# Patient Record
Sex: MALE | Race: WHITE | NOT HISPANIC OR LATINO | Employment: FULL TIME | URBAN - METROPOLITAN AREA
[De-identification: names, ages, dates, MRNs, and addresses within clinical notes are randomized per-mention and may not be internally consistent; named-entity substitution may affect disease eponyms.]

---

## 2017-01-03 ENCOUNTER — PATIENT MESSAGE (OUTPATIENT)
Dept: OPHTHALMOLOGY | Facility: CLINIC | Age: 57
End: 2017-01-03

## 2017-01-05 ENCOUNTER — TELEPHONE (OUTPATIENT)
Dept: OPTOMETRY | Facility: CLINIC | Age: 57
End: 2017-01-05

## 2017-01-06 ENCOUNTER — TELEPHONE (OUTPATIENT)
Dept: OPHTHALMOLOGY | Facility: CLINIC | Age: 57
End: 2017-01-06

## 2017-01-06 NOTE — TELEPHONE ENCOUNTER
I spoke to patient.  He had several questions regarding his upcoming surgery. Including no food and drink except water/gatorade after 9pm Sunday.  NO COFFEE.  Arrival time 8:30am.

## 2017-01-06 NOTE — TELEPHONE ENCOUNTER
----- Message from Cynthia Cash sent at 1/5/2017  2:55 PM CST -----  Contact: Patient  Patient has some questions about upcoming surgery

## 2017-01-09 ENCOUNTER — ANESTHESIA (OUTPATIENT)
Dept: SURGERY | Facility: OTHER | Age: 57
End: 2017-01-09
Payer: COMMERCIAL

## 2017-01-09 ENCOUNTER — ANESTHESIA EVENT (OUTPATIENT)
Dept: SURGERY | Facility: OTHER | Age: 57
End: 2017-01-09
Payer: COMMERCIAL

## 2017-01-09 PROBLEM — H25.10 NUCLEAR SCLEROSIS: Status: ACTIVE | Noted: 2017-01-09

## 2017-01-09 PROCEDURE — 63600175 PHARM REV CODE 636 W HCPCS: Performed by: NURSE ANESTHETIST, CERTIFIED REGISTERED

## 2017-01-09 RX ORDER — MIDAZOLAM HYDROCHLORIDE 1 MG/ML
INJECTION INTRAMUSCULAR; INTRAVENOUS
Status: DISCONTINUED | OUTPATIENT
Start: 2017-01-09 | End: 2017-01-09

## 2017-01-09 RX ADMIN — MIDAZOLAM HYDROCHLORIDE 2 MG: 1 INJECTION, SOLUTION INTRAMUSCULAR; INTRAVENOUS at 11:01

## 2017-01-09 NOTE — ANESTHESIA PREPROCEDURE EVALUATION
01/09/2017  Adam Dalal is a 56 y.o., male.    [facility] Anesthesia Evaluation    I have reviewed the Patient Summary Reports.    I have reviewed the Nursing Notes.   I have reviewed the Medications.     Review of Systems  Anesthesia Hx:  No problems with previous Anesthesia    Social:  Former Smoker    Cardiovascular:   Exercise tolerance: good    Pulmonary:   Sleep Apnea    Hepatic/GI:  Hepatic/GI Normal    Psych:   anxiety          Physical Exam  General:  Well nourished                 Anesthesia Plan  Type of Anesthesia, risks & benefits discussed:  Anesthesia Type:  MAC  Patient's Preference:   Intra-op Monitoring Plan:   Intra-op Monitoring Plan Comments:   Post Op Pain Control Plan:   Post Op Pain Control Plan Comments:   Induction:   IV  Beta Blocker:         Informed Consent: Patient understands risks and agrees with Anesthesia plan.  Questions answered. Anesthesia consent signed with patient.  ASA Score: 2     Day of Surgery Review of History & Physical:    H&P update referred to the surgeon.         Ready For Surgery From Anesthesia Perspective.

## 2017-01-09 NOTE — ANESTHESIA POSTPROCEDURE EVALUATION
"Anesthesia Post Evaluation    Patient: Adam Dalal    Procedure(s) Performed: Procedure(s) (LRB):  PHACOEMULSIFICATION-ASPIRATION-CATARACT (Right)  INSERTION-INTRAOCULAR LENS (IOL) (Right)    Final Anesthesia Type: MAC  Patient location during evaluation: [facility]  Patient participation: Yes- Able to Participate  Level of consciousness: awake and alert  Post-procedure vital signs: reviewed and stable  Pain management: adequate  Airway patency: patent  PONV status at discharge: No PONV  Anesthetic complications: no      Cardiovascular status: blood pressure returned to baseline  Respiratory status: unassisted  Hydration status: euvolemic  Follow-up not needed.        Visit Vitals    /81 (BP Location: Left arm, Patient Position: Lying, BP Method: Automatic)    Pulse 64    Temp 36.5 °C (97.7 °F) (Oral)    Resp 17    Ht 6' 1" (1.854 m)    Wt 99.8 kg (220 lb)    SpO2 99%    BMI 29.03 kg/m2       Pain/Bárbara Score: Pain Assessment Performed: Yes (1/9/2017  9:16 AM)  Presence of Pain: denies (1/9/2017  9:16 AM)      "

## 2017-01-10 ENCOUNTER — OFFICE VISIT (OUTPATIENT)
Dept: OPHTHALMOLOGY | Facility: CLINIC | Age: 57
End: 2017-01-10
Attending: OPHTHALMOLOGY
Payer: COMMERCIAL

## 2017-01-10 DIAGNOSIS — Z98.890 POST-OPERATIVE STATE: Primary | ICD-10-CM

## 2017-01-10 DIAGNOSIS — H25.11 NUCLEAR SCLEROTIC CATARACT OF RIGHT EYE: ICD-10-CM

## 2017-01-10 PROCEDURE — 99024 POSTOP FOLLOW-UP VISIT: CPT | Mod: S$GLB,,, | Performed by: OPHTHALMOLOGY

## 2017-01-10 PROCEDURE — 99999 PR PBB SHADOW E&M-EST. PATIENT-LVL I: CPT | Mod: PBBFAC,,, | Performed by: OPHTHALMOLOGY

## 2017-01-17 ENCOUNTER — OFFICE VISIT (OUTPATIENT)
Dept: OPHTHALMOLOGY | Facility: CLINIC | Age: 57
End: 2017-01-17
Attending: OPHTHALMOLOGY
Payer: COMMERCIAL

## 2017-01-17 DIAGNOSIS — Z98.890 POST-OPERATIVE STATE: Primary | ICD-10-CM

## 2017-01-17 DIAGNOSIS — H25.13 NUCLEAR SCLEROSIS, BILATERAL: ICD-10-CM

## 2017-01-17 PROCEDURE — 99024 POSTOP FOLLOW-UP VISIT: CPT | Mod: S$GLB,,, | Performed by: OPHTHALMOLOGY

## 2017-01-17 PROCEDURE — 99999 PR PBB SHADOW E&M-EST. PATIENT-LVL I: CPT | Mod: PBBFAC,,, | Performed by: OPHTHALMOLOGY

## 2017-01-17 RX ORDER — TETRACAINE HYDROCHLORIDE 5 MG/ML
1 SOLUTION OPHTHALMIC
Status: CANCELLED | OUTPATIENT
Start: 2017-01-17

## 2017-01-17 RX ORDER — TROPICAMIDE 10 MG/ML
1 SOLUTION/ DROPS OPHTHALMIC
Status: CANCELLED | OUTPATIENT
Start: 2017-01-17

## 2017-01-17 RX ORDER — LIDOCAINE HYDROCHLORIDE 10 MG/ML
1 INJECTION, SOLUTION EPIDURAL; INFILTRATION; INTRACAUDAL; PERINEURAL ONCE
Status: CANCELLED | OUTPATIENT
Start: 2017-01-17 | End: 2017-01-17

## 2017-01-17 RX ORDER — PHENYLEPHRINE HYDROCHLORIDE 25 MG/ML
1 SOLUTION/ DROPS OPHTHALMIC
Status: CANCELLED | OUTPATIENT
Start: 2017-01-17

## 2017-01-17 RX ORDER — DORZOLAMIDE HYDROCHLORIDE AND TIMOLOL MALEATE 20; 5 MG/ML; MG/ML
1 SOLUTION/ DROPS OPHTHALMIC 2 TIMES DAILY
Qty: 10 ML | Refills: 3 | Status: SHIPPED | OUTPATIENT
Start: 2017-01-17 | End: 2018-06-25

## 2017-01-17 RX ORDER — MOXIFLOXACIN 5 MG/ML
1 SOLUTION/ DROPS OPHTHALMIC
Status: CANCELLED | OUTPATIENT
Start: 2017-01-17

## 2017-01-17 NOTE — PROGRESS NOTES
HPI     S/P  CE with IOL OD 01/09/2017 ([facility] -.5-.75)    Pt states he is seeing lots of rings in VA OD in all types of lighting   conditions. His distance VA OD is a little blurred. His OS at distance is   still the clearest and he is a little unsure if that was to be the desired   outcome.     PGN tid OD       Last edited by Blank Hebert on 1/17/2017  2:02 PM.         Assessment /Plan     For exam results, see Encounter Report.    Post-operative state    Nuclear sclerosis, bilateral    Other orders  -     dorzolamide-timolol 2-0.5% (COSOPT) 22.3-6.8 mg/mL ophthalmic solution; Place 1 drop into both eyes 2 (two) times daily.  Dispense: 10 mL; Refill: 3    Slit lamp exam:  L/L: nl  K: clear, wound sealed  AC: trace cell  Iris/Lens: IOL centered and stable    POW1 s/p phaco: Surgery healing well with no signs of infection or abnormal inflammation.    Patient wishes to proceed with surgery in the second eye. Risks, benefits, alternatives reviewed. IOL selection reviewed.     Left eye  IOL: [id number] 16.0, 16.5, 17.0, 17.5 (plano)    The patient expresses a desire to reduce spectacle dependence. I reviewed various IOL and LASER refractive surgical options and we will attempt to minimize spectacle dependence by managing astigmatism and optimizing IOL selection. Femtosecond LASER assisted cataract surgery (FLACS) technology was explained to the patient with educational videos and discussion.  The patient voices understanding and wishes to implement this technology during the cataract procedure.  I explained the increased precision of the LASER versus manual techniques, especially as it relates to astigmatism reduction with arcuate incisions.  I emphasized that although our goal is to reduce the need for refractive correction after surgery, there may still be a need for spectacle correction to achieve optimal visual acuity, and that a reasonable range of functional vision should be the expectation.  No guarantees  are made about post operative refraction or visual acuity, as the eye may heal in unpredictable ways, and the standard risks, benefits, and alternatives to cataract surgery were explained.  The patient understands that the refractive portions of this cataract procedure are not covered by insurance, and that there is an out of pocket expense of $2250 per eye. I also explained that even though our pre-operative plan is to utilize advanced refractive technologies during surgery, that I may decide to eliminate part or all of this plan if surgical challenges or complications arise, or I feel that it is not in the patient's best interest. Consent forms and an ABN form were given to the patient to review.    Catalys Parameters:    Left Eye:   ANTHONY:  12mm   ?Need to sachin patient sitting up?: Yes  Capsulotomy: Scanned Capsule  stGstrstastdstest:st st1st Arcuate: Toric Sachin: at  Axis: 105   Incisions:  OFF

## 2017-01-26 ENCOUNTER — PATIENT MESSAGE (OUTPATIENT)
Dept: OPHTHALMOLOGY | Facility: CLINIC | Age: 57
End: 2017-01-26

## 2017-01-26 ENCOUNTER — TELEPHONE (OUTPATIENT)
Dept: OPTOMETRY | Facility: CLINIC | Age: 57
End: 2017-01-26

## 2017-01-30 ENCOUNTER — ANESTHESIA (OUTPATIENT)
Dept: SURGERY | Facility: OTHER | Age: 57
End: 2017-01-30
Payer: COMMERCIAL

## 2017-01-30 ENCOUNTER — ANESTHESIA EVENT (OUTPATIENT)
Dept: SURGERY | Facility: OTHER | Age: 57
End: 2017-01-30
Payer: COMMERCIAL

## 2017-01-30 PROBLEM — H25.12 NUCLEAR SCLEROTIC CATARACT OF LEFT EYE: Status: ACTIVE | Noted: 2017-01-30

## 2017-01-30 PROBLEM — Z98.890 POST-OPERATIVE STATE: Status: ACTIVE | Noted: 2017-01-30

## 2017-01-30 PROCEDURE — 63600175 PHARM REV CODE 636 W HCPCS: Performed by: NURSE ANESTHETIST, CERTIFIED REGISTERED

## 2017-01-30 RX ORDER — MIDAZOLAM HYDROCHLORIDE 1 MG/ML
INJECTION INTRAMUSCULAR; INTRAVENOUS
Status: DISCONTINUED | OUTPATIENT
Start: 2017-01-30 | End: 2017-01-30

## 2017-01-30 RX ADMIN — MIDAZOLAM HYDROCHLORIDE 2 MG: 1 INJECTION, SOLUTION INTRAMUSCULAR; INTRAVENOUS at 02:01

## 2017-01-30 RX ADMIN — MIDAZOLAM HYDROCHLORIDE 1 MG: 1 INJECTION, SOLUTION INTRAMUSCULAR; INTRAVENOUS at 02:01

## 2017-01-30 NOTE — ANESTHESIA PREPROCEDURE EVALUATION
01/30/2017  Adam Dalal is a 56 y.o., male.    [facility] Anesthesia Evaluation    I have reviewed the Patient Summary Reports.    I have reviewed the Nursing Notes.   I have reviewed the Medications.     Review of Systems  Anesthesia Hx:  No problems with previous Anesthesia    Social:  Former Smoker    Cardiovascular:   Exercise tolerance: good    Pulmonary:   Sleep Apnea    Hepatic/GI:  Hepatic/GI Normal    Psych:   anxiety          Physical Exam  General:  Well nourished                 Anesthesia Plan  Type of Anesthesia, risks & benefits discussed:  Anesthesia Type:  MAC  Patient's Preference:   Intra-op Monitoring Plan:   Intra-op Monitoring Plan Comments:   Post Op Pain Control Plan:   Post Op Pain Control Plan Comments:   Induction:   IV  Beta Blocker:         Informed Consent: Patient understands risks and agrees with Anesthesia plan.  Questions answered. Anesthesia consent signed with patient.  ASA Score: 2     Day of Surgery Review of History & Physical:    H&P update referred to the surgeon.     Anesthesia Plan Notes: Did well 3 weeks ago with cat ext        Ready For Surgery From Anesthesia Perspective.

## 2017-01-30 NOTE — ANESTHESIA POSTPROCEDURE EVALUATION
"Anesthesia Post Evaluation    Patient: Adam Dalal    Procedure(s) Performed: Procedure(s) (LRB):  PHACOEMULSIFICATION-ASPIRATION-CATARACT (Left)  INSERTION-INTRAOCULAR LENS (IOL) (Left)    Final Anesthesia Type: MAC  Patient location during evaluation: [facility]  Patient participation: Yes- Able to Participate  Level of consciousness: awake and alert  Post-procedure vital signs: reviewed and stable  Pain management: adequate  Airway patency: patent  PONV status at discharge: No PONV  Anesthetic complications: no      Cardiovascular status: blood pressure returned to baseline  Respiratory status: unassisted, spontaneous ventilation and room air  Hydration status: euvolemic  Follow-up not needed.        Visit Vitals    /78 (BP Location: Left arm, Patient Position: Sitting, BP Method: Automatic)    Pulse 63    Temp 36.6 °C (97.9 °F) (Oral)    Resp 18    Ht 6' 1" (1.854 m)    Wt 99.8 kg (220 lb)    SpO2 97%    BMI 29.03 kg/m2       Pain/Bárbara Score: Presence of Pain: denies (1/30/2017 12:29 PM)      "

## 2017-01-31 ENCOUNTER — OFFICE VISIT (OUTPATIENT)
Dept: OPHTHALMOLOGY | Facility: CLINIC | Age: 57
End: 2017-01-31
Attending: OPHTHALMOLOGY
Payer: COMMERCIAL

## 2017-01-31 DIAGNOSIS — Z98.890 POST-OPERATIVE STATE: Primary | ICD-10-CM

## 2017-01-31 DIAGNOSIS — H25.12 NUCLEAR SCLEROTIC CATARACT OF LEFT EYE: ICD-10-CM

## 2017-01-31 DIAGNOSIS — H40.053 OCULAR HYPERTENSION, BILATERAL: ICD-10-CM

## 2017-01-31 PROCEDURE — 99999 PR PBB SHADOW E&M-EST. PATIENT-LVL I: CPT | Mod: PBBFAC,,, | Performed by: OPHTHALMOLOGY

## 2017-01-31 PROCEDURE — 99024 POSTOP FOLLOW-UP VISIT: CPT | Mod: S$GLB,,, | Performed by: OPHTHALMOLOGY

## 2017-01-31 NOTE — PROGRESS NOTES
HPI     Post-op Evaluation    Additional comments: 1 day           Comments   S/P Phaco w/IOL OS 1/30/17 Dr. Marin  ([facility])    Pt states doing great .  Could read billboards, even the small print on   them while riding in this AM. Was also able to read the newspaper without   his glasses this AM for the 1st time ever.     Eye meds: P/G/N  TID         Last edited by lBank Hebert on 1/31/2017  9:37 AM. (History)            Assessment /Plan     For exam results, see Encounter Report.    Post-operative state    Nuclear sclerotic cataract of left eye    Ocular hypertension, bilateral      Slit lamp exam:  L/L: nl  K: clear, wound sealed  AC: 1+ cell  Lens: IOL centered and stable    POD1 s/p Phaco/IOL  Appropriate precautions and post op medications reviewed.  Patient instructed to call or come in if symptoms of redness, decreased vision, or pain are experienced.

## 2017-02-14 RX ORDER — ALPRAZOLAM 0.5 MG/1
TABLET ORAL
Qty: 30 TABLET | Refills: 0 | Status: SHIPPED | OUTPATIENT
Start: 2017-02-14 | End: 2017-08-09 | Stop reason: SDUPTHER

## 2017-02-22 ENCOUNTER — PATIENT MESSAGE (OUTPATIENT)
Dept: OPHTHALMOLOGY | Facility: CLINIC | Age: 57
End: 2017-02-22

## 2017-02-22 ENCOUNTER — TELEPHONE (OUTPATIENT)
Dept: OPHTHALMOLOGY | Facility: CLINIC | Age: 57
End: 2017-02-22

## 2017-02-22 NOTE — TELEPHONE ENCOUNTER
----- Message from Clayton Ruffin sent at 2/22/2017  1:09 PM CST -----  Contact: Adam Dalal [[id number]]  Pt states that he lost one of the bottles of his drops he was advised to take post Sx for a month,pt wants to know if more needs to be or is he ok with what he has left for the next couple of days, please call back at [phone],thanks

## 2017-03-03 ENCOUNTER — OFFICE VISIT (OUTPATIENT)
Dept: OPTOMETRY | Facility: CLINIC | Age: 57
End: 2017-03-03
Payer: COMMERCIAL

## 2017-03-03 DIAGNOSIS — H40.053 OCULAR HYPERTENSION, BILATERAL: ICD-10-CM

## 2017-03-03 DIAGNOSIS — Z98.41 S/P BILATERAL CATARACT EXTRACTION: Primary | ICD-10-CM

## 2017-03-03 DIAGNOSIS — Z98.42 S/P BILATERAL CATARACT EXTRACTION: Primary | ICD-10-CM

## 2017-03-03 PROCEDURE — 99999 PR PBB SHADOW E&M-EST. PATIENT-LVL II: CPT | Mod: PBBFAC,,, | Performed by: OPTOMETRIST

## 2017-03-03 PROCEDURE — 92134 CPTRZ OPH DX IMG PST SGM RTA: CPT | Mod: S$GLB,,, | Performed by: OPTOMETRIST

## 2017-03-03 PROCEDURE — 99024 POSTOP FOLLOW-UP VISIT: CPT | Mod: S$GLB,,, | Performed by: OPTOMETRIST

## 2017-03-03 NOTE — PROGRESS NOTES
HPI     01/09/2017 IMPLANT: [id number] 16.0  Dr. Marin OD  01/30/2017 IMPLANT: [id number] 16.0 Dr. Marin OS  With Femtosecond LASER assist OU    Pt states: says his vision is better during the day, still seeing patterns   of the lens implant at night around lights.. Has been using systane gtts   ou prn. Not using any reading glasses. Says the monovision is noticeable,   that his brain has not caught up with his vision. Says floaters have   resolved od  PATIENT'S LAST DFE WAS 10/26/2016        Last edited by Amanda Dawn, PCT on 3/3/2017 11:04 AM.     ROS     Positive for: Eyes    Negative for: Constitutional, Gastrointestinal, Neurological, Skin,   Genitourinary, Musculoskeletal, HENT, Endocrine, Cardiovascular,   Respiratory, Psychiatric, Allergic/Imm, Heme/Lymph    Last edited by Valentina John, OD on 3/3/2017 11:33 AM. (History)        Assessment /Plan     For exam results, see Encounter Report.    S/P bilateral cataract extraction  -     OCT- Retina    Ocular hypertension, bilateral            1.  Pt doing well.  Still adjusting to halos and monovision.  No rx given.  No CME OU.  RTC 1 month for YAG OU-Symfoni IOLs.  2.  Continue scheduled follow-up with Dr. Villegas.

## 2017-05-17 ENCOUNTER — PROCEDURE VISIT (OUTPATIENT)
Dept: OPHTHALMOLOGY | Facility: CLINIC | Age: 57
End: 2017-05-17
Payer: COMMERCIAL

## 2017-05-17 DIAGNOSIS — H26.493 POSTERIOR CAPSULAR OPACIFICATION, BILATERAL: Primary | ICD-10-CM

## 2017-05-17 PROCEDURE — 66821 AFTER CATARACT LASER SURGERY: CPT | Mod: 50,S$GLB,, | Performed by: OPHTHALMOLOGY

## 2017-05-17 PROCEDURE — 92012 INTRM OPH EXAM EST PATIENT: CPT | Mod: 57,S$GLB,, | Performed by: OPHTHALMOLOGY

## 2017-05-17 NOTE — MR AVS SNAPSHOT
[facility] - Ophthalmology  [address]  [facility] [address]  Phone: [phone]  Fax: [phone]                  Adam Dalal   2017 3:00 PM   Procedure visit    Description:  Male : 1960   Provider:  Jason Marin MD   Department:  [facility]           Reason for Visit     Eye Problem           Diagnoses this Visit        Comments    Posterior capsular opacification, bilateral    -  Primary            To Do List           Goals (5 Years of Data)     None      [facility]     [facility] On Call Nurse Care Line -  Assistance  Unless otherwise directed by your provider, please contact [facility], our nurse care line that is available for  assistance.     Registered nurses in the [facility] Center provide: appointment scheduling, clinical advisement, health education, and other advisory services.  Call: [phone] (toll free)               Medications           Message regarding Medications     Verify the changes and/or additions to your medication regime listed below are the same as discussed with your clinician today.  If any of these changes or additions are incorrect, please notify your healthcare provider.             Verify that the below list of medications is an accurate representation of the medications you are currently taking.  If none reported, the list may be blank. If incorrect, please contact your healthcare provider. Carry this list with you in case of emergency.           Current Medications     alprazolam (XANAX) 0.5 MG tablet TAKE 1 TABLET(0.5 MG) BY MOUTH EVERY NIGHT    dorzolamide-timolol 2-0.5% (COSOPT) 22.3-6.8 mg/mL ophthalmic solution Place 1 drop into both eyes 2 (two) times daily.    pravastatin (PRAVACHOL) 80 MG tablet Take 1 tablet (80 mg total) by mouth every morning.           Clinical Reference Information           Allergies as of 2017     No Known Allergies      Immunizations Administered on Date of  Encounter - 5/17/2017     None      Language Assistance Services     ATTENTION: Language assistance services are available, free of charge. Please call [phone].      ATENCIÓN: Si habla joce, tiene a vizcaino disposición servicios gratuitos de asistencia lingüística. Llame al [phone].     CHÚ Ý: N?u b?n nói Ti?ng Vi?t, có các d?ch v? h? tr? ngôn ng? mi?n phí dành cho b?n. G?i s? [phone].         [facility] complies with applicable Federal civil rights laws and does not discriminate on the basis of race, color, national origin, age, disability, or sex.

## 2017-05-17 NOTE — PROGRESS NOTES
HPI     Eye Problem    Additional comments: referred by Dr. John           Comments   DLS 3/3/17    S/P 01/09/2017 IMPLANT: [id number] 16.0  Dr. Marin OD  S/P 01/30/2017 IMPLANT: [id number] 16.0 Dr. Marin OS    Pt states here for YAG laser OU. States his VA for intermediate range is   blurred. Still adjusting to the monovision.         Last edited by Blank Hebert on 5/17/2017  3:03 PM. (History)            Assessment /Plan     For exam results, see Encounter Report.    Posterior capsular opacification, bilateral      Visually significant posterior capsular opacity present.  Discussed risks, benefits, and alternatives to laser surgery.  YAG laser capsulotomy Procedure Note:   Informed consent obtained and correct eye(s) verified with patient.  1 drop of topical Proparacaine and Iopidine instilled, and eye(s) dilated with 1% Tropicamide 2.5% Phenylephrine.  YAG laser applied to posterior capsule in cruciate pattern OU  Patient tolerated procedure well. No complications. Follow up in 1 month/PRN.

## 2017-05-19 ENCOUNTER — PATIENT MESSAGE (OUTPATIENT)
Dept: OPHTHALMOLOGY | Facility: CLINIC | Age: 57
End: 2017-05-19

## 2017-08-07 ENCOUNTER — PATIENT MESSAGE (OUTPATIENT)
Dept: INTERNAL MEDICINE | Facility: CLINIC | Age: 57
End: 2017-08-07

## 2017-08-07 DIAGNOSIS — Z00.00 ROUTINE MEDICAL EXAM: Primary | ICD-10-CM

## 2017-08-09 ENCOUNTER — LAB VISIT (OUTPATIENT)
Dept: LAB | Facility: HOSPITAL | Age: 57
End: 2017-08-09
Attending: INTERNAL MEDICINE
Payer: COMMERCIAL

## 2017-08-09 ENCOUNTER — PATIENT MESSAGE (OUTPATIENT)
Dept: INTERNAL MEDICINE | Facility: CLINIC | Age: 57
End: 2017-08-09

## 2017-08-09 ENCOUNTER — OFFICE VISIT (OUTPATIENT)
Dept: INTERNAL MEDICINE | Facility: CLINIC | Age: 57
End: 2017-08-09
Payer: COMMERCIAL

## 2017-08-09 VITALS
HEART RATE: 72 BPM | HEIGHT: 73 IN | WEIGHT: 207 LBS | BODY MASS INDEX: 27.43 KG/M2 | DIASTOLIC BLOOD PRESSURE: 88 MMHG | SYSTOLIC BLOOD PRESSURE: 126 MMHG

## 2017-08-09 DIAGNOSIS — N42.81: ICD-10-CM

## 2017-08-09 DIAGNOSIS — Z00.00 ROUTINE MEDICAL EXAM: ICD-10-CM

## 2017-08-09 DIAGNOSIS — R35.0 FREQUENCY OF MICTURITION: Primary | ICD-10-CM

## 2017-08-09 LAB
ALBUMIN SERPL BCP-MCNC: 4.2 G/DL
ALP SERPL-CCNC: 59 U/L
ALT SERPL W/O P-5'-P-CCNC: 26 U/L
ANION GAP SERPL CALC-SCNC: 11 MMOL/L
AST SERPL-CCNC: 26 U/L
BASOPHILS # BLD AUTO: 0.05 K/UL
BASOPHILS NFR BLD: 0.8 %
BILIRUB SERPL-MCNC: 1.2 MG/DL
BILIRUB SERPL-MCNC: NORMAL MG/DL
BLOOD URINE, POC: NORMAL
BUN SERPL-MCNC: 14 MG/DL
CALCIUM SERPL-MCNC: 9.4 MG/DL
CHLORIDE SERPL-SCNC: 103 MMOL/L
CHOLEST/HDLC SERPL: 3.4 {RATIO}
CO2 SERPL-SCNC: 27 MMOL/L
COLOR, POC UA: NORMAL
COMPLEXED PSA SERPL-MCNC: 0.43 NG/ML
CREAT SERPL-MCNC: 1.1 MG/DL
DIFFERENTIAL METHOD: ABNORMAL
EOSINOPHIL # BLD AUTO: 0.1 K/UL
EOSINOPHIL NFR BLD: 0.9 %
ERYTHROCYTE [DISTWIDTH] IN BLOOD BY AUTOMATED COUNT: 13.6 %
EST. GFR  (AFRICAN AMERICAN): >60 ML/MIN/1.73 M^2
EST. GFR  (NON AFRICAN AMERICAN): >60 ML/MIN/1.73 M^2
GLUCOSE SERPL-MCNC: 115 MG/DL
GLUCOSE UR QL STRIP: NORMAL
HCT VFR BLD AUTO: 43.4 %
HDL/CHOLESTEROL RATIO: 29.2 %
HDLC SERPL-MCNC: 192 MG/DL
HDLC SERPL-MCNC: 56 MG/DL
HGB BLD-MCNC: 15 G/DL
KETONES UR QL STRIP: NORMAL
LDLC SERPL CALC-MCNC: 119.2 MG/DL
LEUKOCYTE ESTERASE URINE, POC: NORMAL
LYMPHOCYTES # BLD AUTO: 1.4 K/UL
LYMPHOCYTES NFR BLD: 22.2 %
MCH RBC QN AUTO: 31.6 PG
MCHC RBC AUTO-ENTMCNC: 34.6 G/DL
MCV RBC AUTO: 92 FL
MONOCYTES # BLD AUTO: 0.7 K/UL
MONOCYTES NFR BLD: 10.3 %
NEUTROPHILS # BLD AUTO: 4.2 K/UL
NEUTROPHILS NFR BLD: 65.2 %
NITRITE, POC UA: NORMAL
NONHDLC SERPL-MCNC: 136 MG/DL
PH, POC UA: 8
PLATELET # BLD AUTO: 179 K/UL
PMV BLD AUTO: 12 FL
POTASSIUM SERPL-SCNC: 4.9 MMOL/L
PROT SERPL-MCNC: 7.8 G/DL
PROTEIN, POC: NORMAL
RBC # BLD AUTO: 4.74 M/UL
SODIUM SERPL-SCNC: 141 MMOL/L
SPECIFIC GRAVITY, POC UA: 1
TRIGL SERPL-MCNC: 84 MG/DL
UROBILINOGEN, POC UA: NORMAL
WBC # BLD AUTO: 6.43 K/UL

## 2017-08-09 PROCEDURE — 99999 PR PBB SHADOW E&M-EST. PATIENT-LVL III: CPT | Mod: PBBFAC,,, | Performed by: INTERNAL MEDICINE

## 2017-08-09 PROCEDURE — 36415 COLL VENOUS BLD VENIPUNCTURE: CPT

## 2017-08-09 PROCEDURE — 99214 OFFICE O/P EST MOD 30 MIN: CPT | Mod: 25,S$GLB,, | Performed by: INTERNAL MEDICINE

## 2017-08-09 PROCEDURE — 83036 HEMOGLOBIN GLYCOSYLATED A1C: CPT

## 2017-08-09 PROCEDURE — 3008F BODY MASS INDEX DOCD: CPT | Mod: S$GLB,,, | Performed by: INTERNAL MEDICINE

## 2017-08-09 PROCEDURE — 80053 COMPREHEN METABOLIC PANEL: CPT

## 2017-08-09 PROCEDURE — 84153 ASSAY OF PSA TOTAL: CPT

## 2017-08-09 PROCEDURE — 80061 LIPID PANEL: CPT

## 2017-08-09 PROCEDURE — 81001 URINALYSIS AUTO W/SCOPE: CPT | Mod: S$GLB,,, | Performed by: INTERNAL MEDICINE

## 2017-08-09 PROCEDURE — 85025 COMPLETE CBC W/AUTO DIFF WBC: CPT

## 2017-08-09 RX ORDER — ALPRAZOLAM 0.5 MG/1
TABLET ORAL
Qty: 30 TABLET | Refills: 5 | Status: SHIPPED | OUTPATIENT
Start: 2017-08-09 | End: 2018-02-07 | Stop reason: SDUPTHER

## 2017-08-09 RX ORDER — SULFAMETHOXAZOLE AND TRIMETHOPRIM 800; 160 MG/1; MG/1
1 TABLET ORAL 2 TIMES DAILY
Qty: 20 TABLET | Refills: 0 | Status: SHIPPED | OUTPATIENT
Start: 2017-08-09 | End: 2017-08-19

## 2017-08-09 RX ORDER — PRAVASTATIN SODIUM 80 MG/1
80 TABLET ORAL EVERY MORNING
Qty: 90 TABLET | Refills: 6 | Status: SHIPPED | OUTPATIENT
Start: 2017-08-09 | End: 2017-10-09

## 2017-08-09 NOTE — PROGRESS NOTES
Subjective:       Patient ID: Adam Dalal is a 57 y.o. male.    Chief Complaint: Urinary Frequency    History of present illness: 57-year-old some lower abdominal pressure and urination for about 2 weeks.  He says his bowel movements have been normal.  Occasional increased urination at night and occasional warmth with urination.  No discharge.  No significant back pain fever nausea or vomiting.  Office dipstick urine was unremarkable      Urinary Frequency    Associated symptoms include frequency. Pertinent negatives include no chills, nausea, vomiting, constipation or rash.     Review of Systems   Constitutional: Negative for chills, fatigue, fever and unexpected weight change.   HENT: Negative for trouble swallowing.    Eyes: Negative for visual disturbance.   Respiratory: Negative for cough, shortness of breath and wheezing.    Cardiovascular: Negative for chest pain and palpitations.   Gastrointestinal: Positive for abdominal pain. Negative for constipation, diarrhea, nausea and vomiting.   Genitourinary: Positive for dysuria and frequency. Negative for difficulty urinating.   Musculoskeletal: Negative for neck pain.   Skin: Negative for rash.   Neurological: Negative for dizziness and headaches.       Objective:      Physical Exam   Constitutional: He is oriented to person, place, and time. He appears well-developed and well-nourished. No distress.   HENT:   Head: Normocephalic and atraumatic.   Mouth/Throat: No oropharyngeal exudate.   TM's clear, pharynx clear   Eyes: Conjunctivae and EOM are normal. Pupils are equal, round, and reactive to light. No scleral icterus.   Crossed right eye     Neck: Normal range of motion. Neck supple. No thyromegaly present.   No supraclavicular nodes palpated   Cardiovascular: Normal rate, regular rhythm and normal heart sounds.    No murmur heard.  Pulmonary/Chest: Effort normal and breath sounds normal. He has no wheezes.   Abdominal: Soft. Bowel sounds are normal.  He exhibits no mass. There is tenderness (mild suprapubic tenderness). Hernia confirmed negative in the right inguinal area and confirmed negative in the left inguinal area.   Genitourinary: Prostate is enlarged and tender (mild tenderness to palpation).   Musculoskeletal: He exhibits no edema.   Lymphadenopathy:     He has no cervical adenopathy. No inguinal adenopathy noted on the right or left side.   Neurological: He is alert and oriented to person, place, and time.   Skin: No pallor.   Psychiatric: He has a normal mood and affect.       Assessment:       1. Frequency of micturition    2. Tender prostate        Plan:       Adam was seen today for urinary frequency.    Diagnoses and all orders for this visit:    Frequency of micturition  -     POCT urinalysis, dipstick or tablet reag    Tender prostate  -     POCT urinalysis, dipstick or tablet reag    Other orders  -     sulfamethoxazole-trimethoprim 800-160mg (BACTRIM DS) 800-160 mg Tab; Take 1 tablet by mouth 2 (two) times daily.  -     pravastatin (PRAVACHOL) 80 MG tablet; Take 1 tablet (80 mg total) by mouth every morning.  -     alprazolam (XANAX) 0.5 MG tablet; TAKE 1 TABLET(0.5 MG) BY MOUTH EVERY NIGHT        Review labs.  Treat as prostatitis.  Keep appt in next 2 weeks for physical

## 2017-08-10 ENCOUNTER — PATIENT MESSAGE (OUTPATIENT)
Dept: INTERNAL MEDICINE | Facility: CLINIC | Age: 57
End: 2017-08-10

## 2017-08-10 LAB
ESTIMATED AVG GLUCOSE: 108 MG/DL
HBA1C MFR BLD HPLC: 5.4 %

## 2017-08-21 ENCOUNTER — OFFICE VISIT (OUTPATIENT)
Dept: INTERNAL MEDICINE | Facility: CLINIC | Age: 57
End: 2017-08-21
Payer: COMMERCIAL

## 2017-08-21 VITALS
HEIGHT: 73 IN | SYSTOLIC BLOOD PRESSURE: 100 MMHG | HEART RATE: 77 BPM | WEIGHT: 210.13 LBS | DIASTOLIC BLOOD PRESSURE: 72 MMHG | BODY MASS INDEX: 27.85 KG/M2

## 2017-08-21 DIAGNOSIS — F41.9 ANXIETY: ICD-10-CM

## 2017-08-21 DIAGNOSIS — Z00.00 ROUTINE PHYSICAL EXAMINATION: Primary | ICD-10-CM

## 2017-08-21 DIAGNOSIS — R27.8 WORSENED HANDWRITING: ICD-10-CM

## 2017-08-21 DIAGNOSIS — E78.5 HYPERLIPIDEMIA, UNSPECIFIED HYPERLIPIDEMIA TYPE: ICD-10-CM

## 2017-08-21 DIAGNOSIS — R27.8 INCOORDINATION OF EXTREMITY: ICD-10-CM

## 2017-08-21 DIAGNOSIS — R25.1 TREMOR OF RIGHT HAND: ICD-10-CM

## 2017-08-21 DIAGNOSIS — G47.33 OBSTRUCTIVE SLEEP APNEA: ICD-10-CM

## 2017-08-21 PROCEDURE — 99999 PR PBB SHADOW E&M-EST. PATIENT-LVL III: CPT | Mod: PBBFAC,,, | Performed by: INTERNAL MEDICINE

## 2017-08-21 PROCEDURE — 99396 PREV VISIT EST AGE 40-64: CPT | Mod: S$GLB,,, | Performed by: INTERNAL MEDICINE

## 2017-08-21 NOTE — PROGRESS NOTES
Subjective:       Patient ID: Adam Dalal is a 57 y.o. male.    Chief Complaint: Annual Exam    History of present illness: Patient here for follow-up of prostate infection and physical exam.  Labs were reviewed and stable.  He says the antibiotics significantly helped the urine and prostate.  He feels he is essentially back to normal.  He didn't want but a mild scratchy throat for a few days and a longer complaint of hand incoordination on the right, changes in handwriting.  May be some weakness or incoordination of both the right arm and leg.  He has some vision changes with the right eye but that has been chronic and stable.  He sees an ophthalmologist.  No personal or family history of parkinsonism.  He has not tripped or fallen but sometimes feels like the right arm and leg are a little out of sync.      Review of Systems   Constitutional: Negative for chills, fatigue, fever and unexpected weight change.   HENT: Negative for ear pain and sore throat.    Eyes: Negative for pain and visual disturbance.   Respiratory: Negative for cough, shortness of breath and wheezing.    Cardiovascular: Negative for chest pain and leg swelling.   Gastrointestinal: Negative for abdominal pain, constipation, diarrhea, nausea and vomiting.   Genitourinary: Negative for difficulty urinating, frequency and hematuria.   Musculoskeletal: Positive for gait problem. Negative for arthralgias, back pain, myalgias, neck pain and neck stiffness.   Skin: Negative for rash and wound.   Neurological: Negative for dizziness, numbness and headaches.        Mild weakness/incoordination of the right hand and possibly the leg.  Change in handwriting.   Hematological: Negative for adenopathy.   Psychiatric/Behavioral: Negative for dysphoric mood. The patient is nervous/anxious.        Objective:      Physical Exam   Constitutional: He is oriented to person, place, and time. He appears well-developed and well-nourished. No distress.   HENT:    Head: Normocephalic and atraumatic.   Right Ear: External ear normal.   Left Ear: External ear normal.   Mouth/Throat: Oropharynx is clear and moist. No oropharyngeal exudate.   TM's clear, pharynx clear   Eyes: Conjunctivae and EOM are normal. Pupils are equal, round, and reactive to light. No scleral icterus.   Neck: Normal range of motion. Neck supple. No thyromegaly present.   No supraclavicular nodes palpated   Cardiovascular: Normal rate, regular rhythm and normal heart sounds.    No murmur heard.  Pulmonary/Chest: Effort normal and breath sounds normal. He has no wheezes.   Abdominal: Soft. Bowel sounds are normal. He exhibits no mass. There is no tenderness.   Musculoskeletal: He exhibits no edema.   Lymphadenopathy:     He has no cervical adenopathy.   Neurological: He is alert and oriented to person, place, and time.   Patient has reliably to be cogwheeling at the left elbow and wrist.  There is a mild tremor with finger to nose with the right hand.  No lower extremity symptoms.   Skin: No pallor.   Psychiatric: He has a normal mood and affect.       Assessment:       1. Routine physical examination    2. Anxiety    3. Obstructive sleep apnea    4. Hyperlipidemia, unspecified hyperlipidemia type    5. Incoordination of extremity    6. Tremor of right hand    7. Worsened handwriting        Plan:       Adam was seen today for annual exam.    Diagnoses and all orders for this visit:    Routine physical examination    Anxiety    Obstructive sleep apnea    Hyperlipidemia, unspecified hyperlipidemia type    Incoordination of extremity  -     Ambulatory referral to Neurology    Tremor of right hand  -     Ambulatory referral to Neurology    Worsened handwriting  -     Ambulatory referral to Neurology        Follow-up in 6 months, sooner when necessary.  Follow-up after neurology

## 2017-10-09 RX ORDER — PRAVASTATIN SODIUM 80 MG/1
TABLET ORAL
Qty: 90 TABLET | Refills: 0 | Status: SHIPPED | OUTPATIENT
Start: 2017-10-09 | End: 2018-04-16 | Stop reason: SDUPTHER

## 2017-10-23 ENCOUNTER — PATIENT MESSAGE (OUTPATIENT)
Dept: INTERNAL MEDICINE | Facility: CLINIC | Age: 57
End: 2017-10-23

## 2017-11-15 ENCOUNTER — OFFICE VISIT (OUTPATIENT)
Dept: NEUROLOGY | Facility: CLINIC | Age: 57
End: 2017-11-15
Payer: COMMERCIAL

## 2017-11-15 VITALS
WEIGHT: 211.19 LBS | SYSTOLIC BLOOD PRESSURE: 122 MMHG | HEIGHT: 73 IN | BODY MASS INDEX: 27.99 KG/M2 | DIASTOLIC BLOOD PRESSURE: 77 MMHG | HEART RATE: 70 BPM

## 2017-11-15 DIAGNOSIS — G20.A1 PARKINSON'S DISEASE: Primary | ICD-10-CM

## 2017-11-15 PROCEDURE — 99999 PR PBB SHADOW E&M-EST. PATIENT-LVL IV: CPT | Mod: PBBFAC,,, | Performed by: PSYCHIATRY & NEUROLOGY

## 2017-11-15 PROCEDURE — 99205 OFFICE O/P NEW HI 60 MIN: CPT | Mod: S$GLB,,, | Performed by: PSYCHIATRY & NEUROLOGY

## 2017-11-15 RX ORDER — CARBIDOPA AND LEVODOPA 25; 100 MG/1; MG/1
0.5 TABLET ORAL 3 TIMES DAILY
Qty: 45 TABLET | Refills: 11 | Status: SHIPPED | OUTPATIENT
Start: 2017-11-15 | End: 2018-05-10 | Stop reason: SDUPTHER

## 2017-11-15 NOTE — PROGRESS NOTES
[facility] NEUROLOGY  [facility] [place]    Date: 11/15/17  Patient Name: Adam Dalal   MRN: [id number]   PCP: Rashi Gee  Referring Provider: Rashi Gee MD    Assessment:   Adam Dalal is a 57 y.o. male presenting with clinically probable Parkinson's disease.  Will obtain baseline brain imaging to rule out underlying intracranial neuropathology.  Will attempt Sinemet trial and have referred patient for ancillary services as below. Extensive diagnostic counseling completed.   Plan:     Problem List Items Addressed This Visit        Neuro    Parkinson's disease - Primary    Current Assessment & Plan     MRI Brain  Neuropsych referral  PT/OT for LSVT BIG and LOUD  Sinemet Challenge         Relevant Orders    MRI Brain Without Contrast (Completed)    Ambulatory Referral to Physical/Occupational Therapy    Ambulatory Referral to Speech Therapy    Ambulatory referral to Neuropsychology        I spent a total of 65 minutes in face to face time with the patient, over half of which was spent on counseling and education about the patient's diagnosis and medications.     Amol Jerome MD  [facility]   Department of Neurology    Patient note was created using Dragon Dictation.  Any errors in syntax or even information may not have been identified and edited on initial review prior to signing this note.  Subjective:   Patient seen in consultation at the request of Dr. Gee or the evaluation of tremor. A copy of this note will be sent to the referring physician.      HPI:   Mr. Adam Dalal is a 57 y.o. male who presents with a chief complaint of   Treatment.  The patient presents today with his wife who contributes to the history.  They report that over the past year, they have noted the development of a resting tremor.  The patient's right upper extremity.  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  He also admits that his voice is hypophonic.  He has become less expressive over the past year.  He states he often feels rigid in the morning and has trouble initiating movement.  He has frozen several times and also notes postural instability, particularly when stepping backward.  He has had trouble using his computer mouse and coordinating rapid movement.  His wife states that she feels as though he drags his right foot slightly when he walks.  He is more prone to tripping.  They deny cognitive or mood changes, sleep disturbances, behavioral changes, or hallucinations. He denies family history of parkinsonism      PAST MEDICAL HISTORY:  Past Medical History:   Diagnosis Date    Anxiety     Esotropia of right eye 5/2/2013    High cholesterol     Hyperlipidemia        PAST SURGICAL HISTORY:  Past Surgical History:   Procedure Laterality Date    CATARACT EXTRACTION W/  INTRAOCULAR LENS IMPLANT Right 01/09/2017    Dr marin     CATARACT EXTRACTION W/  INTRAOCULAR LENS IMPLANT Left 01/30/2017    Dr. Marin    HERNIA REPAIR      STRABISMUS SURGERY  4yrs    right eye       CURRENT MEDS:  Current Outpatient Prescriptions   Medication Sig Dispense Refill    alprazolam (XANAX) 0.5 MG tablet TAKE 1 TABLET(0.5 MG) BY MOUTH EVERY NIGHT 30 tablet 5    pravastatin (PRAVACHOL) 80 MG tablet TAKE 1 TABLET(80 MG) BY MOUTH EVERY MORNING 90 tablet 0    carbidopa-levodopa  mg (SINEMET)  mg per tablet Take 0.5 tablets by mouth 3 (three) times daily. 45 tablet 11    dorzolamide-timolol 2-0.5% (COSOPT) 22.3-6.8 mg/mL ophthalmic solution Place 1 drop into both eyes 2 (two) times daily. 10 mL 3     No current facility-administered medications for this visit.        ALLERGIES:  Review of patient's allergies indicates:  No Known Allergies    FAMILY HISTORY:  Family History   Problem Relation Age of Onset    Arthritis Mother     Sleep apnea Brother     Arthritis Brother   "   Cataracts Maternal Grandmother     Cataracts Paternal Grandmother     Amblyopia Neg Hx     Blindness Neg Hx     Glaucoma Neg Hx     Macular degeneration Neg Hx     Retinal detachment Neg Hx     Strabismus Neg Hx        SOCIAL HISTORY:  Social History   Substance Use Topics    Smoking status: Former Smoker     Packs/day: 1.00     Years: 1.00     Quit date: 1/1/2006    Smokeless tobacco: Not on file    Alcohol use 12.0 oz/week     20 Glasses of wine per week       Review of Systems:  12 review of systems is negative except for the symptoms mentioned in HPI.      Objective:     Vitals:    11/15/17 0843   BP: 122/77   Pulse: 70   Weight: 95.8 kg (211 lb 3.2 oz)   Height: 6' 1" (1.854 m)     General: NAD, well nourished   Eyes: no tearing, discharge, no erythema   ENT: moist mucous membranes of the oral cavity, nares patent    Neck: Supple, full range of motion  Cardiovascular: Warm and well perfused, pulses equal and symmetrical  Lungs: Normal work of breathing, normal chest wall excursions  Skin: No rash, lesions, or breakdown on exposed skin  Psychiatry: Mood and affect are appropriate   Abdomen: soft, non tender, non distended  Extremeties: No cyanosis, clubbing or edema.    Neurological   MENTAL STATUS: Alert and oriented to person, place, and time. Attention and concentration within normal limits. Speech hypophonic without dysarthria, able to name and repeat without difficulty. Recent and remote memory within normal limits   CRANIAL NERVES: Visual fields intact. PERRL. EOMI. Facial sensation intact. Face symmetrical with hypomimia. Hearing grossly intact. Full shoulder shrug bilaterally. Tongue protrudes midline   SENSORY: Sensation is intact to light touch throughout.    MOTOR: Normal bulk and increased tone in RUE and RLE>L with cogwheeling of RUE. 5/5 deltoid, biceps, triceps, interosseous, hand  bilaterally. 5/5 iliopsoas, knee extension/flexion, foot dorsi/plantarflexion bilaterally. Subtle " resting tremor of RUE  REFLEXES: Symmetric and 1+ throughout  CEREBELLAR/COORDINATION/GAIT: Festinating gait with reduced R arm swing and en bloc turns. Positive pull back test.  Diane slowed R>L.

## 2017-11-15 NOTE — PATIENT INSTRUCTIONS
Common Symptoms of Parkinson Disease  You have Parkinson disease. This disease is caused by a loss of a chemical in your brain needed to help control movement and balance. For reasons that are not clear, cells that make this brain chemical stop working. This causes symptoms. This sheet tells you more about symptoms of Parkinson disease.    How symptoms may affect you  Parkinson disease symptoms vary for each person. You may have many severe symptoms. Or you may have only a few mild ones. Your symptoms may involve only one side of your body. Or they may involve both sides of your body. Also, your symptoms may change over time. And you may have different symptoms at different stages. Your symptoms may also get worse as your disease progresses.  Symptoms that affect movement and balance  These can include:  · Tremor (shaking). This is a very common symptom. Most often, a hand or arm shakes on one or both sides of the body. Tremor may also affect other areas of the body, such as a leg, a foot, or the chin. Shaking may lessen when the affected part is used. It may worsen when at rest.  · Rigidity. This refers to having stiff or tight muscles. This happens because the muscles dont get the signal to relax. Rigidity may cause muscle pain and cramping. It may also cause a stooped posture.  · Problems with balance. This can affect how well you stand and move. This can also increase your risk of falls.  Other symptoms  Other symptoms of Parkinson disease include speaking too softly and in a monotone, writing that gets shaky and smaller across the page, and trouble swallowing. They also include constipation, oily skin, and changes in blood pressure. Memory loss and other problems with thinking can occur later in the disease progression. Bradykinesia--or slow movement--can also occur. This can cause problems with actions such as getting out of chairs and beds. Walking may be limited to short, shuffling steps. You may feel  ""frozen," or unable to move. Blinking, facial expressions, swinging of your arms when walking, and other "unconscious movements" are also slowed down. Some people may have problems with their urination and others may also feel depressed.  Date Last Reviewed: 9/26/2015  © 3625-7631 [employer]. [address] [place] [address]. All rights reserved. This information is not intended as a substitute for professional medical care. Always follow your healthcare professional's instructions.      Exercise for Parkinson's Disease  These exercises can help strengthen your muscles and keep them loose and flexible. Ask your healthcare provider whether theyre right for you. Your healthcare provider or physical therapist may also suggest other exercises.  Do the exercises once a day at first, then build up to several times a day. Exercise slowly, and rest if you feel pain.              Body twist Seated march Back stretch   Body twist  Follow these steps:  · Sit in a chair, facing forward. Place your hands on your shoulders.  · Turn your head and body to the side as far as possible, as if you were trying to look behind you.  · Return to starting position, then turn to the other side.  · Repeat 10 times.  Seated march  Follow these steps:  · Sit in a chair, facing forward.  · Slowly lift one knee as high as you can, then lower your foot to the floor.  · Do the same with your other leg.  · Repeat 10 times with each leg.  Back stretch  Follow these steps:  · Stand or sit with your back straight.  · Hold your arms in front of you. Put your hands and elbows together, hands pointing toward the ceiling.  · Move your arms apart as far as possible, pushing your shoulder blades together.  · Slowly move your hands back together.  · Repeat 10 times.  Date Last Reviewed: 10/11/2015  © 0604-9587 [employer]. [address] [place] [address]. All rights reserved. This information is not intended as " a substitute for professional medical care. Always follow your healthcare professional's instructions.

## 2017-11-15 NOTE — LETTER
November 26, 2017      Rashi Gee MD  [address]  [facility] [address]           [facility] Neurology  [address]  [place] [address]  Phone: [phone]  Fax: [phone]          Patient: Adam Dalal   MR Number: [id number]   YOB: 1960   Date of Visit: 11/15/2017       Dear Dr. Rashi Gee:    Thank you for referring Adam Dalal to me for evaluation. Attached you will find relevant portions of my assessment and plan of care.    If you have questions, please do not hesitate to call me. I look forward to following Adam Dalal along with you.    Sincerely,    Amol Jerome MD    Enclosure  CC:  No Recipients    If you would like to receive this communication electronically, please contact [email or web address] or [phone] to request more information on [employer] access.    For providers and/or their staff who would like to refer a patient to [facility], please contact us through our one-stop-shop provider referral line, [facility], at [phone].    If you feel you have received this communication in error or would no longer like to receive these types of communications, please e-mail [email or web address]

## 2017-11-16 ENCOUNTER — PATIENT MESSAGE (OUTPATIENT)
Dept: NEUROLOGY | Facility: CLINIC | Age: 57
End: 2017-11-16

## 2017-11-17 ENCOUNTER — HOSPITAL ENCOUNTER (OUTPATIENT)
Dept: RADIOLOGY | Facility: OTHER | Age: 57
Discharge: HOME | End: 2017-11-17
Attending: PSYCHIATRY & NEUROLOGY
Payer: COMMERCIAL

## 2017-11-17 DIAGNOSIS — G20.A1 PARKINSON'S DISEASE: ICD-10-CM

## 2017-11-17 PROCEDURE — 70551 MRI BRAIN STEM W/O DYE: CPT | Mod: 26,,, | Performed by: RADIOLOGY

## 2017-11-17 PROCEDURE — 70551 MRI BRAIN STEM W/O DYE: CPT | Mod: TC

## 2017-11-21 ENCOUNTER — CLINICAL SUPPORT (OUTPATIENT)
Dept: REHABILITATION | Facility: HOSPITAL | Age: 57
End: 2017-11-21
Attending: PSYCHIATRY & NEUROLOGY
Payer: COMMERCIAL

## 2017-11-21 DIAGNOSIS — Z74.09 IMPAIRED FUNCTIONAL MOBILITY, BALANCE, GAIT, AND ENDURANCE: ICD-10-CM

## 2017-11-21 DIAGNOSIS — R47.1 DYSARTHRIA: ICD-10-CM

## 2017-11-21 DIAGNOSIS — R49.9 VOICE AND RESONANCE DISORDER: ICD-10-CM

## 2017-11-21 DIAGNOSIS — R49.8 HYPOPHONIA: ICD-10-CM

## 2017-11-21 DIAGNOSIS — Z78.9 IMPAIRED MOTOR CONTROL: ICD-10-CM

## 2017-11-21 PROCEDURE — 97161 PT EVAL LOW COMPLEX 20 MIN: CPT | Mod: PO | Performed by: PHYSICAL THERAPIST

## 2017-11-21 PROCEDURE — 92610 EVALUATE SWALLOWING FUNCTION: CPT | Mod: PO

## 2017-11-21 PROCEDURE — 92522 EVALUATE SPEECH PRODUCTION: CPT | Mod: PO

## 2017-11-21 NOTE — PLAN OF CARE
"Date: 11/21/2017    Start Time:  0900  Stop Time:  0945      OUTPATIENT NEUROLOGICAL REHABILITATION  SPEECH THERAPY EVALUATION    Subjective/History  Onset Date:  Diagnosed November 2017. Symptoms began over the last year.  Primary Diagnosis:  Parkinson's Disease  Treatment Diagnosis:  Hypokinetic Dysarthria  Referring Provider:  Dr. Amol Jerome  Orders:  for evaluation and treat  Current Medical History:  Adam Dalal presents to the [facility] secondary to the diagnosis of Parkinson's Disease. He reported that symptoms began over the past year and that in the last month he has had intermittent stuttering especially when he becomes excited. He also noted that he has always mumbled a little but has notice it more recently especially at the end of his sentences. When he talks with his wife in the evenings, he said that he sometimes will have to repeat himself as she cannot hear him. He had a flat affect and eyes opened widely during the evaluation.  Past Medical History:   Past Medical History:   Diagnosis Date    Anxiety     Esotropia of right eye 5/2/2013    High cholesterol     Hyperlipidemia      Precautions:  General precautions, fall precautions  Prior Therapy:  None  Pain: 0 /10  Nutrition:  Regular diet consistency, thin liquids  Environmental Concerns/Cultural/Spiritual/Developmental/Educational Needs: none  Prior Level of Function: Independent  Social History:  Mr. Dalal lives at home with his wife and two dogs. He currently works for the [employer] in the [employer]. He works in the [email or web address] and does not have contact with the public.   Signs of Abuse: No  Functional Deficits Leading to Referral/Nature of Injury:  Decreased volume, mumbled speech, and intermittent stuttering  Patient Therapy Goals:  "To not head in a direction that it all gets worse."    Objective   Auditory Comprehension: Did not assess but appeared to be " "within functional limits for conversation. No concerns were reported.     Reading Comprehension: Did not assess. No concerns were reported.    Verbal Expression: Did not formally assess. Mr. Dalal spoke in complete, grammatically correct sentences during the evaluation. No concerns were reported.     Written Expression: Did not assess. No concerns were reported.    Cognition: Did not assess but appeared to be within functional limits for conversation. No concerns were reported.     Motor Speech/Fluency/Voice: Oral motor exam revealed: lingual, labial, and velar muscles to be WFL for ROM, strength, and coordination. Diadochokinetic (DDK) rates for rapid repetition of 1 - 3 syllable utterances was WNL. Vocal quality was mildly muffled. Mildly reduced breath-speech coordination was present. Mr. Dalal reported that he often has intermittent stuttering especially when he becomes excited rushing "to get it out" and mumbled speech.      The [place]ay Dysarthria Assessment-2nd Edition was administered to Mr. Dalal to assess his motor speech skills. This test assesses the patient's reflexes, respiration, lips, palate, laryngeal function, tongue, and overall intelligibility. Results are described in the following tables:    Reflexes Respiration Lips Palate Laryngeal Tongue Intelligibility   Normal for Age X X X X X X X   Mild Abnormality          Abnormality obvious but can perform task with reasonable approximation          Some production of task poor in quality, unable to sustain, or extremely labored          Unable to undertake task/ movement/ sound            Description: Reflexes and respiration were WFL. Mr. Dalal was able to sustain an "ah" for an average of 15 seconds which is below the average for his age and gender (norm = 25-35). He was able to change his vocal volume and pitch in a controlled manner, however, mildly decreased prosody and vocal intensity was present during conversation. Mr. Dalal was 100% " "intelligible in this quiet environment. However he reported that his speech intelligibility decreased later in the day.     Swallowing:  Clinical Swallow Study:   A clinical swallow study was performed to assess functionality of the pt's swallow. Mr. Dalal was presented with:  Presentation S/s of aspiration   Tsp thin liquid No   Self regulated sip thin liquid No   Thin liquid via straw  No   Tsp puree No   solid No     Oral Phase: Mr. Dalal maintained an adequate labial seal with no anterior spillage present. His mastication was timely and efficient. Oral residue was present after the cracker, which was cleared with a liquid wash.   Pharyngeal Phase: Laryngeal elevation and excursion was WFL via palpation. Swallow was timely. Vocal quality was clear. No signs and symptoms of aspiration were presented.  Mr. Dalal reports a feeling of "fullness" in his throat especially when eating and drinking. A Modified Barium Swallow Study is recommended to further assess his swallowing function.    Hearing: Appeared to be WFL for conversation in a quiet environment. No concerns were reported.    Assessment/Impressions  Mr. Dalal presented to the [facility] clinic secondary to a diagnosis of Parkinson's disease.  He presents with mild Hypokinetic dysarthria characterized by decreased vocal intensity, muffled vocal quality, mildly decreased prosody, mildly reduced breath-speech coordination, intermittent stuttering especially when he becomes excited rushing "to get it out" and mumbled speech.  No signs or symptoms of aspiration were observed today, however a MBSS is recommended to further assess his swallowing function. Mr. Dalal would benefit from outpatient neurological rehabilitation speech therapy. He would be a good candidate for the LSVT LOUD program (a speech therapy for patients with Parkinson's disease and/or neurological conditions) which focuses on increasing the patient's vocal " intensity and speech clarity over a course of 16 sessions (4 times a week for 4 weeks) including daily homework and carryover exercises. He requested to begin the program in January 2018.    Functional Communication Measure (FCM):   Severity Modifier for Medicare G-Code:  Motor Speech  Current status: FCM:  Level 6   - CI at least 1% but less than 20% impaired, limited or restricted  Projected status:  FCM:   Level 7   - CH 0% impaired, limited or restricted    Swallowing  Current status:  FCM:  Level 7   - CH 0% impaired, limited or restricted  Projected status:  FCM: Level 7   - CH 0% impaired, limited or restricted  Discharge status:  FCM:  Level 7   -  CH 0% impaired, limited or restricted    Short Term Goals (4 weeks):   1. Mr. Dalal will participate in further assessment of his vocal intensity to determine his sound pressure level (SPL, acoustic correlate of vocal loudness) measured with a sound level meter and more specific goals will be determined.  2. Mr. Dalal will use motor speech strategies for speech hierarchy tasks with 90% accuracy IND'ly to increase his speech intelligibility.     Long Term Goals (6 weeks):   1. Mr. Dalal will increase his speech intelligibility to improve functional communication.  2. Mr. Dalal will demonstrate understanding and use of compensatory strategies to improve functional communication.    Rehab Potential: good    Education: Patient was educated on the plan of care. He verbalized and demonstrated understanding and agreed with the plan of care.     Plan  Certification Period: 11/21/17 to 12/31/17  Plan of Care Certification Period: 11/21/17 to 2/16/18 (Pt requested to begin program in January 2018)    Recommended Treatment Plan:  Patient will participate in the [facility] neurological rehabilitation program for LSVT LOUD program 4 times per week to address his speech deficits, educate patient/family, and home exercise program.    Other Recommendations:  1. MBSS to further assess swallowing function.     Radha Levy, BA/BS  SLP  Clinician    I certify that I was present in the room directing the student in service delivery and guiding them using my skilled judgment. As the co-signing therapist I have reviewed the students documentation and am responsible for the treatment, assessment, and plan.    TEO Lewis, CCC-SLP  Speech-Language Pathologist  [facility]  Neurological Rehabilitation        Date: 11/21/2017    I certify the need for these services furnished under this plan of treatment and while under my care.  ____________________________________ Physician/Referring Practitioner   Date of Signature

## 2017-11-21 NOTE — PLAN OF CARE
OUTPATIENT NEUROLOGICAL REHABILITATION  PHYSICAL THERAPY EVALUATION       Parkinson Initial Interview     Identifying Information  Name: Adam Dalal  [facility] Number: [id number]    Medical Diagnosis: G20 (ICD-10-CM) - Parkinson's disease  Encounter Diagnosis:   Encounter Diagnoses   Name Primary?    Impaired functional mobility, balance, gait, and endurance     Impaired motor control      Physician: Amol Jerome MD  Treatment Orders: PT Eval and Treat  Past Medical History:   Diagnosis Date    Anxiety     Esotropia of right eye 5/2/2013    High cholesterol     Hyperlipidemia      Current Outpatient Prescriptions   Medication Sig    alprazolam (XANAX) 0.5 MG tablet TAKE 1 TABLET(0.5 MG) BY MOUTH EVERY NIGHT    carbidopa-levodopa  mg (SINEMET)  mg per tablet Take 0.5 tablets by mouth 3 (three) times daily.    dorzolamide-timolol 2-0.5% (COSOPT) 22.3-6.8 mg/mL ophthalmic solution Place 1 drop into both eyes 2 (two) times daily.    pravastatin (PRAVACHOL) 80 MG tablet TAKE 1 TABLET(80 MG) BY MOUTH EVERY MORNING     No current facility-administered medications for this visit.        Visit #: 1  Evaluation Date: 11/21/17  Plan of care expiration: 1/16/18  Precautions: Standard  FOTO (FS): 84/100 (16% limitation)    Neurological and Other Medical Information/History   Date of Initial Diagnosis: 11/13 or 11/14 of 2017  Diagnosis/Stage: unknown  Past medical history: Pt was diagnosed with Parkinson's Disease Monday (11/13/14) or Tuesday (11/14/17) or last week. Pt states he noticed symptoms included decreased mobility on R, difficulty with ADL tasks including putting keys in car, opening jars, moving mouse on the computer (states he needs to assist with LUE occasionally), and slight tremor on RUE about 1 year ago. Pt reports the following medical history: sleep apnea, cataract surgery last year, hernia operation on R side 20 years prior, and high cholesterol (controlled by medication).    Prior  level of function: independent with all tasks, no problems with mobility    Prior Therapy: No prior for PD, 15 years ago had therapy for neck injury  Date of initial symptoms? ~ a year ago  Do you have pain? 1/10 If yes, please describe: stiffness in R hamstring  How many (if any) falls or near falls have you had in the last year? Fell over uneven surface at work this month, only fall reported for the year  ADL status: difficulty cooking (whisking eggs specifically), tucking in his shirt putting phone in pocket takes noted increased time, buttoning shirt takes increased time, turning the car key     Medication Information   Medications for Parkinson disease:  Carbidopa-levadopa  Time of last Parkinson's med: 6 AM this morning  Time for next Parkinson's med: around noon  Do you experience on/off symptoms? yes If yes, please describe noticeable difference after taking medication  Do you experience any dyskinesias? yes If yes, please describe: R foot dragging, overall mobility on RUE/LE  Do you experience any tremors?yes If yes, please describe: noted in right hand when stretching or yawning, not consistent throughout the day    Surgical Information   Have you had neurosurgery? No. Had an unremarkable brain scan last week  Orthopedic surgery? Cataract, hernia    Social Information   Home environment/ assistance available: single  Story home, 4 CHAMP. Lives with wife and 2 dogs.  DME owned: None  Work:  Yes  Job description:  Works at a [employer]. Required to ambulate a lot but states he is able to do everything he needs to.                      Would you rate yourself as sedentary, moderately active, or very active? Moderately actively  Exercise routine/ activity level prior to onset : Walking at work.   Hobbies: Walking, festivals. Not effected at this point.    Subjective   Pt stated goals: Increase ability in right side      Motor Symptoms   Functional Deficits Leading to Referral/Chief Complaint:  1. Ambulating,  dragging foot on R  2. Turning keys in car    Dominant hand:right    Have you noticed any changes in your ability to:  Button: yes  Dial the phone: No  Open containers: Yes, corkscrew  Manipulate money: Yes  Tie shoes: No  Write: Yes, noticeable smaller handwritting, increased amount of time  Type or use a computer: Yes, Difficulty moving mouse- needs assist from LUE occasionally. Increased errors with typing  Have you noticed if your hand movements are smaller than they used to be? No  Have you noticed if your hands feel any weaker than they used to? Yes, slightly weaker  If yes, please describe: opening jars are more difficult      ROM:   UPPER EXTREMITY--AROM/PROM  (R) UE: WFLs  (L) UE: WFLs           RANGE OF MOTION--LOWER EXTREMITIES  (R) LE Hip: AROM- flexion 90 degrees, AAROM 100 degrees   Knee: normal   Ankle: 6 degrees dorsiflexion    (L) LE: Hip: flexion- 90 degrees AROM, AAROM 113 deggrees   Knee: normal   Ankle: 8 degrees dorsiflexion    Strength: manual muscle test grades below   Upper Extremity Strength   RUE LUE   Shoulder Flexion: 4+/5 5/5   Shoulder Abduction: 4+/5 5/5   Shoulder Extension: 5/5 5/5   Shoulder External  Rotation: 4+/5 5/5   Shoulder Internal  Rotation: 4+/5 5/5   Elbow Flexion: 5/5 5/5   Elbow Extension: 5/5 5/5   Wrist Flexion: 5/5 5/5   Wrist Extension: 4+/5 5/5   : Gross 4/5 Gross 5/5     Lower Extremity Strength   RLE LLE   Hip Flexion: 4+/5 4+/5   Hip Extension:  4/5 4/5   Hip Abduction: 4+/5 4+/5   Hip Adduction: 3+/5 4+/5   Knee Extension: 5/5 5/5   Knee Flexion: 4+/5 4+/5   Ankle Dorsiflexion: 4+/5 4+/5   Ankle Plantarflexion: 5/5 5/5   Ankle Inversion: 4+/5 4+/5   Ankle Eversion: 4+/5 4+/5       Assessment - Outcome Measures     Speech: decreased volume noted, diminished kiko noted and decreased pitch  Mental status: alert, oriented to person, place, and time  Appearance: Well groomed and Work clothing  Behavior:  calm and cooperative  Attention Span and Concentration:   Normal  Posture Alignment :slouched posture, forward head, rounded shoulders, increased tremor on RUE when assessing ROM/muscle strength  Sensation: Light Touch: Intact       Proprioception:   Intact  Tone: cogwheel rigidity noted in RUE per chart review, No noted tone in B LE  Visual/Auditory: denies changes     LE strength and endurance  30 second Chair Rise 12 completed with no arms   5 times sit-stand NT     Gait Assessment:   - AD used: NA  - Assistance: Independent  - Distance: unlimited community distances    -Deviations with ambulation: No RUE arm swing, foot drag on RLE with increased speed or changing speeds  -Impairments contributing to deviations: impaired motor control,     Endurance Deficit: No noticeable difference, Increased difficulty with motor endurance- dragging R foot     Evaluation   Timed Up and Go 7 sec   TUG - Manual Task 7 sec   TUG - CognitiveTask 11 sec   Self Selected Walking Speed 1.2 m/sec (6m/5s)   Fast Walking Speed 1.5 m/sec (6m/4s)       Functional Gait Assessment:   1. Gait on level surface =  3   (3) Normal: less than 5.5 sec, no A.D., no imbalance, normal gait pattern, deviates< 6in   (2) Mild impairment: 7-5.6 sec, uses A.D., mild gait deviations, or deviates 6-10 in   (1) Moderate impairment: > 7 sec, slow speed, imbalance, deviates 10-15 in.   (0) Severe impairment: needs assist, deviates >15 in, reach/touch wall  2. Change in Gait Speed = 2   (3) Normal: smooth change w/o loss of balance or gait deviation, deviates < 6 in, significant difference between speeds   (2) Mild impairment: changes speed, but demonstrates mild gait deviations, deviates 6-10 in, OR no deviations but unable to significantly speed, OR uses A.D.   (1) Moderate impairment: minor changes to speed, OR changes speed w/ significant deviations, deviates 10-15 in, OR  Changes speed , but loses balance & recovers   (0) Severe impairment: cannot change speed, deviates >15 in, or loses balance & needs assist  3.  Gait with horizontal head turns  = 2   (3) Normal: no change in gait, deviates <6 in   (2) Mild impairment: slight change in speed, deviates 6-10 in, OR uses A.D.   (1) Moderate impairment: moderate change in speed, deviates 10-15 in   (0) Severe impairment: severe disruption of gait, deviates >15in  4. Gait with vertical head turns = 3   (3) Normal: no change in gait, deviates <6 in   (2) Mild impairment: slight change in speed, deviates 6-10 in OR uses A.D.   (1) Moderate impairment: moderate change in speed, deviates 10-15 in   (0) Severe impairment: severe disruption of gait, deviates >15 in  5. Gait with pivot turns = 3   (3) Normal: performs safely in 3 sec, no LOB   (2) Mild impairment: performs in >3 sec & no LOB, OR turns safely & requires several steps to regain LOB   (1) Moderate impairment: turns slow, OR requires several small steps for balance following turn & stop   (0) Severe impairment: cannot turn safely, needs assist  6. Step over obstacle = 3   (3) Normal: steps over 2 stacked boxes w/o change in speed or LOB   (2) Mild impairment: able to step over 1 box w/o change in speed or LOB   (1) Moderate impairment: steps over 1 box but must slow down, may require VC   (0) Severe impairment: cannot perform w/o assist  7. Gait with Narrow MIGUELANGEL = 3   (3) Normal: 10 steps no staggering   (2) Mild impairment: 7-9 steps   (1) Moderate impairment: 4-7 steps   (0) Severe impairment: < 4 steps or cannot perform w/o assist  8. Gait with eyes closed = 2   (3) Normal: < 7 sec, no A.D., no LOB, normal gait pattern, deviates <6 in   (2) Mild impairment: 7.1-9 sec, mild gait deviations, deviates 6-10 in   (1) Moderate impairment: > 9 sec, abnormal pattern, LOB, deviates 10-15 in   (0) Severe impairment: cannot perform w/o assist, LOB, deviates >15in  9. Ambulating Backwards = 3   (3) Normal: no A.D., no LOB, normal gait pattern, deviates <6in   (2) Mild impairment: uses A.D., slower speed, mild gait deviations,  deviates 6-10 in   (1) Moderate impairment: slow speed, abnormal gait pattern, LOB, deviates 10-15 in   (0) Severe impairment: severe gait deviations or LOB, deviates >15in  10. Steps = 3   (3) Normal: alternating feet, no rail   (2) Mild Impairment: alternating feet, uses rail   (1) Moderate impairment: step-to, uses rail   (0) Severe impairment: cannot perform safely  Score 27/30     Pt/family was provided educational information, including: role of PT, goals for PT, scheduling - pt verbalized understanding. Discussed insurance limitations with pt.     Educated patient on the LSVT BIG program and protocol. Pt agreed to the program requirements (4x/wk x 4 consecutive weeks).   Discussed the purpose and research behind the program.     Pt has no cultural, educational or language barriers to learning provided.      Assessment   This is a 57 y.o. male referred to outpatient neurological rehabilitation physical therapy and presents with a medical diagnosis of Parkinson's Disease and demonstrates limitations as described in the problem list. The patient's most significant impairments and challenges which affect functional mobility and ADLs are effected as evidenced by decreased ROM on RLE, cogwheel rigidity in RUE, slight decrease in strength on R side, noted difficulty with handwriting and using a mouse on the computer, opening jars, balance deficits as evidenced by >6 inch deviation when ambulating with lateral head turns, and gait deficits. Pt recommended for LSVT BIG program to increase functional mobility, ADLs, and gait deficits 4x per week for 4 weeks. Plan of care will be extended to 1/16/18 to allow for scheduling conflicts and possible need of follow up visits.      Pt prognosis is Good. Pt is a great candidate for the LSVT BIG program designed for Parkinson's Disease pt's which includes: neuromuscular re-education, postural education, functional gait, balance and activity training utilizing amplitude based  training. This includes extensive patient education and HEP to continue the training program at home.  Pt will benefit from skilled outpatient physical therapy to address the deficits listed below in the problem list, provide pt/family education and to maximize pt's level of independence in the home and community environment.     History  Co-morbidities and personal factors that may impact the plan of care Examination  Body Structures and Functions, activity limitations and participation restrictions that may impact the plan of care    Clinical Presentation   Co-morbidities:   Sleep Apnea, High cholesterol, previous eye surgery        Personal Factors:   no deficits Body Regions:   lower extremities  upper extremities   Trunk    Body Systems:    gross symmetry  ROM  strength  gross coordinated movement  balance  gait        Participation Restrictions:       1. Fall Risk  2. Impaired balance   3. Weakness   4. Impaired muscle tone  5. Gait deviations    6. Difficulty to participate in daily activities  7. Requires skilled supervision to complete and progress HEP  Activity limitations:   Learning and applying knowledge  no deficits    General Tasks and Commands  no deficits    Communication  no deficits    Mobility  lifting and carrying objects  walking  reaching for objects on a shelf    Self care  no deficits    Domestic Life  cooking  turning car key    Interactions/Relationships  no deficits    Life Areas  no deficits    Community and Social Life  no deficits         stable and uncomplicated                      low   low  low Decision Making/ Complexity Score:  low     Functional Component Tasks plan/ideas (to be finalized first follow up visit):   1. Sit to stand  2. Handwriting  3. Turning car key  4. Bending over  5. Reaching forward    Hierarchy Task plan/ideas (to be finalized first follow up visit): Ambulating with cognitive tasks, reaching small objects off of shelves at eye level and above,      GOALS:  "  Short term goals: Pt will accomplish the following in 2-4 weeks; agrees to goals set:  1. Pt will perform LSVT BIG daily maximal exercises with supervision to improve carryover of BIG movements into daily life.  2. Pt will complete TUG while performing cognitive task of counting backwards by 3s from 25 in 9 seconds or less to demonstrate improved gait and balance.  3. Assess R HS length and set goal at needed.     Long term goals: 4-8 weeks, pt agrees to goals set  4. Pt will report score of 100 on ABC outcome measure when asked his perceived ability to "reach for a small can off a shelf at eye level" to demonstrate improved gait and balance.  5. Pt will score 29/30 on FGA to demonstrate improved gait and balance.  6. Pt will report score of 100 on ABC outcome measure when asked his perceived ability to "walk up or down stairs" to demonstrate improved strength and balance.      Tiffanie Lambert, SPT     I, Elo Cantrell, DPT, certify that I was present in the room directing the student in service delivery and guiding them using my skilled judgment. As the co-signing therapist I have reviewed the students documentation and am responsible for the treatment, assessment, and plan.     Elo Cantrell DPT  11/21/2017        I certify the need for these services furnished under this plan of treatment and while under my care.  ____________________________________ Physician/Referring Practitioner   Date of Signature    "

## 2017-11-28 ENCOUNTER — PATIENT MESSAGE (OUTPATIENT)
Dept: NEUROLOGY | Facility: CLINIC | Age: 57
End: 2017-11-28

## 2017-12-04 ENCOUNTER — PATIENT MESSAGE (OUTPATIENT)
Dept: NEUROLOGY | Facility: CLINIC | Age: 57
End: 2017-12-04

## 2018-01-04 ENCOUNTER — CLINICAL SUPPORT (OUTPATIENT)
Dept: REHABILITATION | Facility: HOSPITAL | Age: 58
End: 2018-01-04
Attending: PSYCHIATRY & NEUROLOGY
Payer: COMMERCIAL

## 2018-01-04 DIAGNOSIS — R49.8 HYPOPHONIA: ICD-10-CM

## 2018-01-04 DIAGNOSIS — R47.1 DYSARTHRIA: ICD-10-CM

## 2018-01-04 DIAGNOSIS — Z78.9 IMPAIRED MOTOR CONTROL: ICD-10-CM

## 2018-01-04 DIAGNOSIS — R49.9 VOICE AND RESONANCE DISORDER: ICD-10-CM

## 2018-01-04 DIAGNOSIS — Z74.09 IMPAIRED FUNCTIONAL MOBILITY, BALANCE, GAIT, AND ENDURANCE: ICD-10-CM

## 2018-01-04 PROCEDURE — 97112 NEUROMUSCULAR REEDUCATION: CPT | Mod: PO | Performed by: PHYSICAL THERAPIST

## 2018-01-04 PROCEDURE — 92507 TX SP LANG VOICE COMM INDIV: CPT | Mod: PO

## 2018-01-04 NOTE — PLAN OF CARE
Identifying Information  Name: Adam Sawyer [facility] Number: [id number]    Medical Diagnosis: G20 (ICD-10-CM) - Parkinson's disease  Encounter Diagnosis:   Encounter Diagnoses   Name Primary?    Impaired functional mobility, balance, gait, and endurance     Impaired motor control      Physician: Amol Jerome MD  Treatment Orders: PT Eval and Treat, LSVT BIG  Past Medical History:   Diagnosis Date    Anxiety     Esotropia of right eye 5/2/2013    High cholesterol     Hyperlipidemia      Current Outpatient Prescriptions   Medication Sig    alprazolam (XANAX) 0.5 MG tablet TAKE 1 TABLET(0.5 MG) BY MOUTH EVERY NIGHT    carbidopa-levodopa  mg (SINEMET)  mg per tablet Take 0.5 tablets by mouth 3 (three) times daily.    dorzolamide-timolol 2-0.5% (COSOPT) 22.3-6.8 mg/mL ophthalmic solution Place 1 drop into both eyes 2 (two) times daily.    pravastatin (PRAVACHOL) 80 MG tablet TAKE 1 TABLET(80 MG) BY MOUTH EVERY MORNING     No current facility-administered medications for this visit.        Evaluation Date: 11/21/17  Plan of care expiration: 1/16/18  Extended POC: 2/14/2018   Precautions: Standard    LSVT Protocol    Week: 1/4   LSVT visit # 1/16     Visit total= 2/25    SUBJECTIVE:   Pain: pt denies  Pt reports: no new complaints, pt eager to participate in PT    Adam received individual therapy including neuro re-education for 40 minutes including:    LSVT Maximal Daily Exercises to promote amplitude: (8 repetitions each)     Daily Exercises Performance Effort Comments   1 Floor<>ceiling   Fair to good NT 10 finger flicks, 4 reps   2 Side<>side   Fair to good NT 10 finger flicks, 4 reps   3 Step & reach  (forward) Fair to good NT 8 reps   4 Step & reach  (backwards) Fair to good NT 8 reps   5 Backwards Step   good NT 8 reps   6 Rock & reach  (foward) good NT 10 reps   7 Rock & reach   (sideways) fair NT 10 reps       Functional Component Tasks:   1. Sit to stands: 5 reps, 2000g  "ball  2. wiping counter in circles- 5x ea  3. Screws- 3x with moderate VC to increase amplitude of movement  4. knee tap (standing)- 5x ea  5. Buttons (5 on shirt)= 43 sec + 47 sec + 38 sec    Hierarchy Task: NT today, likely will address dressing    Gait Training/ "BIG walking":  NP today due to time    Calibration: none    Carryover assignment tracking:  Using big movements during housework    Assessment: Adam tolerated treatment session well. Today was pt's first treatment session, no goals met since eval. Pt was able to perform all daily maximal exercises with moderate verbal cues for technique. Pt required cueing to improve amplitude of arm and hand movements during session (R>L). Pt was instructed in HEP and provided with pamphlet. Pt can benefit from conitnued skilled PT to address impairments to improve functional mobility, ADLs, and deter onset of symptoms related to diagnosis. POC extended x 4 weeks to allow for completion of program (pt was unable to begin PT immediately after eval).    Anticipated barriers to progress: Sleep Apnea, High cholesterol, previous eye surgery    Impairment list:   Fall Risk  Impaired balance   Weakness   Impaired muscle tone  Gait deviations    Difficulty to participate in daily activities  Requires skilled supervision to complete and progress HEP       GOALS:   Short term goals: Pt will accomplish the following in 2-4 weeks; agrees to goals set:  1. Pt will perform LSVT BIG daily maximal exercises with supervision to improve carryover of BIG movements into daily life.  2. Pt will complete TUG while performing cognitive task of counting backwards by 3s from 25 in 9 seconds or less to demonstrate improved gait and balance.  3. Assess R HS length and set goal at needed.      Long term goals: 4-8 weeks, pt agrees to goals set  4. Pt will report score of 100 on ABC outcome measure when asked his perceived ability to "reach for a small can off a shelf at eye level" to " "demonstrate improved gait and balance.  5. Pt will score 29/30 on FGA to demonstrate improved gait and balance.  6. Pt will report score of 100 on ABC outcome measure when asked his perceived ability to "walk up or down stairs" to demonstrate improved strength and balance.    Plan:  Continue with LSVT Program to increase safety, mobility, independence, and quality of life per protocol- 4 times per week.      Elo Cantrell, PT  01/04/2018      "

## 2018-01-05 NOTE — PATIENT INSTRUCTIONS
COMPLETE 4 REPS OF EACH EXERCISE.   PERFORM EACH EXERCISE Twice Daily, 7 DAYS A WEEK.

## 2018-01-08 ENCOUNTER — CLINICAL SUPPORT (OUTPATIENT)
Dept: REHABILITATION | Facility: HOSPITAL | Age: 58
End: 2018-01-08
Attending: PSYCHIATRY & NEUROLOGY
Payer: COMMERCIAL

## 2018-01-08 ENCOUNTER — DOCUMENTATION ONLY (OUTPATIENT)
Dept: REHABILITATION | Facility: HOSPITAL | Age: 58
End: 2018-01-08

## 2018-01-08 DIAGNOSIS — Z78.9 IMPAIRED MOTOR CONTROL: ICD-10-CM

## 2018-01-08 DIAGNOSIS — R49.8 HYPOPHONIA: ICD-10-CM

## 2018-01-08 DIAGNOSIS — R47.1 DYSARTHRIA: ICD-10-CM

## 2018-01-08 DIAGNOSIS — Z74.09 IMPAIRED FUNCTIONAL MOBILITY, BALANCE, GAIT, AND ENDURANCE: ICD-10-CM

## 2018-01-08 DIAGNOSIS — R49.9 VOICE AND RESONANCE DISORDER: ICD-10-CM

## 2018-01-08 PROCEDURE — 97112 NEUROMUSCULAR REEDUCATION: CPT | Mod: PO | Performed by: PHYSICAL THERAPIST

## 2018-01-08 PROCEDURE — 92507 TX SP LANG VOICE COMM INDIV: CPT | Mod: PO

## 2018-01-08 NOTE — PROGRESS NOTES
"OUTPATIENT NEUROLOGICAL REHABILITATION  SPEECH THERAPY PROGRESS NOTE    Date:  1/8/2018     Start Time:  1345  Stop Time:  1430    Subjective/History  Onset Date:  Diagnosed November 2017. Symptoms began over the last year.  Primary Diagnosis:  Parkinson's Disease  Treatment Diagnosis:  Hypokinetic Dysarthria  Referring Provider:  Dr. Amol Jerome  Reason for Referral: Speech therapy for evaluation and treatment  Current Medical History:  Adam Dalal presents to the [facility] with minimum communication and speech difficulty secondary to the diagnosis of Parkinson's Disease.     Precautions:  general         UNTIMED  Procedure  Min.    Speech-Language therapy    45            Total Minutes: 45  Total Timed Units: 0  Total Untimed Units: 1  Charges Billed/# of units: 1    Visit #: 2  Date of Evaluation:  11/21/17  Plan of Care Expiration:    11/21/17 to 2/16/18 (Pt requested to begin program in January 2018)  Extended POC:      Progress/Current Status    Subjective:     Pain: 0 /10  Pt was motivated to participate in the LSVT LOUD program    Objective:     Short Term Goals: (4 weeks) Current Progress:   1. Mr. Dalal will participate in further assessment of his vocal intensity to determine his sound pressure level (SPL, acoustic correlate of vocal loudness) measured with a sound level meter and more specific goals will be determined. Goal met/revised  1. - Task 1: sustained "ah" for average SPL of 73.4 dB (range 67.0 - 81.5 dB) and average 14.2 seconds (range 12.3 - 17.5 secs)  - Task 2a: sustained "high ah" for average SPL of 83.5 dB (range 81.2 - 86.6 dB) and highest pitch 301.5 Hz (pitch range 283 - 307Hz)  - Task 2b: sustained "low ah" for average of SPL of 75.8 dB (range of 72.8 - 78.1dB) and lowest pitch 181.5 (pitch range 161.0 - 192.0 Hz)  - Task 3: while reading a passage: SPL range of 67.4 dB (range 63.2 - 74.0 dB)  - Task 4: during monologue: SPL " "range of 66.3 dB (range 64.9 - 76.5 dB)  - Task 5: generated words with specific letters:  SPL range of 68.0 dB (range 63.3 - 72.9 dB)  - Task 6: describe a motor task while completing a dual motor activity: SPL range of 62.2 dB (range 60.8 - 72.4 dB); fast rate of speech  - Task 7a: stimulability testing: sustained vowel phonation ("ah") with average SPL 85.7 dB and duration average of 13.4 seconds   - Task 7b: "high ah": average frequency of 360.0 Hz and SPL average 88.6 dB   - Task 7c:  "low ah": average frequency of 203.0 Hz and average SPL 81.7 dB   - Task 7d: functional phrases: SPL range of 79.1 dB   Revised Goal: 1 a. The patient will produce loud, good quality "AH" sounds in the  decibel range for an average of 20 to 25 seconds (as a voice/speech "foundation" skill) for sets of 15 with minimal to no cues at least 95% of the time to further develop breath-speech coordination and for loudness calibration.      - Pt sustained "ah" for average SPL of 83.7 dB (range 76.9 - 86.6 dB) and average 26.3 seconds (range 12.3 - 17.5 secs)   1 b. The patient will produce "AH" from a mid-level to at least 3 notes higher and from a mid-level to at least 3 notes lower in sets of 15 for each pitch direction with minimal to no cues 95% of the time to improve pitch variability. - Pt sustained "high ah" for average SPL of 87.2 dB (range 81.0 - 88.9 dB) and highest pitch 376.3 Hz (pitch range 333 - 395 Hz)    - Pt sustained "low ah" for average of SPL of 81.6 dB (range of 80.2 - 83.4 dB) and lowest pitch 169.7 (pitch range 161.0 - 185.0 Hz)   1 c. The patient will verbalize a set of 10 functional phrases/ sentences at an average loudness level at least in the  decibel range with minimal to no cues at least 95% of the time.  Not formally addressed        1 d. The patient will read aloud at an average loudness level at least in the  decibel range with minimal to no cues at least 95% of the time.  Not formally addressed      1 e. " "The patient will verbalize at least in the decibel range in his conversational speech at least 95% of the time with minimal to no cues.  Not formally addressed      2. 2. Mr. Dalal will use motor speech strategies for speech hierarchy tasks with 90% accuracy IND'ly to increase his speech intelligibility. Goal discontinued.  Will focus on LSVT LOUD goals       Assessment:   Good voice loudness and voice quality today during the exercises. Current goals remain appropriate. Goals to be updated as necessary.      Mr. Dalal presents with minimum impaired communication skills. He presents with dysarthria and hypophonia. Pt would benefit from continued skilled ST. Prognosis for improvement remains Good     Patient Education/Response:     Pt was educated on the LSVT LOUD program and exercises. He verbalized understanding.     Home Program: LSVT LOUD exercises - "AH's"     Plans and Goals:     Recommended Treatment Plan:  Patient will participate in the [facility] program for LSVT LOUD program 4 times per week to address his speech deficits, educate patient/family, and home exercise program.     Other Recommendations: 1. MBSS to further assess swallowing function.     TEO Lewis, CCC-SLP  Speech-Language Pathologist  [facility]  Neurological Rehabilitation    "

## 2018-01-08 NOTE — PROGRESS NOTES
Identifying Information  Name: Adam Sawyer [facility] Number: [id number]     Medical Diagnosis: G20 (ICD-10-CM) - Parkinson's disease  Encounter Diagnosis:        Encounter Diagnoses   Name Primary?    Impaired functional mobility, balance, gait, and endurance      Impaired motor control        Physician: Amol Jerome MD  Treatment Orders: PT Eval and Treat, LSVT BIG       Past Medical History:   Diagnosis Date    Anxiety      Esotropia of right eye 5/2/2013    High cholesterol      Hyperlipidemia             Current Outpatient Prescriptions   Medication Sig    alprazolam (XANAX) 0.5 MG tablet TAKE 1 TABLET(0.5 MG) BY MOUTH EVERY NIGHT    carbidopa-levodopa  mg (SINEMET)  mg per tablet Take 0.5 tablets by mouth 3 (three) times daily.    dorzolamide-timolol 2-0.5% (COSOPT) 22.3-6.8 mg/mL ophthalmic solution Place 1 drop into both eyes 2 (two) times daily.    pravastatin (PRAVACHOL) 80 MG tablet TAKE 1 TABLET(80 MG) BY MOUTH EVERY MORNING      No current facility-administered medications for this visit.          Evaluation Date: 11/21/17  Plan of care expiration: 1/16/18  Extended POC: 2/14/2018   Precautions: Standard     LSVT Protocol     Week: 1/4   LSVT visit # 2/16      Visit total= 3/25     SUBJECTIVE:   Pain: pt denies  Pt reports: no new complaints, reports compliance with HEP ~2x/day     Adam received individual therapy including neuro re-education for 42 minutes including:     LSVT Maximal Daily Exercises to promote amplitude: (8 repetitions each)       Daily Exercises Performance Effort Comments   1 Floor<>ceiling good NT 10 finger flicks   2 Side<>side Fair to good NT 10 finger flicks   3 Step & reach  (forward) Fair to good NT 8 reps   4 Step & reach  (backwards) Fair to good NT 8 reps   5 Backwards Step good NT 8 reps   6 Rock & reach  (foward) good NT 10 reps   7 Rock & reach   (sideways) fair NT 10 reps         Functional Component Tasks:   1. Sit to stands: 5 reps,  "2000g ball  2. wiping counter in circles- 5x ea  3. Screws- 5x with no VC  4. knee tap (standing)- 5x ea  5. Buttons (5 on frame)= 70 sec + 41 sec + 23.1 sec + 27.7 sec + 22 sec     Hierarchy Task: simulated LB dressing     Gait Training/ "BIG walking":  3 flights of stairs without HR, BIG walking with moderate VC for RUE swing. ambulation with cognitive task (animals by alphabet), max VC for RUE swing     Calibration: none     Carryover assignment tracking:  Using big movements while moving furniture     Assessment: Adam tolerated treatment session well. Pt reports good compliance with HEP. Pt demonstrated improved ability to manage buttons due to increased size on practice board. PT can likely increase sit<>stand reps and begin with alternating sides for daily exercises this week. Pt demonstrated impaired gait with cognitive task (decreased RUE swing and hesitation with task). Pt can benefit from conitnued skilled PT to address impairments to improve functional mobility, ADLs, and deter onset of symptoms related to diagnosis.      Anticipated barriers to progress: Sleep Apnea, High cholesterol, previous eye surgery     Impairment list:   Fall Risk  Impaired balance   Weakness   Impaired muscle tone  Gait deviations    Difficulty to participate in daily activities  Requires skilled supervision to complete and progress HEP         GOALS:   Short term goals: Pt will accomplish the following in 2-4 weeks; agrees to goals set:  1. Pt will perform LSVT BIG daily maximal exercises with supervision to improve carryover of BIG movements into daily life.  2. Pt will complete TUG while performing cognitive task of counting backwards by 3s from 25 in 9 seconds or less to demonstrate improved gait and balance.  3. Assess R HS length and set goal at needed.      Long term goals: 4-8 weeks, pt agrees to goals set  4. Pt will report score of 100 on ABC outcome measure when asked his perceived ability to "reach for a small can " "off a shelf at eye level" to demonstrate improved gait and balance.  5. Pt will score 29/30 on FGA to demonstrate improved gait and balance.  6. Pt will report score of 100 on ABC outcome measure when asked his perceived ability to "walk up or down stairs" to demonstrate improved strength and balance.     Plan:  Continue with LSVT Program to increase safety, mobility, independence, and quality of life per protocol- 4 times per week.        Elo Cantrell, PT  1/8/2018         "

## 2018-01-08 NOTE — PROGRESS NOTES
"OUTPATIENT NEUROLOGICAL REHABILITATION  SPEECH THERAPY PROGRESS NOTE    Date:  1/4/2018     Start Time:  1345  Stop Time:  1430    Subjective/History  Onset Date:  Diagnosed November 2017. Symptoms began over the last year.  Primary Diagnosis:  Parkinson's Disease  Treatment Diagnosis:  Hypokinetic Dysarthria  Referring Provider:  Dr. Amol Jerome  Reason for Referral: Speech therapy for evaluation and treatment  Current Medical History:  Adam Dalal presents to the [facility] with minimum communication and speech difficulty secondary to the diagnosis of Parkinson's Disease.     Precautions:  general         UNTIMED  Procedure  Min.    Speech-Language therapy    45            Total Minutes: 45  Total Timed Units: 0  Total Untimed Units: 1  Charges Billed/# of units: 1    Visit #: 2  Date of Evaluation:  11/21/17  Plan of Care Expiration:    11/21/17 to 2/16/18 (Pt requested to begin program in January 2018)  Extended POC:      Progress/Current Status    Subjective:     Pain: 0 /10  Pt was motivated to participate in the LSVT LOUD program    Objective:     Short Term Goals: (4 weeks) Current Progress:   1. Mr. Dalal will participate in further assessment of his vocal intensity to determine his sound pressure level (SPL, acoustic correlate of vocal loudness) measured with a sound level meter and more specific goals will be determined. Goal met/revised  1. - Task 1: sustained "ah" for average SPL of 73.4 dB (range 67.0 - 81.5 dB) and average 14.2 seconds (range 12.3 - 17.5 secs)  - Task 2a: sustained "high ah" for average SPL of 83.5 dB (range 81.2 - 86.6 dB) and highest pitch 301.5 Hz (pitch range 283 - 307Hz)  - Task 2b: sustained "low ah" for average of SPL of 75.8 dB (range of 72.8 - 78.1dB) and lowest pitch 181.5 (pitch range 161.0 - 192.0 Hz)  - Task 3: while reading a passage: SPL range of 67.4 dB (range 63.2 - 74.0 dB)  - Task 4: during monologue: SPL " "range of 66.3 dB (range 64.9 - 76.5 dB)  - Task 5: generated words with specific letters:  SPL range of 68.0 dB (range 63.3 - 72.9 dB)  - Task 6: describe a motor task while completing a dual motor activity: SPL range of 62.2 dB (range 60.8 - 72.4 dB); fast rate of speech  - Task 7a: stimulability testing: sustained vowel phonation ("ah") with average SPL 85.7 dB and duration average of 13.4 seconds   - Task 7b: "high ah": average frequency of 360.0 Hz and SPL average 88.6 dB   - Task 7c:  "low ah": average frequency of 203.0 Hz and average SPL 81.7 dB   - Task 7d: functional phrases: SPL range of 79.1 dB   Revised Goal: 1 a. The patient will produce loud, good quality "AH" sounds in the  decibel range for an average of 20 to 25 seconds (as a voice/speech "foundation" skill) for sets of 15 with minimal to no cues at least 95% of the time to further develop breath-speech coordination and for loudness calibration.         1 b. The patient will produce "AH" from a mid-level to at least 5 notes higher and from a mid-level to at least 3 notes lower in sets of 15 for each pitch direction with minimal to no cues 95% of the time to improve pitch variability.    1 c. The patient will verbalize a set of 10 functional phrases/ sentences at an average loudness level at least in the  decibel range with minimal to no cues at least 95% of the time.      1 d. The patient will read aloud at an average loudness level at least in the  decibel range with minimal to no cues at least 95% of the time.    1 e. The patient will verbalize at least in the decibel range in his conversational speech at least 95% of the time with minimal to no cues.    2. 2. Mr. Dalal will use motor speech strategies for speech hierarchy tasks with 90% accuracy IND'ly to increase his speech intelligibility. Goal discontinued.  Will focus on LSVT LOUD goals       Assessment:     Mr. Dalal presents with minimum impaired communication skills. He presents with " "dysarthria and hypophonia. Pt would benefit from continued skilled ST. Prognosis for improvement remains Good     Patient Education/Response:     Pt was educated on the LSVT LOUD program. He verbalized understanding.     Home Program: LSVT LOUD exercises - "AH's"     Plans and Goals:     Recommended Treatment Plan:  Patient will participate in the [facility] program for LSVT LOUD program 4 times per week to address his speech deficits, educate patient/family, and home exercise program.     Other Recommendations: 1. MBSS to further assess swallowing function.     TEO Lewis, CCC-SLP  Speech-Language Pathologist  [facility]  Neurological Rehabilitation      "

## 2018-01-10 ENCOUNTER — CLINICAL SUPPORT (OUTPATIENT)
Dept: REHABILITATION | Facility: HOSPITAL | Age: 58
End: 2018-01-10
Attending: PSYCHIATRY & NEUROLOGY
Payer: COMMERCIAL

## 2018-01-10 DIAGNOSIS — Z74.09 IMPAIRED FUNCTIONAL MOBILITY, BALANCE, GAIT, AND ENDURANCE: ICD-10-CM

## 2018-01-10 DIAGNOSIS — R49.8 HYPOPHONIA: ICD-10-CM

## 2018-01-10 DIAGNOSIS — Z78.9 IMPAIRED MOTOR CONTROL: ICD-10-CM

## 2018-01-10 DIAGNOSIS — R49.9 VOICE AND RESONANCE DISORDER: ICD-10-CM

## 2018-01-10 DIAGNOSIS — R47.1 DYSARTHRIA: ICD-10-CM

## 2018-01-10 PROCEDURE — 97112 NEUROMUSCULAR REEDUCATION: CPT | Mod: PO

## 2018-01-10 PROCEDURE — 97116 GAIT TRAINING THERAPY: CPT | Mod: PO

## 2018-01-10 PROCEDURE — 92507 TX SP LANG VOICE COMM INDIV: CPT | Mod: PO

## 2018-01-10 NOTE — PROGRESS NOTES
"OUTPATIENT NEUROLOGICAL REHABILITATION  SPEECH THERAPY PROGRESS NOTE    Date:  1/10/2018     Start Time:  0900  Stop Time:  0945    Subjective/History  Onset Date:  Diagnosed November 2017. Symptoms began over the last year.  Primary Diagnosis:  Parkinson's Disease  Treatment Diagnosis:  Hypokinetic Dysarthria  Referring Provider:  Dr. Amol Jerome  Reason for Referral: Speech therapy for evaluation and treatment  Current Medical History:  Adam Dalal presents to the [facility] with minimum communication and speech difficulty secondary to the diagnosis of Parkinson's Disease.     Precautions:  general         UNTIMED  Procedure  Min.    Speech-Language therapy    45            Total Minutes: 45  Total Timed Units: 0  Total Untimed Units: 1  Charges Billed/# of units: 1    Visit #: 4  Date of Evaluation:  11/21/17  Plan of Care Expiration:    11/21/17 to 2/16/18 (Pt requested to begin program in January 2018)  Extended POC:      Progress/Current Status    Subjective:     Pain: 0 /10  "My dogs started barking while I did my exercises."     Objective:     Short Term Goals: (4 weeks) Current Progress:   1. Mr. Dalal will participate in further assessment of his vocal intensity to determine his sound pressure level (SPL, acoustic correlate of vocal loudness) measured with a sound level meter and more specific goals will be determined. Goal met/revised  1. - Task 1: sustained "ah" for average SPL of 73.4 dB (range 67.0 - 81.5 dB) and average 14.2 seconds (range 12.3 - 17.5 secs)  - Task 2a: sustained "high ah" for average SPL of 83.5 dB (range 81.2 - 86.6 dB) and highest pitch 301.5 Hz (pitch range 283 - 307Hz)  - Task 2b: sustained "low ah" for average of SPL of 75.8 dB (range of 72.8 - 78.1dB) and lowest pitch 181.5 (pitch range 161.0 - 192.0 Hz)  - Task 3: while reading a passage: SPL range of 67.4 dB (range 63.2 - 74.0 dB)  - Task 4: during monologue: SPL range " "of 66.3 dB (range 64.9 - 76.5 dB)  - Task 5: generated words with specific letters:  SPL range of 68.0 dB (range 63.3 - 72.9 dB)  - Task 6: describe a motor task while completing a dual motor activity: SPL range of 62.2 dB (range 60.8 - 72.4 dB); fast rate of speech  - Task 7a: stimulability testing: sustained vowel phonation ("ah") with average SPL 85.7 dB and duration average of 13.4 seconds   - Task 7b: "high ah": average frequency of 360.0 Hz and SPL average 88.6 dB   - Task 7c:  "low ah": average frequency of 203.0 Hz and average SPL 81.7 dB   - Task 7d: functional phrases: SPL range of 79.1 dB   Revised Goal: 1 a. The patient will produce loud, good quality "AH" sounds in the  decibel range for an average of 20 to 25 seconds (as a voice/speech "foundation" skill) for sets of 15 with minimal to no cues at least 95% of the time to further develop breath-speech coordination and for loudness calibration.      - Pt sustained "ah" for average SPL of 83.5 dB (range 79.8 - 87.1 dB) and average 25.3 seconds (range 22 - 28 secs)   1 b. The patient will produce "AH" from a mid-level to at least 3 notes higher and from a mid-level to at least 3 notes lower in sets of 15 for each pitch direction with minimal to no cues 95% of the time to improve pitch variability. - Pt sustained "high ah" for average SPL of 86.7dB (range 85.4 - 89.3 dB) and highest pitch 325.5 Hz (pitch range 309 - 387 Hz)    - Pt sustained "low ah" for average of SPL of 81.4 dB (range of 79.6 - 83.7 dB) and lowest pitch 180.3 (pitch range 161.0 - 185.0 Hz)   1 c. The patient will verbalize a set of 10 functional phrases/ sentences at an average loudness level at least in the  decibel range with minimal to no cues at least 95% of the time.  Not formally addressed        1 d. The patient will read aloud at an average loudness level at least in the  decibel range with minimal to no cues at least 95% of the time.  Not formally addressed      1 e. The patient " "will verbalize at least in the decibel range in his conversational speech at least 95% of the time with minimal to no cues.  - Average SPL of 68.6 dB (range 61.7 - 75.8 dB)     2. 2. Mr. Dalal will use motor speech strategies for speech hierarchy tasks with 90% accuracy IND'ly to increase his speech intelligibility. Goal discontinued.  Will focus on LSVT LOUD goals       Assessment:   Increased vocal loudness during the exercises but he needed cues to maintain clear, loud speech in conversation. Current goals remain appropriate. Goals to be updated as necessary.      Mr. Dalal presents with minimum impaired communication skills. He presents with dysarthria and hypophonia. Pt would benefit from continued skilled ST. Prognosis for improvement remains Good     Patient Education/Response:     Pt was educated on the LSVT LOUD program and exercises. He verbalized understanding.     Home Program: LSVT LOUD exercises - "AH's"     Plans and Goals:     Continue LSVT LOUD program     Other Recommendations: 1. MBSS to further assess swallowing function.     TEO Lewis, CCC-SLP  Speech-Language Pathologist  [facility]  Neurological Rehabilitation  "

## 2018-01-10 NOTE — PROGRESS NOTES
Identifying Information  Name: Adam Sawyer [facility] Number: [id number]     Medical Diagnosis: G20 (ICD-10-CM) - Parkinson's disease  Encounter Diagnosis:        Encounter Diagnoses   Name Primary?    Impaired functional mobility, balance, gait, and endurance      Impaired motor control        Physician: Amol Jerome MD  Treatment Orders: PT Eval and Treat, LSVT BIG       Past Medical History:   Diagnosis Date    Anxiety      Esotropia of right eye 5/2/2013    High cholesterol      Hyperlipidemia             Current Outpatient Prescriptions   Medication Sig    alprazolam (XANAX) 0.5 MG tablet TAKE 1 TABLET(0.5 MG) BY MOUTH EVERY NIGHT    carbidopa-levodopa  mg (SINEMET)  mg per tablet Take 0.5 tablets by mouth 3 (three) times daily.    dorzolamide-timolol 2-0.5% (COSOPT) 22.3-6.8 mg/mL ophthalmic solution Place 1 drop into both eyes 2 (two) times daily.    pravastatin (PRAVACHOL) 80 MG tablet TAKE 1 TABLET(80 MG) BY MOUTH EVERY MORNING      No current facility-administered medications for this visit.          Evaluation Date: 11/21/17  Plan of care expiration: 1/16/18  Extended POC: 2/14/2018   Precautions: Standard     LSVT Protocol     Week: 1/4   LSVT visit # 3/16      Visit total= 4/25     SUBJECTIVE:   Pain: pt denies  Pt reports: no new complaints, reports compliance with HEP ~2x/day     Adam received individual therapy including neuro re-education for 37 minutes including:     LSVT Maximal Daily Exercises to promote amplitude: (10 repetitions each)       Daily Exercises Performance Effort Comments   1 Floor<>ceiling good NT 10 finger flicks   2 Side<>side  good NT 10 finger flicks   3 Step & reach  (forward)  good NT 10 reps   4 Step & reach  (backwards)  good NT 10 reps   5 Backwards Step good NT 10 reps   6 Rock & reach  (foward) good NT 10 reps   7 Rock & reach   (sideways) fair NT 10 reps         Functional Component Tasks:   1. Sit to stands: 10 reps, 2000g  "ball  2. wiping counter in circles- 5x ea  3. Screws- 5x with no VC  4. knee tap (standing)- 5x ea  5. Buttons (5 on frame)= 32 sec + 16 sec + 24 sec + 22 sec      Hierarchy Task: simulated LB dressing     Gait Training/ "BIG walking" x 8 mins:  3 flights of stairs without HR, BIG walking with moderate VC for RUE swing. ambulation with cognitive task (states by alphabet), max VC for RUE swing     Calibration: none     Carryover assignment tracking:  Using big movements while moving furniture     Assessment: Adam tolerated treatment session well and did not have any problems.  Pt was able to increase reps from 8 to 10 reps and increased reps for sit to stand trials with weighted ball.  Pt able to decrease the amount of time to button and unbutton 5 buttons  Pt can benefit from conitnued skilled PT to address impairments to improve functional mobility, ADLs, and deter onset of symptoms related to diagnosis.      Anticipated barriers to progress: Sleep Apnea, High cholesterol, previous eye surgery     Impairment list:   Fall Risk  Impaired balance   Weakness   Impaired muscle tone  Gait deviations    Difficulty to participate in daily activities  Requires skilled supervision to complete and progress HEP         GOALS:   Short term goals: Pt will accomplish the following in 2-4 weeks; agrees to goals set:  1. Pt will perform LSVT BIG daily maximal exercises with supervision to improve carryover of BIG movements into daily life.  2. Pt will complete TUG while performing cognitive task of counting backwards by 3s from 25 in 9 seconds or less to demonstrate improved gait and balance.  3. Assess R HS length and set goal at needed.      Long term goals: 4-8 weeks, pt agrees to goals set  4. Pt will report score of 100 on ABC outcome measure when asked his perceived ability to "reach for a small can off a shelf at eye level" to demonstrate improved gait and balance.  5. Pt will score 29/30 on FGA to demonstrate improved " "gait and balance.  6. Pt will report score of 100 on ABC outcome measure when asked his perceived ability to "walk up or down stairs" to demonstrate improved strength and balance.     Plan:  Continue with LSVT Program to increase safety, mobility, independence, and quality of life per protocol- 4 times per week.     Chasidy Field, PTA    1/10/2018         "

## 2018-01-11 ENCOUNTER — CLINICAL SUPPORT (OUTPATIENT)
Dept: REHABILITATION | Facility: HOSPITAL | Age: 58
End: 2018-01-11
Attending: PSYCHIATRY & NEUROLOGY
Payer: COMMERCIAL

## 2018-01-11 DIAGNOSIS — Z74.09 IMPAIRED FUNCTIONAL MOBILITY, BALANCE, GAIT, AND ENDURANCE: ICD-10-CM

## 2018-01-11 DIAGNOSIS — Z78.9 IMPAIRED MOTOR CONTROL: ICD-10-CM

## 2018-01-11 DIAGNOSIS — R49.8 HYPOPHONIA: ICD-10-CM

## 2018-01-11 DIAGNOSIS — R49.9 VOICE AND RESONANCE DISORDER: ICD-10-CM

## 2018-01-11 DIAGNOSIS — R47.1 DYSARTHRIA: ICD-10-CM

## 2018-01-11 PROCEDURE — 97112 NEUROMUSCULAR REEDUCATION: CPT | Mod: PO

## 2018-01-11 PROCEDURE — 97116 GAIT TRAINING THERAPY: CPT | Mod: PO

## 2018-01-11 PROCEDURE — 92507 TX SP LANG VOICE COMM INDIV: CPT | Mod: PO

## 2018-01-11 NOTE — PROGRESS NOTES
"Identifying Information  Name: Adam Sawyer [facility] [address] Number: [id number]     Medical Diagnosis: G20 (ICD-10-CM) - Parkinson's disease  Encounter Diagnosis:        Encounter Diagnoses   Name Primary?    Impaired functional mobility, balance, gait, and endurance      Impaired motor control        Physician: Amol Jerome MD  Treatment Orders: PT Eval and Treat, LSVT BIG       Past Medical History:   Diagnosis Date    Anxiety      Esotropia of right eye 5/2/2013    High cholesterol      Hyperlipidemia             Current Outpatient Prescriptions   Medication Sig    alprazolam (XANAX) 0.5 MG tablet TAKE 1 TABLET(0.5 MG) BY MOUTH EVERY NIGHT    carbidopa-levodopa  mg (SINEMET)  mg per tablet Take 0.5 tablets by mouth 3 (three) times daily.    dorzolamide-timolol 2-0.5% (COSOPT) 22.3-6.8 mg/mL ophthalmic solution Place 1 drop into both eyes 2 (two) times daily.    pravastatin (PRAVACHOL) 80 MG tablet TAKE 1 TABLET(80 MG) BY MOUTH EVERY MORNING      No current facility-administered medications for this visit.          Evaluation Date: 11/21/17  Plan of care expiration: 1/16/18  Extended POC: 2/14/2018   Precautions: Standard     LSVT Protocol     Week: 1/4   LSVT visit # 4/16      Visit total= 4/25     SUBJECTIVE:   Pain: pt denies  Pt reports: " I'm a little worn out today."     Adam received individual therapy including neuro re-education for 37 minutes including:     LSVT Maximal Daily Exercises to promote amplitude: (10 repetitions each)       Daily Exercises Performance Effort Comments   1 Floor<>ceiling good NT 10 finger flicks   2 Side<>side  good NT 10 finger flicks   3 Step & reach  (forward)  good NT 10 reps   4 Step & reach  (backwards)  good NT 10 reps   5 Backwards Step good NT 10 reps   6 Rock & reach  (foward) good NT 10 reps   7 Rock & reach   (sideways) fair NT 10 reps         Functional Component Tasks:   1. Sit to stands: 10 reps, 2000g ball  2. wiping counter in circles- " "5x ea  3. Screws- 5x with no VC  4. knee tap (standing)- 10x ea  5. Buttons (5 on frame)= 20 sec + 32 sec + 19 sec + 22 sec      Hierarchy Task: simulated LB dressing     Gait Training/ "BIG walking" x 8 mins:  3 flights of stairs without HR, BIG walking with moderate VC for RUE swing. ambulation with cognitive task ([place] cities by alphabet), max VC for RUE swing     Calibration: none     Carryover assignment tracking:  Using big movements while moving furniture     Assessment: Adam tolerated treatment session well and did not have any problems. Pt performed activities today with better balance on the knee taps and was able to increase reps on knee taps and sit to stands.   Pt can benefit from conitnued skilled PT to address impairments to improve functional mobility, ADLs, and deter onset of symptoms related to diagnosis.      Anticipated barriers to progress: Sleep Apnea, High cholesterol, previous eye surgery     Impairment list:   Fall Risk  Impaired balance   Weakness   Impaired muscle tone  Gait deviations    Difficulty to participate in daily activities  Requires skilled supervision to complete and progress HEP         GOALS:   Short term goals: Pt will accomplish the following in 2-4 weeks; agrees to goals set:  1. Pt will perform LSVT BIG daily maximal exercises with supervision to improve carryover of BIG movements into daily life.  2. Pt will complete TUG while performing cognitive task of counting backwards by 3s from 25 in 9 seconds or less to demonstrate improved gait and balance.  3. Assess R HS length and set goal at needed.      Long term goals: 4-8 weeks, pt agrees to goals set  4. Pt will report score of 100 on ABC outcome measure when asked his perceived ability to "reach for a small can off a shelf at eye level" to demonstrate improved gait and balance.  5. Pt will score 29/30 on FGA to demonstrate improved gait and balance.  6. Pt will report score of 100 on ABC outcome measure when asked his " "perceived ability to "walk up or down stairs" to demonstrate improved strength and balance.     Plan:  Continue with LSVT Program to increase safety, mobility, independence, and quality of life per protocol- 4 times per week.     Chasidy Field, PTA    1/11/2018         "

## 2018-01-12 ENCOUNTER — CLINICAL SUPPORT (OUTPATIENT)
Dept: REHABILITATION | Facility: HOSPITAL | Age: 58
End: 2018-01-12
Attending: PSYCHIATRY & NEUROLOGY
Payer: COMMERCIAL

## 2018-01-12 DIAGNOSIS — Z78.9 IMPAIRED MOTOR CONTROL: ICD-10-CM

## 2018-01-12 DIAGNOSIS — R49.9 VOICE AND RESONANCE DISORDER: ICD-10-CM

## 2018-01-12 DIAGNOSIS — R49.8 HYPOPHONIA: ICD-10-CM

## 2018-01-12 DIAGNOSIS — Z74.09 IMPAIRED FUNCTIONAL MOBILITY, BALANCE, GAIT, AND ENDURANCE: ICD-10-CM

## 2018-01-12 DIAGNOSIS — R47.1 DYSARTHRIA: ICD-10-CM

## 2018-01-12 PROCEDURE — 97116 GAIT TRAINING THERAPY: CPT | Mod: PO | Performed by: PHYSICAL THERAPIST

## 2018-01-12 PROCEDURE — 97112 NEUROMUSCULAR REEDUCATION: CPT | Mod: PO | Performed by: PHYSICAL THERAPIST

## 2018-01-12 PROCEDURE — 92507 TX SP LANG VOICE COMM INDIV: CPT | Mod: PO

## 2018-01-12 NOTE — PROGRESS NOTES
"OUTPATIENT NEUROLOGICAL REHABILITATION  SPEECH THERAPY PROGRESS NOTE    Date:  1/11/2018     Start Time:  0900  Stop Time:  0945    Subjective/History  Onset Date:  Diagnosed November 2017. Symptoms began over the last year.  Primary Diagnosis:  Parkinson's Disease  Treatment Diagnosis:  Hypokinetic Dysarthria  Referring Provider:  Dr. Amol Jerome  Reason for Referral: Speech therapy for evaluation and treatment  Current Medical History:  Adam Dalal presents to the [facility] with minimum communication and speech difficulty secondary to the diagnosis of Parkinson's Disease.     Precautions:  general         UNTIMED  Procedure  Min.    Speech-Language therapy    45            Total Minutes: 45  Total Timed Units: 0  Total Untimed Units: 1  Charges Billed/# of units: 1    Visit #: 5  Date of Evaluation:  11/21/17  Plan of Care Expiration:    11/21/17 to 2/16/18 (Pt requested to begin program in January 2018)  Extended POC:      Progress/Current Status    Subjective:     Pain: 0 /10      Objective:     Short Term Goals: (4 weeks) Current Progress:   1. Mr. Dalal will participate in further assessment of his vocal intensity to determine his sound pressure level (SPL, acoustic correlate of vocal loudness) measured with a sound level meter and more specific goals will be determined. Goal met/revised  1. - Task 1: sustained "ah" for average SPL of 73.4 dB (range 67.0 - 81.5 dB) and average 14.2 seconds (range 12.3 - 17.5 secs)  - Task 2a: sustained "high ah" for average SPL of 83.5 dB (range 81.2 - 86.6 dB) and highest pitch 301.5 Hz (pitch range 283 - 307Hz)  - Task 2b: sustained "low ah" for average of SPL of 75.8 dB (range of 72.8 - 78.1dB) and lowest pitch 181.5 (pitch range 161.0 - 192.0 Hz)  - Task 3: while reading a passage: SPL range of 67.4 dB (range 63.2 - 74.0 dB)  - Task 4: during monologue: SPL range of 66.3 dB (range 64.9 - 76.5 dB)  - Task 5: " "generated words with specific letters:  SPL range of 68.0 dB (range 63.3 - 72.9 dB)  - Task 6: describe a motor task while completing a dual motor activity: SPL range of 62.2 dB (range 60.8 - 72.4 dB); fast rate of speech  - Task 7a: stimulability testing: sustained vowel phonation ("ah") with average SPL 85.7 dB and duration average of 13.4 seconds   - Task 7b: "high ah": average frequency of 360.0 Hz and SPL average 88.6 dB   - Task 7c:  "low ah": average frequency of 203.0 Hz and average SPL 81.7 dB   - Task 7d: functional phrases: SPL range of 79.1 dB   Revised Goal: 1 a. The patient will produce loud, good quality "AH" sounds in the  decibel range for an average of 20 to 25 seconds (as a voice/speech "foundation" skill) for sets of 15 with minimal to no cues at least 95% of the time to further develop breath-speech coordination and for loudness calibration.      - Pt sustained "ah" for average SPL of 85.2 dB (range 83.6 - 87.3 dB) and average 25.1 seconds (range 20 - 30 secs) - 100% acc   1 b. The patient will produce "AH" from a mid-level to at least 3 notes higher and from a mid-level to at least 3 notes lower in sets of 15 for each pitch direction with minimal to no cues 95% of the time to improve pitch variability. - Pt sustained "high ah" for average SPL of 88.9 dB (range 84.4 - 90.4 dB) and highest pitch 358.5 Hz (pitch range 270 - 397 Hz), given min cues (53% acc)    - Pt sustained "low ah" for average of SPL of 81.7 dB (range of 80.4 - 84.0 dB) and lowest pitch 181.7 (pitch range 173.0 - 192.0 Hz), given min cues   1 c. The patient will verbalize a set of 10 functional phrases/ sentences at an average loudness level at least in the  decibel range with minimal to no cues at least 95% of the time.  - Words:  Average SPL of 76.7 dB (range 64.5 - 82.8 dB), given min cues       1 d. The patient will read aloud at an average loudness level at least in the  decibel range with minimal to no cues at least 95% of " "the time.  Not formally addressed      1 e. The patient will verbalize at least in the decibel range in his conversational speech at least 95% of the time with minimal to no cues.  - Average SPL of 70.8 dB (range 67.3 - 77.9 dB), given min cues     2. 2. Mr. Dalal will use motor speech strategies for speech hierarchy tasks with 90% accuracy IND'ly to increase his speech intelligibility. Goal discontinued.  Will focus on LSVT LOUD goals       Assessment:   Increased vocal loudness during the exercises but he needed cues to change pitch levels. Current goals remain appropriate. Goals to be updated as necessary.      Mr. Dalal presents with minimum impaired communication skills. He presents with dysarthria and hypophonia. Pt would benefit from continued skilled ST. Prognosis for improvement remains Good     Patient Education/Response:     Pt was educated on the LSVT LOUD program and exercises. He verbalized understanding.     Home Program: LSVT LOUD exercises - "AH's"     Plans and Goals:     Continue LSVT LOUD program     Other Recommendations: 1. MBSS to further assess swallowing function.     TEO Lewis, CCC-SLP  Speech-Language Pathologist  [facility]  Neurological Rehabilitation  "

## 2018-01-12 NOTE — PROGRESS NOTES
Identifying Information  Name: Adam Dalal  [facility] Number: [id number]     Medical Diagnosis: G20 (ICD-10-CM) - Parkinson's disease  Encounter Diagnosis:        Encounter Diagnoses   Name Primary?    Impaired functional mobility, balance, gait, and endurance      Impaired motor control        Physician: Amol Jerome MD  Treatment Orders: PT Eval and Treat, LSVT BIG       Past Medical History:   Diagnosis Date    Anxiety      Esotropia of right eye 5/2/2013    High cholesterol      Hyperlipidemia             Current Outpatient Prescriptions   Medication Sig    alprazolam (XANAX) 0.5 MG tablet TAKE 1 TABLET(0.5 MG) BY MOUTH EVERY NIGHT    carbidopa-levodopa  mg (SINEMET)  mg per tablet Take 0.5 tablets by mouth 3 (three) times daily.    dorzolamide-timolol 2-0.5% (COSOPT) 22.3-6.8 mg/mL ophthalmic solution Place 1 drop into both eyes 2 (two) times daily.    pravastatin (PRAVACHOL) 80 MG tablet TAKE 1 TABLET(80 MG) BY MOUTH EVERY MORNING      No current facility-administered medications for this visit.          Evaluation Date: 11/21/17  Plan of care expiration: 1/16/18  Extended POC: 2/14/2018   Precautions: Standard     LSVT Protocol     Week: 1/4   LSVT visit # 5/16      Visit total= 6/25     SUBJECTIVE:   Pain: pt denies  Pt reports: no new complaints     Adam received individual therapy including neuro re-education for 36 minutes including:     LSVT Maximal Daily Exercises to promote amplitude: (8 repetitions each)       Daily Exercises Performance Effort Comments   1 Floor<>ceiling good NT 10 finger flicks   2 Side<>side  good NT 10 finger flicks   3 Step & reach  (forward)  good NT    4 Step & reach  (backwards)  good NT    5 Backwards Step good NT    6 Rock & reach  (foward) good NT    7 Rock & reach   (sideways) fair NT          Functional Component Tasks:   1. Sit to stands: 10 reps, 2000g ball  2. wiping counter in circles- 5x ea  3. Screws- 5x ea  4. knee tap (standing)-  "10x ea  5. Buttons (5 on frame)= 27 sec + 17.1 sec + 21 sec + 25.3 sec      Hierarchy Task: simulated LB dressing  * sitting HSS, runner's DF stretch 3 x 30 sec    Gait Training/ "BIG walking" x 14 mins:  3 flights of stairs without HR, BIG walking with moderate VC for RUE swing and hand. Ambulation with cognitive task (cities by alphabet), max VC for RUE swing. ambulation with manual task- ball toss to self, ball volley to PT- fair to good dynamic balance     Calibration: none     Carryover assignment tracking:  Using big movements while performing housekeeping/ cleaning tasks     Assessment: Adam tolerated treatment session well and did not have any problems. Pt is demonstrating improved right hand motions for turning screws. Pt reports that turning keys may be becoming easier. Pt demonstrate minimal RUE motion with partially closed hand during ambulation which increases with addition of cognitive task. PT provided pt with illustrated HEP for hamstring and calf stretching due to reports of leg cramping (increased after increased activity). Pt can benefit from  skilled PT to address impairments to improve functional mobility, ADLs, and deter onset of symptoms related to diagnosis.     Anticipated barriers to progress: Sleep Apnea, High cholesterol, previous eye surgery     Impairment list:   Fall Risk  Impaired balance   Weakness   Impaired muscle tone  Gait deviations    Difficulty to participate in daily activities  Requires skilled supervision to complete and progress HEP         GOALS:   Short term goals: Pt will accomplish the following in 2-4 weeks; agrees to goals set:  1. Pt will perform LSVT BIG daily maximal exercises with supervision to improve carryover of BIG movements into daily life.  2. Pt will complete TUG while performing cognitive task of counting backwards by 3s from 25 in 9 seconds or less to demonstrate improved gait and balance.  3. Assess R HS length and set goal at needed.      Long term " "goals: 4-8 weeks, pt agrees to goals set  4. Pt will report score of 100 on ABC outcome measure when asked his perceived ability to "reach for a small can off a shelf at eye level" to demonstrate improved gait and balance.  5. Pt will score 29/30 on FGA to demonstrate improved gait and balance.  6. Pt will report score of 100 on ABC outcome measure when asked his perceived ability to "walk up or down stairs" to demonstrate improved strength and balance.     Plan:  Continue with LSVT Program to increase safety, mobility, independence, and quality of life per protocol- 4 times per week.     Elo Cantrell, PT    1/12/2018         "

## 2018-01-12 NOTE — PROGRESS NOTES
"OUTPATIENT NEUROLOGICAL REHABILITATION  SPEECH THERAPY PROGRESS NOTE    Date:  1/12/2018     Start Time:  0900  Stop Time:  0945    Subjective/History  Onset Date:  Diagnosed November 2017. Symptoms began over the last year.  Primary Diagnosis:  Parkinson's Disease  Treatment Diagnosis:  Hypokinetic Dysarthria  Referring Provider:  Dr. Amol Jerome  Reason for Referral: Speech therapy for evaluation and treatment  Current Medical History:  Adam Dalal presents to the [facility] with minimum communication and speech difficulty secondary to the diagnosis of Parkinson's Disease.     Precautions:  general         UNTIMED  Procedure Min.    Speech-Language therapy   45           Total Minutes: 45  Total Timed Units: 0  Total Untimed Units: 1  Charges Billed/# of units: 1    Visit #: 5  Date of Evaluation:  11/21/17  Plan of Care Expiration:    11/21/17 to 2/16/18 (Pt requested to begin program in January 2018)  Extended POC:      Progress/Current Status    Subjective:     Pain: 0 /10      Objective:     Short Term Goals: (4 weeks) Current Progress:   1. Mr. Dalal will participate in further assessment of his vocal intensity to determine his sound pressure level (SPL, acoustic correlate of vocal loudness) measured with a sound level meter and more specific goals will be determined. Goal met/revised  1. - Task 1: sustained "ah" for average SPL of 73.4 dB (range 67.0 - 81.5 dB) and average 14.2 seconds (range 12.3 - 17.5 secs)  - Task 2a: sustained "high ah" for average SPL of 83.5 dB (range 81.2 - 86.6 dB) and highest pitch 301.5 Hz (pitch range 283 - 307Hz)  - Task 2b: sustained "low ah" for average of SPL of 75.8 dB (range of 72.8 - 78.1dB) and lowest pitch 181.5 (pitch range 161.0 - 192.0 Hz)  - Task 3: while reading a passage: SPL range of 67.4 dB (range 63.2 - 74.0 dB)  - Task 4: during monologue: SPL range of 66.3 dB (range 64.9 - 76.5 dB)  - Task 5: generated " "words with specific letters:  SPL range of 68.0 dB (range 63.3 - 72.9 dB)  - Task 6: describe a motor task while completing a dual motor activity: SPL range of 62.2 dB (range 60.8 - 72.4 dB); fast rate of speech  - Task 7a: stimulability testing: sustained vowel phonation ("ah") with average SPL 85.7 dB and duration average of 13.4 seconds   - Task 7b: "high ah": average frequency of 360.0 Hz and SPL average 88.6 dB   - Task 7c:  "low ah": average frequency of 203.0 Hz and average SPL 81.7 dB   - Task 7d: functional phrases: SPL range of 79.1 dB   Revised Goal: 1 a. The patient will produce loud, good quality "AH" sounds in the  decibel range for an average of 20 to 25 seconds (as a voice/speech "foundation" skill) for sets of 15 with minimal to no cues at least 95% of the time to further develop breath-speech coordination and for loudness calibration.      - Pt sustained "ah" for average SPL 83.4dB (range 80.9 - 86.6 dB) and average 20.5 seconds (range 5 - 26 secs)   1 b. The patient will produce "AH" from a mid-level to at least 3 notes higher and from a mid-level to at least 3 notes lower in sets of 15 for each pitch direction with minimal to no cues 95% of the time to improve pitch variability. - Pt sustained "high ah" for average SPL of 89.6 dB (range 81.7 - 93.3 dB) and highest pitch 313.8 Hz (pitch range 320 - 343 Hz)    - Pt sustained "low ah" for average of SPL of 82.3 dB (range of 79.3 - 88.0 dB) and lowest pitch 168.6 (pitch range 158.0 - 187.0 Hz)   1 c. The patient will verbalize a set of 10 functional phrases/ sentences at an average loudness level at least in the  decibel range with minimal to no cues at least 95% of the time.  Words:  Average SPL 78.2 dB (range 64.7 - 86.7 dB)       1 d. The patient will read aloud at an average loudness level at least in the  decibel range with minimal to no cues at least 95% of the time.  Not formally addressed      1 e. The patient will verbalize at least in the " "decibel range in his conversational speech at least 95% of the time with minimal to no cues.  - Average SPL 70.4 dB (range 63.6 - 77.8 dB)     2. 2. Mr. Dalal will use motor speech strategies for speech hierarchy tasks with 90% accuracy IND'ly to increase his speech intelligibility. Goal discontinued.  Will focus on LSVT LOUD goals       Assessment:   Increased vocal loudness during the exercises but he needed cues to slow down speech rate in conversation. Current goals remain appropriate. Goals to be updated as necessary.      Mr. Dalal presents with minimum impaired communication skills. He presents with dysarthria and hypophonia. Pt would benefit from continued skilled ST. Prognosis for improvement remains Good     Patient Education/Response:     Pt was educated on the LSVT LOUD program and exercises. He verbalized understanding.     Home Program: LSVT LOUD exercises - "AH's" and word list     Plans and Goals:     Continue LSVT LOUD program     Other Recommendations: 1. MBSS to further assess swallowing function.     TEO Lewis, CCC-SLP  Speech-Language Pathologist  [facility]  Neurological Rehabilitation  "

## 2018-01-16 ENCOUNTER — CLINICAL SUPPORT (OUTPATIENT)
Dept: REHABILITATION | Facility: HOSPITAL | Age: 58
End: 2018-01-16
Attending: PSYCHIATRY & NEUROLOGY
Payer: COMMERCIAL

## 2018-01-16 DIAGNOSIS — R47.1 DYSARTHRIA: ICD-10-CM

## 2018-01-16 DIAGNOSIS — R49.8 HYPOPHONIA: ICD-10-CM

## 2018-01-16 DIAGNOSIS — Z74.09 IMPAIRED FUNCTIONAL MOBILITY, BALANCE, GAIT, AND ENDURANCE: ICD-10-CM

## 2018-01-16 DIAGNOSIS — Z78.9 IMPAIRED MOTOR CONTROL: ICD-10-CM

## 2018-01-16 DIAGNOSIS — R49.9 VOICE AND RESONANCE DISORDER: ICD-10-CM

## 2018-01-16 PROCEDURE — 97112 NEUROMUSCULAR REEDUCATION: CPT | Mod: PO

## 2018-01-16 PROCEDURE — 97116 GAIT TRAINING THERAPY: CPT | Mod: PO

## 2018-01-16 PROCEDURE — 92507 TX SP LANG VOICE COMM INDIV: CPT | Mod: PO

## 2018-01-16 NOTE — PROGRESS NOTES
"Identifying Information  Name: Adam Sawyer [facility] Number: [id number]     Medical Diagnosis: G20 (ICD-10-CM) - Parkinson's disease  Encounter Diagnosis:        Encounter Diagnoses   Name Primary?    Impaired functional mobility, balance, gait, and endurance      Impaired motor control        Physician: Amol Jerome MD  Treatment Orders: PT Eval and Treat, LSVT BIG       Past Medical History:   Diagnosis Date    Anxiety      Esotropia of right eye 5/2/2013    High cholesterol      Hyperlipidemia             Current Outpatient Prescriptions   Medication Sig    alprazolam (XANAX) 0.5 MG tablet TAKE 1 TABLET(0.5 MG) BY MOUTH EVERY NIGHT    carbidopa-levodopa  mg (SINEMET)  mg per tablet Take 0.5 tablets by mouth 3 (three) times daily.    dorzolamide-timolol 2-0.5% (COSOPT) 22.3-6.8 mg/mL ophthalmic solution Place 1 drop into both eyes 2 (two) times daily.    pravastatin (PRAVACHOL) 80 MG tablet TAKE 1 TABLET(80 MG) BY MOUTH EVERY MORNING      No current facility-administered medications for this visit.          Evaluation Date: 11/21/17  Plan of care expiration: 1/16/18  Extended POC: 2/14/2018   Precautions: Standard     LSVT Protocol     Week: 2/4   LSVT visit # 6/16      Visit total= 7/25     SUBJECTIVE:   Pain: pt denies  Pt reports: " I'm doing pretty good."     Adam received individual therapy including neuro re-education for 36 minutes including:     LSVT Maximal Daily Exercises to promote amplitude: (10 repetitions each)       Daily Exercises Performance Effort Comments   1 Floor<>ceiling good NT 10 finger flicks   2 Side<>side  good NT 10 finger flicks   3 Step & reach  (forward)  good NT    4 Step & reach  (backwards)  good NT    5 Backwards Step good NT    6 Rock & reach  (foward) good NT    7 Rock & reach   (sideways) fair NT          Functional Component Tasks:   1. Sit to stands: 10 reps, 3000g ball  2. wiping counter in circles with #1lb cuff weight- 5x ea  3. Screws- 5x " "ea  4. knee tap (standing)- 10x ea  5. Buttons (5 on frame)= 25 sec + 26 sec + 27  sec + 30 sec      Hierarchy Task: simulated LB dressing x 5 trials each  2 x 30 sec sitting B HSS, runner's DF stretch 2 x 30 sec    Gait Training/ "BIG walking" x 14 mins:  3 flights of stairs without HR, BIG walking with moderate VC for RUE swing and hand. Ambulation with cognitive task ( [place] by alphabet), max VC for RUE swing. ambulation with manual task- ball toss to self, ball volley to PT- fair to good dynamic balance     Calibration: none     Carryover assignment tracking:  Using big movements while performing housekeeping/ cleaning tasks     Assessment: Adam tolerated treatment session well and did not have any problems. Pt was able to progress to 10 reps from 8 reps on all exercises, and began alternating sides on both sustained and multidirectional exercises.  Pt progressed to 1 lb cuff weights on wiping counter tops.    Pt can benefit from  skilled PT to address impairments to improve functional mobility, ADLs, and deter onset of symptoms related to diagnosis.     Anticipated barriers to progress: Sleep Apnea, High cholesterol, previous eye surgery     Impairment list:   Fall Risk  Impaired balance   Weakness   Impaired muscle tone  Gait deviations    Difficulty to participate in daily activities  Requires skilled supervision to complete and progress HEP         GOALS:   Short term goals: Pt will accomplish the following in 2-4 weeks; agrees to goals set:  1. Pt will perform LSVT BIG daily maximal exercises with supervision to improve carryover of BIG movements into daily life.  2. Pt will complete TUG while performing cognitive task of counting backwards by 3s from 25 in 9 seconds or less to demonstrate improved gait and balance.  3. Assess R HS length and set goal at needed.      Long term goals: 4-8 weeks, pt agrees to goals set  4. Pt will report score of 100 on ABC outcome measure when asked his " "perceived ability to "reach for a small can off a shelf at eye level" to demonstrate improved gait and balance.  5. Pt will score 29/30 on FGA to demonstrate improved gait and balance.  6. Pt will report score of 100 on ABC outcome measure when asked his perceived ability to "walk up or down stairs" to demonstrate improved strength and balance.     Plan:  Continue with LSVT Program to increase safety, mobility, independence, and quality of life per protocol- 4 times per week.     Chasidy Field, PTA    1/16/2018         "

## 2018-01-16 NOTE — PROGRESS NOTES
"OUTPATIENT NEUROLOGICAL REHABILITATION  SPEECH THERAPY PROGRESS NOTE    Date:  1/16/2018     Start Time:  0900  Stop Time:  0945    Subjective/History  Onset Date:  Diagnosed November 2017. Symptoms began over the last year.  Primary Diagnosis:  Parkinson's Disease  Treatment Diagnosis:  Hypokinetic Dysarthria  Referring Provider:  Dr. Amol Jerome  Reason for Referral: Speech therapy for evaluation and treatment  Current Medical History:  Adam Dalal presents to the [facility] with minimum communication and speech difficulty secondary to the diagnosis of Parkinson's Disease.     Precautions:  general         UNTIMED  Procedure Min.    Speech-Language therapy   45           Total Minutes: 45  Total Timed Units: 0  Total Untimed Units: 1  Charges Billed/# of units: 1    Visit #: 5  Date of Evaluation:  11/21/17  Plan of Care Expiration:    11/21/17 to 2/16/18 (Pt requested to begin program in January 2018)  Extended POC:      Progress/Current Status    Subjective:     Pain: 0 /10    Objective:     Short Term Goals: (4 weeks) Current Progress:   1. Mr. Dalal will participate in further assessment of his vocal intensity to determine his sound pressure level (SPL, acoustic correlate of vocal loudness) measured with a sound level meter and more specific goals will be determined. Goal met/revised  1. - Task 1: sustained "ah" for average SPL of 73.4 dB (range 67.0 - 81.5 dB) and average 14.2 seconds (range 12.3 - 17.5 secs)  - Task 2a: sustained "high ah" for average SPL of 83.5 dB (range 81.2 - 86.6 dB) and highest pitch 301.5 Hz (pitch range 283 - 307Hz)  - Task 2b: sustained "low ah" for average of SPL of 75.8 dB (range of 72.8 - 78.1dB) and lowest pitch 181.5 (pitch range 161.0 - 192.0 Hz)  - Task 3: while reading a passage: SPL range of 67.4 dB (range 63.2 - 74.0 dB)  - Task 4: during monologue: SPL range of 66.3 dB (range 64.9 - 76.5 dB)  - Task 5: generated " "words with specific letters:  SPL range of 68.0 dB (range 63.3 - 72.9 dB)  - Task 6: describe a motor task while completing a dual motor activity: SPL range of 62.2 dB (range 60.8 - 72.4 dB); fast rate of speech  - Task 7a: stimulability testing: sustained vowel phonation ("ah") with average SPL 85.7 dB and duration average of 13.4 seconds   - Task 7b: "high ah": average frequency of 360.0 Hz and SPL average 88.6 dB   - Task 7c:  "low ah": average frequency of 203.0 Hz and average SPL 81.7 dB   - Task 7d: functional phrases: SPL range of 79.1 dB   Revised Goal: 1 a. The patient will produce loud, good quality "AH" sounds in the  decibel range for an average of 20 to 25 seconds (as a voice/speech "foundation" skill) for sets of 15 with minimal to no cues at least 95% of the time to further develop breath-speech coordination and for loudness calibration.      - Pt sustained "ah" for average SPL 85.8 dB (range 83.9 - 91.0 dB) and average 21.8 seconds (range 6 - 25 secs)   1 b. The patient will produce "AH" from a mid-level to at least 3 notes higher and from a mid-level to at least 3 notes lower in sets of 15 for each pitch direction with minimal to no cues 95% of the time to improve pitch variability. - Pt sustained "high ah" for average SPL of 90.3 dB (range 88.9 - 91.4 dB) and highest pitch 325.1 Hz (pitch range 319 - 336 Hz)    - Pt sustained "low ah" for average of SPL of 84.7 dB (range of 81.6 - 86.5 dB) and lowest pitch 178.9 (pitch range 120.0 - 193.0 Hz)   1 c. The patient will verbalize a set of 10 functional phrases/ sentences at an average loudness level at least in the  decibel range with minimal to no cues at least 95% of the time.  Words:  Average SPL 78.2 dB (range 62.7 - 86.1 dB)  Phrases:  Average SPL 77.1 dB (range 62.4 - 82.9 dB)         1 d. The patient will read aloud at an average loudness level at least in the  decibel range with minimal to no cues at least 95% of the time.  Not formally " "addressed      1 e. The patient will verbalize at least in the decibel range in his conversational speech at least 95% of the time with minimal to no cues.  - Average SPL 67.4 dB (range 60.4 - 78.3 dB)     2. 2. Mr. Dalal will use motor speech strategies for speech hierarchy tasks with 90% accuracy IND'ly to increase his speech intelligibility. Goal discontinued.  Will focus on LSVT LOUD goals       Assessment:   Increased vocal loudness and MPT. Current goals remain appropriate. Goals to be updated as necessary.      Mr. Dalal presents with minimum impaired communication skills. He presents with dysarthria and hypophonia. Pt would benefit from continued skilled ST. Prognosis for improvement remains Good     Patient Education/Response:     Pt was educated on the LSVT LOUD program and exercises. He verbalized understanding.     Home Program: LSVT LOUD exercises - "AH's" and word list     Plans and Goals:     Continue LSVT LOUD program     Other Recommendations: 1. MBSS to further assess swallowing function.     TEO Lewis, CCC-SLP  Speech-Language Pathologist  [facility]  Neurological Rehabilitation  "

## 2018-01-19 ENCOUNTER — CLINICAL SUPPORT (OUTPATIENT)
Dept: REHABILITATION | Facility: HOSPITAL | Age: 58
End: 2018-01-19
Attending: PSYCHIATRY & NEUROLOGY
Payer: COMMERCIAL

## 2018-01-19 DIAGNOSIS — Z74.09 IMPAIRED FUNCTIONAL MOBILITY, BALANCE, GAIT, AND ENDURANCE: ICD-10-CM

## 2018-01-19 DIAGNOSIS — R47.1 DYSARTHRIA: ICD-10-CM

## 2018-01-19 DIAGNOSIS — Z78.9 IMPAIRED MOTOR CONTROL: ICD-10-CM

## 2018-01-19 DIAGNOSIS — R49.8 HYPOPHONIA: ICD-10-CM

## 2018-01-19 DIAGNOSIS — R49.9 VOICE AND RESONANCE DISORDER: ICD-10-CM

## 2018-01-19 PROCEDURE — 92507 TX SP LANG VOICE COMM INDIV: CPT | Mod: PO

## 2018-01-19 PROCEDURE — 97112 NEUROMUSCULAR REEDUCATION: CPT | Mod: PO | Performed by: PHYSICAL THERAPIST

## 2018-01-19 PROCEDURE — 97116 GAIT TRAINING THERAPY: CPT | Mod: PO | Performed by: PHYSICAL THERAPIST

## 2018-01-19 NOTE — PROGRESS NOTES
"Identifying Information  Name: Adam Sawyer [facility] Number: [id number]     Medical Diagnosis: G20 (ICD-10-CM) - Parkinson's disease  Encounter Diagnosis:        Encounter Diagnoses   Name Primary?    Impaired functional mobility, balance, gait, and endurance      Impaired motor control        Physician: Amol Jerome MD  Treatment Orders: PT Eval and Treat, LSVT BIG       Past Medical History:   Diagnosis Date    Anxiety      Esotropia of right eye 5/2/2013    High cholesterol      Hyperlipidemia             Current Outpatient Prescriptions   Medication Sig    alprazolam (XANAX) 0.5 MG tablet TAKE 1 TABLET(0.5 MG) BY MOUTH EVERY NIGHT    carbidopa-levodopa  mg (SINEMET)  mg per tablet Take 0.5 tablets by mouth 3 (three) times daily.    dorzolamide-timolol 2-0.5% (COSOPT) 22.3-6.8 mg/mL ophthalmic solution Place 1 drop into both eyes 2 (two) times daily.    pravastatin (PRAVACHOL) 80 MG tablet TAKE 1 TABLET(80 MG) BY MOUTH EVERY MORNING      No current facility-administered medications for this visit.          Evaluation Date: 11/21/17  Plan of care expiration: 1/16/18  Extended POC: 2/14/2018   Precautions: Standard     LSVT Protocol     Week: 2/4   LSVT visit # 7/16      Visit total= 8/25     SUBJECTIVE:   Pain: pt denies  Pt reports: "I'm doing better"  FOTO: Degenerative Disorders survey= 89% (11% impairment)   ABC survey= 92% (8% impairment)     Adam received individual therapy including neuro re-education for 28 minutes including:     LSVT Maximal Daily Exercises to promote amplitude: (10 repetitions each)       Daily Exercises Performance Effort Comments   1 Floor<>ceiling good NT 10 finger flicks   2 Side<>side good NT 10 finger flicks   3 Step & reach  (forward) good NT    4 Step & reach  (backwards) good NT    5 Backwards Step good NT    6 Rock & reach  (foward) good NT 20x   7 Rock & reach   (sideways) good NT 10x         Functional Component Tasks:   1. Sit to stands: 10 " "reps, 3000g ball  2. wiping wall in circles with #1lb cuff weight- 5x ea  3. Screws- 5x ea   4. knee tap (standing)- 10x ea  5. Buttons (5 on shirt)= 32 sec + 25 sec + 24 sec + 28 sec      Hierarchy Task: simulated LB dressing x 5 trials each- NP today      Gait Training/ "BIG walking" x 13 mins:  3 flights of stairs without HR, BIG walking with minimal VC for RUE swing and hand. Ambulation with manual task- ball toss to PT (forward and backward)- fair to good dynamic balance  TUG with cognitive task= 11.5 sec + 6.7 sec    Calibration: improved RUE swing and decreased fisting with walking      Carryover assignment tracking:  Using big movements while performing housekeeping/ cleaning tasks     Assessment: Adam tolerated treatment session well. Pt demonstrates improved calibration with daily maximal exercises and ambulation. Pt continues to require minimal verbal cues to increased RUE swing with gait. Pt also demonstrates improved stair negotiation  Pt can benefit from  skilled PT to address impairments to improve functional mobility, ADLs, and deter onset of symptoms related to diagnosis.     Anticipated barriers to progress: Sleep Apnea, High cholesterol, previous eye surgery     Impairment list:   Fall Risk  Impaired balance   Weakness   Impaired muscle tone  Gait deviations    Difficulty to participate in daily activities  Requires skilled supervision to complete and progress HEP         GOALS:   Status as of 1/19/18  Short term goals: Pt will accomplish the following in 2-4 weeks; agrees to goals set:  1. Pt will perform LSVT BIG daily maximal exercises with supervision to improve carryover of BIG movements into daily life. met  2. Pt will complete TUG while performing cognitive task of counting backwards by 3s from 25 in 9 seconds or less to demonstrate improved gait and balance. Improved/ nearly met- 9.1 sec  3. Assess R HS length and set goal at needed. NT- pt is performing HS stretches     Long term " "goals: 4-8 weeks, pt agrees to goals set  4. Pt will report score of 100 on ABC outcome measure when asked his perceived ability to "reach for a small can off a shelf at eye level" to demonstrate improved gait and balance. met  5. Pt will score 29/30 on FGA to demonstrate improved gait and balance.  6. Pt will report score of 100 on ABC outcome measure when asked his perceived ability to "walk up or down stairs" to demonstrate improved strength and balance. Same- 90% confidence     Plan:  Continue with LSVT Program to increase safety, mobility, independence, and quality of life per protocol- 4 times per week.     Elo Cantrell, PT    1/19/2018         "

## 2018-01-19 NOTE — PLAN OF CARE
"Identifying Information  Name: Adam Sawyer [facility] Number: [id number]     Medical Diagnosis: G20 (ICD-10-CM) - Parkinson's disease  Encounter Diagnosis:        Encounter Diagnoses   Name Primary?    Impaired functional mobility, balance, gait, and endurance      Impaired motor control        Physician: Amol Jerome MD  Treatment Orders: PT Eval and Treat, LSVT BIG       Past Medical History:   Diagnosis Date    Anxiety      Esotropia of right eye 5/2/2013    High cholesterol      Hyperlipidemia             Current Outpatient Prescriptions   Medication Sig    alprazolam (XANAX) 0.5 MG tablet TAKE 1 TABLET(0.5 MG) BY MOUTH EVERY NIGHT    carbidopa-levodopa  mg (SINEMET)  mg per tablet Take 0.5 tablets by mouth 3 (three) times daily.    dorzolamide-timolol 2-0.5% (COSOPT) 22.3-6.8 mg/mL ophthalmic solution Place 1 drop into both eyes 2 (two) times daily.    pravastatin (PRAVACHOL) 80 MG tablet TAKE 1 TABLET(80 MG) BY MOUTH EVERY MORNING      No current facility-administered medications for this visit.          Evaluation Date: 11/21/17  Plan of care expiration: 1/16/18  Extended POC: 2/14/2018   Precautions: Standard     LSVT Protocol     Week: 2/4   LSVT visit # 7/16      Visit total= 8/25     SUBJECTIVE:   Pain: pt denies  Pt reports: "I'm doing better"  FOTO: Degenerative Disorders survey= 89% (11% impairment)   ABC survey= 92% (8% impairment)     Adam received individual therapy including neuro re-education for 28 minutes including:     LSVT Maximal Daily Exercises to promote amplitude: (10 repetitions each)       Daily Exercises Performance Effort Comments   1 Floor<>ceiling good NT 10 finger flicks   2 Side<>side good NT 10 finger flicks   3 Step & reach  (forward) good NT    4 Step & reach  (backwards) good NT    5 Backwards Step good NT    6 Rock & reach  (foward) good NT 20x   7 Rock & reach   (sideways) good NT 10x         Functional Component Tasks:   1. Sit to stands: 10 " "reps, 3000g ball  2. wiping wall in circles with #1lb cuff weight- 5x ea  3. Screws- 5x ea   4. knee tap (standing)- 10x ea  5. Buttons (5 on shirt)= 32 sec + 25 sec + 24 sec + 28 sec      Hierarchy Task: simulated LB dressing x 5 trials each- NP today      Gait Training/ "BIG walking" x 13 mins:  3 flights of stairs without HR, BIG walking with minimal VC for RUE swing and hand. Ambulation with manual task- ball toss to PT (forward and backward)- fair to good dynamic balance  TUG with cognitive task= 11.5 sec + 6.7 sec    Calibration: improved RUE swing and decreased fisting with walking      Carryover assignment tracking:  Using big movements while performing housekeeping/ cleaning tasks     Assessment: Adam tolerated treatment session well. Pt demonstrates improved calibration with daily maximal exercises and ambulation. Pt continues to require minimal verbal cues to increased RUE swing with gait. Pt also demonstrates improved stair negotiation  Pt can benefit from  skilled PT to address impairments to improve functional mobility, ADLs, and deter onset of symptoms related to diagnosis.     Anticipated barriers to progress: Sleep Apnea, High cholesterol, previous eye surgery     Impairment list:   Fall Risk  Impaired balance   Weakness   Impaired muscle tone  Gait deviations    Difficulty to participate in daily activities  Requires skilled supervision to complete and progress HEP         GOALS:   Status as of 1/19/18  Short term goals: Pt will accomplish the following in 2-4 weeks; agrees to goals set:  1. Pt will perform LSVT BIG daily maximal exercises with supervision to improve carryover of BIG movements into daily life. met  2. Pt will complete TUG while performing cognitive task of counting backwards by 3s from 25 in 9 seconds or less to demonstrate improved gait and balance. Improved/ nearly met- 9.1 sec  3. Assess R HS length and set goal at needed. NT- pt is performing HS stretches     Long term " "goals: 4-8 weeks, pt agrees to goals set  4. Pt will report score of 100 on ABC outcome measure when asked his perceived ability to "reach for a small can off a shelf at eye level" to demonstrate improved gait and balance. met  5. Pt will score 29/30 on FGA to demonstrate improved gait and balance.  6. Pt will report score of 100 on ABC outcome measure when asked his perceived ability to "walk up or down stairs" to demonstrate improved strength and balance. Same- 90% confidence     Plan:  Continue with LSVT Program to increase safety, mobility, independence, and quality of life per protocol- 4 times per week.     Elo Cantrell, PT    1/19/2018           "

## 2018-01-19 NOTE — PROGRESS NOTES
"OUTPATIENT NEUROLOGICAL REHABILITATION  SPEECH THERAPY PROGRESS NOTE    Date:  1/19/2018     Start Time:  0900  Stop Time:  0945    Subjective/History  Onset Date:  Diagnosed November 2017. Symptoms began over the last year.  Primary Diagnosis:  Parkinson's Disease  Treatment Diagnosis:  Hypokinetic Dysarthria  Referring Provider:  Dr. Amol Jerome  Reason for Referral: Speech therapy for evaluation and treatment  Current Medical History:  Adam Dalal presents to the [facility] with minimum communication and speech difficulty secondary to the diagnosis of Parkinson's Disease.     Precautions:  general         UNTIMED  Procedure Min.    Speech-Language therapy   45           Total Minutes: 45  Total Timed Units: 0  Total Untimed Units: 1  Charges Billed/# of units: 1    Visit #: 6  Date of Evaluation:  11/21/17  Plan of Care Expiration:    11/21/17 to 2/16/18 (Pt requested to begin program in January 2018)  Extended POC:      Progress/Current Status    Subjective:     Pain: 0 /10    Objective:     Short Term Goals: (4 weeks) Current Progress:   1. Mr. Dalal will participate in further assessment of his vocal intensity to determine his sound pressure level (SPL, acoustic correlate of vocal loudness) measured with a sound level meter and more specific goals will be determined. Goal met/revised  1. - Task 1: sustained "ah" for average SPL of 73.4 dB (range 67.0 - 81.5 dB) and average 14.2 seconds (range 12.3 - 17.5 secs)  - Task 2a: sustained "high ah" for average SPL of 83.5 dB (range 81.2 - 86.6 dB) and highest pitch 301.5 Hz (pitch range 283 - 307Hz)  - Task 2b: sustained "low ah" for average of SPL of 75.8 dB (range of 72.8 - 78.1dB) and lowest pitch 181.5 (pitch range 161.0 - 192.0 Hz)  - Task 3: while reading a passage: SPL range of 67.4 dB (range 63.2 - 74.0 dB)  - Task 4: during monologue: SPL range of 66.3 dB (range 64.9 - 76.5 dB)  - Task 5: generated " "words with specific letters:  SPL range of 68.0 dB (range 63.3 - 72.9 dB)  - Task 6: describe a motor task while completing a dual motor activity: SPL range of 62.2 dB (range 60.8 - 72.4 dB); fast rate of speech  - Task 7a: stimulability testing: sustained vowel phonation ("ah") with average SPL 85.7 dB and duration average of 13.4 seconds   - Task 7b: "high ah": average frequency of 360.0 Hz and SPL average 88.6 dB   - Task 7c:  "low ah": average frequency of 203.0 Hz and average SPL 81.7 dB   - Task 7d: functional phrases: SPL range of 79.1 dB   Revised Goal: 1 a. The patient will produce loud, good quality "AH" sounds in the  decibel range for an average of 20 to 25 seconds (as a voice/speech "foundation" skill) for sets of 15 with minimal to no cues at least 95% of the time to further develop breath-speech coordination and for loudness calibration.      - Pt sustained "ah" for average SPL 85.6 dB (range 82.9 - 89.1 dB) and average 22.7 seconds (range 16 - 26 secs) - 80% acc   1 b. The patient will produce "AH" from a mid-level to at least 3 notes higher and from a mid-level to at least 3 notes lower in sets of 15 for each pitch direction with minimal to no cues 95% of the time to improve pitch variability. - Pt sustained "high ah" for average SPL of 88.4 dB (range 84.5 - 91.2 dB) and highest pitch 434.3 Hz (pitch range 357 - 1208 Hz)- 80% acc    - Pt sustained "low ah" for average of SPL of 85.2 dB (range of 80.7 - 88.5 dB) and lowest pitch 179.9 (pitch range 120.0 - 193.0 Hz) - 26% acc min cues needed   1 c. The patient will verbalize a set of 10 functional phrases/ sentences at an average loudness level at least in the  decibel range with minimal to no cues at least 95% of the time.  Phrases:  Average SPL 76.3 dB (range 65.3 - 84.6 dB)  Sentences:  Average SPL 77.1 dB (range 60.0 - 83.1dB)         1 d. The patient will read aloud at an average loudness level at least in the  decibel range with minimal to no " "cues at least 95% of the time.  Not formally addressed      1 e. The patient will verbalize at least in the decibel range in his conversational speech at least 95% of the time with minimal to no cues.  - Average SPL 68.6 dB (range 64.9 - 76.1 dB)     2. 2. Mr. Dalal will use motor speech strategies for speech hierarchy tasks with 90% accuracy IND'ly to increase his speech intelligibility. Goal discontinued.  Will focus on LSVT LOUD goals       Assessment:   Increased vocal loudness and MPT. Good carryover of strategies.  Will monitor swallowing to determine if MBSS is warranted. Current goals remain appropriate. Goals to be updated as necessary.      Mr. Dalal presents with minimum impaired communication skills. He presents with dysarthria and hypophonia. Pt would benefit from continued skilled ST. Prognosis for improvement remains Good     Patient Education/Response:     Pt was educated on the LSVT LOUD program and exercises. He verbalized understanding.     Home Program: LSVT LOUD exercises - "AH's", list of words, phrases, and sentences    Plans and Goals:     Continue LSVT LOUD program     Other Recommendations: 1. MBSS to further assess swallowing function is still warranted.     TEO Lewis, CCC-SLP  Speech-Language Pathologist  [facility]  Neurological Rehabilitation  "

## 2018-01-22 ENCOUNTER — CLINICAL SUPPORT (OUTPATIENT)
Dept: REHABILITATION | Facility: HOSPITAL | Age: 58
End: 2018-01-22
Attending: PSYCHIATRY & NEUROLOGY
Payer: COMMERCIAL

## 2018-01-22 ENCOUNTER — DOCUMENTATION ONLY (OUTPATIENT)
Dept: REHABILITATION | Facility: HOSPITAL | Age: 58
End: 2018-01-22

## 2018-01-22 DIAGNOSIS — R49.8 HYPOPHONIA: ICD-10-CM

## 2018-01-22 DIAGNOSIS — R47.1 DYSARTHRIA: ICD-10-CM

## 2018-01-22 DIAGNOSIS — Z78.9 IMPAIRED MOTOR CONTROL: ICD-10-CM

## 2018-01-22 DIAGNOSIS — Z74.09 IMPAIRED FUNCTIONAL MOBILITY, BALANCE, GAIT, AND ENDURANCE: ICD-10-CM

## 2018-01-22 DIAGNOSIS — R49.9 VOICE AND RESONANCE DISORDER: ICD-10-CM

## 2018-01-22 PROCEDURE — 97116 GAIT TRAINING THERAPY: CPT | Mod: PO

## 2018-01-22 PROCEDURE — 92507 TX SP LANG VOICE COMM INDIV: CPT | Mod: PO

## 2018-01-22 PROCEDURE — 97112 NEUROMUSCULAR REEDUCATION: CPT | Mod: PO

## 2018-01-22 NOTE — PROGRESS NOTES
"Identifying Information  Name: Adam Sawyer [facility] Number: [id number]     Medical Diagnosis: G20 (ICD-10-CM) - Parkinson's disease  Encounter Diagnosis:        Encounter Diagnoses   Name Primary?    Impaired functional mobility, balance, gait, and endurance      Impaired motor control        Physician: Amol Jerome MD  Treatment Orders: PT Eval and Treat, LSVT BIG       Past Medical History:   Diagnosis Date    Anxiety      Esotropia of right eye 5/2/2013    High cholesterol      Hyperlipidemia             Current Outpatient Prescriptions   Medication Sig    alprazolam (XANAX) 0.5 MG tablet TAKE 1 TABLET(0.5 MG) BY MOUTH EVERY NIGHT    carbidopa-levodopa  mg (SINEMET)  mg per tablet Take 0.5 tablets by mouth 3 (three) times daily.    dorzolamide-timolol 2-0.5% (COSOPT) 22.3-6.8 mg/mL ophthalmic solution Place 1 drop into both eyes 2 (two) times daily.    pravastatin (PRAVACHOL) 80 MG tablet TAKE 1 TABLET(80 MG) BY MOUTH EVERY MORNING      No current facility-administered medications for this visit.          Evaluation Date: 11/21/17  Plan of care expiration: 1/16/18  Extended POC: 2/14/2018   Precautions: Standard     LSVT Protocol     Week: 3/4   LSVT visit # 8/16      Visit total= 8/25     SUBJECTIVE:   Pain: pt denies  Pt reports: "I'm able to clap with my hands now, I used to pretend to clap because I couldn't do it.  Now I can actually do it."      Adam received individual therapy including neuro re-education for 28 minutes including:     LSVT Maximal Daily Exercises to promote amplitude: (10 repetitions each)       Daily Exercises Performance Effort Comments   1 Floor<>ceiling good NT 10 finger flicks   2 Side<>side good NT 10 finger flicks, alternating sides   3 Step & reach  (forward) good NT Alternating sides on the red mat   4 Step & reach  (backwards) good NT Alternating sides on the red mat   5 Backwards Step good NT Alternating sides on the red mat   6 Rock & " "reach  (foward) good NT 20x  On red mat   7 Rock & reach   (sideways) good NT 10x  On red mat         Functional Component Tasks:   1. Sit to stands: 10 reps, 3000g ball  2. wiping wall in circles with #1lb cuff weight- 5x ea  3. Screws- 5x ea   4. knee tap (standing)- 10x ea  5. Buttons (5 on square)= 33 sec + 29 sec + 23 sec      Hierarchy Task: simulated LB dressing x 5 trials each       Gait Training/ "BIG walking" x 13 mins:  3 flights of stairs without HR, BIG walking with minimal VC for RUE swing and hand.  Ambulation with cognitive task ( naming [employer]) VC's to increase R arm swing      Calibration: improved RUE swing and decreased fisting with walking      Carryover assignment tracking:  Using big movements while performing housekeeping/ cleaning tasks     Assessment: Adam tolerated treatment session well and did not have any problems. Pt progressed to standing exercises on uneven surfaces with alternating sides.  Pt required minimal VCs for right arm swing during gait.  Pt can benefit from skilled PT to address impairments to improve functional mobility, ADLs, and deter onset of symptoms related to diagnosis.     Anticipated barriers to progress: Sleep Apnea, High cholesterol, previous eye surgery     Impairment list:   Fall Risk  Impaired balance   Weakness   Impaired muscle tone  Gait deviations    Difficulty to participate in daily activities  Requires skilled supervision to complete and progress HEP         GOALS:   Status as of 1/19/18  Short term goals: Pt will accomplish the following in 2-4 weeks; agrees to goals set:  1. Pt will perform LSVT BIG daily maximal exercises with supervision to improve carryover of BIG movements into daily life. met  2. Pt will complete TUG while performing cognitive task of counting backwards by 3s from 25 in 9 seconds or less to demonstrate improved gait and balance. Improved/ nearly met- 9.1 sec  3. Assess R HS length and set goal at needed. NT- pt is " "performing HS stretches     Long term goals: 4-8 weeks, pt agrees to goals set  4. Pt will report score of 100 on ABC outcome measure when asked his perceived ability to "reach for a small can off a shelf at eye level" to demonstrate improved gait and balance. met  5. Pt will score 29/30 on FGA to demonstrate improved gait and balance.  6. Pt will report score of 100 on ABC outcome measure when asked his perceived ability to "walk up or down stairs" to demonstrate improved strength and balance. Same- 90% confidence     Plan:  Continue with LSVT Program to increase safety, mobility, independence, and quality of life per protocol- 4 times per week.     Chasidy Field, PTA    1/22/2018         "

## 2018-01-22 NOTE — PROGRESS NOTES
"OUTPATIENT NEUROLOGICAL REHABILITATION  SPEECH THERAPY PROGRESS NOTE    Date:  1/22/2018     Start Time:  0900  Stop Time:  0945    Subjective/History  Onset Date:  Diagnosed November 2017. Symptoms began over the last year.  Primary Diagnosis:  Parkinson's Disease  Treatment Diagnosis:  Hypokinetic Dysarthria  Referring Provider:  Dr. Amol Jerome  Reason for Referral: Speech therapy for evaluation and treatment  Current Medical History:  Adam Dalal presents to the [facility] with minimum communication and speech difficulty secondary to the diagnosis of Parkinson's Disease.     Precautions:  general         UNTIMED  Procedure Min.    Speech-Language therapy   45           Total Minutes: 45  Total Timed Units: 0  Total Untimed Units: 1  Charges Billed/# of units: 1    Visit #: 7  Date of Evaluation:  11/21/17  Plan of Care Expiration:    11/21/17 to 2/16/18 (Pt requested to begin program in January 2018)  Extended POC:      Progress/Current Status    Subjective:     Pain: 0 /10    Objective:     Short Term Goals: (4 weeks) Current Progress:   1. Mr. Dalal will participate in further assessment of his vocal intensity to determine his sound pressure level (SPL, acoustic correlate of vocal loudness) measured with a sound level meter and more specific goals will be determined. Goal met/revised  1. - Task 1: sustained "ah" for average SPL of 73.4 dB (range 67.0 - 81.5 dB) and average 14.2 seconds (range 12.3 - 17.5 secs)  - Task 2a: sustained "high ah" for average SPL of 83.5 dB (range 81.2 - 86.6 dB) and highest pitch 301.5 Hz (pitch range 283 - 307Hz)  - Task 2b: sustained "low ah" for average of SPL of 75.8 dB (range of 72.8 - 78.1dB) and lowest pitch 181.5 (pitch range 161.0 - 192.0 Hz)  - Task 3: while reading a passage: SPL range of 67.4 dB (range 63.2 - 74.0 dB)  - Task 4: during monologue: SPL range of 66.3 dB (range 64.9 - 76.5 dB)  - Task 5: generated " "words with specific letters:  SPL range of 68.0 dB (range 63.3 - 72.9 dB)  - Task 6: describe a motor task while completing a dual motor activity: SPL range of 62.2 dB (range 60.8 - 72.4 dB); fast rate of speech  - Task 7a: stimulability testing: sustained vowel phonation ("ah") with average SPL 85.7 dB and duration average of 13.4 seconds   - Task 7b: "high ah": average frequency of 360.0 Hz and SPL average 88.6 dB   - Task 7c:  "low ah": average frequency of 203.0 Hz and average SPL 81.7 dB   - Task 7d: functional phrases: SPL range of 79.1 dB   Revised Goal: 1 a. The patient will produce loud, good quality "AH" sounds in the  decibel range for an average of 20 to 25 seconds (as a voice/speech "foundation" skill) for sets of 15 with minimal to no cues at least 95% of the time to further develop breath-speech coordination and for loudness calibration.      - Pt sustained "ah" for average SPL 84.9 dB (range 82.8 - 87.51 dB) and average 21.8 seconds (range 18 - 23 secs) - 87% acc IND'ly   1 b. The patient will produce "AH" from a mid-level to at least 3 notes higher and from a mid-level to at least 3 notes lower in sets of 15 for each pitch direction with minimal to no cues 95% of the time to improve pitch variability. - Pt sustained "high ah" for average SPL of 87.2 dB (range 84.8 - 89.5 dB) and highest pitch 347.3 Hz (pitch range 333 - 356 Hz)- 100% acc IND'ly    - Pt sustained "low ah" for average of SPL of 84.8 dB (range of 82.2 - 86.9 dB) and lowest pitch 174.6 (pitch range 163.0 - 181.0 Hz) - 27% acc min cues needed   1 c. The patient will verbalize a set of 10 functional phrases/ sentences at an average loudness level at least in the  decibel range with minimal to no cues at least 95% of the time.  Phrases:  Average SPL 78.3 dB (range 63.9 - 81.7 dB)  Sentences:  Average SPL 78.9 dB (range 64.4 - 86.5 dB)       1 d. The patient will read aloud at an average loudness level at least in the  decibel range with " "minimal to no cues at least 95% of the time.  -  Paragraph read 2x: Average SPL 75.3 dB (range 65.7 - 80.0 dB), min cues to slow down and read louder.     1 e. The patient will verbalize at least in the decibel range in his conversational speech at least 95% of the time with minimal to no cues.  - Average SPL 71.1 dB (range 68.4 - 80.2 dB), min cues to speak louder     2. 2. Mr. Dalal will use motor speech strategies for speech hierarchy tasks with 90% accuracy IND'ly to increase his speech intelligibility. Goal discontinued.  Will focus on LSVT LOUD goals       Assessment:   Good carryover of strategies.  Current goals remain appropriate. Goals to be updated as necessary.      Mr. Dalal presents with minimum impaired communication skills. He presents with dysarthria and hypophonia. Pt would benefit from continued skilled ST. Prognosis for improvement remains Good     Patient Education/Response:     Pt was educated on the LSVT LOUD program and exercises. He verbalized understanding.     Home Program: LSVT LOUD exercises - "AH's", list of words, phrases, and sentences    Plans and Goals:     Continue LSVT LOUD program     Other Recommendations: 1. MBSS to further assess swallowing function if still warranted.     TEO Lewis, CCC-SLP  Speech-Language Pathologist  [facility]  Neurological Rehabilitation  "

## 2018-01-22 NOTE — PROGRESS NOTES
I, Elo Cantrell, DONNYT, met with Chasidy Field PTA to discuss this pt's POC. Pt is doing well with LSVT BIG program. May begin to add weights this week to progress. Continue to address stairs, cognitive task + gait, and manual task + gait.    Elo Cantrell DPT  1/22/2018      Face to face consultation with supervision PT held on 01/22/2018    Chasidy Field PTA

## 2018-01-23 ENCOUNTER — CLINICAL SUPPORT (OUTPATIENT)
Dept: REHABILITATION | Facility: HOSPITAL | Age: 58
End: 2018-01-23
Attending: PSYCHIATRY & NEUROLOGY
Payer: COMMERCIAL

## 2018-01-23 DIAGNOSIS — Z78.9 IMPAIRED MOTOR CONTROL: ICD-10-CM

## 2018-01-23 DIAGNOSIS — R49.9 VOICE AND RESONANCE DISORDER: ICD-10-CM

## 2018-01-23 DIAGNOSIS — R47.1 DYSARTHRIA: ICD-10-CM

## 2018-01-23 DIAGNOSIS — Z74.09 IMPAIRED FUNCTIONAL MOBILITY, BALANCE, GAIT, AND ENDURANCE: ICD-10-CM

## 2018-01-23 DIAGNOSIS — R49.8 HYPOPHONIA: ICD-10-CM

## 2018-01-23 PROCEDURE — 97116 GAIT TRAINING THERAPY: CPT | Mod: PO

## 2018-01-23 PROCEDURE — 97112 NEUROMUSCULAR REEDUCATION: CPT | Mod: PO

## 2018-01-23 PROCEDURE — 92507 TX SP LANG VOICE COMM INDIV: CPT | Mod: PO

## 2018-01-23 NOTE — PROGRESS NOTES
"Identifying Information  Name: Adam Sawyer [facility] Number: [id number]     Medical Diagnosis: G20 (ICD-10-CM) - Parkinson's disease  Encounter Diagnosis:        Encounter Diagnoses   Name Primary?    Impaired functional mobility, balance, gait, and endurance      Impaired motor control        Physician: Amol Jerome MD  Treatment Orders: PT Eval and Treat, LSVT BIG       Past Medical History:   Diagnosis Date    Anxiety      Esotropia of right eye 5/2/2013    High cholesterol      Hyperlipidemia             Current Outpatient Prescriptions   Medication Sig    alprazolam (XANAX) 0.5 MG tablet TAKE 1 TABLET(0.5 MG) BY MOUTH EVERY NIGHT    carbidopa-levodopa  mg (SINEMET)  mg per tablet Take 0.5 tablets by mouth 3 (three) times daily.    dorzolamide-timolol 2-0.5% (COSOPT) 22.3-6.8 mg/mL ophthalmic solution Place 1 drop into both eyes 2 (two) times daily.    pravastatin (PRAVACHOL) 80 MG tablet TAKE 1 TABLET(80 MG) BY MOUTH EVERY MORNING      No current facility-administered medications for this visit.          Evaluation Date: 11/21/17  Plan of care expiration: 1/16/18  Extended POC: 2/14/2018   Precautions: Standard     LSVT Protocol     Week: 3/4   LSVT visit # 9/16      Visit total= 8/25     SUBJECTIVE:   Pain: 2/10 right abdominal  Pt reports: "I pulled something on the Right side of my abdominals I think doing something.  Is it ok if I do a little less twisting."      Adam received individual therapy including neuro re-education for 30 minutes including:     LSVT Maximal Daily Exercises to promote amplitude: (10 repetitions each)       Daily Exercises Performance Effort Comments   1 Floor<>ceiling good NT 10 finger flicks   2 Side<>side good NT 10 finger flicks, alternating sides, only reaching to the sides, not stretching legs 2* abdominal pain   3 Step & reach  (forward) good NT Alternating sides on the red mat   4 Step & reach  (backwards) good NT Alternating sides on the red " "mat   5 Backwards Step good NT Alternating sides on the red mat   6 Rock & reach  (foward) good NT 20x  On red mat   7 Rock & reach   (sideways) good NT 10x  On red mat, only 1/2 turns 2* pain in abdominal         Functional Component Tasks:   1. Sit to stands: 10 reps, 3000g ball  2. wiping wall in circles with #1lb cuff weight- 5x ea  3. Screws- 5x ea   4. knee tap (standing)- 10x ea  5. Buttons (5 on square)= 25 sec + 22 sec + 30 sec + 23 sec     Hierarchy Task: simulated LB dressing x 5 trials each       Gait Training/ "BIG walking" x 15mins:  3 flights of stairs without HR, BIG walking with minimal VC for RUE swing and hand.  Ambulation with cognitive task ( naming rock bands on turf downstairs) VC's to increase R arm swing      Calibration: improved RUE swing and decreased fisting with walking      Carryover assignment tracking:  Using big movements while performing housekeeping/ cleaning tasks     Assessment: Adam tolerated tx session fairly well despite new abdominal pain.  Pt was able to perform all exercises in sitting and standing.  Pt did not twist as much as the pain in his abdominals was felt with twisting.  Pt ambulated on the turf today for change of surface and did not have any problems.  Cont to require VC's to increase arm swing on the R hand.  Pt can benefit from skilled PT to address impairments to improve functional mobility, ADLs, and deter onset of symptoms related to diagnosis.     Anticipated barriers to progress: Sleep Apnea, High cholesterol, previous eye surgery     Impairment list:   Fall Risk  Impaired balance   Weakness   Impaired muscle tone  Gait deviations    Difficulty to participate in daily activities  Requires skilled supervision to complete and progress HEP         GOALS:   Status as of 1/19/18  Short term goals: Pt will accomplish the following in 2-4 weeks; agrees to goals set:  1. Pt will perform LSVT BIG daily maximal exercises with supervision to improve carryover of " "BIG movements into daily life. met  2. Pt will complete TUG while performing cognitive task of counting backwards by 3s from 25 in 9 seconds or less to demonstrate improved gait and balance. Improved/ nearly met- 9.1 sec  3. Assess R HS length and set goal at needed. NT- pt is performing HS stretches     Long term goals: 4-8 weeks, pt agrees to goals set  4. Pt will report score of 100 on ABC outcome measure when asked his perceived ability to "reach for a small can off a shelf at eye level" to demonstrate improved gait and balance. met  5. Pt will score 29/30 on FGA to demonstrate improved gait and balance.  6. Pt will report score of 100 on ABC outcome measure when asked his perceived ability to "walk up or down stairs" to demonstrate improved strength and balance. Same- 90% confidence     Plan:  Continue with LSVT Program to increase safety, mobility, independence, and quality of life per protocol- 4 times per week.     Chasidy Field, PTA    1/23/2018         "

## 2018-01-23 NOTE — PROGRESS NOTES
"OUTPATIENT NEUROLOGICAL REHABILITATION  SPEECH THERAPY PROGRESS NOTE    Date:  1/23/2018     Start Time:  0900  Stop Time:  0945    Subjective/History  Onset Date:  Diagnosed November 2017. Symptoms began over the last year.  Primary Diagnosis:  Parkinson's Disease  Treatment Diagnosis:  Hypokinetic Dysarthria  Referring Provider:  Dr. Amol Jerome  Reason for Referral: Speech therapy for evaluation and treatment  Current Medical History:  Adam Dalal presents to the [facility] with minimum communication and speech difficulty secondary to the diagnosis of Parkinson's Disease.     Precautions:  general         UNTIMED  Procedure Min.    Speech-Language therapy   45           Total Minutes: 45  Total Timed Units: 0  Total Untimed Units: 1  Charges Billed/# of units: 1    Visit #: 7  Date of Evaluation:  11/21/17  Plan of Care Expiration:    11/21/17 to 2/16/18 (Pt requested to begin program in January 2018)  Extended POC:      Progress/Current Status    Subjective:     Pain: 0 /10    Objective:     Short Term Goals: (4 weeks) Current Progress:   1. Mr. Dalal will participate in further assessment of his vocal intensity to determine his sound pressure level (SPL, acoustic correlate of vocal loudness) measured with a sound level meter and more specific goals will be determined. Goal met/revised  1. - Task 1: sustained "ah" for average SPL of 73.4 dB (range 67.0 - 81.5 dB) and average 14.2 seconds (range 12.3 - 17.5 secs)  - Task 2a: sustained "high ah" for average SPL of 83.5 dB (range 81.2 - 86.6 dB) and highest pitch 301.5 Hz (pitch range 283 - 307Hz)  - Task 2b: sustained "low ah" for average of SPL of 75.8 dB (range of 72.8 - 78.1dB) and lowest pitch 181.5 (pitch range 161.0 - 192.0 Hz)  - Task 3: while reading a passage: SPL range of 67.4 dB (range 63.2 - 74.0 dB)  - Task 4: during monologue: SPL range of 66.3 dB (range 64.9 - 76.5 dB)  - Task 5: generated " "words with specific letters:  SPL range of 68.0 dB (range 63.3 - 72.9 dB)  - Task 6: describe a motor task while completing a dual motor activity: SPL range of 62.2 dB (range 60.8 - 72.4 dB); fast rate of speech  - Task 7a: stimulability testing: sustained vowel phonation ("ah") with average SPL 85.7 dB and duration average of 13.4 seconds   - Task 7b: "high ah": average frequency of 360.0 Hz and SPL average 88.6 dB   - Task 7c:  "low ah": average frequency of 203.0 Hz and average SPL 81.7 dB   - Task 7d: functional phrases: SPL range of 79.1 dB   Revised Goal: 1 a. The patient will produce loud, good quality "AH" sounds in the  decibel range for an average of 20 to 25 seconds (as a voice/speech "foundation" skill) for sets of 15 with minimal to no cues at least 95% of the time to further develop breath-speech coordination and for loudness calibration.      - Pt sustained "ah" for average SPL 84.0 dB (range 83.2 - 85.7 dB) and average 21.1 seconds (range 18 - 23 secs) - 93% acc IND'ly   1 b. The patient will produce "AH" from a mid-level to at least 3 notes higher and from a mid-level to at least 3 notes lower in sets of 15 for each pitch direction with minimal to no cues 95% of the time to improve pitch variability. - Pt sustained "high ah" for average SPL of 86.8 dB (range 84.4 - 89.9 dB) and highest pitch 297.5 Hz (pitch range 274 - 318 Hz)    - Pt sustained "low ah" for average of SPL of 84.9 dB (range of 82.1 - 88.6 dB) and lowest pitch 177.1 (pitch range 119.0 - 187.0 Hz) - 27% acc min cues needed   1 c. The patient will verbalize a set of 10 functional phrases/ sentences at an average loudness level at least in the  decibel range with minimal to no cues at least 95% of the time.  Phrases:  Average SPL 77.4 dB (range 65.4 - 83.4 dB)  Sentences read 2x:  Average SPL 78.0 dB (range 67.2 - 84.3 dB)       1 d. The patient will read aloud at an average loudness level at least in the  decibel range with minimal to " "no cues at least 95% of the time.  -  Paragraph: Average SPL 76.4 dB (range 64.2 - 81.6 dB), min cues to slow down and read louder.     1 e. The patient will verbalize at least in the decibel range in his conversational speech at least 95% of the time with minimal to no cues.  - Average SPL 68.5 dB (range 63.0 - 80.2 dB), min cues to speak louder     2. 2. Mr. Dalal will use motor speech strategies for speech hierarchy tasks with 90% accuracy IND'ly to increase his speech intelligibility. Goal discontinued.  Will focus on LSVT LOUD goals       Assessment:   Good carryover of loud voice in spontaneous speech inconsistently.   Current goals remain appropriate. Goals to be updated as necessary.      Mr. Dalal presents with minimum impaired communication skills. He presents with dysarthria and hypophonia. Pt would benefit from continued skilled ST. Prognosis for improvement remains Good     Patient Education/Response:     Pt was educated on the LSVT LOUD program and exercises. Discussed masked facial expression and how exercises may help improve it.  He verbalized understanding.     Home Program: LSVT LOUD exercises - "AH's", list of phrases, sentences, and reading aloud.     Plans and Goals:     Continue LSVT LOUD program     Other Recommendations: 1. MBSS to further assess swallowing function if still warranted.     TEO Lewis, CCC-SLP  Speech-Language Pathologist  [facility]  Neurological Rehabilitation  "

## 2018-01-25 ENCOUNTER — CLINICAL SUPPORT (OUTPATIENT)
Dept: REHABILITATION | Facility: HOSPITAL | Age: 58
End: 2018-01-25
Attending: PSYCHIATRY & NEUROLOGY
Payer: COMMERCIAL

## 2018-01-25 DIAGNOSIS — Z74.09 IMPAIRED FUNCTIONAL MOBILITY, BALANCE, GAIT, AND ENDURANCE: ICD-10-CM

## 2018-01-25 DIAGNOSIS — R47.1 DYSARTHRIA: ICD-10-CM

## 2018-01-25 DIAGNOSIS — R49.9 VOICE AND RESONANCE DISORDER: ICD-10-CM

## 2018-01-25 DIAGNOSIS — R49.8 HYPOPHONIA: ICD-10-CM

## 2018-01-25 DIAGNOSIS — Z78.9 IMPAIRED MOTOR CONTROL: ICD-10-CM

## 2018-01-25 PROCEDURE — 92507 TX SP LANG VOICE COMM INDIV: CPT | Mod: PO

## 2018-01-25 PROCEDURE — 97116 GAIT TRAINING THERAPY: CPT | Mod: PO

## 2018-01-25 PROCEDURE — 97110 THERAPEUTIC EXERCISES: CPT | Mod: PO

## 2018-01-25 NOTE — PROGRESS NOTES
"OUTPATIENT NEUROLOGICAL REHABILITATION  SPEECH THERAPY PROGRESS NOTE    Date:  1/25/2018     Start Time:  0900  Stop Time:  0945    Subjective/History  Onset Date:  Diagnosed November 2017. Symptoms began over the last year.  Primary Diagnosis:  Parkinson's Disease  Treatment Diagnosis:  Hypokinetic Dysarthria  Referring Provider:  Dr. Amol Jerome  Reason for Referral: Speech therapy for evaluation and treatment  Current Medical History:  Adam Dalal presents to the [facility] with minimum communication and speech difficulty secondary to the diagnosis of Parkinson's Disease.     Precautions:  general         UNTIMED  Procedure Min.    Speech-Language therapy   45           Total Minutes: 45  Total Timed Units: 0  Total Untimed Units: 1  Charges Billed/# of units: 1    Visit #: 7  Date of Evaluation:  11/21/17  Plan of Care Expiration:    11/21/17 to 2/16/18 (Pt requested to begin program in January 2018)  Extended POC:      Progress/Current Status    Subjective:     Pain: 0 /10    Objective:     Short Term Goals: (4 weeks) Current Progress:   1. Mr. Dalal will participate in further assessment of his vocal intensity to determine his sound pressure level (SPL, acoustic correlate of vocal loudness) measured with a sound level meter and more specific goals will be determined. Goal met/revised  1. - Task 1: sustained "ah" for average SPL of 73.4 dB (range 67.0 - 81.5 dB) and average 14.2 seconds (range 12.3 - 17.5 secs)  - Task 2a: sustained "high ah" for average SPL of 83.5 dB (range 81.2 - 86.6 dB) and highest pitch 301.5 Hz (pitch range 283 - 307Hz)  - Task 2b: sustained "low ah" for average of SPL of 75.8 dB (range of 72.8 - 78.1dB) and lowest pitch 181.5 (pitch range 161.0 - 192.0 Hz)  - Task 3: while reading a passage: SPL range of 67.4 dB (range 63.2 - 74.0 dB)  - Task 4: during monologue: SPL range of 66.3 dB (range 64.9 - 76.5 dB)  - Task 5: generated " "words with specific letters:  SPL range of 68.0 dB (range 63.3 - 72.9 dB)  - Task 6: describe a motor task while completing a dual motor activity: SPL range of 62.2 dB (range 60.8 - 72.4 dB); fast rate of speech  - Task 7a: stimulability testing: sustained vowel phonation ("ah") with average SPL 85.7 dB and duration average of 13.4 seconds   - Task 7b: "high ah": average frequency of 360.0 Hz and SPL average 88.6 dB   - Task 7c:  "low ah": average frequency of 203.0 Hz and average SPL 81.7 dB   - Task 7d: functional phrases: SPL range of 79.1 dB   Revised Goal: 1 a. The patient will produce loud, good quality "AH" sounds in the  decibel range for an average of 20 to 25 seconds (as a voice/speech "foundation" skill) for sets of 15 with minimal to no cues at least 95% of the time to further develop breath-speech coordination and for loudness calibration.      - Pt sustained "ah" for average SPL 84.0 dB (range 83.2 - 85.7 dB) and average 21.1 seconds (range 18 - 23 secs) - 93% acc IND'ly   1 b. The patient will produce "AH" from a mid-level to at least 3 notes higher and from a mid-level to at least 3 notes lower in sets of 15 for each pitch direction with minimal to no cues 95% of the time to improve pitch variability. - Pt sustained "high ah" for average SPL of 86.8 dB (range 84.4 - 89.9 dB) and highest pitch 297.5 Hz (pitch range 274 - 318 Hz)    - Pt sustained "low ah" for average of SPL of 84.9 dB (range of 82.1 - 88.6 dB) and lowest pitch 177.1 (pitch range 119.0 - 187.0 Hz) - 27% acc min cues needed   1 c. The patient will verbalize a set of 10 functional phrases/ sentences at an average loudness level at least in the  decibel range with minimal to no cues at least 95% of the time.  Phrases:  Average SPL 77.4 dB (range 65.4 - 83.4 dB)  Sentences read 2x:  Average SPL 78.0 dB (range 67.2 - 84.3 dB)       1 d. The patient will read aloud at an average loudness level at least in the  decibel range with minimal to " "no cues at least 95% of the time.  -  Paragraph: Average SPL 76.4 dB (range 64.2 - 81.6 dB), min cues to slow down and read louder.     1 e. The patient will verbalize at least in the decibel range in his conversational speech at least 95% of the time with minimal to no cues.  - Average SPL 68.5 dB (range 63.0 - 80.2 dB), min cues to speak louder     2. 2. Mr. Dalal will use motor speech strategies for speech hierarchy tasks with 90% accuracy IND'ly to increase his speech intelligibility. Goal discontinued.  Will focus on LSVT LOUD goals       Assessment:   Good carryover of loud voice in spontaneous speech inconsistently.   Current goals remain appropriate. Goals to be updated as necessary.      Mr. Dalal presents with minimum impaired communication skills. He presents with dysarthria and hypophonia. Pt would benefit from continued skilled ST. Prognosis for improvement remains Good     Patient Education/Response:     Pt was educated on the LSVT LOUD program and exercises. Discussed masked facial expression and how exercises may help improve it.  He verbalized understanding.     Home Program: LSVT LOUD exercises - "AH's", list of phrases, sentences, and reading aloud.     Plans and Goals:     Continue LSVT LOUD program     Other Recommendations: 1. MBSS to further assess swallowing function if still warranted.     TEO Lewis, CCC-SLP  Speech-Language Pathologist  [facility]  Neurological Rehabilitation  "

## 2018-01-25 NOTE — PROGRESS NOTES
"Identifying Information  Name: Adam Sawyer [facility] Number: [id number]     Medical Diagnosis: G20 (ICD-10-CM) - Parkinson's disease  Encounter Diagnosis:        Encounter Diagnoses   Name Primary?    Impaired functional mobility, balance, gait, and endurance      Impaired motor control        Physician: Amol Jerome MD  Treatment Orders: PT Eval and Treat, LSVT BIG       Past Medical History:   Diagnosis Date    Anxiety      Esotropia of right eye 5/2/2013    High cholesterol      Hyperlipidemia             Current Outpatient Prescriptions   Medication Sig    alprazolam (XANAX) 0.5 MG tablet TAKE 1 TABLET(0.5 MG) BY MOUTH EVERY NIGHT    carbidopa-levodopa  mg (SINEMET)  mg per tablet Take 0.5 tablets by mouth 3 (three) times daily.    dorzolamide-timolol 2-0.5% (COSOPT) 22.3-6.8 mg/mL ophthalmic solution Place 1 drop into both eyes 2 (two) times daily.    pravastatin (PRAVACHOL) 80 MG tablet TAKE 1 TABLET(80 MG) BY MOUTH EVERY MORNING      No current facility-administered medications for this visit.          Evaluation Date: 11/21/17  Plan of care expiration: 1/16/18  Extended POC: 2/14/2018   Precautions: Standard     LSVT Protocol     Week: 3/4   LSVT visit # 10/16      Visit total= 8/25     SUBJECTIVE:   Pain: 2/10 right abdominal  Pt reports: "My abdominals are feeling a little bit better."       Adam received individual therapy including neuro re-education for 30 minutes including:     LSVT Maximal Daily Exercises to promote amplitude: (10 repetitions each)       Daily Exercises Performance Effort Comments   1 Floor<>ceiling good NT 10 finger flicks   2 Side<>side good NT 10 finger flicks, alternating sides,   3 Step & reach  (forward) good NT Alternating sides on the red mat   4 Step & reach  (backwards) good NT Alternating sides on the red mat   5 Backwards Step good NT Alternating sides on the red mat   6 Rock & reach  (foward) good NT 20x  On red mat   7 Rock & reach " "  (sideways) good NT 10x  On red mat, only 1/2 turns 2* pain in abdominal         Functional Component Tasks:   1. Sit to stands: 5 reps, no ball 2* pain in abdominals  2. wiping wall in circles with #1.5lb cuff weight- 5x ea  3. Screws- 5x ea   4. knee tap (standing)- 10x ea  5. Buttons (5 on square)= 26 sec + 24 sec + 21 sec + 24 sec     Hierarchy Task: simulated LB dressing x 5 trials each       Gait Training/ "BIG walking" x 15mins:  3 flights of stairs without HR, BIG walking with minimal VC for RUE swing and hand.  Ambulation with cognitive task ( naming animals on turf downstairs) VC's to increase R arm swing      Calibration: improved RUE swing and decreased fisting with walking      Carryover assignment tracking:  Using big movements while performing housekeeping/ cleaning tasks     Assessment: Adam tolerated tx session well and only have minimal complaints of ab pain.  Pt cont to perform exercises but did not use the weighted ball for sit to stand 2* ab pain.  Progressed to 1.5 cuff weights for wiping the counter tops.   Cont to require VC's to increase arm swing on the R hand.  Pt can benefit from skilled PT to address impairments to improve functional mobility, ADLs, and deter onset of symptoms related to diagnosis.     Anticipated barriers to progress: Sleep Apnea, High cholesterol, previous eye surgery     Impairment list:   Fall Risk  Impaired balance   Weakness   Impaired muscle tone  Gait deviations    Difficulty to participate in daily activities  Requires skilled supervision to complete and progress HEP         GOALS:   Status as of 1/19/18  Short term goals: Pt will accomplish the following in 2-4 weeks; agrees to goals set:  1. Pt will perform LSVT BIG daily maximal exercises with supervision to improve carryover of BIG movements into daily life. met  2. Pt will complete TUG while performing cognitive task of counting backwards by 3s from 25 in 9 seconds or less to demonstrate improved gait " "and balance. Improved/ nearly met- 9.1 sec  3. Assess R HS length and set goal at needed. NT- pt is performing HS stretches     Long term goals: 4-8 weeks, pt agrees to goals set  4. Pt will report score of 100 on ABC outcome measure when asked his perceived ability to "reach for a small can off a shelf at eye level" to demonstrate improved gait and balance. met  5. Pt will score 29/30 on FGA to demonstrate improved gait and balance.  6. Pt will report score of 100 on ABC outcome measure when asked his perceived ability to "walk up or down stairs" to demonstrate improved strength and balance. Same- 90% confidence     Plan:  Continue with LSVT Program to increase safety, mobility, independence, and quality of life per protocol- 4 times per week.     Chasidy Field, PTA    1/25/2018         "

## 2018-01-25 NOTE — PROGRESS NOTES
"OUTPATIENT NEUROLOGICAL REHABILITATION  SPEECH THERAPY PROGRESS NOTE    Date:  1/25/2018     Start Time:  0945  Stop Time:  1030    Subjective/History  Onset Date:  Diagnosed November 2017. Symptoms began over the last year.  Primary Diagnosis:  Parkinson's Disease  Treatment Diagnosis:  Hypokinetic Dysarthria  Referring Provider:  Dr. Amol Jerome  Reason for Referral: Speech therapy for evaluation and treatment  Current Medical History:  Adam Dalal presents to the [facility] with minimum communication and speech difficulty secondary to the diagnosis of Parkinson's Disease.     Precautions:  general         UNTIMED  Procedure Min.    Speech-Language therapy   45           Total Minutes: 45  Total Timed Units: 0  Total Untimed Units: 1  Charges Billed/# of units: 1    Visit #: 8  Date of Evaluation:  11/21/17  Plan of Care Expiration:    11/21/17 to 2/16/18 (Pt requested to begin program in January 2018)  Extended POC:      Progress/Current Status    Subjective:     Pain: 0 /10    Objective:     Short Term Goals: (4 weeks) Current Progress:   1. Mr. Dalal will participate in further assessment of his vocal intensity to determine his sound pressure level (SPL, acoustic correlate of vocal loudness) measured with a sound level meter and more specific goals will be determined. Goal met/revised  1. - Task 1: sustained "ah" for average SPL of 73.4 dB (range 67.0 - 81.5 dB) and average 14.2 seconds (range 12.3 - 17.5 secs)  - Task 2a: sustained "high ah" for average SPL of 83.5 dB (range 81.2 - 86.6 dB) and highest pitch 301.5 Hz (pitch range 283 - 307Hz)  - Task 2b: sustained "low ah" for average of SPL of 75.8 dB (range of 72.8 - 78.1dB) and lowest pitch 181.5 (pitch range 161.0 - 192.0 Hz)  - Task 3: while reading a passage: SPL range of 67.4 dB (range 63.2 - 74.0 dB)  - Task 4: during monologue: SPL range of 66.3 dB (range 64.9 - 76.5 dB)  - Task 5: generated " "words with specific letters:  SPL range of 68.0 dB (range 63.3 - 72.9 dB)  - Task 6: describe a motor task while completing a dual motor activity: SPL range of 62.2 dB (range 60.8 - 72.4 dB); fast rate of speech  - Task 7a: stimulability testing: sustained vowel phonation ("ah") with average SPL 85.7 dB and duration average of 13.4 seconds   - Task 7b: "high ah": average frequency of 360.0 Hz and SPL average 88.6 dB   - Task 7c:  "low ah": average frequency of 203.0 Hz and average SPL 81.7 dB   - Task 7d: functional phrases: SPL range of 79.1 dB   Revised Goal: 1 a. The patient will produce loud, good quality "AH" sounds in the  decibel range for an average of 20 to 25 seconds (as a voice/speech "foundation" skill) for sets of 15 with minimal to no cues at least 95% of the time to further develop breath-speech coordination and for loudness calibration.      - Pt sustained "ah" for average SPL 86.5 dB (range 83.6 - 88.4 dB) and average 21.1 seconds (range 14 - 23 secs)   1 b. The patient will produce "AH" from a mid-level to at least 3 notes higher and from a mid-level to at least 3 notes lower in sets of 15 for each pitch direction with minimal to no cues 95% of the time to improve pitch variability. - Pt sustained "high ah" for average SPL of 86.1 dB (range 83.7 - 89.9 dB) and highest pitch 336.9 Hz (pitch range 308 - 360 Hz)    - Pt sustained "low ah" for average of SPL of 84.4 dB (range of 79.3 - 86.5 dB) and lowest pitch 176.5 (pitch range 169.0 - 187.0 Hz)    1 c. The patient will verbalize a set of 10 functional phrases/ sentences at an average loudness level at least in the  decibel range with minimal to no cues at least 95% of the time.  Phrases:  Average SPL 77.7 dB (range 67.0 - 83.1 dB)  Sentences read 1x:  Average SPL 80 dB (range 75.6 dB - 84.4 dB)       1 d. The patient will read aloud at an average loudness level at least in the  decibel range with minimal to no cues at least 95% of the time.  -  " "Paragraph: Average SPL 76.4 dB (range 62.3 - 86.4 dB), min cues to slow down and read louder.     1 e. The patient will verbalize at least in the decibel range in his conversational speech at least 95% of the time with minimal to no cues.  - Average SPL 70.8 dB (range 66.8 - 84.2 dB), min cues to speak louder     2. 2. Mr. Dalal will use motor speech strategies for speech hierarchy tasks with 90% accuracy IND'ly to increase his speech intelligibility. Goal discontinued.  Will focus on LSVT LOUD goals       Assessment:   Carryover of loud voice to conversational discourse becoming more consistent, and pt continues to make progress. Current goals remain appropriate. Goals to be updated as necessary.      Mr. Dalal presents with minimum impaired communication skills. He presents with dysarthria and hypophonia. Pt would benefit from continued skilled ST. Prognosis for improvement remains Good     Patient Education/Response:     The LSVT LOUD program and exercises as well as the pt's progress on this plan of care were discussed. He verbalized understanding.     Home Program: LSVT LOUD exercises - "AH's", list of phrases, sentences, and reading aloud.     Plans and Goals:     Continue LSVT LOUD program     Other Recommendations: 1. MBSS to further assess swallowing function if still warranted.     Didi Goldberg, SHALOM   Clinician     "

## 2018-01-26 ENCOUNTER — CLINICAL SUPPORT (OUTPATIENT)
Dept: REHABILITATION | Facility: HOSPITAL | Age: 58
End: 2018-01-26
Attending: PSYCHIATRY & NEUROLOGY
Payer: COMMERCIAL

## 2018-01-26 DIAGNOSIS — Z78.9 IMPAIRED MOTOR CONTROL: ICD-10-CM

## 2018-01-26 DIAGNOSIS — R47.1 DYSARTHRIA: ICD-10-CM

## 2018-01-26 DIAGNOSIS — R49.8 HYPOPHONIA: ICD-10-CM

## 2018-01-26 DIAGNOSIS — Z74.09 IMPAIRED FUNCTIONAL MOBILITY, BALANCE, GAIT, AND ENDURANCE: ICD-10-CM

## 2018-01-26 DIAGNOSIS — R49.9 VOICE AND RESONANCE DISORDER: ICD-10-CM

## 2018-01-26 PROCEDURE — 97112 NEUROMUSCULAR REEDUCATION: CPT | Mod: PO | Performed by: PHYSICAL THERAPIST

## 2018-01-26 PROCEDURE — 92507 TX SP LANG VOICE COMM INDIV: CPT | Mod: PO

## 2018-01-26 NOTE — PROGRESS NOTES
"Identifying Information  Name: Adam Sawyer [facility] Number: [id number]     Medical Diagnosis: G20 (ICD-10-CM) - Parkinson's disease  Encounter Diagnosis:        Encounter Diagnoses   Name Primary?    Impaired functional mobility, balance, gait, and endurance      Impaired motor control        Physician: Amol Jerome MD  Treatment Orders: PT Eval and Treat, LSVT BIG       Past Medical History:   Diagnosis Date    Anxiety      Esotropia of right eye 5/2/2013    High cholesterol      Hyperlipidemia             Current Outpatient Prescriptions   Medication Sig    alprazolam (XANAX) 0.5 MG tablet TAKE 1 TABLET(0.5 MG) BY MOUTH EVERY NIGHT    carbidopa-levodopa  mg (SINEMET)  mg per tablet Take 0.5 tablets by mouth 3 (three) times daily.    dorzolamide-timolol 2-0.5% (COSOPT) 22.3-6.8 mg/mL ophthalmic solution Place 1 drop into both eyes 2 (two) times daily.    pravastatin (PRAVACHOL) 80 MG tablet TAKE 1 TABLET(80 MG) BY MOUTH EVERY MORNING      No current facility-administered medications for this visit.          Evaluation Date: 11/21/17  Plan of care expiration: 1/16/18  Extended POC: 2/14/2018   Precautions: Standard     LSVT Protocol     Week: 3/4   LSVT visit # 10/16      Visit total= 8/25     SUBJECTIVE:   Pain: tightness in cervical and upper thoracic region. Reports pain in abdominals (old hernia) with use of weighted ball for sit<>stand  Pt reports: requesting exercises to address tightness in cervical/ upper back region. "What do I do when I am finished here?"  Adam received individual therapy including neuro re-education for 37 minutes including:     LSVT Maximal Daily Exercises to promote amplitude: (10 repetitions each)       Daily Exercises Performance Effort Comments   1 Floor<>ceiling good NT 10 finger flicks   2 Side<>side good NT 10 finger flicks, alternating sides,   3 Step & reach  (forward) good NT Alternating sides on the    4 Step & reach  (backwards) good NT " "Alternating sides on the    5 Backwards Step good NT Alternating sides on the    6 Rock & reach  (foward) good NT 20x   7 Rock & reach   (sideways) good NT 10x         Functional Component Tasks:   1. Sit to stands: 10 reps, 1# BUE  2. wiping wall in circles with 1# cuff weight- 5x ea  3. Screws- 5x ea   4. knee tap>clap(standing)- 10x ea  5. Buttons (5 on self)= 48 sec + 39.4 sec + 42.9 sec     Hierarchy Task: simulated LB dressing x 5 trials each- NP today  Pt instructed in HEP to address cervical/ thoracic tightness and rounded shoulders posture: chin tucks, cervical extension, cervical AROM, pec doorway stretch      Gait Training/ "BIG walking" x 4 mins: ambulation with 1# weights 600 ft in hallway, mod VC for RUE swing  (backwards)    Calibration: improved RUE use during exercises     Carryover assignment tracking:  concentrate on RUE swing with gait     Assessment: Adam tolerated tx session well.  Pt was instructed in cervical ext PROM, chin tucks, pectoralis doorway stretch, AROM cervical rotation to address reports of poor posture and cervical/ thoracic pain/ tightness. PT added 1# weights to UE for functional tasks to improve UE movement and strength. Pt did not perform exercises on foam mat to determine if this may have influenced onset of tightness in cervical/thoracic region. 1# weight used for sit<>stand vs. Ball to decrease strain on abdominal area.   Pt can benefit from skilled PT to address impairments to improve functional mobility, ADLs, and deter onset of symptoms related to diagnosis.     Anticipated barriers to progress: Sleep Apnea, High cholesterol, previous eye surgery     Impairment list:   Fall Risk  Impaired balance   Weakness   Impaired muscle tone  Gait deviations    Difficulty to participate in daily activities  Requires skilled supervision to complete and progress HEP         GOALS:   Status as of 1/19/18  Short term goals: Pt will accomplish the following in 2-4 weeks; agrees to " "goals set:  1. Pt will perform LSVT BIG daily maximal exercises with supervision to improve carryover of BIG movements into daily life. met  2. Pt will complete TUG while performing cognitive task of counting backwards by 3s from 25 in 9 seconds or less to demonstrate improved gait and balance. Improved/ nearly met- 9.1 sec  3. Assess R HS length and set goal at needed. NT- pt is performing HS stretches     Long term goals: 4-8 weeks, pt agrees to goals set  4. Pt will report score of 100 on ABC outcome measure when asked his perceived ability to "reach for a small can off a shelf at eye level" to demonstrate improved gait and balance. met  5. Pt will score 29/30 on FGA to demonstrate improved gait and balance.  6. Pt will report score of 100 on ABC outcome measure when asked his perceived ability to "walk up or down stairs" to demonstrate improved strength and balance. Same- 90% confidence     Plan:  Continue with LSVT Program to increase safety, mobility, independence, and quality of life per protocol- 4 times per week.     Elo Cantrell, PT    1/26/2018         "

## 2018-01-26 NOTE — PROGRESS NOTES
"OUTPATIENT NEUROLOGICAL REHABILITATION  SPEECH THERAPY PROGRESS NOTE    Date:  1/26/2018     Start Time:  0900  Stop Time:  0945    Subjective/History  Onset Date:  Diagnosed November 2017. Symptoms began over the last year.  Primary Diagnosis:  Parkinson's Disease  Treatment Diagnosis:  Hypokinetic Dysarthria  Referring Provider:  Dr. Amol Jerome  Reason for Referral: Speech therapy for evaluation and treatment  Current Medical History:  Adam Dalal presents to the [facility] with minimum communication and speech difficulty secondary to the diagnosis of Parkinson's Disease.     Precautions:  general         UNTIMED  Procedure Min.    Speech-Language therapy   45           Total Minutes: 45  Total Timed Units: 0  Total Untimed Units: 1  Charges Billed/# of units: 1    Visit #: 9  Date of Evaluation:  11/21/17  Plan of Care Expiration:    11/21/17 to 2/16/18 (Pt requested to begin program in January 2018)  Extended POC:      Progress/Current Status    Subjective:     Pain: 0 /10  Pt motivated and performing LSVT LOUD exercises well.     Objective:     Short Term Goals: (4 weeks) Current Progress:   1. Mr. Dalal will participate in further assessment of his vocal intensity to determine his sound pressure level (SPL, acoustic correlate of vocal loudness) measured with a sound level meter and more specific goals will be determined. Goal met/revised  1. - Task 1: sustained "ah" for average SPL of 73.4 dB (range 67.0 - 81.5 dB) and average 14.2 seconds (range 12.3 - 17.5 secs)  - Task 2a: sustained "high ah" for average SPL of 83.5 dB (range 81.2 - 86.6 dB) and highest pitch 301.5 Hz (pitch range 283 - 307Hz)  - Task 2b: sustained "low ah" for average of SPL of 75.8 dB (range of 72.8 - 78.1dB) and lowest pitch 181.5 (pitch range 161.0 - 192.0 Hz)  - Task 3: while reading a passage: SPL range of 67.4 dB (range 63.2 - 74.0 dB)  - Task 4: during monologue: SPL range of " "66.3 dB (range 64.9 - 76.5 dB)  - Task 5: generated words with specific letters:  SPL range of 68.0 dB (range 63.3 - 72.9 dB)  - Task 6: describe a motor task while completing a dual motor activity: SPL range of 62.2 dB (range 60.8 - 72.4 dB); fast rate of speech  - Task 7a: stimulability testing: sustained vowel phonation ("ah") with average SPL 85.7 dB and duration average of 13.4 seconds   - Task 7b: "high ah": average frequency of 360.0 Hz and SPL average 88.6 dB   - Task 7c:  "low ah": average frequency of 203.0 Hz and average SPL 81.7 dB   - Task 7d: functional phrases: SPL range of 79.1 dB   Revised Goal: 1 a. The patient will produce loud, good quality "AH" sounds in the  decibel range for an average of 20 to 25 seconds (as a voice/speech "foundation" skill) for sets of 15 with minimal to no cues at least 95% of the time to further develop breath-speech coordination and for loudness calibration.      - Pt sustained "ah" for average SPL 87.0 dB (range 85.1 - 88.6 dB) and average 22.5 seconds (range 20 - 25 secs)   1 b. The patient will produce "AH" from a mid-level to at least 3 notes higher and from a mid-level to at least 3 notes lower in sets of 15 for each pitch direction with minimal to no cues 95% of the time to improve pitch variability. - Pt sustained "high ah" for average SPL of 89.2 dB (range 87.4 - 91.5 dB) and highest pitch 304.2 Hz (pitch range 297 - 321 Hz)    - Pt sustained "low ah" for average of SPL of 85.9 dB (range of 82.2 - 90.6 dB) and lowest pitch 176.8 (pitch range 171.0 - 180.0 Hz)    1 c. The patient will verbalize a set of 10 functional phrases/ sentences at an average loudness level at least in the  decibel range with minimal to no cues at least 95% of the time.    Sentences read 3x:  Average SPL 80.0 dB (range 61.6 dB - 83.6 dB)       1 d. The patient will read aloud at an average loudness level at least in the  decibel range with minimal to no cues at least 95% of the time.  -  " "Paragraph: Average SPL 76.5 dB (range 60.5 - 81.2 dB), min cues to slow down and read louder.     1 e. The patient will verbalize at least in the decibel range in his conversational speech at least 95% of the time with minimal to no cues.  - Average SPL 71.4 dB (range 61.4 - 76.8 dB), min cues to speak louder     2. 2. Mr. Dalal will use motor speech strategies for speech hierarchy tasks with 90% accuracy IND'ly to increase his speech intelligibility. Goal discontinued.  Will focus on LSVT LOUD goals       Assessment:   Good carryover of loud voice in conversational speech. Increased loudness and maximum phonation time for "long AH." Current goals remain appropriate. Goals to be updated as necessary.      Mr. Dalal presents with minimum impaired communication skills. He presents with dysarthria and hypophonia. Pt would benefit from continued skilled ST. Prognosis for improvement remains Good     Patient Education/Response:     The LSVT LOUD program and exercises as well as the pt's progress on this plan of care were discussed. He verbalized understanding.     Home Program: LSVT LOUD exercises - "AH's", list of phrases, sentences, conversation, and reading aloud.     Plans and Goals:     Continue LSVT LOUD program     Other Recommendations: 1. MBSS to further assess swallowing function if still warranted.     BETH Lewis., CCC-SLP  Speech-Language Pathologist         "

## 2018-01-26 NOTE — PATIENT INSTRUCTIONS
Axial Extension (Chin Tuck)        Gently pull chin in while lengthening back of neck. Hold 5 seconds. Repeat 10 times. Do 2 sets, 1-2 sessions per day.       AROM, Rotation        Sit or stand, head comfortable, centered position. Turn head slowly to look over one shoulder. Hold 5-10 seconds. Repeat to other side.  Repeat 10 times per session. Do 1-2 sessions per day.         Scapula Adduction With Pectoralis Stretch: Mid-Range - Standing        Shoulders at 90°, keeping weight on feet, lean forward and squeeze shoulder blades together. Hold 30 seconds. Repeat with arms lifted in higher position.   Do 3 times, 1 times per day.     [email or web address]

## 2018-01-29 ENCOUNTER — CLINICAL SUPPORT (OUTPATIENT)
Dept: REHABILITATION | Facility: HOSPITAL | Age: 58
End: 2018-01-29
Attending: PSYCHIATRY & NEUROLOGY
Payer: COMMERCIAL

## 2018-01-29 DIAGNOSIS — Z78.9 IMPAIRED MOTOR CONTROL: ICD-10-CM

## 2018-01-29 DIAGNOSIS — Z74.09 IMPAIRED FUNCTIONAL MOBILITY, BALANCE, GAIT, AND ENDURANCE: ICD-10-CM

## 2018-01-29 PROCEDURE — 92507 TX SP LANG VOICE COMM INDIV: CPT | Mod: PO

## 2018-01-29 PROCEDURE — 97116 GAIT TRAINING THERAPY: CPT | Mod: PO

## 2018-01-29 PROCEDURE — 97112 NEUROMUSCULAR REEDUCATION: CPT | Mod: PO

## 2018-01-29 NOTE — PROGRESS NOTES
"Identifying Information  Name: Adam Sawyer [facility] Number: [id number]     Medical Diagnosis: G20 (ICD-10-CM) - Parkinson's disease  Encounter Diagnosis:        Encounter Diagnoses   Name Primary?    Impaired functional mobility, balance, gait, and endurance      Impaired motor control        Physician: Amol Jerome MD  Treatment Orders: PT Eval and Treat, LSVT BIG       Past Medical History:   Diagnosis Date    Anxiety      Esotropia of right eye 5/2/2013    High cholesterol      Hyperlipidemia             Current Outpatient Prescriptions   Medication Sig    alprazolam (XANAX) 0.5 MG tablet TAKE 1 TABLET(0.5 MG) BY MOUTH EVERY NIGHT    carbidopa-levodopa  mg (SINEMET)  mg per tablet Take 0.5 tablets by mouth 3 (three) times daily.    dorzolamide-timolol 2-0.5% (COSOPT) 22.3-6.8 mg/mL ophthalmic solution Place 1 drop into both eyes 2 (two) times daily.    pravastatin (PRAVACHOL) 80 MG tablet TAKE 1 TABLET(80 MG) BY MOUTH EVERY MORNING      No current facility-administered medications for this visit.          Evaluation Date: 11/21/17  Plan of care expiration: 1/16/18  Extended POC: 2/14/2018   Precautions: Standard     LSVT Protocol     Week: 4/4   LSVT visit # 11/16      Visit total= 8/25     SUBJECTIVE:   Pain:  Reports pain in abdominals (old hernia) with use of weighted ball for sit<>stand  Pt reports: " I'm doing pretty good."   Adam received individual therapy including neuro re-education for 37 minutes including:     LSVT Maximal Daily Exercises to promote amplitude: (10 repetitions each)       Daily Exercises Performance Effort Comments   1 Floor<>ceiling good NT 10 finger flicks  #1lb cuff weights   2 Side<>side good NT 10 finger flicks, alternating sides,  #1lb cuff weights   3 Step & reach  (forward) good NT Alternating sides on the   #1lb cuff weights   4 Step & reach  (backwards) good NT Alternating sides on the   #1lb cuff weights   5 Backwards Step good NT Alternating " "sides on the   #1lb cuff weights   6 Rock & reach  (foward) good NT 20x  #1lb cuff weights   7 Rock & reach   (sideways) good NT 10x  #1lb cuff weights         Functional Component Tasks:   1. Sit to stands: 10 reps, 1# BUE  2. wiping wall in circles with 1# cuff weight- 5x ea  3. Screws- 5x ea   4. knee tap>clap(standing)- 10x ea  5. Buttons (5 on self)= 24 sec + 28 sec + 22 sec + 22 sec     Hierarchy Task: simulated LB dressing x 5 trials each- NP today  Pt instructed in HEP to address cervical/ thoracic tightness and rounded shoulders posture: chin tucks, cervical extension, cervical AROM, pec doorway stretch      Gait Training/ "BIG walking" x 4 mins: ambulation naming things you see during [employer] parades    Calibration: improved RUE use during exercises     Carryover assignment tracking:  concentrate on RUE swing with gait     Assessment: Adam tolerated tx session well and did not have any problems.  Pt was able to progress to #1lb cuff weights on B UE for all sitting and standing therex with no complaints.   Pt can benefit from skilled PT to address impairments to improve functional mobility, ADLs, and deter onset of symptoms related to diagnosis.     Anticipated barriers to progress: Sleep Apnea, High cholesterol, previous eye surgery     Impairment list:   Fall Risk  Impaired balance   Weakness   Impaired muscle tone  Gait deviations    Difficulty to participate in daily activities  Requires skilled supervision to complete and progress HEP         GOALS:   Status as of 1/19/18  Short term goals: Pt will accomplish the following in 2-4 weeks; agrees to goals set:  1. Pt will perform LSVT BIG daily maximal exercises with supervision to improve carryover of BIG movements into daily life. met  2. Pt will complete TUG while performing cognitive task of counting backwards by 3s from 25 in 9 seconds or less to demonstrate improved gait and balance. Improved/ nearly met- 9.1 sec  3. Assess R HS length and set " "goal at needed. NT- pt is performing HS stretches     Long term goals: 4-8 weeks, pt agrees to goals set  4. Pt will report score of 100 on ABC outcome measure when asked his perceived ability to "reach for a small can off a shelf at eye level" to demonstrate improved gait and balance. met  5. Pt will score 29/30 on FGA to demonstrate improved gait and balance.  6. Pt will report score of 100 on ABC outcome measure when asked his perceived ability to "walk up or down stairs" to demonstrate improved strength and balance. Same- 90% confidence     Plan:  Continue with LSVT Program to increase safety, mobility, independence, and quality of life per protocol- 4 times per week.     Chasidy Field, PTA    1/29/2018         "

## 2018-01-29 NOTE — PROGRESS NOTES
"OUTPATIENT NEUROLOGICAL REHABILITATION  SPEECH THERAPY PROGRESS NOTE    Date:  1/29/2018     Start Time:  815  Stop Time:  900    Subjective/History  Onset Date:  Diagnosed November 2017. Symptoms began over the last year.  Primary Diagnosis:  Parkinson's Disease  Treatment Diagnosis:  Hypokinetic Dysarthria  Referring Provider:  Dr. Amol Jerome  Reason for Referral: Speech therapy for evaluation and treatment  Current Medical History:  Adam Dalal presents to the [facility] with minimum communication and speech difficulty secondary to the diagnosis of Parkinson's Disease.     Precautions:  general         UNTIMED  Procedure Min.    Speech-Language therapy   45           Total Minutes: 45  Total Timed Units: 0  Total Untimed Units: 1  Charges Billed/# of units: 1    Visit #: 10  Date of Evaluation:  11/21/17  Plan of Care Expiration:    11/21/17 to 2/16/18 (Pt requested to begin program in January 2018)  Extended POC:      Progress/Current Status    Subjective:     Pain: 0 /10  Pt motivated and performing LSVT LOUD exercises well.     Objective:     Short Term Goals: (4 weeks) Current Progress:   1. Mr. Dalal will participate in further assessment of his vocal intensity to determine his sound pressure level (SPL, acoustic correlate of vocal loudness) measured with a sound level meter and more specific goals will be determined. Goal met/revised  1. - Task 1: sustained "ah" for average SPL of 73.4 dB (range 67.0 - 81.5 dB) and average 14.2 seconds (range 12.3 - 17.5 secs)  - Task 2a: sustained "high ah" for average SPL of 83.5 dB (range 81.2 - 86.6 dB) and highest pitch 301.5 Hz (pitch range 283 - 307Hz)  - Task 2b: sustained "low ah" for average of SPL of 75.8 dB (range of 72.8 - 78.1dB) and lowest pitch 181.5 (pitch range 161.0 - 192.0 Hz)  - Task 3: while reading a passage: SPL range of 67.4 dB (range 63.2 - 74.0 dB)  - Task 4: during monologue: SPL range of " "66.3 dB (range 64.9 - 76.5 dB)  - Task 5: generated words with specific letters:  SPL range of 68.0 dB (range 63.3 - 72.9 dB)  - Task 6: describe a motor task while completing a dual motor activity: SPL range of 62.2 dB (range 60.8 - 72.4 dB); fast rate of speech  - Task 7a: stimulability testing: sustained vowel phonation ("ah") with average SPL 85.7 dB and duration average of 13.4 seconds   - Task 7b: "high ah": average frequency of 360.0 Hz and SPL average 88.6 dB   - Task 7c:  "low ah": average frequency of 203.0 Hz and average SPL 81.7 dB   - Task 7d: functional phrases: SPL range of 79.1 dB   Revised Goal: 1 a. The patient will produce loud, good quality "AH" sounds in the  decibel range for an average of 20 to 25 seconds (as a voice/speech "foundation" skill) for sets of 15 with minimal to no cues at least 95% of the time to further develop breath-speech coordination and for loudness calibration.      - Pt sustained "ah" for average SPL 86.0 dB (range 82.8 -90.3 dB) and average 22.9 seconds (range 20-25 secs)   1 b. The patient will produce "AH" from a mid-level to at least 3 notes higher and from a mid-level to at least 3 notes lower in sets of 15 for each pitch direction with minimal to no cues 95% of the time to improve pitch variability. - Pt sustained "high ah" for average SPL of 85.7 dB (range 82.3-88.5dB) and highest pitch 304.7 Hz (pitch range 303-311 Hz)    - Pt sustained "low ah" for average of SPL of 83.5 dB (range of 81.4-85.8 dB) and lowest pitch 174.0 (pitch range 166-180 Hz)    1 c. The patient will verbalize a set of 10 functional phrases/ sentences at an average loudness level at least in the  decibel range with minimal to no cues at least 95% of the time.  Phrases:  Average SPL 78.9 dB (range 63.9 dB - 86.7 dB)    Sentences read 2x:  Average SPL 80.5 dB (range 74.8 dB - 86.2 dB)       1 d. The patient will read aloud at an average loudness level at least in the  decibel range with minimal to " "no cues at least 95% of the time.  -  Paragraph: Average SPL 78.1 dB (range 61.0 - 83.6 dB), min cues to read louder.     1 e. The patient will verbalize at least in the decibel range in his conversational speech at least 95% of the time with minimal to no cues.  - Average SPL 70.0 dB (range 62.8-79.5 dB), min cues to speak louder  -Average SPL with motor task 72.2 dB (range 656.9-81.1 dB)      2. 2. Mr. Dalal will use motor speech strategies for speech hierarchy tasks with 90% accuracy IND'ly to increase his speech intelligibility. Goal discontinued.  Will focus on LSVT LOUD goals       Assessment:   Good carryover of loud voice in conversational speech. Increased loudness and maximum phonation time for "long AH." Current goals remain appropriate. Goals to be updated as necessary.  Carryover of loud voice noted during conversational speech. A motor task was     Mr. Dalal presents with minimum impaired communication skills. He presents with dysarthria and hypophonia. Pt would benefit from continued skilled ST. Prognosis for improvement remains Good     Patient Education/Response:     The LSVT LOUD program and exercises as well as the pt's progress on this plan of care were discussed. He verbalized understanding.     Home Program: LSVT LOUD exercises - "AH's", list of phrases, sentences, conversation, and reading aloud.     Plans and Goals:     Continue LSVT LOUD program     Other Recommendations: 1. MBSS to further assess swallowing function if still warranted.          "

## 2018-01-30 ENCOUNTER — CLINICAL SUPPORT (OUTPATIENT)
Dept: REHABILITATION | Facility: HOSPITAL | Age: 58
End: 2018-01-30
Attending: PSYCHIATRY & NEUROLOGY
Payer: COMMERCIAL

## 2018-01-30 DIAGNOSIS — Z74.09 IMPAIRED FUNCTIONAL MOBILITY, BALANCE, GAIT, AND ENDURANCE: ICD-10-CM

## 2018-01-30 DIAGNOSIS — R47.1 DYSARTHRIA: ICD-10-CM

## 2018-01-30 DIAGNOSIS — R49.8 HYPOPHONIA: ICD-10-CM

## 2018-01-30 DIAGNOSIS — Z78.9 IMPAIRED MOTOR CONTROL: ICD-10-CM

## 2018-01-30 DIAGNOSIS — R49.9 VOICE AND RESONANCE DISORDER: ICD-10-CM

## 2018-01-30 PROCEDURE — 92507 TX SP LANG VOICE COMM INDIV: CPT | Mod: PO

## 2018-01-30 PROCEDURE — 97116 GAIT TRAINING THERAPY: CPT | Mod: PO | Performed by: PHYSICAL THERAPIST

## 2018-01-30 PROCEDURE — 97112 NEUROMUSCULAR REEDUCATION: CPT | Mod: PO | Performed by: PHYSICAL THERAPIST

## 2018-01-30 NOTE — PROGRESS NOTES
"OUTPATIENT NEUROLOGICAL REHABILITATION  SPEECH THERAPY PROGRESS NOTE    Date:  1/30/2018     Start Time:  815  Stop Time:  900    Subjective/History  Onset Date:  Diagnosed November 2017. Symptoms began over the last year.  Primary Diagnosis:  Parkinson's Disease  Treatment Diagnosis:  Hypokinetic Dysarthria  Referring Provider:  Dr. Amol Jerome  Reason for Referral: Speech therapy for evaluation and treatment  Current Medical History:  Adam Dalal presents to the [facility] with minimum communication and speech difficulty secondary to the diagnosis of Parkinson's Disease.     Precautions:  general         UNTIMED  Procedure Min.    Speech-Language therapy   45           Total Minutes: 45  Total Timed Units: 0  Total Untimed Units: 1  Charges Billed/# of units: 1    Visit #: 12  Date of Evaluation:  11/21/17  Plan of Care Expiration:    11/21/17 to 2/16/18 (Pt requested to begin program in January 2018)  Extended POC:      Progress/Current Status    Subjective:     Pain: 0 /10  Pt motivated and performing LSVT LOUD exercises well.     Objective:     Short Term Goals: (4 weeks) Current Progress:   1. Mr. Dalal will participate in further assessment of his vocal intensity to determine his sound pressure level (SPL, acoustic correlate of vocal loudness) measured with a sound level meter and more specific goals will be determined. Goal met/revised  1. - Task 1: sustained "ah" for average SPL of 73.4 dB (range 67.0 - 81.5 dB) and average 14.2 seconds (range 12.3 - 17.5 secs)  - Task 2a: sustained "high ah" for average SPL of 83.5 dB (range 81.2 - 86.6 dB) and highest pitch 301.5 Hz (pitch range 283 - 307Hz)  - Task 2b: sustained "low ah" for average of SPL of 75.8 dB (range of 72.8 - 78.1dB) and lowest pitch 181.5 (pitch range 161.0 - 192.0 Hz)  - Task 3: while reading a passage: SPL range of 67.4 dB (range 63.2 - 74.0 dB)  - Task 4: during monologue: SPL range of " "66.3 dB (range 64.9 - 76.5 dB)  - Task 5: generated words with specific letters:  SPL range of 68.0 dB (range 63.3 - 72.9 dB)  - Task 6: describe a motor task while completing a dual motor activity: SPL range of 62.2 dB (range 60.8 - 72.4 dB); fast rate of speech  - Task 7a: stimulability testing: sustained vowel phonation ("ah") with average SPL 85.7 dB and duration average of 13.4 seconds   - Task 7b: "high ah": average frequency of 360.0 Hz and SPL average 88.6 dB   - Task 7c:  "low ah": average frequency of 203.0 Hz and average SPL 81.7 dB   - Task 7d: functional phrases: SPL range of 79.1 dB   Revised Goal: 1 a. The patient will produce loud, good quality "AH" sounds in the  decibel range for an average of 20 to 25 seconds (as a voice/speech "foundation" skill) for sets of 15 with minimal to no cues at least 95% of the time to further develop breath-speech coordination and for loudness calibration.      - Pt sustained "ah" for average SPL 85.9 dB (range 84.7-87.4 dB) and average 22.5 seconds (range 20-23.5 secs)   1 b. The patient will produce "AH" from a mid-level to at least 3 notes higher and from a mid-level to at least 3 notes lower in sets of 15 for each pitch direction with minimal to no cues 95% of the time to improve pitch variability. - Pt sustained "high ah" for average SPL of 84.0 dB (range 81.7-85.0dB) and highest pitch 292.3 Hz (pitch range 280-301 Hz)    - Pt sustained "low ah" for average of SPL of 81.5 dB (range of 79.5-85.7 dB) and lowest pitch 175.0 (pitch range 160-236 Hz)    1 c. The patient will verbalize a set of 10 functional phrases/ sentences at an average loudness level at least in the  decibel range with minimal to no cues at least 95% of the time.    Phrases read 2x:  Average SPL 80 dB (range 65.2-83.5 dB)    Sentences read 2x:  Average SPL 79.2 dB (range 65.8 dB - 83.5 dB)       1 d. The patient will read aloud at an average loudness level at least in the  decibel range with " "minimal to no cues at least 95% of the time.  -  Paragraph: Average SPL 77.6 dB (range 61.8 - 82.3 dB).      1 e. The patient will verbalize at least in the decibel range in his conversational speech at least 95% of the time with minimal to no cues.  - Average SPL 70.6 dB (range 62.2-78.7 dB), min cues to speak louder  -Average SPL with cognitive task 72.1 dB (range 62.1- 81.5 dB)      2. 2. Mr. Dalal will use motor speech strategies for speech hierarchy tasks with 90% accuracy IND'ly to increase his speech intelligibility. Goal discontinued.  Will focus on LSVT LOUD goals       Assessment:   A cognitive task was added into conversation during today's session. The pt did well with this task, and continues to improve consistent use of loud voice during conversational speech. Current goals remain appropriate. Goals to be updated as necessary.      Mr. Dalal presents with minimum impaired communication skills. He presents with dysarthria and hypophonia. Pt would benefit from continued skilled ST. Prognosis for improvement remains Good     Patient Education/Response:     The LSVT LOUD program and exercises as well as the pt's progress on this plan of care were discussed. He verbalized understanding.     Home Program: LSVT LOUD exercises - "AH's", list of phrases, sentences, conversation, and reading aloud.     Plans and Goals:     Continue LSVT LOUD program     Other Recommendations: 1. MBSS to further assess swallowing function if still warranted.        Didi Goldberg, SHALOM   Clinician     "

## 2018-01-30 NOTE — PROGRESS NOTES
"Identifying Information  Name: Adam Sawyer [facility] Number: [id number]     Medical Diagnosis: G20 (ICD-10-CM) - Parkinson's disease  Encounter Diagnosis:        Encounter Diagnoses   Name Primary?    Impaired functional mobility, balance, gait, and endurance      Impaired motor control        Physician: Amol Jerome MD  Treatment Orders: PT Eval and Treat, LSVT BIG       Past Medical History:   Diagnosis Date    Anxiety      Esotropia of right eye 5/2/2013    High cholesterol      Hyperlipidemia             Current Outpatient Prescriptions   Medication Sig    alprazolam (XANAX) 0.5 MG tablet TAKE 1 TABLET(0.5 MG) BY MOUTH EVERY NIGHT    carbidopa-levodopa  mg (SINEMET)  mg per tablet Take 0.5 tablets by mouth 3 (three) times daily.    dorzolamide-timolol 2-0.5% (COSOPT) 22.3-6.8 mg/mL ophthalmic solution Place 1 drop into both eyes 2 (two) times daily.    pravastatin (PRAVACHOL) 80 MG tablet TAKE 1 TABLET(80 MG) BY MOUTH EVERY MORNING      No current facility-administered medications for this visit.          Evaluation Date: 11/21/17  Plan of care expiration: 1/16/18  Extended POC: 2/14/2018   Precautions: Standard     LSVT Protocol     Week: 4/4   LSVT visit # 12/16         SUBJECTIVE:   Pain:  Reports pain right great toe  Pt reports: " I'm doing pretty good."     Adam received individual therapy including neuro re-education for 29 minutes including:     LSVT Maximal Daily Exercises to promote amplitude: (10 repetitions each)       Daily Exercises Performance Effort Comments   1 Floor<>ceiling good NT 10 finger flicks  #1lb cuff weights   2 Side<>side good NT 10 finger flicks, alternating sides,  #1lb cuff weights   3 Step & reach  (forward) good NT Alternating sides on the   #1lb cuff weights   4 Step & reach  (backwards) good NT Alternating sides on the   #1lb cuff weights   5 Backwards Step good NT Alternating sides on the   #1lb cuff weights   6 Rock & reach  (foward) good NT " "20x  #1lb cuff weights   7 Rock & reach   (sideways) good NT 10x  #1lb cuff weights         Functional Component Tasks:   1. Sit to stands: 10 reps, 1# BUE  2. wiping wall in circles with 1# cuff weight- 5x ea  3. Screws- 5x ea RUE  4. knee tap>clap(standing)- 10x ea  5. Buttons (6 on self)= 29.3 sec + 37 sec + 31 sec + 31 sec     Hierarchy Task: simulated LB dressing x 5 trials each      Gait Training/ "BIG walking" x 15 mins: ambulation  In small spaces, quick side stepping, braided gait, stairs. Minimal VC to increase RUE swing    Calibration: improved RUE use during exercises, noted calibration of hand     Carryover assignment tracking:  Paint with BIG motions     Assessment: Adam tolerated tx session well. Pt demonstrated improved calibration for moving RUE and keeping right hand open during session. Pt reports improvements in lower body dressing and turning keys. PT may trial weight on both UE and LE to improve challenge of daily exercises. Pt can benefit from skilled PT to address impairments to improve functional mobility, ADLs, and deter onset of symptoms related to diagnosis.     Anticipated barriers to progress: Sleep Apnea, High cholesterol, previous eye surgery     Impairment list:   Fall Risk  Impaired balance   Weakness   Impaired muscle tone  Gait deviations    Difficulty to participate in daily activities  Requires skilled supervision to complete and progress HEP         GOALS:   Status as of 1/19/18  Short term goals: Pt will accomplish the following in 2-4 weeks; agrees to goals set:  1. Pt will perform LSVT BIG daily maximal exercises with supervision to improve carryover of BIG movements into daily life. met  2. Pt will complete TUG while performing cognitive task of counting backwards by 3s from 25 in 9 seconds or less to demonstrate improved gait and balance. Improved/ nearly met- 9.1 sec  3. Assess R HS length and set goal at needed. NT- pt is performing HS stretches     Long term goals: " "4-8 weeks, pt agrees to goals set  4. Pt will report score of 100 on ABC outcome measure when asked his perceived ability to "reach for a small can off a shelf at eye level" to demonstrate improved gait and balance. met  5. Pt will score 29/30 on FGA to demonstrate improved gait and balance.  6. Pt will report score of 100 on ABC outcome measure when asked his perceived ability to "walk up or down stairs" to demonstrate improved strength and balance. Same- 90% confidence     Plan:  Continue with LSVT Program to increase safety, mobility, independence, and quality of life per protocol- 4 times per week.     Elo Cantrell, PT    1/30/2018         "

## 2018-01-31 ENCOUNTER — CLINICAL SUPPORT (OUTPATIENT)
Dept: REHABILITATION | Facility: HOSPITAL | Age: 58
End: 2018-01-31
Attending: PSYCHIATRY & NEUROLOGY
Payer: COMMERCIAL

## 2018-01-31 DIAGNOSIS — Z74.09 IMPAIRED FUNCTIONAL MOBILITY, BALANCE, GAIT, AND ENDURANCE: ICD-10-CM

## 2018-01-31 DIAGNOSIS — R47.1 DYSARTHRIA: ICD-10-CM

## 2018-01-31 DIAGNOSIS — Z78.9 IMPAIRED MOTOR CONTROL: ICD-10-CM

## 2018-01-31 DIAGNOSIS — R49.8 HYPOPHONIA: ICD-10-CM

## 2018-01-31 DIAGNOSIS — R49.9 VOICE AND RESONANCE DISORDER: ICD-10-CM

## 2018-01-31 PROCEDURE — 92507 TX SP LANG VOICE COMM INDIV: CPT | Mod: PO

## 2018-01-31 PROCEDURE — 97116 GAIT TRAINING THERAPY: CPT | Mod: PO

## 2018-01-31 PROCEDURE — 97112 NEUROMUSCULAR REEDUCATION: CPT | Mod: PO

## 2018-01-31 NOTE — PROGRESS NOTES
"OUTPATIENT NEUROLOGICAL REHABILITATION  SPEECH THERAPY PROGRESS NOTE    Date:  1/31/2018     Start Time:  815  Stop Time:  900    Subjective/History  Onset Date:  Diagnosed November 2017. Symptoms began over the last year.  Primary Diagnosis:  Parkinson's Disease  Treatment Diagnosis:  Hypokinetic Dysarthria  Referring Provider:  Dr. Amol Jerome  Reason for Referral: Speech therapy for evaluation and treatment  Current Medical History:  Adam Dalal presents to the [facility] with minimum communication and speech difficulty secondary to the diagnosis of Parkinson's Disease.     Precautions:  general         UNTIMED  Procedure Min.    Speech-Language therapy   45           Total Minutes: 45  Total Timed Units: 0  Total Untimed Units: 1  Charges Billed/# of units: 1    Visit #: 13  Date of Evaluation:  11/21/17  Plan of Care Expiration:    11/21/17 to 2/16/18 (Pt requested to begin program in January 2018)  Extended POC:      Progress/Current Status    Subjective:     Pain: 0 /10  Pt motivated and performing LSVT LOUD exercises well.     Objective:     Short Term Goals: (4 weeks) Current Progress:   1. Mr. Dalal will participate in further assessment of his vocal intensity to determine his sound pressure level (SPL, acoustic correlate of vocal loudness) measured with a sound level meter and more specific goals will be determined. Goal met/revised  1. - Task 1: sustained "ah" for average SPL of 73.4 dB (range 67.0 - 81.5 dB) and average 14.2 seconds (range 12.3 - 17.5 secs)  - Task 2a: sustained "high ah" for average SPL of 83.5 dB (range 81.2 - 86.6 dB) and highest pitch 301.5 Hz (pitch range 283 - 307Hz)  - Task 2b: sustained "low ah" for average of SPL of 75.8 dB (range of 72.8 - 78.1dB) and lowest pitch 181.5 (pitch range 161.0 - 192.0 Hz)  - Task 3: while reading a passage: SPL range of 67.4 dB (range 63.2 - 74.0 dB)  - Task 4: during monologue: SPL range of " "66.3 dB (range 64.9 - 76.5 dB)  - Task 5: generated words with specific letters:  SPL range of 68.0 dB (range 63.3 - 72.9 dB)  - Task 6: describe a motor task while completing a dual motor activity: SPL range of 62.2 dB (range 60.8 - 72.4 dB); fast rate of speech  - Task 7a: stimulability testing: sustained vowel phonation ("ah") with average SPL 85.7 dB and duration average of 13.4 seconds   - Task 7b: "high ah": average frequency of 360.0 Hz and SPL average 88.6 dB   - Task 7c:  "low ah": average frequency of 203.0 Hz and average SPL 81.7 dB   - Task 7d: functional phrases: SPL range of 79.1 dB   Revised Goal: 1 a. The patient will produce loud, good quality "AH" sounds in the  decibel range for an average of 20 to 25 seconds (as a voice/speech "foundation" skill) for sets of 15 with minimal to no cues at least 95% of the time to further develop breath-speech coordination and for loudness calibration.      - Pt sustained "ah" for average SPL 82.7 dB (range 81.2-85.5 dB) and average 23.2 seconds (range 19.75-26.25 secs)   1 b. The patient will produce "AH" from a mid-level to at least 3 notes higher and from a mid-level to at least 3 notes lower in sets of 15 for each pitch direction with minimal to no cues 95% of the time to improve pitch variability. - Pt sustained "high ah" for average SPL of 82.4 dB (range 78.7-84.5dB) and highest pitch 279.4 Hz (pitch range 260-287 Hz)    - Pt sustained "low ah" for average of SPL of 80.8 dB (range of 79.0-82.5 dB) and lowest pitch 178.4 (pitch range 169-187 Hz)    1 c. The patient will verbalize a set of 10 functional phrases/ sentences at an average loudness level at least in the  decibel range with minimal to no cues at least 95% of the time.    Phrases read 2x:  Average SPL 79.1 dB (range 61.4-85.4 dB)    Sentences read 2x:  Average SPL 77.6 dB (range 62.4 dB - 83.3 dB)       1 d. The patient will read aloud at an average loudness level at least in the  decibel range with " "minimal to no cues at least 95% of the time.  -  Paragraph: Average SPL 74.5 dB (range 60.2 - 82.2 dB).      1 e. The patient will verbalize at least in the decibel range in his conversational speech at least 95% of the time with minimal to no cues.  - Average SPL 69.9 dB (range 61.2-83.1 dB),  min cues to speak louder. Pt completed task while standing.        2. 2. Mr. Dalal will use motor speech strategies for speech hierarchy tasks with 90% accuracy IND'ly to increase his speech intelligibility. Goal discontinued.  Will focus on LSVT LOUD goals       Assessment:   Prior to beginning the session, pt reported that some food had irritated his throat while eating breakfast. Pt performed the conversation portion of LSVT LOUD program standing up today, and he maintained good volume, pace, and prosody throughout the task. Current goals remain appropriate. Goals to be updated as necessary.      Mr. Dalal presents with minimum impaired communication skills. He presents with dysarthria and hypophonia. Pt would benefit from continued skilled ST. Prognosis for improvement remains Good     Patient Education/Response:     The LSVT LOUD program and exercises as well as the pt's progress on this plan of care were discussed. Pt was also encouraged to report any further discomfort or s/s of aspiration and penetration while eating and drinking. He verbalized understanding.     Home Program: LSVT LOUD exercises - "AH's", list of phrases, sentences, conversation, and reading aloud.     Plans and Goals:     Continue LSVT LOUD program     Other Recommendations: 1. MBSS to further assess swallowing function if still warranted.        SHALOM Villalba   Clinician   1/31/2018    "

## 2018-01-31 NOTE — PROGRESS NOTES
"Identifying Information  Name: Adam Sawyer [facility] Number: [id number]     Medical Diagnosis: G20 (ICD-10-CM) - Parkinson's disease  Encounter Diagnosis:        Encounter Diagnoses   Name Primary?    Impaired functional mobility, balance, gait, and endurance      Impaired motor control        Physician: Amol Jerome MD  Treatment Orders: PT Eval and Treat, LSVT BIG       Past Medical History:   Diagnosis Date    Anxiety      Esotropia of right eye 5/2/2013    High cholesterol      Hyperlipidemia             Current Outpatient Prescriptions   Medication Sig    alprazolam (XANAX) 0.5 MG tablet TAKE 1 TABLET(0.5 MG) BY MOUTH EVERY NIGHT    carbidopa-levodopa  mg (SINEMET)  mg per tablet Take 0.5 tablets by mouth 3 (three) times daily.    dorzolamide-timolol 2-0.5% (COSOPT) 22.3-6.8 mg/mL ophthalmic solution Place 1 drop into both eyes 2 (two) times daily.    pravastatin (PRAVACHOL) 80 MG tablet TAKE 1 TABLET(80 MG) BY MOUTH EVERY MORNING      No current facility-administered medications for this visit.          Evaluation Date: 11/21/17  Plan of care expiration: 1/16/18  Extended POC: 2/14/2018   Precautions: Standard     LSVT Protocol     Week: 4/4   LSVT visit # 13/16         SUBJECTIVE:   Pain:  Reports pain right great toe  Pt reports: " I'm able to scrub my pots better since I have been practicing wiping here."    Adam received individual therapy including neuro re-education for 29 minutes including:     LSVT Maximal Daily Exercises to promote amplitude: (10 repetitions each)       Daily Exercises Performance Effort Comments   1 Floor<>ceiling good NT 10 finger flicks  #1lb cuff weights   2 Side<>side good NT 10 finger flicks, alternating sides,  #1lb cuff weights   3 Step & reach  (forward) good NT Alternating sides on the   #1lb cuff weights   4 Step & reach  (backwards) good NT Alternating sides on the   #1lb cuff weights   5 Backwards Step good NT Alternating sides on the " "  #1lb cuff weights   6 Rock & reach  (foward) good NT 20x  #1lb cuff weights   7 Rock & reach   (sideways) good NT 10x  #1lb cuff weights         Functional Component Tasks:   1. Sit to stands: 10 reps, 1# BUE  2. wiping wall in circles with 1# cuff weight- 5x ea  3. Screws- 5x ea RUE  4. knee tap>clap(standing)- 10x ea  5. Buttons (6 on self)= 24 sec + 23 sec + 22 sec + 24 sec     Hierarchy Task: simulated LB dressing x 5 trials each      Gait Training/ "BIG walking" x 15 mins: ambulation  In small spaces, quick side stepping, braided gait, stairs, gait on turf, gait weaving around mats downstairs. Minimal VC to increase RUE swing    Calibration: improved RUE use during exercises, noted calibration of hand     Carryover assignment tracking:  Paint with BIG motions     Assessment: Adam tolerated tx session well and did not have any problems noted.  Pt was able to improve time on the small buttons today and cont to report improvement in his daily tasks at home.   Pt can benefit from skilled PT to address impairments to improve functional mobility, ADLs, and deter onset of symptoms related to diagnosis.     Anticipated barriers to progress: Sleep Apnea, High cholesterol, previous eye surgery     Impairment list:   Fall Risk  Impaired balance   Weakness   Impaired muscle tone  Gait deviations    Difficulty to participate in daily activities  Requires skilled supervision to complete and progress HEP         GOALS:   Status as of 1/19/18  Short term goals: Pt will accomplish the following in 2-4 weeks; agrees to goals set:  1. Pt will perform LSVT BIG daily maximal exercises with supervision to improve carryover of BIG movements into daily life. met  2. Pt will complete TUG while performing cognitive task of counting backwards by 3s from 25 in 9 seconds or less to demonstrate improved gait and balance. Improved/ nearly met- 9.1 sec  3. Assess R HS length and set goal at needed. NT- pt is performing HS " "stretches     Long term goals: 4-8 weeks, pt agrees to goals set  4. Pt will report score of 100 on ABC outcome measure when asked his perceived ability to "reach for a small can off a shelf at eye level" to demonstrate improved gait and balance. met  5. Pt will score 29/30 on FGA to demonstrate improved gait and balance.  6. Pt will report score of 100 on ABC outcome measure when asked his perceived ability to "walk up or down stairs" to demonstrate improved strength and balance. Same- 90% confidence     Plan:  Continue with LSVT Program to increase safety, mobility, independence, and quality of life per protocol- 4 times per week.     Chasidy Field, PTA    1/31/2018         "

## 2018-02-01 ENCOUNTER — CLINICAL SUPPORT (OUTPATIENT)
Dept: REHABILITATION | Facility: HOSPITAL | Age: 58
End: 2018-02-01
Attending: PSYCHIATRY & NEUROLOGY
Payer: COMMERCIAL

## 2018-02-01 DIAGNOSIS — Z74.09 IMPAIRED FUNCTIONAL MOBILITY, BALANCE, GAIT, AND ENDURANCE: ICD-10-CM

## 2018-02-01 DIAGNOSIS — R47.1 DYSARTHRIA: ICD-10-CM

## 2018-02-01 DIAGNOSIS — R49.8 HYPOPHONIA: ICD-10-CM

## 2018-02-01 DIAGNOSIS — R49.9 VOICE AND RESONANCE DISORDER: ICD-10-CM

## 2018-02-01 DIAGNOSIS — Z78.9 IMPAIRED MOTOR CONTROL: ICD-10-CM

## 2018-02-01 PROCEDURE — 92507 TX SP LANG VOICE COMM INDIV: CPT | Mod: PO

## 2018-02-01 PROCEDURE — 97530 THERAPEUTIC ACTIVITIES: CPT | Mod: PO

## 2018-02-01 PROCEDURE — 97112 NEUROMUSCULAR REEDUCATION: CPT | Mod: PO

## 2018-02-01 NOTE — PROGRESS NOTES
"OUTPATIENT NEUROLOGICAL REHABILITATION  SPEECH THERAPY PROGRESS NOTE    Date:  2/1/2018     Start Time:  820  Stop Time:  900    Subjective/History  Onset Date:  Diagnosed November 2017. Symptoms began over the last year.  Primary Diagnosis:  Parkinson's Disease  Treatment Diagnosis:  Hypokinetic Dysarthria  Referring Provider:  Dr. Amol Jerome  Reason for Referral: Speech therapy for evaluation and treatment  Current Medical History:  Adam Dalal presents to the [facility] with minimum communication and speech difficulty secondary to the diagnosis of Parkinson's Disease.     Precautions:  general         UNTIMED  Procedure Min.    Speech-Language therapy   45           Total Minutes: 45  Total Timed Units: 0  Total Untimed Units: 1  Charges Billed/# of units: 1    Visit #: 14  Date of Evaluation:  11/21/17  Plan of Care Expiration:    11/21/17 to 2/16/18 (Pt requested to begin program in January 2018)  Extended POC:      Progress/Current Status    Subjective:     Pain: 0 /10  Pt motivated and performing LSVT LOUD exercises well.     Objective:     Short Term Goals: (4 weeks) Current Progress:   1. Mr. Dalal will participate in further assessment of his vocal intensity to determine his sound pressure level (SPL, acoustic correlate of vocal loudness) measured with a sound level meter and more specific goals will be determined. Goal met/revised  1. - Task 1: sustained "ah" for average SPL of 73.4 dB (range 67.0 - 81.5 dB) and average 14.2 seconds (range 12.3 - 17.5 secs)  - Task 2a: sustained "high ah" for average SPL of 83.5 dB (range 81.2 - 86.6 dB) and highest pitch 301.5 Hz (pitch range 283 - 307Hz)  - Task 2b: sustained "low ah" for average of SPL of 75.8 dB (range of 72.8 - 78.1dB) and lowest pitch 181.5 (pitch range 161.0 - 192.0 Hz)  - Task 3: while reading a passage: SPL range of 67.4 dB (range 63.2 - 74.0 dB)  - Task 4: during monologue: SPL range of " "66.3 dB (range 64.9 - 76.5 dB)  - Task 5: generated words with specific letters:  SPL range of 68.0 dB (range 63.3 - 72.9 dB)  - Task 6: describe a motor task while completing a dual motor activity: SPL range of 62.2 dB (range 60.8 - 72.4 dB); fast rate of speech  - Task 7a: stimulability testing: sustained vowel phonation ("ah") with average SPL 85.7 dB and duration average of 13.4 seconds   - Task 7b: "high ah": average frequency of 360.0 Hz and SPL average 88.6 dB   - Task 7c:  "low ah": average frequency of 203.0 Hz and average SPL 81.7 dB   - Task 7d: functional phrases: SPL range of 79.1 dB   Revised Goal: 1 a. The patient will produce loud, good quality "AH" sounds in the  decibel range for an average of 20 to 25 seconds (as a voice/speech "foundation" skill) for sets of 15 with minimal to no cues at least 95% of the time to further develop breath-speech coordination and for loudness calibration.      - Pt sustained "ah" for average SPL 84.1 dB (range 81.8-86.1 dB) and average 26.6 seconds (range 16-30.25 secs)   1 b. The patient will produce "AH" from a mid-level to at least 3 notes higher and from a mid-level to at least 3 notes lower in sets of 15 for each pitch direction with minimal to no cues 95% of the time to improve pitch variability. - Pt sustained "high ah" for average SPL of 84.8 dB (range 82.4-85.7dB) and highest pitch 250.9 Hz (pitch range 245-259 Hz)    - Pt sustained "low ah" for average of SPL of 86.0 dB (range of 82.7.0-88.7 dB) and lowest pitch 167.6 (pitch range 160-175 Hz)    1 c. The patient will verbalize a set of 10 functional phrases/ sentences at an average loudness level at least in the  decibel range with minimal to no cues at least 95% of the time.    Sentences read 3x:  Average SPL 79.1 dB (range 70.4 dB - 84.7 dB)       1 d. The patient will read aloud at an average loudness level at least in the  decibel range with minimal to no cues at least 95% of the time.  -  Paragraph: Not " "formally addressed. (Previously: Average SPL 74.5 dB (range 60.2 - 82.2 dB). )     1 e. The patient will verbalize at least in the decibel range in his conversational speech at least 95% of the time with minimal to no cues.  - Average SPL 70.2 dB (range 64.3-80.7 dB),  min cues to speak louder.      2. 2. Mr. Dalal will use motor speech strategies for speech hierarchy tasks with 90% accuracy IND'ly to increase his speech intelligibility. Goal discontinued.  Will focus on LSVT LOUD goals       Assessment:   Pt continues to maintain good volume, pace, and prosody throughout each task, and he was able to increase his maximum phonation time as well. Current goals remain appropriate. Goals to be updated as necessary.      Mr. Dalal presents with minimum impaired communication skills. He presents with dysarthria and hypophonia. Pt would benefit from continued skilled ST. Prognosis for improvement remains Good     Patient Education/Response:     The LSVT LOUD program and exercises as well as the pt's progress on this plan of care were discussed. The anatomy used to produce his LOUD voice and maintain breath support were also discussed. He verbalized understanding.     Home Program: LSVT LOUD exercises - "AH's", list of phrases, sentences, conversation, and reading aloud.     Plans and Goals:     Continue LSVT LOUD program     Other Recommendations: 1. MBSS to further assess swallowing function if still warranted.        Didi Goldberg, SHALOM   Clinician   2/1/2018  "

## 2018-02-01 NOTE — PROGRESS NOTES
"Identifying Information  Name: Adam [facility]yer [facility] Number: [id number]     Medical Diagnosis: G20 (ICD-10-CM) - Parkinson's disease  Encounter Diagnosis:        Encounter Diagnoses   Name Primary?    Impaired functional mobility, balance, gait, and endurance      Impaired motor control        Physician: Amol Jerome MD  Treatment Orders: PT Eval and Treat, LSVT BIG       Past Medical History:   Diagnosis Date    Anxiety      Esotropia of right eye 5/2/2013    High cholesterol      Hyperlipidemia             Current Outpatient Prescriptions   Medication Sig    alprazolam (XANAX) 0.5 MG tablet TAKE 1 TABLET(0.5 MG) BY MOUTH EVERY NIGHT    carbidopa-levodopa  mg (SINEMET)  mg per tablet Take 0.5 tablets by mouth 3 (three) times daily.    dorzolamide-timolol 2-0.5% (COSOPT) 22.3-6.8 mg/mL ophthalmic solution Place 1 drop into both eyes 2 (two) times daily.    pravastatin (PRAVACHOL) 80 MG tablet TAKE 1 TABLET(80 MG) BY MOUTH EVERY MORNING      No current facility-administered medications for this visit.          Evaluation Date: 11/21/17  Plan of care expiration: 1/16/18  Extended POC: 2/14/2018   Precautions: Standard     LSVT Protocol     Week: 4/4   LSVT visit # 14/16         SUBJECTIVE:   Pain:  denies  Pt reports: " I'm doing pretty good."     Adam received individual therapy including neuro re-education for 29 minutes including:     LSVT Maximal Daily Exercises to promote amplitude: (10 repetitions each)       Daily Exercises Performance Effort Comments   1 Floor<>ceiling good NT 10 finger flicks  #1lb cuff weights   2 Side<>side good NT 10 finger flicks, alternating sides,  #1lb cuff weights   3 Step & reach  (forward) good NT Alternating sides on the   #1lb cuff weights   4 Step & reach  (backwards) good NT Alternating sides on the   #1lb cuff weights   5 Backwards Step good NT Alternating sides on the   #1lb cuff weights   6 Rock & reach  (foward) good NT 20x  #1lb cuff weights " "  7 Rock & reach   (sideways) good NT 10x  #1lb cuff weights         Functional Component Tasks:   1. Sit to stands: 10 reps, 1# BUE  2. wiping wall in circles with 1# cuff weight- 5x ea  3. Screws- 5x ea RUE  4. knee tap>clap(standing)- 10x ea  5. Buttons (6 on self)= 28 sec + 23 sec + 27 sec + 26 sec     Hierarchy Task: simulated LB dressing x 5 trials each      Gait Training/ "BIG walking" x 15 mins: ambulation with #1lb cuff weights and naming types of cars    Calibration: improved RUE use during exercises, noted calibration of hand     Carryover assignment tracking:  Paint with BIG motions     Assessment: dAam tolerated tx session well and did not have any problems noted.  Pt able to progress with knee taps and able to balance better on single leg.  Pt can benefit from skilled PT to address impairments to improve functional mobility, ADLs, and deter onset of symptoms related to diagnosis.     Anticipated barriers to progress: Sleep Apnea, High cholesterol, previous eye surgery     Impairment list:   Fall Risk  Impaired balance   Weakness   Impaired muscle tone  Gait deviations    Difficulty to participate in daily activities  Requires skilled supervision to complete and progress HEP         GOALS:   Status as of 1/19/18  Short term goals: Pt will accomplish the following in 2-4 weeks; agrees to goals set:  1. Pt will perform LSVT BIG daily maximal exercises with supervision to improve carryover of BIG movements into daily life. met  2. Pt will complete TUG while performing cognitive task of counting backwards by 3s from 25 in 9 seconds or less to demonstrate improved gait and balance. Improved/ nearly met- 9.1 sec  3. Assess R HS length and set goal at needed. NT- pt is performing HS stretches     Long term goals: 4-8 weeks, pt agrees to goals set  4. Pt will report score of 100 on ABC outcome measure when asked his perceived ability to "reach for a small can off a shelf at eye level" to demonstrate improved " "gait and balance. met  5. Pt will score 29/30 on FGA to demonstrate improved gait and balance.  6. Pt will report score of 100 on ABC outcome measure when asked his perceived ability to "walk up or down stairs" to demonstrate improved strength and balance. Same- 90% confidence     Plan:  Continue with LSVT Program to increase safety, mobility, independence, and quality of life per protocol- 4 times per week.     Chasidy Field, PTA    2/1/2018         "

## 2018-02-02 ENCOUNTER — CLINICAL SUPPORT (OUTPATIENT)
Dept: REHABILITATION | Facility: HOSPITAL | Age: 58
End: 2018-02-02
Attending: PSYCHIATRY & NEUROLOGY
Payer: COMMERCIAL

## 2018-02-02 DIAGNOSIS — R49.8 HYPOPHONIA: ICD-10-CM

## 2018-02-02 DIAGNOSIS — Z78.9 IMPAIRED MOTOR CONTROL: ICD-10-CM

## 2018-02-02 DIAGNOSIS — R49.9 VOICE AND RESONANCE DISORDER: ICD-10-CM

## 2018-02-02 DIAGNOSIS — R47.1 DYSARTHRIA: ICD-10-CM

## 2018-02-02 DIAGNOSIS — Z74.09 IMPAIRED FUNCTIONAL MOBILITY, BALANCE, GAIT, AND ENDURANCE: ICD-10-CM

## 2018-02-02 PROCEDURE — 97112 NEUROMUSCULAR REEDUCATION: CPT | Mod: PO | Performed by: PHYSICAL THERAPIST

## 2018-02-02 PROCEDURE — 97530 THERAPEUTIC ACTIVITIES: CPT | Mod: PO | Performed by: PHYSICAL THERAPIST

## 2018-02-02 PROCEDURE — 92507 TX SP LANG VOICE COMM INDIV: CPT | Mod: PO

## 2018-02-02 NOTE — PROGRESS NOTES
"OUTPATIENT NEUROLOGICAL REHABILITATION  SPEECH THERAPY DISCHARGE SUMMARY    Date:  2/2/2018     Start Time:  0915  Stop Time:  1000    Subjective/History  Onset Date:  Diagnosed November 2017. Symptoms began over the last year.  Primary Diagnosis:  Parkinson's Disease  Treatment Diagnosis:  Hypokinetic Dysarthria  Referring Provider:  Dr. Amol Jerome  Reason for Referral: Speech therapy for evaluation and treatment  Current Medical History:  Adam Dalal presents to the [facility] with minimum communication and speech difficulty secondary to the diagnosis of Parkinson's Disease.     Precautions:  general         UNTIMED  Procedure Min.    Speech-Language therapy   45           Total Minutes: 45  Total Timed Units: 0  Total Untimed Units: 1  Charges Billed/# of units: 1    Visit #: 15  Date of Evaluation:  11/21/17  Plan of Care Expiration:    11/21/17 to 2/16/18 (Pt requested to begin program in January 2018)  Extended POC:  N/A    Progress/Current Status    Subjective:     Pain: 0 /10  Pt motivated and performed LSVT LOUD exercises well.     Objective:     Short Term Goals: (4 weeks) Current Progress:   1. Mr. Dalal will participate in further assessment of his vocal intensity to determine his sound pressure level (SPL, acoustic correlate of vocal loudness) measured with a sound level meter and more specific goals will be determined. Goal met/revised  1. - Task 1: sustained "ah" for average SPL of 73.4 dB (range 67.0 - 81.5 dB) and average 14.2 seconds (range 12.3 - 17.5 secs)  - Task 2a: sustained "high ah" for average SPL of 83.5 dB (range 81.2 - 86.6 dB) and highest pitch 301.5 Hz (pitch range 283 - 307Hz)  - Task 2b: sustained "low ah" for average of SPL of 75.8 dB (range of 72.8 - 78.1dB) and lowest pitch 181.5 (pitch range 161.0 - 192.0 Hz)  - Task 3: while reading a passage: SPL range of 67.4 dB (range 63.2 - 74.0 dB)  - Task 4: during monologue: SPL " "range of 66.3 dB (range 64.9 - 76.5 dB)  - Task 5: generated words with specific letters:  SPL range of 68.0 dB (range 63.3 - 72.9 dB)  - Task 6: describe a motor task while completing a dual motor activity: SPL range of 62.2 dB (range 60.8 - 72.4 dB); fast rate of speech  - Task 7a: stimulability testing: sustained vowel phonation ("ah") with average SPL 85.7 dB and duration average of 13.4 seconds   - Task 7b: "high ah": average frequency of 360.0 Hz and SPL average 88.6 dB   - Task 7c:  "low ah": average frequency of 203.0 Hz and average SPL 81.7 dB   - Task 7d: functional phrases: SPL range of 79.1 dB   Revised Goal: 1 a. The patient will produce loud, good quality "AH" sounds in the  decibel range for an average of 20 to 25 seconds (as a voice/speech "foundation" skill) for sets of 15 with minimal to no cues at least 95% of the time to further develop breath-speech coordination and for loudness calibration.      - Overall Average: Pt sustained "ah" for average SPL 84.4 dB (range 81.8-86.1 dB) and average 25.6 seconds (range 16-30.5 secs)    Goal met   1 b. The patient will produce "AH" from a mid-level to at least 3 notes higher and from a mid-level to at least 3 notes lower in sets of 15 for each pitch direction with minimal to no cues 95% of the time to improve pitch variability. - "high ah"  Overall Average SPL of 85.6 dB and highest pitch 282.0 Hz   - "low ah" Overall Average SPL of 84.3 dB and lowest pitch 173.4 Hz   Goal met   1 c. The patient will verbalize a set of 10 functional phrases/ sentences at an average loudness level at least in the  decibel range with minimal to no cues at least 95% of the time.    Sentences:  Overall Average SPL 78.9 dB (range 70.4 dB - 84.7 dB)  Goal met     1 d. The patient will read aloud at an average loudness level at least in the  decibel range with minimal to no cues at least 95% of the time.  -  Overall  Average SPL 77.0 dB (range 60.2 - 82.2 dB).  Goal met     1 e. " "The patient will verbalize at least in the decibel range in his conversational speech at least 95% of the time with minimal to no cues.  - Overall Average SPL 71.0 dB (range 64.3-80.7 dB)  min cues to speak louder.   Goal met   2. 2. Mr. Dalal will use motor speech strategies for speech hierarchy tasks with 90% accuracy IND'ly to increase his speech intelligibility. Goal discontinued.  Will focus on LSVT LOUD goals       Assessment:   Mr. Dalal presents with minimum impaired communication skills. He presented with mild dysarthria and hypophonia. Improvements noted and pt was able to maintain good volume, pace, and prosody throughout each task and conversation.  He also was able to increase his maximum phonation time as well. He is being discharged from outpatient speech and he completed the LSVT LOUD program. Pt was instructed to follow up in about 6 months if needed for retraining of LOUD voice.     Patient Education/Response:     The LSVT LOUD home program and exercises were discussed. He verbalized understanding.     Home Program: LSVT LOUD exercises - "AH's", list of phrases, sentences, conversation, and reading aloud using good LOUD voice.    Plans and Goals:     Discharge Plan:  Mr. Dalal is being discharged from outpatient neuro rehab speech therapy for the following reason(s):  Patient has met all of his goals and he completed the LSVT LOUD program.        Other Recommendations: 1. MBSS to further assess swallowing function if still warranted.     BETH Lewis., CCC-SLP  Speech-Language Pathologist    2/2/2018  "

## 2018-02-08 RX ORDER — ALPRAZOLAM 0.5 MG/1
TABLET ORAL
Qty: 30 TABLET | Refills: 0 | Status: SHIPPED | OUTPATIENT
Start: 2018-02-08 | End: 2018-06-25 | Stop reason: SDUPTHER

## 2018-02-08 NOTE — PLAN OF CARE
PHYSICAL THERAPY DISCHARGE SUMMARY     Name: Adam Dalal  Clinic Number: [id number]    Diagnosis: G20 (ICD-10-CM) - Parkinson's disease  Encounter Diagnosis:   1. Impaired functional mobility, balance, gait, and endurance     2. Impaired motor control           Physician: Amol Jerome MD  Treatment Orders: PT Eval and Treat  Past Medical History:   Diagnosis Date    Anxiety     Esotropia of right eye 5/2/2013    High cholesterol     Hyperlipidemia        Initial visit: 11/21/2017  Date of Last visit: 2/2/2018  Total Visits Received: 17    DME recommended: none  HEP provided: LSVT BIG daily maximal exercises  Pain: denies    FOTO: Degenerative Disorders Survey= 93% (7% limitation)   ABC scale= 99% (1% impairment)  OBJECTIVE     ROM:   UPPER EXTREMITY--AROM/PROM  (R) UE: WFLs  (L) UE: WFLs           RANGE OF MOTION--LOWER EXTREMITIES  (R) LE Hip: AROM flex= 96 deg   Knee: equal bilaterally   Ankle: AAROM DF= 10 deg    (L) LE: Hip: flex AROM= 96 deg, DF= 8 deg   Knee: equal bilaterally   Ankle: AAROM DF= 8 deg    Strength: manual muscle test grades below   Upper Extremity Strength   RUE LUE   Shoulder Flexion: 5/5 5/5   Shoulder Abduction: 5/5 5/5   Shoulder Extension: 5/5 5/5   Shoulder External  Rotation:   5/5 5/5   Shoulder Internal  Rotation: 5/5 5/5   Elbow Flexion: 5/5 5/5   Elbow Extension: 5/5 5/5   Wrist Flexion: NT NT   Wrist Extension: NT NT   : NT NT     Lower Extremity Strength   RLE LLE   Hip Flexion: 5/5 5/5   Hip Extension:  4+/5 5/5   Hip Abduction: 5/5 4+/5   Hip Adduction: 4/5 4+/5   Knee Extension: 4+/5* 5/5   Knee Flexion: 4/5 4/5   Ankle Dorsiflexion: 4+/5 5/5   Ankle Plantarflexion: 4+/5 5/5   Ankle Inversion: 5/5 5/5   Ankle Eversion: 5/5 5/5   * cramp in HS       Evaluation   Single Limb Stance R LE 22 sec  (<10 sec = HIGH FALL RISK)   Single Limb Stance L LE > 60 sec  (<10 sec = HIGH FALL RISK)   30 second Chair Rise 15 completed with no arms   5 times sit-stand NT      Gait Assessment:   - AD used: none  - Assistance: I  - Distance: community  - Curb: I  - Ramp:  I     Evaluation   Timed Up and Go 6.3 sec   cognitive 8.5 sec   manual 5.5 sec   Self Selected Walking Speed 1.3 m/sec (6m/4.6s)   Fast Walking Speed 1.76 m/sec (6m/3.4s)     Functional Gait Assessment:   1. Gait on level surface =  3   (3) Normal: less than 5.5 sec, no A.D., no imbalance, normal gait pattern, deviates< 6in   (2) Mild impairment: 7-5.6 sec, uses A.D., mild gait deviations, or deviates 6-10 in   (1) Moderate impairment: > 7 sec, slow speed, imbalance, deviates 10-15 in.   (0) Severe impairment: needs assist, deviates >15 in, reach/touch wall  2. Change in Gait Speed = 3   (3) Normal: smooth change w/o loss of balance or gait deviation, deviates < 6 in, significant difference between speeds   (2) Mild impairment: changes speed, but demonstrates mild gait deviations, deviates 6-10 in, OR no deviations but unable to significantly speed, OR uses A.D.   (1) Moderate impairment: minor changes to speed, OR changes speed w/ significant deviations, deviates 10-15 in, OR  Changes speed , but loses balance & recovers   (0) Severe impairment: cannot change speed, deviates >15 in, or loses balance & needs assist  3. Gait with horizontal head turns  = 3   (3) Normal: no change in gait, deviates <6 in   (2) Mild impairment: slight change in speed, deviates 6-10 in, OR uses A.D.   (1) Moderate impairment: moderate change in speed, deviates 10-15 in   (0) Severe impairment: severe disruption of gait, deviates >15in  4. Gait with vertical head turns = 3   (3) Normal: no change in gait, deviates <6 in   (2) Mild impairment: slight change in speed, deviates 6-10 in OR uses A.D.   (1) Moderate impairment: moderate change in speed, deviates 10-15 in   (0) Severe impairment: severe disruption of gait, deviates >15 in  5. Gait with pivot turns = 3   (3) Normal: performs safely in 3 sec, no LOB   (2) Mild impairment: performs  in >3 sec & no LOB, OR turns safely & requires several steps to regain LOB   (1) Moderate impairment: turns slow, OR requires several small steps for balance following turn & stop   (0) Severe impairment: cannot turn safely, needs assist  6. Step over obstacle = 3   (3) Normal: steps over 2 stacked boxes w/o change in speed or LOB   (2) Mild impairment: able to step over 1 box w/o change in speed or LOB   (1) Moderate impairment: steps over 1 box but must slow down, may require VC   (0) Severe impairment: cannot perform w/o assist  7. Gait with Narrow MIGUELANGEL = 3   (3) Normal: 10 steps no staggering   (2) Mild impairment: 7-9 steps   (1) Moderate impairment: 4-7 steps   (0) Severe impairment: < 4 steps or cannot perform w/o assist  8. Gait with eyes closed = 3   (3) Normal: < 7 sec, no A.D., no LOB, normal gait pattern, deviates <6 in   (2) Mild impairment: 7.1-9 sec, mild gait deviations, deviates 6-10 in   (1) Moderate impairment: > 9 sec, abnormal pattern, LOB, deviates 10-15 in   (0) Severe impairment: cannot perform w/o assist, LOB, deviates >15in  9. Ambulating Backwards = 3   (3) Normal: no A.D., no LOB, normal gait pattern, deviates <6in   (2) Mild impairment: uses A.D., slower speed, mild gait deviations, deviates 6-10 in   (1) Moderate impairment: slow speed, abnormal gait pattern, LOB, deviates 10-15 in   (0) Severe impairment: severe gait deviations or LOB, deviates >15in  10. Steps = 3   (3) Normal: alternating feet, no rail   (2) Mild Impairment: alternating feet, uses rail   (1) Moderate impairment: step-to, uses rail   (0) Severe impairment: cannot perform safely  Score 30/30         Functional Mobility (Bed mobility, transfers)  Bed mobility: I  Supine to sit: I  Sit to supine: I  Rolling: I  Transfers to bed: I  Transfers to toilet: I  Sit to stand:  I  Stand pivot:  I  Car transfers: I  Wheelchair mobility: I  Floor transfers: I    Pt participated in the following treatment today:   Neuromuscular re  education x 17 minutes:     LSVT Maximal Daily Exercises to promote amplitude: (10 repetitions each)       Daily Exercises Performance Effort Comments   1 Floor<>ceiling good NT 10 finger flicks  #1lb cuff weights   2 Side<>side good NT 10 finger flicks, alternating sides,  #1lb cuff weights   3 Step & reach  (forward) good NT Alternating sides on the   #1lb cuff weights   4 Step & reach  (backwards) good NT Alternating sides on the   #1lb cuff weights   5 Backwards Step good NT Alternating sides on the   #1lb cuff weights   6 Rock & reach  (foward) good NT 20x  #1lb cuff weights   7 Rock & reach   (sideways) good NT 10x  #1lb cuff weights         therapeutic activity x 26 minutes:   D/c assessments: strength, ROM, FGA, TUG, balance    ASSESSMENT   57 year old male with diagnosis of Parkinson's Disease referred to [facility] received skilled outpatient PT 11/21/2017 to 2/2/2018. Pt completed LSVT BIG program and met all goals. Pt reports improvements in gait (now feels normal) and ability to turn keys. Pt demonstrated improvements in RUE and BLE strength/ endurance, AROM of hips and ankles, gait quality and speed, and balance.  Pt demonstrates improve arm swing and right foot clearance during gait. Occasional foot slap is noted, but is not impairing pt's balance or mobility.  Pt is scored within normal ranges for strength, LE endurance (sit<>stand test), balance (FGA), gait velocity, and ability to multitask with gait. Pt is d/c to continue with LSVT BIG HEP and may follow up in 6 months-1 yr to determine if mobility/ balance has changed to determine the need to resume PT.     Status Towards Goals Met:     Short term goals:  1. Pt will perform LSVT BIG daily maximal exercises with supervision to improve carryover of BIG movements into daily life. met  2. Pt will complete TUG while performing cognitive task of counting backwards by 3s from 25 in 9 seconds or less to demonstrate improved gait and  "balance. met  3. Assess R HS length and set goal at needed. met     Long term goals:   4. Pt will report score of 100 on ABC outcome measure when asked his perceived ability to "reach for a small can off a shelf at eye level" to demonstrate improved gait and balance. met  5. Pt will score 29/30 on FGA to demonstrate improved gait and balance. met  6. Pt will report score of 100 on ABC outcome measure when asked his perceived ability to "walk up or down stairs" to demonstrate improved strength and balance. met     Goals Not achieved and why: all goals achieved    Discharge reason : Met goals and completed LSVT BIG protocol    PLAN   This patient is discharged from Physical Therapy Services.         Elo Cantrell, PT  02/02/2018        "

## 2018-02-09 ENCOUNTER — INITIAL CONSULT (OUTPATIENT)
Dept: NEUROLOGY | Facility: CLINIC | Age: 58
End: 2018-02-09
Payer: COMMERCIAL

## 2018-02-09 DIAGNOSIS — F06.70 MILD NEUROCOGNITIVE DISORDER DUE TO PARKINSON'S DISEASE: ICD-10-CM

## 2018-02-09 DIAGNOSIS — G20.A1 MILD NEUROCOGNITIVE DISORDER DUE TO PARKINSON'S DISEASE: ICD-10-CM

## 2018-02-09 PROCEDURE — 96118 PR NEUROPSYCH TESTING BY PSYCH/PHYS: CPT | Mod: S$GLB,,, | Performed by: PSYCHIATRY & NEUROLOGY

## 2018-02-09 PROCEDURE — 90791 PSYCH DIAGNOSTIC EVALUATION: CPT | Mod: S$GLB,,, | Performed by: PSYCHIATRY & NEUROLOGY

## 2018-02-09 PROCEDURE — 99499 UNLISTED E&M SERVICE: CPT | Mod: S$GLB,,, | Performed by: PSYCHIATRY & NEUROLOGY

## 2018-02-09 PROCEDURE — 96119 PR NEUROPSYCH TESTING BY TECHNICIAN: CPT | Mod: 59,S$GLB,, | Performed by: PSYCHIATRY & NEUROLOGY

## 2018-02-09 NOTE — PROGRESS NOTES
NEUROPSYCHOLOGICAL EVALUATION - CONFIDENTIAL    REFERRAL SOURCE: Amol Jerome MD  MEDICAL NECESSITY:  Evaluate cognitive functioning in the setting of clinically probable Parkinsons disease.     DATE CONDUCTED: 2/9/2018    SOURCES OF INFORMATION:  The following was gathered from a clinical interview with Mr. Adam Dalal, a separate interview with his wife, and review of the available medical records. Mr. Dalal expressed an understanding of the purpose of the evaluation and consented to all procedures. Total licensed billing psychologists professional time including clinical interview, test administration and interpretation of tests administered by the billing psychologist, integration of test results and other clinical data, preparing the final report, and personally reporting results to the patient   58302 - 1 hour  [id number] - 2 hours  [id number] - 2 hours    HISTORY OF PRESENT ILLNESS: Mr. Adam Dalal is a 57-year-old, right-handed,  male with 18 years of education who was referred for a neuropsychological evaluation in the setting of clinically probable Parkinsons disease (diagnosed in 11/2017). He developed a right hand tremor within the past several years with changes in handwriting and fine motor difficulties. He also noticed leg dragging and stiffness/rigidity. He denied sensory changes.      Both Mr. Dalal and his wife reported mild inattentiveness/distractibility at baseline. Neither has noticed significant changes to his cognitive or daily functioning since the diagnosis of PD or over the past several years. He has no history of impulse control issues, both currently and pre-morbidly. He has occasional instances of forgetting to take his medication, but this has reduced since he started using a pillbox. No changes in judgment or personality were noted. He has no difficulties managing a schedule, cooking, or driving. He continues to meet his job demands with no problems.     ADDITIONAL MEDICAL  HISTORY:  Bilateral cataract removal with lense implantation in early 2017, tinnitus, CHU, HLD. Occasional problems with sleep maintenance due to using the bathroom several times per night. He feels urinary urgency has improved since starting Sinemet. He sleeps about 8-9 hours per night and feels rested during the day. He has 2 cups of coffee in the morning. He occasionally naps on the weekends. He does not use a CPAP machine at the present time due to claustrophobia.     Impressions offered from a brain MRI in 11/2017 includes: No MRI evidence of acute intracranial abnormality. Mild generalized cerebral volume loss.    SUBSTANCE USE: Mr. Dalal described a longstanding history of moderate to heavy alcohol use. He estimated drinking fairly heavily in his 30s, averaging about 4 alcoholic beverages per day. He has consumed alcohol on a daily basis for the majority of his adult life. He currently drinks a few glasses of red wine per night. He does not believe his use has ever been a problem or reached the point of abuse. He denied a history of illicit drug use/abuse. He smoked cigarettes for about 2 years, none in many years.     CURRENT MEDICATIONS:     ALPRAZolam (XANAX) 0.5 MG tablet     carbidopa-levodopa  mg (SINEMET)  mg per tablet    dorzolamide-timolol 2-0.5% (COSOPT) 22.3-6.8 mg/mL ophthalmic solution    pravastatin (PRAVACHOL) 80 MG tablet      PSYCHIATRIC HISTORY: Longstanding history of mild anxiety with claustrophobia. He has been prescribed Xanax for the past 5 years, taking about 2x per week before sleep after a particularly stressful day at work. He also takes when he flies. He reported difficulties using a CPAP machine due to claustrophobia. Otherwise, he does not believe that anxiety impacts his daily functioning.    Mr. Dalal denied a history of depression, with his wife commenting on his very positive mood and temperament. He denied any changes in mood at the present time. He has  been very proactive in his treatment/therapies for PD. He feels that PD has had little impact on his life, with the exception of noting occasional gait changes while at work. He denied active suicidal ideation, plan, or intent. He denied experiencing hallucinations. He saw a therapist at one point due to trepidation about moving to [place], but he has otherwise not been engaged in consistent psychotherapy. Xanax currently prescribed by PCP.     FAMILY HISTORY: No known history of neurodegenerative conditions. Father with Bipolar Disorder.     PSYCHOSOCIAL HISTORY: Mild inattentiveness throughout his education, with difficulty taking notes and listening to lectures simultaneously. Academic performance improved as he went to college at [employer] and a Masters program at [employer]. He graduated from [employer] with a Masters degree in fine arts. Mr. Dalal has worked at the [employer] for many years and has been the  for almost the past 20 years. Once .  since 2006. No children.    BEHAVIORAL OBSERVATIONS: Mr. Dalal arrived on time for the evaluation and was unaccompanied. He was appropriately dressed and groomed. He underwent bilateral cataract removal with lense implantation in early 2017. He denied any difficulty perceiving the visual stimuli. Mild resting tremor was noted during the interview, more pronounced when using a pencil. Speech was of normal rate, volume, and prosody with very mild dysarthria. Expressive and receptive language were grossly intact. Mood was euthymic. Mr. Dalal demonstrated intact episodic memory for recent events. He recalled his 3 medications from memory. He had no difficulty understanding or retaining test instructions. He appeared engaged and motivated for the entirety of the evaluation. He worked at a fast pace.     TEST RESULTS: The test scores are also included in an appendix to this report.      Mental Status Exam: Mr. Dalal was fully oriented to time  and place. He obtained a total score of 26/30 on the MoCA. He did not draw the outside contour of a clock, instead drawing dash marks from the center of the clock. The clock hands were placed at the designated time, but they were the incorrect length. Mr. Dalal encoded 5/5 words after 2 trials, freely recalling 5/5 words following a brief delay. He provided 3/5 correct responses on a serial 7 subtraction task.     Baseline/Pre-Morbid Abilities: Baseline abilities were estimated to be in the average to high average range.     Visuospatial Abilities: Mr. Dalal employed a highly inefficient approach to his copy of a complex figure. He worked right to left in a piecemeal fashion, which resulted in the slight misplacement of multiple details. He self-corrected several errors. Overall, the drawing was more suggestive of poor organization/planning than visuospatial dysfunction. Furthermore, he performed in the high average range on a block design test.     Language: Very superior letter verbal fluency. Average semantic verbal fluency and confrontation naming. Grossly intact expressive and receptive language.     Learning/Memory: Mr. Kearney demonstrated average encoding of 2 short stories. He lost several details from each story following a long delay and his overall recall was low average/average. Responses to yes/no questions pertaining to the stories was within normal limits. Mr. Berg encoding of a supraspan word list was low average as he recalled 6, 7, and 9 of 12 words across the learning trials. He demonstrated perfect retention following a long delay and his overall recall was average. Recognition was average.     Executive Functioning/Attention: Encoding/single trial learning ranged from average to high average. Working memory and processing speed were average. Conceptual reasoning/problem solving were within normal limits. Performance on a test of divided attention/set shifting was average.     Mood: Responses on  a self-report inventory were not suggestive of clinically significant depression.     SUMMARY AND IMPRESSION: Mr. Adam Dalal is a 57-year-old male who was referred for a neuropsychological evaluation in the setting of clinically probable Parkinsons disease (diagnosed in 11/2017). He has mild inattentiveness and anxiety at baseline. He works full-time and continues to function independently with few reported problems.     Mr. Berg neuropsychological profile was within normal limits and not suggestive of a cognitive diagnosis at the present time. Mild inefficiencies were noted in encoding/retrieval and planning/organization. These may be pre-morbid weaknesses based on his history of inattention. Otherwise, he demonstrated intact functioning in all other domains of cognitive functioning. The results can act as baseline for future comparison. Mr. Berg risks for current and future cognitive dysfunction include: Parkinsons disease, chronic moderate to heavy alcohol use, and untreated obstructive sleep apnea (CHU). In fact, alcohol use and untreated CHU may be contributing to mild cognitive inefficiencies at the present time. Several recommendations are offered to assist in his care.     DIAGNOSIS:  1. Subclinical alcohol use disorder  2. Subclinical anxiety disorder    RECOMMENDATIONS:    1. Follow-up with Sleep Medicine - For untreated obstructive sleep apnea (CUH) as this can contribute to cognitive difficulties. Notify providers of claustrophobia as there may be equipment for people with this condition.     2. Counseling/Psychotherapy - For treatment of mild claustrophobia/anxiety. It is also possible that he is self-medicating clinically significant anxiety with alcohol.     3. Optimize Brain Health/Reduce Alcohol Consumption - Prioritize physical and social activity. Maintain a heart healthy diet and 100% treatment compliance. Mr. Dalal alcohol consumption would currently be classified as moderate to heavy  use, which can also impact cognitive functioning.     4. Compensatory Mechanisms -Prioritize external structure and organization. Use a pillbox for medication management and set recurring alarms/timers on phone at medication times. Keep a calendar/datebook and write down important information.     5. Neuropsychology Follow-up - 1-2 years to assess for interval change.      I will provide Mr. Dalal the results of the evaluation.      Thank you for allowing me to participate in Mr. Dalal care.  If you have any questions, please contact me at [phone].    Vikas Meyer Psy.D., ABPP  Board Certified in Clinical Neuropsychology  [facility] - Department of Neurology    APPENDIX  TESTS ADMINISTERED:  Clinical Interview and Review of Records, Chapin Cognitive Assessment (MoCA), Test of Premorbid Functioning (TOPF), selected subtests from the Wechsler Adult Intelligence Scale - 4th Edition (WAIS-IV), Logical Memory subtest form the Wechsler Memory Scale - 4th Edition (WMS-IV), Alas Verbal Learning Test - Revised (HVLT-R), Luis Complex Figure (copy trial only), Trailmaking Test Part A and B, Controlled Oral Word Association Test (COWAT), Semantic Verbal Fluency (Animals), Symbol Digit Modalities Test (SDMT), Wisconsin Card Sorting Test - 64 card version, Naming subtest from the NAB, and the Geriatric Depression Scale.      TOPF    Standard Score  + simple demographics 108 (predicted = 114)     MoCA    26/30    WAIS-IV   Index Percentile  Working Memory Index 111 (77%)    Individual subtests  Scaled Score   Block Design   14  Digit Span   13   LDF   9 (raw)   LDB   5 (raw)   LDS   6 (raw)  RDS   11  Arithmetic   11    WMS-IV   Scaled Score  Logical Memory I  10  Logical Memory II  8  Logical Memory II Recog. 24/30 (raw)    CVLT-II   T scores  T1    6 (raw)  T2    7 (raw)  T3    9 (raw)  Total    37  DR    45 (9/12)  Retention   55 (10%)  Discrim.   51 (12/12 hits, 1 fp)    Luis Complex  Figure  Percentile  Copy    2-5% (30/36)  Time    >16% (218 seconds)                             Romel Norms   T-Score  Trails A   44  Trails B   50 (0 errors)  FAS    74  Animal fluency   49    NAB    T-Score  Naming   52 (31/31)    SDMT    Z-Score  Written    0.17  Oral    0.40    WCST-64   Standard Score  Total Correct   87  Perseverative Errors  85  Categories   >16% (raw)  Trials to 1st   >16% (11)  FMS    0  Learning to Learn  >16% (-6.23)    GDS    2/30

## 2018-02-21 ENCOUNTER — OFFICE VISIT (OUTPATIENT)
Dept: NEUROLOGY | Facility: CLINIC | Age: 58
End: 2018-02-21
Payer: COMMERCIAL

## 2018-02-21 VITALS
HEIGHT: 73 IN | HEART RATE: 65 BPM | BODY MASS INDEX: 27.99 KG/M2 | DIASTOLIC BLOOD PRESSURE: 72 MMHG | SYSTOLIC BLOOD PRESSURE: 116 MMHG | WEIGHT: 211.19 LBS

## 2018-02-21 DIAGNOSIS — F10.10 EXCESSIVE DRINKING ALCOHOL: ICD-10-CM

## 2018-02-21 DIAGNOSIS — G20.A1 PARKINSON'S DISEASE: ICD-10-CM

## 2018-02-21 PROCEDURE — 99214 OFFICE O/P EST MOD 30 MIN: CPT | Mod: S$GLB,,, | Performed by: PSYCHIATRY & NEUROLOGY

## 2018-02-21 PROCEDURE — 99999 PR PBB SHADOW E&M-EST. PATIENT-LVL III: CPT | Mod: PBBFAC,,, | Performed by: PSYCHIATRY & NEUROLOGY

## 2018-02-21 PROCEDURE — 3008F BODY MASS INDEX DOCD: CPT | Mod: S$GLB,,, | Performed by: PSYCHIATRY & NEUROLOGY

## 2018-02-21 NOTE — ASSESSMENT & PLAN NOTE
-- continue sinemet 1/2 tab TID with extra half tab as needed for increased tone  -- MRI brain reviewed and WNL  -- PT/OT completed LSVT BIG/LOUD  -- reviewed neuropsych testing report, discussed alcohol overuse and anxiety

## 2018-02-21 NOTE — PROGRESS NOTES
[facility] NEUROLOGY  [facility] [place] [facility]    Date: 2/21/18  Patient Name: Adam Dalal   MRN: [id number]   PCP: Rashi Gee  Referring Provider: Self, Aaareferral    Assessment:   Adam Dalal is a 57 y.o. male presenting Parkinson's disease diagnosed after dopamine challenge.  The patient is doing well with current good symptomatic control.  He has completed PT and OT as well as neuropsychiatry evaluation.  I have counseled patient on his alcohol use today.  Patient will use an extra half tablet of Sinemet as needed for increased tone.   Plan:     Problem List Items Addressed This Visit        Neuro    Parkinson's disease    Current Assessment & Plan     -- continue sinemet 1/2 tab TID with extra half tab as needed for increased tone  -- MRI brain reviewed and WNL  -- PT/OT completed LSVT BIG/LOUD  -- reviewed neuropsych testing report, discussed alcohol overuse and anxiety            Psychiatric    Excessive drinking alcohol    Overview     Drinks ~4 glasses of wine every night         Current Assessment & Plan     -- patient counseled on appropriate alcohol use, expressed understanding             Amol Jerome MD  [facility]   Department of Neurology    Patient note was created using Dragon Dictation.  Any errors in syntax or even information may not have been identified and edited on initial review prior to signing this note.  Subjective:   P     HPI:   Mr. Adam Dalal is a 57 y.o. male who presents in follow-up for Parkinson's disease.  Since the patient's last visit, he was initiated on Sinemet with total resolution of his resting tremor.  He expresses improve mobility, though does endorse occasional increase in tone in his right lower extremity greater than his left.  He states very rarely, particularly when his medication is wearing off, he was emergency tremor.  He denies any falls, hallucinations, cognitive changes, or medication side effects.  "He has completed LSVT BIG and LOUD therapy as well as neuropsychiatry testing.  During this evaluation, concern was raised over his anxiety and heavy alcohol use.  We discussed his excessive alcohol use today as well as appropriate consumption.  The patient states that he would "take those recommendations under consideration".  He has no new complaints today.      PAST MEDICAL HISTORY:  Past Medical History:   Diagnosis Date    Anxiety     Esotropia of right eye 5/2/2013    High cholesterol     Hyperlipidemia        PAST SURGICAL HISTORY:  Past Surgical History:   Procedure Laterality Date    CATARACT EXTRACTION W/  INTRAOCULAR LENS IMPLANT Right 01/09/2017    Dr marin     CATARACT EXTRACTION W/  INTRAOCULAR LENS IMPLANT Left 01/30/2017    Dr. Marin    HERNIA REPAIR      STRABISMUS SURGERY  4yrs    right eye       CURRENT MEDS:  Current Outpatient Prescriptions   Medication Sig Dispense Refill    ALPRAZolam (XANAX) 0.5 MG tablet TAKE 1 TABLET(0.5 MG) BY MOUTH EVERY NIGHT 30 tablet 0    carbidopa-levodopa  mg (SINEMET)  mg per tablet Take 0.5 tablets by mouth 3 (three) times daily. 45 tablet 11    pravastatin (PRAVACHOL) 80 MG tablet TAKE 1 TABLET(80 MG) BY MOUTH EVERY MORNING 90 tablet 0    dorzolamide-timolol 2-0.5% (COSOPT) 22.3-6.8 mg/mL ophthalmic solution Place 1 drop into both eyes 2 (two) times daily. 10 mL 3     No current facility-administered medications for this visit.        ALLERGIES:  Review of patient's allergies indicates:  No Known Allergies    FAMILY HISTORY:  Family History   Problem Relation Age of Onset    Arthritis Mother     Sleep apnea Brother     Arthritis Brother     Cataracts Maternal Grandmother     Cataracts Paternal Grandmother     Amblyopia Neg Hx     Blindness Neg Hx     Glaucoma Neg Hx     Macular degeneration Neg Hx     Retinal detachment Neg Hx     Strabismus Neg Hx        SOCIAL HISTORY:  Social History   Substance Use Topics    Smoking status: " "Former Smoker     Packs/day: 1.00     Years: 1.00     Quit date: 1/1/2006    Smokeless tobacco: Not on file    Alcohol use 12.0 oz/week     20 Glasses of wine per week       Review of Systems:  12 review of systems is negative except for the symptoms mentioned in HPI.      Objective:     Vitals:    02/21/18 0907   BP: 116/72   Pulse: 65   Weight: 95.8 kg (211 lb 3.2 oz)   Height: 6' 1" (1.854 m)     General: NAD, well nourished   Eyes: no tearing, discharge, no erythema   ENT: moist mucous membranes of the oral cavity, nares patent    Neck: Supple, full range of motion  Cardiovascular: Warm and well perfused, pulses equal and symmetrical  Lungs: Normal work of breathing, normal chest wall excursions  Skin: No rash, lesions, or breakdown on exposed skin  Psychiatry: Mood and affect are appropriate   Abdomen: soft, non tender, non distended  Extremeties: No cyanosis, clubbing or edema.    Neurological   MENTAL STATUS: Alert and oriented to person, place, and time. Attention and concentration within normal limits. Speech without dysarthria, able to name and repeat without difficulty. Recent and remote memory within normal limits   CRANIAL NERVES: Visual fields intact. PERRL.  Facial sensation intact. Face symmetrical with hypomimia. Hearing grossly intact. Full shoulder shrug bilaterally. Tongue protrudes midline   SENSORY: Sensation is intact to light touch throughout.    MOTOR: Normal bulk and increased tone in RLE>L with cogwheeling of RUE. 5/5 deltoid, biceps, triceps, interosseous, hand  bilaterally. 5/5 iliopsoas, knee extension/flexion, foot dorsi/plantarflexion bilaterally. No resting tremor seen today.   REFLEXES: Symmetric and 1+ throughout  CEREBELLAR/COORDINATION/GAIT: Narrow based gait. Positive pull back test.  Diane slowed R>L.      "

## 2018-02-21 NOTE — PATIENT INSTRUCTIONS
Parkinsons Disease: Coping Tips for Caregivers  In the early stages of Parkinsons disease, your loved one can likely handle most tasks without help. But over time, you may find yourself taking on more and more responsibilities. Keep in mind that you cant take good care of someone else if you dont take care of yourself, too. So be sure to take breaks when you need them. Its not being selfish. Its vital.    Give yourself a break  Its OK to spend time outside your role as caregiver. It may not feel right at first. But even simple things can help ease stress. Try these tips:  · Go to a movie or concert.  · Have a meal with friends.  · Read a book or write in a journal.  · Take a walk.  · Pursue a hobby.  · Take a long bath.  If you feel depressed  Taking care of your loved one may leave you feeling frustrated, sad, or worn out. This isnt a sign youre doing something wrong. Its completely normal. However, be on the lookout for signs of depression. These include feeling sad, guilty, tired, or helpless most of the time. Other signs include trouble sleeping, and loss of interest in activities or food. If you notice these signs in yourself, or in your loved one, talk to a doctor. Depression can and should be treated.  Accept help  Caring for someone with Parkinsons disease takes more than 1 person. So learn to accept help when its offered. And be willing to ask for help when you need it. People who care about you and your loved one really do want to help. You can also talk to the doctor or a  about options for respite care (temporary help). This can range from adult day care to a home health aide who cares for your loved one a few times each month.  Date Last Reviewed: 11/9/2015  © 3885-1321 [employer]. [address] [place] [address], [place] [address]. All rights reserved. This information is not intended as a substitute for professional medical care. Always follow your healthcare  professional's instructions.

## 2018-02-23 ENCOUNTER — OFFICE VISIT (OUTPATIENT)
Dept: NEUROLOGY | Facility: CLINIC | Age: 58
End: 2018-02-23
Payer: COMMERCIAL

## 2018-02-23 DIAGNOSIS — F06.70 MILD NEUROCOGNITIVE DISORDER DUE TO PARKINSON'S DISEASE: Primary | ICD-10-CM

## 2018-02-23 DIAGNOSIS — G20.A1 MILD NEUROCOGNITIVE DISORDER DUE TO PARKINSON'S DISEASE: Primary | ICD-10-CM

## 2018-02-23 PROCEDURE — 99499 UNLISTED E&M SERVICE: CPT | Mod: S$GLB,,, | Performed by: PSYCHIATRY & NEUROLOGY

## 2018-02-25 NOTE — PROGRESS NOTES
NEUROPSYCHOLOGICAL EVALUATION - CONFIDENTIAL  FEEDBACK NOTE    On 2/23/18, I provided Mr. Adam Dalal and his wife the results of his neuropsychological evaluation. Please see his full report for a comprehensive overview of the findings. He was provided a copy of the report and invited to call with additional questions.     Vikas Meyer Psy.D., DANIELAP  Board Certified in Clinical Neuropsychology  [facility] - Department of Neurology

## 2018-04-16 RX ORDER — PRAVASTATIN SODIUM 80 MG/1
TABLET ORAL
Qty: 90 TABLET | Refills: 0 | Status: SHIPPED | OUTPATIENT
Start: 2018-04-16 | End: 2018-06-25 | Stop reason: SDUPTHER

## 2018-05-10 ENCOUNTER — PATIENT MESSAGE (OUTPATIENT)
Dept: NEUROLOGY | Facility: CLINIC | Age: 58
End: 2018-05-10

## 2018-05-10 RX ORDER — CARBIDOPA AND LEVODOPA 25; 100 MG/1; MG/1
TABLET ORAL
Qty: 180 TABLET | Refills: 3 | Status: SHIPPED | OUTPATIENT
Start: 2018-05-10 | End: 2019-05-31 | Stop reason: SDUPTHER

## 2018-06-05 ENCOUNTER — PATIENT MESSAGE (OUTPATIENT)
Dept: INTERNAL MEDICINE | Facility: CLINIC | Age: 58
End: 2018-06-05

## 2018-06-22 ENCOUNTER — PATIENT MESSAGE (OUTPATIENT)
Dept: INTERNAL MEDICINE | Facility: CLINIC | Age: 58
End: 2018-06-22

## 2018-06-25 ENCOUNTER — OFFICE VISIT (OUTPATIENT)
Dept: INTERNAL MEDICINE | Facility: CLINIC | Age: 58
End: 2018-06-25
Payer: COMMERCIAL

## 2018-06-25 ENCOUNTER — LAB VISIT (OUTPATIENT)
Dept: LAB | Facility: HOSPITAL | Age: 58
End: 2018-06-25
Attending: INTERNAL MEDICINE
Payer: COMMERCIAL

## 2018-06-25 VITALS
HEART RATE: 75 BPM | OXYGEN SATURATION: 96 % | HEIGHT: 73 IN | SYSTOLIC BLOOD PRESSURE: 122 MMHG | DIASTOLIC BLOOD PRESSURE: 80 MMHG | WEIGHT: 212.5 LBS | BODY MASS INDEX: 28.16 KG/M2

## 2018-06-25 DIAGNOSIS — Z00.00 ROUTINE PHYSICAL EXAMINATION: Primary | ICD-10-CM

## 2018-06-25 DIAGNOSIS — Z12.5 SCREENING FOR PROSTATE CANCER: ICD-10-CM

## 2018-06-25 DIAGNOSIS — G47.33 OBSTRUCTIVE SLEEP APNEA: ICD-10-CM

## 2018-06-25 DIAGNOSIS — L98.9 SKIN LESION OF FACE: ICD-10-CM

## 2018-06-25 DIAGNOSIS — G20.A1 PARKINSON'S DISEASE: ICD-10-CM

## 2018-06-25 DIAGNOSIS — F41.9 ANXIETY: ICD-10-CM

## 2018-06-25 DIAGNOSIS — E78.5 HYPERLIPIDEMIA, UNSPECIFIED HYPERLIPIDEMIA TYPE: ICD-10-CM

## 2018-06-25 DIAGNOSIS — Z74.09 IMPAIRED FUNCTIONAL MOBILITY, BALANCE, GAIT, AND ENDURANCE: ICD-10-CM

## 2018-06-25 DIAGNOSIS — Z00.00 ROUTINE PHYSICAL EXAMINATION: ICD-10-CM

## 2018-06-25 LAB
ALBUMIN SERPL BCP-MCNC: 4.2 G/DL
ALP SERPL-CCNC: 54 U/L
ALT SERPL W/O P-5'-P-CCNC: 12 U/L
ANION GAP SERPL CALC-SCNC: 8 MMOL/L
AST SERPL-CCNC: 24 U/L
BASOPHILS # BLD AUTO: 0.05 K/UL
BASOPHILS NFR BLD: 0.7 %
BILIRUB SERPL-MCNC: 0.9 MG/DL
BUN SERPL-MCNC: 13 MG/DL
CALCIUM SERPL-MCNC: 8.9 MG/DL
CHLORIDE SERPL-SCNC: 103 MMOL/L
CHOLEST SERPL-MCNC: 173 MG/DL
CHOLEST/HDLC SERPL: 2.9 {RATIO}
CO2 SERPL-SCNC: 27 MMOL/L
COMPLEXED PSA SERPL-MCNC: 0.32 NG/ML
CREAT SERPL-MCNC: 0.9 MG/DL
DIFFERENTIAL METHOD: ABNORMAL
EOSINOPHIL # BLD AUTO: 0.1 K/UL
EOSINOPHIL NFR BLD: 1 %
ERYTHROCYTE [DISTWIDTH] IN BLOOD BY AUTOMATED COUNT: 13.4 %
EST. GFR  (AFRICAN AMERICAN): >60 ML/MIN/1.73 M^2
EST. GFR  (NON AFRICAN AMERICAN): >60 ML/MIN/1.73 M^2
ESTIMATED AVG GLUCOSE: 108 MG/DL
GLUCOSE SERPL-MCNC: 114 MG/DL
HBA1C MFR BLD HPLC: 5.4 %
HCT VFR BLD AUTO: 42.6 %
HDLC SERPL-MCNC: 59 MG/DL
HDLC SERPL: 34.1 %
HGB BLD-MCNC: 14.8 G/DL
LDLC SERPL CALC-MCNC: 96.8 MG/DL
LYMPHOCYTES # BLD AUTO: 1.3 K/UL
LYMPHOCYTES NFR BLD: 18 %
MCH RBC QN AUTO: 31.7 PG
MCHC RBC AUTO-ENTMCNC: 34.7 G/DL
MCV RBC AUTO: 91 FL
MONOCYTES # BLD AUTO: 0.7 K/UL
MONOCYTES NFR BLD: 10 %
NEUTROPHILS # BLD AUTO: 4.9 K/UL
NEUTROPHILS NFR BLD: 70 %
NONHDLC SERPL-MCNC: 114 MG/DL
PLATELET # BLD AUTO: 165 K/UL
PMV BLD AUTO: 11.7 FL
POTASSIUM SERPL-SCNC: 4.3 MMOL/L
PROT SERPL-MCNC: 7.3 G/DL
RBC # BLD AUTO: 4.67 M/UL
SODIUM SERPL-SCNC: 138 MMOL/L
TRIGL SERPL-MCNC: 86 MG/DL
WBC # BLD AUTO: 6.93 K/UL

## 2018-06-25 PROCEDURE — 85025 COMPLETE CBC W/AUTO DIFF WBC: CPT

## 2018-06-25 PROCEDURE — 80061 LIPID PANEL: CPT

## 2018-06-25 PROCEDURE — 84153 ASSAY OF PSA TOTAL: CPT

## 2018-06-25 PROCEDURE — 99999 PR PBB SHADOW E&M-EST. PATIENT-LVL IV: CPT | Mod: PBBFAC,,, | Performed by: INTERNAL MEDICINE

## 2018-06-25 PROCEDURE — 36415 COLL VENOUS BLD VENIPUNCTURE: CPT

## 2018-06-25 PROCEDURE — 99396 PREV VISIT EST AGE 40-64: CPT | Mod: S$GLB,,, | Performed by: INTERNAL MEDICINE

## 2018-06-25 PROCEDURE — 80053 COMPREHEN METABOLIC PANEL: CPT

## 2018-06-25 PROCEDURE — 83036 HEMOGLOBIN GLYCOSYLATED A1C: CPT

## 2018-06-25 RX ORDER — PRAVASTATIN SODIUM 80 MG/1
TABLET ORAL
Qty: 90 TABLET | Refills: 6 | Status: SHIPPED | OUTPATIENT
Start: 2018-06-25 | End: 2019-08-05 | Stop reason: SDUPTHER

## 2018-06-25 RX ORDER — ALPRAZOLAM 0.5 MG/1
TABLET ORAL
Qty: 30 TABLET | Refills: 5 | Status: SHIPPED | OUTPATIENT
Start: 2018-06-25 | End: 2018-12-24 | Stop reason: SDUPTHER

## 2018-06-25 NOTE — PROGRESS NOTES
Subjective:       Patient ID: Adam Dalal is a 57 y.o. male.    Chief Complaint: Follow-up    Here to follow up anxiety and refill medication. He was diagnosed with Parkinsonism and is taking meds. He has gone through the [employer] program and tries to do that daily. He feels he is functionally stable. He denies and chest pain or SOB. He feels his anxiety is well controled.     He is due for labs and I will refill meds.       Review of Systems   Constitutional: Positive for activity change. Negative for chills, fatigue, fever and unexpected weight change.   HENT: Negative for nosebleeds, rhinorrhea and trouble swallowing.    Eyes: Negative for pain and discharge.   Respiratory: Negative for cough, chest tightness, shortness of breath and wheezing.    Cardiovascular: Negative for chest pain and palpitations.   Gastrointestinal: Negative for abdominal pain, blood in stool, constipation, diarrhea, nausea and vomiting.   Endocrine: Negative for polydipsia and polyuria.   Genitourinary: Negative for difficulty urinating, hematuria and urgency.   Musculoskeletal: Positive for gait problem. Negative for arthralgias, joint swelling and neck pain.   Skin: Negative for rash.   Neurological: Positive for tremors. Negative for dizziness, weakness and headaches.   Hematological: Does not bruise/bleed easily.   Psychiatric/Behavioral: Negative for confusion, dysphoric mood, sleep disturbance and suicidal ideas.       Objective:      Physical Exam   Constitutional: He is oriented to person, place, and time. He appears well-developed and well-nourished. No distress.   HENT:   Head: Normocephalic and atraumatic.   Right Ear: External ear normal.   Left Ear: External ear normal.   Mouth/Throat: Oropharynx is clear and moist. No oropharyngeal exudate.   TM's clear, pharynx clear   Eyes: Conjunctivae and EOM are normal. Pupils are equal, round, and reactive to light. No scleral icterus.   Neck: Normal range of motion. Neck  supple. No thyromegaly present.   No supraclavicular nodes palpated   Cardiovascular: Normal rate, regular rhythm and normal heart sounds.    No murmur heard.  Pulmonary/Chest: Effort normal and breath sounds normal. He has no wheezes.   Abdominal: Soft. Bowel sounds are normal. He exhibits no mass. There is no tenderness.   Musculoskeletal: He exhibits no edema.   Lymphadenopathy:     He has no cervical adenopathy.   Neurological: He is alert and oriented to person, place, and time.   Cogwheeling at elbows   Skin: No rash noted. No erythema. No pallor.   Psychiatric: He has a normal mood and affect. His behavior is normal.       Assessment:       1. Routine physical examination    2. Hyperlipidemia, unspecified hyperlipidemia type    3. Obstructive sleep apnea    4. Parkinson's disease    5. Screening for prostate cancer    6. Anxiety    7. Impaired functional mobility, balance, gait, and endurance        Plan:       Adam was seen today for follow-up.    Diagnoses and all orders for this visit:    Routine physical examination  -     Lipid panel; Future  -     PSA, Screening; Future  -     Comprehensive metabolic panel; Future  -     CBC auto differential; Future  -     Hemoglobin A1c; Future    Hyperlipidemia, unspecified hyperlipidemia type  -     Lipid panel; Future  -     PSA, Screening; Future  -     Comprehensive metabolic panel; Future  -     CBC auto differential; Future  -     Hemoglobin A1c; Future    Obstructive sleep apnea  -     Lipid panel; Future  -     PSA, Screening; Future  -     Comprehensive metabolic panel; Future  -     CBC auto differential; Future  -     Hemoglobin A1c; Future    Parkinson's disease  -     Lipid panel; Future  -     PSA, Screening; Future  -     Comprehensive metabolic panel; Future  -     CBC auto differential; Future  -     Hemoglobin A1c; Future    Screening for prostate cancer  -     Lipid panel; Future  -     PSA, Screening; Future  -     Comprehensive metabolic  panel; Future  -     CBC auto differential; Future  -     Hemoglobin A1c; Future    Anxiety    Impaired functional mobility, balance, gait, and endurance    Other orders  -     pravastatin (PRAVACHOL) 80 MG tablet; TAKE 1 TABLET(80 MG) BY MOUTH EVERY MORNING  -     ALPRAZolam (XANAX) 0.5 MG tablet; TAKE 1 TABLET(0.5 MG) BY MOUTH EVERY NIGHT        Follow up 6 months.

## 2018-10-25 ENCOUNTER — OFFICE VISIT (OUTPATIENT)
Dept: OTOLARYNGOLOGY | Facility: CLINIC | Age: 58
End: 2018-10-25
Payer: COMMERCIAL

## 2018-10-25 VITALS
DIASTOLIC BLOOD PRESSURE: 80 MMHG | WEIGHT: 214 LBS | SYSTOLIC BLOOD PRESSURE: 118 MMHG | HEART RATE: 78 BPM | TEMPERATURE: 97 F | BODY MASS INDEX: 28.23 KG/M2

## 2018-10-25 DIAGNOSIS — H91.93 HEARING DIFFICULTY OF BOTH EARS: ICD-10-CM

## 2018-10-25 DIAGNOSIS — H93.12 TINNITUS OF LEFT EAR: ICD-10-CM

## 2018-10-25 DIAGNOSIS — H93.13 TINNITUS AURIUM, BILATERAL: ICD-10-CM

## 2018-10-25 DIAGNOSIS — H61.23 BILATERAL IMPACTED CERUMEN: ICD-10-CM

## 2018-10-25 PROCEDURE — 3008F BODY MASS INDEX DOCD: CPT | Mod: CPTII,S$GLB,, | Performed by: OTOLARYNGOLOGY

## 2018-10-25 PROCEDURE — 69210 REMOVE IMPACTED EAR WAX UNI: CPT | Mod: S$GLB,,, | Performed by: OTOLARYNGOLOGY

## 2018-10-25 PROCEDURE — 99204 OFFICE O/P NEW MOD 45 MIN: CPT | Mod: 25,S$GLB,, | Performed by: OTOLARYNGOLOGY

## 2018-10-25 RX ORDER — OFLOXACIN 3 MG/ML
5 SOLUTION AURICULAR (OTIC) 2 TIMES DAILY
Qty: 10 ML | Refills: 0 | Status: SHIPPED | OUTPATIENT
Start: 2018-10-25 | End: 2019-03-11

## 2018-10-25 NOTE — PROGRESS NOTES
Subjective:       Patient ID: Adam Dalal is a 58 y.o. male.    Chief Complaint: Tinnitus (both ears but more in the left)    He is a new patient for me here today complaining of ringing in his ears for about the past 5 years.  Over the past week or so however it has become louder.  He believes the ringing is in both ears but seems louder on the left.  He denies antecedent acoustical trauma or URI or AR symptoms or air travel.  There is history of acoustic trauma in the form of loud music and on questioning recalls attending a concert in the past few weeks but without immediate symptoms.  He has also flown recently without exacerbation.  He denies prior otologic history otherwise including in childhood.  Family history includes hearing loss in his mother when she was elderly.  He denies any nasal throat or other otolaryngologic complaints.  There is history of Parkinson's disease and no recent medication changes.  Caffeine intake includes 1.5 cups of coffee daily.  Denies NSAID use.  Denies blood pressure problems.          Review of Systems   Ears: Positive for hearing loss, ear pressure, ringing in ear and family history of hearing loss.  Negative for ear pain, ear discharge, ear infections, head trauma and taken gentramycin/streptomycin.    Nose:  Negative for nosebleeds, nasal obstruction, nasal or sinus surgery, loss of smell, postnasal drip and snoring.    Mouth/Throat: Negative for pain swallowing, impaired swallowing, hoarse voice, throat mass, neck mass, oral ulcers and neck lumps.   Constitutional: Negative for recent unexplained weight loss, fever, chills and night sweats.    Eyes:  Positive for history of glaucoma.   Cardiovascular:  Negative for chest pain, palpitations and history of high blood pressure.   Respiratory:  Negative for asthma, emphysema, history of tuberculosis, recent cough and shortness of breath.    Gastrointestinal:  Negative for history of stomach ulcers or pain, acid  reflux, indigestion and blood in stool.   Other:  Positive for disturbances in coordination, depression and anxiety. Negative for kidney problem, bladder problem, prostate disease, arthritis, slurred, swollen glands, anemia and persistent infections.           Objective:        Vitals:    10/25/18 1455   BP: 118/80   Pulse: 78   Temp: 97.3 °F (36.3 °C)     Body mass index is 28.23 kg/m².  Physical Exam   Constitutional: He is oriented to person, place, and time. He appears well-developed and well-nourished. No distress.   HENT:   Head: Normocephalic and atraumatic.   Right Ear: External ear normal.   Left Ear: External ear normal.   Nose: Septal deviation present. No mucosal edema or rhinorrhea. No epistaxis.   Mouth/Throat: Oropharynx is clear and moist and mucous membranes are normal. No oral lesions. No trismus in the jaw. No uvula swelling. No oropharyngeal exudate, posterior oropharyngeal edema or posterior oropharyngeal erythema.   Bilateral cerumen impactions noted. See procedure note.   Neck: Phonation normal. Neck supple. No tracheal deviation present. No thyromegaly present.   Pulmonary/Chest: Effort normal. No respiratory distress.   Lymphadenopathy:     He has no cervical adenopathy.   Neurological: He is alert and oriented to person, place, and time.   Skin: Skin is warm and dry.   Psychiatric: He has a normal mood and affect. His behavior is normal. His speech is not slurred.       Tests / Results:        Assessment:       1. Tinnitus aurium, bilateral    2. Tinnitus of left ear    3. Bilateral impacted cerumen    4. Hearing difficulty of both ears        Plan:        Ears cleaned as per procedure note.  See procedure note.    Reviewed all above and considerations and recommendations and answered questions.  Generic Floxin for 1 week then follow-up in 2 weeks.  Ear care reviewed and answered questions including discontinue Q-tips and possibly use over-the-counter Debrox earwax drops twice a week  beginning in the next few weeks.

## 2018-10-25 NOTE — PROCEDURES
Ear Cerumen Removal  Date/Time: 10/25/2018 6:42 PM  Performed by: Saima Diego MD  Authorized by: Saima Diego MD     Consent Done?:  Yes (Verbal)  Location details:  Both ears  Procedure type: curette    Cerumen  Removal Results:  Cerumen completely removed  Patient tolerance:  Patient tolerated the procedure well with no immediate complications     Bilateral cerumen impactions were cleared with a combination of curette, Domeboro wash, and suction. This was tolerated well with mild irritation of the right ear canal and otherwise normal canals and tympanic membranes bilaterally and reports great improvement in his presenting ear complaints.

## 2018-11-08 ENCOUNTER — OFFICE VISIT (OUTPATIENT)
Dept: OTOLARYNGOLOGY | Facility: CLINIC | Age: 58
End: 2018-11-08
Payer: COMMERCIAL

## 2018-11-08 ENCOUNTER — CLINICAL SUPPORT (OUTPATIENT)
Dept: OTOLARYNGOLOGY | Facility: CLINIC | Age: 58
End: 2018-11-08
Payer: COMMERCIAL

## 2018-11-08 VITALS
DIASTOLIC BLOOD PRESSURE: 75 MMHG | HEART RATE: 67 BPM | TEMPERATURE: 98 F | SYSTOLIC BLOOD PRESSURE: 119 MMHG | BODY MASS INDEX: 28.47 KG/M2 | WEIGHT: 214.81 LBS | HEIGHT: 73 IN

## 2018-11-08 DIAGNOSIS — H90.3 ASYMMETRICAL SENSORINEURAL HEARING LOSS: Primary | ICD-10-CM

## 2018-11-08 DIAGNOSIS — H90.3 BILATERAL SENSORINEURAL HEARING LOSS: ICD-10-CM

## 2018-11-08 DIAGNOSIS — R29.2 ABNORMAL ACOUSTIC REFLEX: ICD-10-CM

## 2018-11-08 DIAGNOSIS — H93.19 TINNITUS, UNSPECIFIED LATERALITY: ICD-10-CM

## 2018-11-08 DIAGNOSIS — H74.8X2 TYPE A-S TYMPANOGRAM, LEFT: ICD-10-CM

## 2018-11-08 DIAGNOSIS — Z77.122 HISTORY OF EXPOSURE TO NOISE: ICD-10-CM

## 2018-11-08 PROCEDURE — 92557 COMPREHENSIVE HEARING TEST: CPT | Mod: S$GLB,,, | Performed by: AUDIOLOGIST-HEARING AID FITTER

## 2018-11-08 PROCEDURE — 92550 TYMPANOMETRY & REFLEX THRESH: CPT | Mod: S$GLB,,, | Performed by: AUDIOLOGIST-HEARING AID FITTER

## 2018-11-08 PROCEDURE — 99214 OFFICE O/P EST MOD 30 MIN: CPT | Mod: S$GLB,,, | Performed by: OTOLARYNGOLOGY

## 2018-11-08 PROCEDURE — 3008F BODY MASS INDEX DOCD: CPT | Mod: CPTII,S$GLB,, | Performed by: OTOLARYNGOLOGY

## 2018-11-08 NOTE — PATIENT INSTRUCTIONS
Hearing protection.  Hearing aid consultation.  Follow up if change or problems and one year.    Okay to use OTC Debrox ear wax drops every few days.

## 2018-11-08 NOTE — PROGRESS NOTES
Subjective:       Patient ID: Adam Dalal is a 58 y.o. male.    Chief Complaint: Other (go over Audio/Tinnitus alot better)    He is here for audiogram and follow-up.  He states the tinnitus essentially cleared immediately after having his ears cleaned at his last visit.  He states he used the drops as prescribed although is still taking them, however there has been no pain or tenderness.  There are no nasal or throat or other otolaryngologic.  He believes his last audiogram was in 2003 and was told some hearing loss at that time. He again denies any family history of hearing loss except for his mother when she was elderly.  He is not aware of any history of problems the with his pregnancy or birth or early childhood infections or hospitalizations or head trauma.  As previously noted there is history of acoustic trauma in the form of loud music attending live music concerts periodically.            Review of Systems   Ears: Positive for hearing loss and family history of hearing loss.  Negative for ear pain, ear pressure, ringing in ear, ear discharge, ear infections, head trauma and taken gentramycin/streptomycin.    Nose:  Negative for nosebleeds, nasal obstruction, nasal or sinus surgery, loss of smell, postnasal drip and snoring.    Mouth/Throat: Negative for pain swallowing, impaired swallowing, hoarse voice, throat mass, neck mass, oral ulcers and neck lumps.   Constitutional: Negative for recent unexplained weight loss, fever, chills and night sweats.    Eyes:  Positive for history of glaucoma.   Cardiovascular:  Negative for chest pain, palpitations and history of high blood pressure.   Respiratory:  Negative for asthma, emphysema, history of tuberculosis, recent cough and shortness of breath.    Gastrointestinal:  Negative for history of stomach ulcers or pain, acid reflux, indigestion and blood in stool.   Other:  Positive for disturbances in coordination, depression and anxiety. Negative for  kidney problem, bladder problem, prostate disease, arthritis, slurred, swollen glands, anemia and persistent infections.           Objective:        Vitals:    11/08/18 1114   BP: 119/75   Pulse: 67   Temp: 97.8 °F (36.6 °C)     Body mass index is 28.34 kg/m².  Physical Exam   Constitutional: He is oriented to person, place, and time. He appears well-developed and well-nourished. No distress.   HENT:   Head: Normocephalic and atraumatic.   Right Ear: Tympanic membrane, external ear and ear canal normal.   Left Ear: Tympanic membrane, external ear and ear canal normal.   Nose: Septal deviation present. No mucosal edema or rhinorrhea. No epistaxis.   Mouth/Throat: Oropharynx is clear and moist and mucous membranes are normal. No oral lesions. No trismus in the jaw. No uvula swelling. No oropharyngeal exudate, posterior oropharyngeal edema or posterior oropharyngeal erythema.   Neck: Phonation normal. Neck supple. No tracheal deviation present. No thyromegaly present.   Pulmonary/Chest: Effort normal. No respiratory distress.   Lymphadenopathy:     He has no cervical adenopathy.   Neurological: He is alert and oriented to person, place, and time.   Skin: Skin is warm and dry.   Psychiatric: He has a normal mood and affect. His behavior is normal. His speech is not slurred.       Tests / Results:                 Assessment:       1. Bilateral sensorineural hearing loss    2. History of exposure to noise        Plan:       Reviewed above audiogram with patient and recommendations and answered questions.  Hearing aid consultation.  Hearing protection.  Follow up if change or problems and one year.    Ear care reviewed and okay to use OTC Debrox ear wax drops every few days.

## 2018-12-24 RX ORDER — ALPRAZOLAM 0.5 MG/1
TABLET ORAL
Qty: 30 TABLET | Refills: 0 | Status: SHIPPED | OUTPATIENT
Start: 2018-12-24 | End: 2019-03-27

## 2018-12-24 NOTE — TELEPHONE ENCOUNTER
Please remind pt that he needs visits every 6 months for controlled meds like Xanax. I will refill one time but he needs to make an appointment.

## 2018-12-26 RX ORDER — ALPRAZOLAM 0.5 MG/1
TABLET ORAL
Qty: 30 TABLET | Refills: 0 | Status: SHIPPED | OUTPATIENT
Start: 2018-12-26 | End: 2019-03-27

## 2018-12-31 NOTE — PROGRESS NOTES
Marcos Mckeon, CCC-A  Audiologist - [facility] [address] [place] [address]  [email or web address]  [phone]    Patient: Adam Dalal   MRN: [id number]  : 1960  GUZMAN: 2018      AUDIOLOGICAL EVALUATION      RECOMMENDATIONS:   It is recommended that he:  Follow up medically with a physician to assess asymmetrical hearing loss.  Receive binaural hearing aids to improve speech understanding.  Continue to receive audiological monitoring annually.  Use precaution and/or hearing protection in noisy environments.    If you should have any questions or concerns regarding the above information, please do not hesitate to contact me at [phone].      _______________________________  Marcos Mckeon, GLORIA-A  Audiologist

## 2019-01-30 ENCOUNTER — OFFICE VISIT (OUTPATIENT)
Dept: DERMATOLOGY | Facility: CLINIC | Age: 59
End: 2019-01-30
Payer: COMMERCIAL

## 2019-01-30 DIAGNOSIS — L82.1 SK (SEBORRHEIC KERATOSIS): ICD-10-CM

## 2019-01-30 DIAGNOSIS — L21.9 SEBORRHEIC DERMATITIS, UNSPECIFIED: ICD-10-CM

## 2019-01-30 DIAGNOSIS — D22.9 NEVUS: ICD-10-CM

## 2019-01-30 DIAGNOSIS — D18.00 ANGIOMA: ICD-10-CM

## 2019-01-30 DIAGNOSIS — Z86.69 HISTORY OF PARKINSON'S DISEASE: ICD-10-CM

## 2019-01-30 DIAGNOSIS — L57.0 AK (ACTINIC KERATOSIS): Primary | ICD-10-CM

## 2019-01-30 DIAGNOSIS — L81.4 LENTIGO: ICD-10-CM

## 2019-01-30 PROCEDURE — 99999 PR PBB SHADOW E&M-EST. PATIENT-LVL II: CPT | Mod: PBBFAC,,, | Performed by: DERMATOLOGY

## 2019-01-30 PROCEDURE — 17000 DESTRUCT PREMALG LESION: CPT | Mod: S$GLB,,, | Performed by: DERMATOLOGY

## 2019-01-30 PROCEDURE — 99202 OFFICE O/P NEW SF 15 MIN: CPT | Mod: 25,S$GLB,, | Performed by: DERMATOLOGY

## 2019-01-30 PROCEDURE — 99202 PR OFFICE/OUTPT VISIT, NEW, LEVL II, 15-29 MIN: ICD-10-PCS | Mod: 25,S$GLB,, | Performed by: DERMATOLOGY

## 2019-01-30 PROCEDURE — 17000 PR DESTRUCTION(LASER SURGERY,CRYOSURGERY,CHEMOSURGERY),PREMALIGNANT LESIONS,FIRST LESION: ICD-10-PCS | Mod: S$GLB,,, | Performed by: DERMATOLOGY

## 2019-01-30 PROCEDURE — 17003 DESTRUCTION, PREMALIGNANT LESIONS; SECOND THROUGH 14 LESIONS: ICD-10-PCS | Mod: S$GLB,,, | Performed by: DERMATOLOGY

## 2019-01-30 PROCEDURE — 99999 PR PBB SHADOW E&M-EST. PATIENT-LVL II: ICD-10-PCS | Mod: PBBFAC,,, | Performed by: DERMATOLOGY

## 2019-01-30 PROCEDURE — 17003 DESTRUCT PREMALG LES 2-14: CPT | Mod: S$GLB,,, | Performed by: DERMATOLOGY

## 2019-01-30 NOTE — PROGRESS NOTES
"  Subjective:       Patient ID:  Adam Dalal is a 58 y.o. male who presents for   Chief Complaint   Patient presents with    Skin Check    Lesion     Pt here today for a UBSE. Pt c/o dry and scaly spots on forehead x 1 year. No bleeding, pain or prev tx. Pt has hx of ak in the past but no skin cancer.     Patient is here today for a "mole" check.   Pt has a history of normal sun exposure in the past.   Pt recalls several blistering sunburns in the past- yes  Pt has history of tanning bed use- no  Pt has had moles removed in the past- no  Pt has history of melanoma in first degree relatives- no          Review of Systems   Skin: Positive for activity-related sunscreen use. Negative for daily sunscreen use, tendency to form keloidal scars and recent sunburn.   Hematologic/Lymphatic: Does not bruise/bleed easily.        Objective:    Physical Exam   Constitutional: He appears well-developed and well-nourished. No distress.   Neurological: He is alert and oriented to person, place, and time. He is not disoriented.   Psychiatric: He has a normal mood and affect.   Skin:   Areas Examined (abnormalities noted in diagram):   Scalp / Hair Palpated and Inspected  Head / Face Inspection Performed  Neck Inspection Performed  Chest / Axilla Inspection Performed  Abdomen Inspection Performed  Back Inspection Performed  RUE Inspected  LUE Inspection Performed                   Diagram Legend     Erythematous scaling macule/papule c/w actinic keratosis       Vascular papule c/w angioma      Pigmented verrucoid papule/plaque c/w seborrheic keratosis      Yellow umbilicated papule c/w sebaceous hyperplasia      Irregularly shaped tan macule c/w lentigo     1-2 mm smooth white papules consistent with Milia      Movable subcutaneous cyst with punctum c/w epidermal inclusion cyst      Subcutaneous movable cyst c/w pilar cyst      Firm pink to brown papule c/w dermatofibroma      Pedunculated fleshy papule(s) c/w skin tag(s) "      Evenly pigmented macule c/w junctional nevus     Mildly variegated pigmented, slightly irregular-bordered macule c/w mildly atypical nevus      Flesh colored to evenly pigmented papule c/w intradermal nevus       Pink pearly papule/plaque c/w basal cell carcinoma      Erythematous hyperkeratotic cursted plaque c/w SCC      Surgical scar with no sign of skin cancer recurrence      Open and closed comedones      Inflammatory papules and pustules      Verrucoid papule consistent consistent with wart     Erythematous eczematous patches and plaques     Dystrophic onycholytic nail with subungual debris c/w onychomycosis     Umbilicated papule    Erythematous-base heme-crusted tan verrucoid plaque consistent with inflamed seborrheic keratosis     Erythematous Silvery Scaling Plaque c/w Psoriasis     See annotation      Assessment / Plan:        AK (actinic keratosis)  Cryosurgery Procedure Note    Verbal consent from the patient is obtained including, but not limited to, risk of hypopigmentation/hyperpigmentation, scar, recurrence of lesion. The patient is aware of the precancerous quality and need for treatment of these lesions. Liquid nitrogen cryosurgery is applied to the 3 actinic keratoses, as detailed in the physical exam, to produce a freeze injury. The patient is aware that blisters may form and is instructed on wound care with gentle cleansing and use of vaseline ointment to keep moist until healed. The patient is supplied a handout on cryosurgery and is instructed to call if lesions do not completely resolve.    Seborrheic dermatitis, unspecified  OTC hydrocortisone cream to right lower cheek  CErave am and pm   OTC medicated shampoos    Angioma  These are benign vascular lesions that are inherited.  Treatment is not necessary.    SK (seborrheic keratosis)  These are benign inherited growths without a malignant potential. Reassurance given to patient. No treatment is necessary.     Lentigo  This is a benign  hyperpigmented sun induced lesion. Daily sun protection will reduce the number of new lesions. Treatment of these benign lesions are considered cosmetic.  The nature of sun-induced photo-aging and skin cancers is discussed.  Sun avoidance, protective clothing, and the use of 30-SPF sunscreens is advised. Observe closely for skin damage/changes, and call if such occurs.    Nevus  Discussed ABCDE's of nevi.  Monitor for new mole or moles that are becoming bigger, darker, irritated, or developing irregular borders. Brochure provided.    History of Parkinson's disease  Discussed increased risk of melanoma in this population  Discussed sun protection, need  For hat, monitoring of new or changing moles    Upper body skin examination performed today including at least 6 points as noted in physical examination. No lesions suspicious for malignancy noted.             Follow-up in about 6 months (around 7/30/2019). ensure resolution of ak

## 2019-03-11 ENCOUNTER — OFFICE VISIT (OUTPATIENT)
Dept: OPTOMETRY | Facility: CLINIC | Age: 59
End: 2019-03-11
Payer: COMMERCIAL

## 2019-03-11 DIAGNOSIS — H50.00 ESOTROPIA: Primary | ICD-10-CM

## 2019-03-11 PROCEDURE — 99999 PR PBB SHADOW E&M-EST. PATIENT-LVL II: ICD-10-PCS | Mod: PBBFAC,,, | Performed by: OPTOMETRIST

## 2019-03-11 PROCEDURE — 99999 PR PBB SHADOW E&M-EST. PATIENT-LVL II: CPT | Mod: PBBFAC,,, | Performed by: OPTOMETRIST

## 2019-03-11 PROCEDURE — 92015 PR REFRACTION: ICD-10-PCS | Mod: S$GLB,,, | Performed by: OPTOMETRIST

## 2019-03-11 PROCEDURE — 92015 DETERMINE REFRACTIVE STATE: CPT | Mod: S$GLB,,, | Performed by: OPTOMETRIST

## 2019-03-11 PROCEDURE — 92014 COMPRE OPH EXAM EST PT 1/>: CPT | Mod: S$GLB,,, | Performed by: OPTOMETRIST

## 2019-03-11 PROCEDURE — 92014 PR EYE EXAM, EST PATIENT,COMPREHESV: ICD-10-PCS | Mod: S$GLB,,, | Performed by: OPTOMETRIST

## 2019-03-11 NOTE — PROGRESS NOTES
HPI     S/P PC IOL 01/2017 Dr. Marin.  DLS  Dr. Villegas  10/13/2016  Patient states reading books and using comupter has been great since   surgery.  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  strab hx    Last edited by Vikas Villegas, OD on 3/11/2019  2:55 PM. (History)        ROS     Positive for: Eyes (strabismus/cat surgeery)    Negative for: Constitutional, Gastrointestinal, Neurological, Skin,   Genitourinary, Musculoskeletal, HENT, Endocrine, Cardiovascular,   Respiratory, Psychiatric, Allergic/Imm, Heme/Lymph    Last edited by Vikas Villegas, OD on 3/11/2019  2:55 PM. (History)        Assessment /Plan     For exam results, see Encounter Report.    Esotropia        1. AET (OD>>OS) sp EOM surgery. Discussed surgery (pt reports some depth perception problems) but he wishes to wait  2. OHT Hx.   iop flux 20-25 without meds prior to cat surgery.  CD wnl--see OCT.  Last VF  wnl.  -fam hx. pach: 579/601. Doubt glauc now--will monitor   3. Sp MFIOL/YAG OU.  Pt reports some distance problems, and glare at night.  Wrote spex Rx and advised CRIZAL for glare    PLAN:    rtc 1 yr

## 2019-03-27 ENCOUNTER — OFFICE VISIT (OUTPATIENT)
Dept: INTERNAL MEDICINE | Facility: CLINIC | Age: 59
End: 2019-03-27
Payer: COMMERCIAL

## 2019-03-27 VITALS
WEIGHT: 217.31 LBS | SYSTOLIC BLOOD PRESSURE: 110 MMHG | BODY MASS INDEX: 28.67 KG/M2 | HEART RATE: 73 BPM | OXYGEN SATURATION: 98 % | DIASTOLIC BLOOD PRESSURE: 72 MMHG

## 2019-03-27 DIAGNOSIS — Z12.5 SCREENING FOR PROSTATE CANCER: ICD-10-CM

## 2019-03-27 DIAGNOSIS — F41.9 ANXIETY: ICD-10-CM

## 2019-03-27 DIAGNOSIS — Z00.00 ROUTINE PHYSICAL EXAMINATION: ICD-10-CM

## 2019-03-27 DIAGNOSIS — E78.5 HYPERLIPIDEMIA, UNSPECIFIED HYPERLIPIDEMIA TYPE: ICD-10-CM

## 2019-03-27 DIAGNOSIS — G20.A1 PARKINSON'S DISEASE: Primary | ICD-10-CM

## 2019-03-27 PROCEDURE — 99396 PREV VISIT EST AGE 40-64: CPT | Mod: S$GLB,,, | Performed by: INTERNAL MEDICINE

## 2019-03-27 PROCEDURE — 3008F BODY MASS INDEX DOCD: CPT | Mod: CPTII,S$GLB,, | Performed by: INTERNAL MEDICINE

## 2019-03-27 PROCEDURE — 99396 PR PREVENTIVE VISIT,EST,40-64: ICD-10-PCS | Mod: S$GLB,,, | Performed by: INTERNAL MEDICINE

## 2019-03-27 PROCEDURE — 99999 PR PBB SHADOW E&M-EST. PATIENT-LVL III: ICD-10-PCS | Mod: PBBFAC,,, | Performed by: INTERNAL MEDICINE

## 2019-03-27 PROCEDURE — 3008F PR BODY MASS INDEX (BMI) DOCUMENTED: ICD-10-PCS | Mod: CPTII,S$GLB,, | Performed by: INTERNAL MEDICINE

## 2019-03-27 PROCEDURE — 99999 PR PBB SHADOW E&M-EST. PATIENT-LVL III: CPT | Mod: PBBFAC,,, | Performed by: INTERNAL MEDICINE

## 2019-03-27 RX ORDER — ALPRAZOLAM 0.5 MG/1
TABLET ORAL
Qty: 30 TABLET | Refills: 3 | Status: SHIPPED | OUTPATIENT
Start: 2019-03-27 | End: 2019-10-09 | Stop reason: SDUPTHER

## 2019-03-27 NOTE — PROGRESS NOTES
Subjective:       Patient ID: Adam Dalal is a 58 y.o. male.    Chief Complaint: Follow-up    HPI patient comes in follow-up parkinsonism dyslipidemia and anxiety.  Says he is doing well, taking his medicine.  He is hoping to change to a new neurologist closer to home and work.  He will let me know the name soon.  He left the information at home but knows it is at or near our [facility].  He is taking the alprazolam infrequently but I will refill that prescription.  He denies any chest pain shortness of breath fevers or chills.  Constipation is mild but he had a colonoscopy.  Tremors maybe slightly worse.    Review of Systems   Constitutional: Negative for activity change, chills, fatigue, fever and unexpected weight change.   HENT: Negative for nosebleeds and trouble swallowing.    Eyes: Negative for pain, discharge and visual disturbance.   Respiratory: Negative for cough, shortness of breath and wheezing.    Cardiovascular: Negative for chest pain and palpitations.   Gastrointestinal: Positive for constipation. Negative for abdominal pain, diarrhea, nausea and vomiting.   Genitourinary: Negative for difficulty urinating and hematuria.   Musculoskeletal: Negative for neck pain.   Skin: Negative for rash.   Neurological: Positive for tremors. Negative for dizziness and headaches.   Hematological: Does not bruise/bleed easily.   Psychiatric/Behavioral: Negative for dysphoric mood, sleep disturbance and suicidal ideas.       Objective:      Physical Exam   Constitutional: He is oriented to person, place, and time. He appears well-developed and well-nourished. No distress.   HENT:   Head: Normocephalic and atraumatic.   Mouth/Throat: Oropharynx is clear and moist. No oropharyngeal exudate.   TM's clear, pharynx clear   Eyes: Pupils are equal, round, and reactive to light. Conjunctivae and EOM are normal. No scleral icterus.   Neck: Normal range of motion. Neck supple. No thyromegaly present.   No  supraclavicular nodes palpated   Cardiovascular: Normal rate, regular rhythm and normal heart sounds.   No murmur heard.  Pulmonary/Chest: Effort normal and breath sounds normal. He has no wheezes.   Abdominal: Soft. Bowel sounds are normal. He exhibits no mass. There is no tenderness.   Musculoskeletal: He exhibits no edema.   Lymphadenopathy:     He has no cervical adenopathy.   Neurological: He is alert and oriented to person, place, and time.   Slight hand tremors, Parkinson's face expression   Skin: No rash noted. No erythema. No pallor.   Psychiatric: He has a normal mood and affect. His behavior is normal.       Assessment:       1. Parkinson's disease    2. Anxiety    3. Hyperlipidemia, unspecified hyperlipidemia type    4. Screening for prostate cancer    5. Routine physical examination        Plan:       Adam was seen today for follow-up.    Diagnoses and all orders for this visit:    Parkinson's disease  Comments:  Continue to monitor. Continue medication.     Anxiety    Hyperlipidemia, unspecified hyperlipidemia type    Screening for prostate cancer  -     PSA, Screening; Future    Routine physical examination  -     PSA, Screening; Future  -     CBC auto differential; Future  -     Cholesterol, total; Future  -     Hemoglobin A1c; Future  -     Lipid panel; Future    Other orders  -     ALPRAZolam (XANAX) 0.5 MG tablet; TAKE 1 TABLET(0.5 MG) BY MOUTH EVERY NIGHT        labs next visit

## 2019-03-28 ENCOUNTER — PATIENT MESSAGE (OUTPATIENT)
Dept: INTERNAL MEDICINE | Facility: CLINIC | Age: 59
End: 2019-03-28

## 2019-03-29 ENCOUNTER — TELEPHONE (OUTPATIENT)
Dept: NEUROLOGY | Facility: CLINIC | Age: 59
End: 2019-03-29

## 2019-03-29 NOTE — TELEPHONE ENCOUNTER
----- Message from Chanel Molina sent at 3/29/2019  3:10 PM CDT -----  Contact: self @[phone]  Calling to schedule a NP appt with Dr Babcock for Parkinson's but there is nothing available.  Pls call pt with an appt.

## 2019-04-11 ENCOUNTER — TELEPHONE (OUTPATIENT)
Dept: NEUROLOGY | Facility: CLINIC | Age: 59
End: 2019-04-11

## 2019-04-12 ENCOUNTER — TELEPHONE (OUTPATIENT)
Dept: NEUROLOGY | Facility: CLINIC | Age: 59
End: 2019-04-12

## 2019-04-12 NOTE — TELEPHONE ENCOUNTER
----- Message from Mikayla Mayorga sent at 4/12/2019  1:20 PM CDT -----  Contact: Self/ [phone]  Patient would like a call back to make a new patient appt to see the doctor for parkinson.

## 2019-04-12 NOTE — TELEPHONE ENCOUNTER
----- Message from Amol Ugalde sent at 4/11/2019 11:37 AM CDT -----  Contact: Patient @ [phone]  Patient calling to schedule a NP appt to consult for Parkinson's, pls call

## 2019-05-29 ENCOUNTER — OFFICE VISIT (OUTPATIENT)
Dept: NEUROLOGY | Facility: CLINIC | Age: 59
End: 2019-05-29
Payer: COMMERCIAL

## 2019-05-29 VITALS
HEIGHT: 73 IN | WEIGHT: 216.94 LBS | SYSTOLIC BLOOD PRESSURE: 135 MMHG | BODY MASS INDEX: 28.75 KG/M2 | DIASTOLIC BLOOD PRESSURE: 81 MMHG | HEART RATE: 75 BPM

## 2019-05-29 DIAGNOSIS — G25.9 MOVEMENT DISORDER: Primary | ICD-10-CM

## 2019-05-29 PROCEDURE — 99214 OFFICE O/P EST MOD 30 MIN: CPT | Mod: S$GLB,,, | Performed by: PSYCHIATRY & NEUROLOGY

## 2019-05-29 PROCEDURE — 99214 PR OFFICE/OUTPT VISIT, EST, LEVL IV, 30-39 MIN: ICD-10-PCS | Mod: S$GLB,,, | Performed by: PSYCHIATRY & NEUROLOGY

## 2019-05-29 PROCEDURE — 99999 PR PBB SHADOW E&M-EST. PATIENT-LVL III: ICD-10-PCS | Mod: PBBFAC,,, | Performed by: PSYCHIATRY & NEUROLOGY

## 2019-05-29 PROCEDURE — 3008F BODY MASS INDEX DOCD: CPT | Mod: CPTII,S$GLB,, | Performed by: PSYCHIATRY & NEUROLOGY

## 2019-05-29 PROCEDURE — 3008F PR BODY MASS INDEX (BMI) DOCUMENTED: ICD-10-PCS | Mod: CPTII,S$GLB,, | Performed by: PSYCHIATRY & NEUROLOGY

## 2019-05-29 PROCEDURE — 99999 PR PBB SHADOW E&M-EST. PATIENT-LVL III: CPT | Mod: PBBFAC,,, | Performed by: PSYCHIATRY & NEUROLOGY

## 2019-05-29 RX ORDER — RASAGILINE 1 MG/1
1 TABLET ORAL DAILY
Qty: 30 TABLET | Refills: 11 | Status: SHIPPED | OUTPATIENT
Start: 2019-05-29 | End: 2020-05-30

## 2019-05-29 NOTE — PROGRESS NOTES
"Name: Adam Dalal  MRN: [id number]   CSN: [id number]      Date: 05/29/2019    Referring physician:  Aaareferral Self  No address on file    Chief Complaint / Interval History: No chief complaint on file.      History of Present Illness (HPI):  59 yo here for evaluation for his presumed PD.  Progressed with slowness and stiffness about 4 years ago.  Tried to blame on aging at the time.  Generally was slower getting out of couches.  Was shuffling and unable to keep up.  Would drag the R foot.  BEhind and above the R knee tends to get stiffer.  Now more progressive slowness and trouble with dressing.  Still driving a car, more issues with backing up and trouble seeing around while driving.      Wife Berenice is here.  She notes that he "feels like he is failing    Works at the [employer] on [employer].    Nonmotor/Premotor ROS:  Hyposmia (HENT)?Yes - a little diminished  RBD/sleep issues (Constitutional)?No, used to snore more   Depression/anxiety (Psychiatric)?Yes - especially at night  Fatigue (Constitutional)?Yes  Constipation (GI)?No  Urinary issues ()?Yes - urgency  Sexual dysfunction ()?Yes - mild ED  Orthostasis (Cardiovascular)?No  Leg swelling (Cardiovascular)? 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    Social History: The patient  reports that he quit smoking about 13 years ago. He has a 1.00 pack-year smoking history. He has never used smokeless tobacco. He reports that he drinks about 12.0 oz of alcohol per week. He reports that he does not use drugs.    Family History: Their family " "history includes Arthritis in his brother and mother; Cataracts in his maternal grandmother and paternal grandmother; Hearing loss in his mother; Sleep apnea in his brother.    Allergies: Patient has no known allergies.     Meds:   Current Outpatient Medications on File Prior to Visit   Medication Sig Dispense Refill    ALPRAZolam (XANAX) 0.5 MG tablet TAKE 1 TABLET(0.5 MG) BY MOUTH EVERY NIGHT 30 tablet 3    carbidopa-levodopa  mg (SINEMET)  mg per tablet 0.5 tab morning, 1 tab mid day, and 0.5 tab evening (Patient taking differently: 1/4 tablet in morning, 1/4 tablet mid day, 1/4 tablet afternoon, and 1/4 tablet before bed time) 180 tablet 3    pravastatin (PRAVACHOL) 80 MG tablet TAKE 1 TABLET(80 MG) BY MOUTH EVERY MORNING 90 tablet 6     No current facility-administered medications on file prior to visit.        Exam:  /81   Pulse 75   Ht 6' 1" (1.854 m)   Wt 98.4 kg (216 lb 14.9 oz)   BMI 28.62 kg/m²     Constitutional  Well-developed, well-nourished, appears stated age   Ophthalmoscopic  No papilledema with no hemorrhages or exudates bilaterally   Cardiovascular  Radial pulses 2+ and symmetric, no LE edema bilaterally   Neurological    * Mental status  MOCA =      - Orientation  Oriented to person, place, time, and situation     - Memory   Intact recent and remote     - Attention/concentration  Attentive, vigilant during exam     - Language  Naming & repetition intact, +2-step commands     - Fund of knowledge  Aware of current events     - Executive  Well-organized thoughts     - Other     * Cranial nerves       - CN II  PERRL, visual fields full to confrontation     - CN III, IV, VI  Extraocular movements full, normal pursuits and saccades     - CN V  Sensation V1 - V3 intact     - CN VII  Face strong and symmetric bilaterally     - CN VIII  Hearing intact bilaterally     - CN IX, X  Palate raises midline and symmetric     - CN XI  SCM and trapezius 5/5 bilaterally     - CN XII  " Tongue midline   * Motor  Muscle bulk normal, strength 5/5 throughout   * Sensory   Intact to temperature and vibration throughout   * Coordination  No dysmetria with finger-to-nose or heel-to-shin   * Gait  See below.   * Deep tendon reflexes  2+ and symmetric throughout   Babinski downgoing bilaterally   * Specialized movement exam    R>L mild-mod symptoms, akinetic rigid      Medical Record Review:  - Lab Results:  No visits with results within 3 Month(s) from this visit.   Latest known visit with results is:   Lab Visit on 06/25/2018   Component Date Value Ref Range Status    Cholesterol 06/25/2018 173  120 - 199 mg/dL Final    Triglycerides 06/25/2018 86  30 - 150 mg/dL Final    HDL 06/25/2018 59  40 - 75 mg/dL Final    LDL Cholesterol 06/25/2018 96.8  63.0 - 159.0 mg/dL Final    Hdl/Cholesterol Ratio 06/25/2018 34.1  20.0 - 50.0 % Final    Total Cholesterol/HDL Ratio 06/25/2018 2.9  2.0 - 5.0 Final    Non-HDL Cholesterol 06/25/2018 114  mg/dL Final    PSA, SCREEN 06/25/2018 0.32  0.00 - 4.00 ng/mL Final    Sodium 06/25/2018 138  136 - 145 mmol/L Final    Potassium 06/25/2018 4.3  3.5 - 5.1 mmol/L Final    Chloride 06/25/2018 103  95 - 110 mmol/L Final    CO2 06/25/2018 27  23 - 29 mmol/L Final    Glucose 06/25/2018 114* 70 - 110 mg/dL Final    BUN, Bld 06/25/2018 13  6 - 20 mg/dL Final    Creatinine 06/25/2018 0.9  0.5 - 1.4 mg/dL Final    Calcium 06/25/2018 8.9  8.7 - 10.5 mg/dL Final    Total Protein 06/25/2018 7.3  6.0 - 8.4 g/dL Final    Albumin 06/25/2018 4.2  3.5 - 5.2 g/dL Final    Total Bilirubin 06/25/2018 0.9  0.1 - 1.0 mg/dL Final    Alkaline Phosphatase 06/25/2018 54* 55 - 135 U/L Final    AST 06/25/2018 24  10 - 40 U/L Final    ALT 06/25/2018 12  10 - 44 U/L Final    Anion Gap 06/25/2018 8  8 - 16 mmol/L Final    eGFR if African American 06/25/2018 >60  >60 mL/min/1.73 m^2 Final    eGFR if non African American 06/25/2018 >60  >60 mL/min/1.73 m^2 Final    WBC 06/25/2018  6.93  3.90 - 12.70 K/uL Final    RBC 06/25/2018 4.67  4.60 - 6.20 M/uL Final    Hemoglobin 06/25/2018 14.8  14.0 - 18.0 g/dL Final    Hematocrit 06/25/2018 42.6  40.0 - 54.0 % Final    Mean Corpuscular Volume 06/25/2018 91  82 - 98 fL Final    Mean Corpuscular Hemoglobin 06/25/2018 31.7* 27.0 - 31.0 pg Final    Mean Corpuscular Hemoglobin Conc 06/25/2018 34.7  32.0 - 36.0 g/dL Final    RDW 06/25/2018 13.4  11.5 - 14.5 % Final    Platelets 06/25/2018 165  150 - 350 K/uL Final    MPV 06/25/2018 11.7  9.2 - 12.9 fL Final    Gran # (ANC) 06/25/2018 4.9  1.8 - 7.7 K/uL Final    Lymph # 06/25/2018 1.3  1.0 - 4.8 K/uL Final    Mono # 06/25/2018 0.7  0.3 - 1.0 K/uL Final    Eos # 06/25/2018 0.1  0.0 - 0.5 K/uL Final    Baso # 06/25/2018 0.05  0.00 - 0.20 K/uL Final    Gran% 06/25/2018 70.0  38.0 - 73.0 % Final    Lymph% 06/25/2018 18.0  18.0 - 48.0 % Final    Mono% 06/25/2018 10.0  4.0 - 15.0 % Final    Eosinophil% 06/25/2018 1.0  0.0 - 8.0 % Final    Basophil% 06/25/2018 0.7  0.0 - 1.9 % Final    Differential Method 06/25/2018 Automated   Final    Hemoglobin A1C 06/25/2018 5.4  4.0 - 5.6 % Final    Estimated Avg Glucose 06/25/2018 108  68 - 131 mg/dL Final       Diagnoses:           pd    Medical Decision Making:  - cd/ld  exercie  Medi diet  - no falls!    Tyree Babcock MD, MPH  Division of Movement and Memory Disorders  [facility]  [phone]

## 2019-05-31 ENCOUNTER — PATIENT MESSAGE (OUTPATIENT)
Dept: NEUROLOGY | Facility: CLINIC | Age: 59
End: 2019-05-31

## 2019-05-31 RX ORDER — CARBIDOPA AND LEVODOPA 25; 100 MG/1; MG/1
TABLET ORAL
Qty: 180 TABLET | Refills: 0 | Status: SHIPPED | OUTPATIENT
Start: 2019-05-31 | End: 2019-12-23

## 2019-05-31 RX ORDER — CARBIDOPA AND LEVODOPA 25; 100 MG/1; MG/1
TABLET ORAL
Qty: 180 TABLET | Refills: 3 | OUTPATIENT
Start: 2019-05-31

## 2019-08-05 RX ORDER — PRAVASTATIN SODIUM 80 MG/1
TABLET ORAL
Qty: 90 TABLET | Refills: 0 | Status: SHIPPED | OUTPATIENT
Start: 2019-08-05 | End: 2019-10-28 | Stop reason: SDUPTHER

## 2019-09-03 ENCOUNTER — OFFICE VISIT (OUTPATIENT)
Dept: NEUROLOGY | Facility: CLINIC | Age: 59
End: 2019-09-03
Payer: COMMERCIAL

## 2019-09-03 VITALS
HEART RATE: 84 BPM | BODY MASS INDEX: 29.65 KG/M2 | SYSTOLIC BLOOD PRESSURE: 120 MMHG | HEIGHT: 73 IN | DIASTOLIC BLOOD PRESSURE: 78 MMHG | WEIGHT: 223.75 LBS

## 2019-09-03 DIAGNOSIS — G20.A1 PARKINSON DISEASE: Primary | ICD-10-CM

## 2019-09-03 PROCEDURE — 99999 PR PBB SHADOW E&M-EST. PATIENT-LVL III: CPT | Mod: PBBFAC,,, | Performed by: PSYCHIATRY & NEUROLOGY

## 2019-09-03 PROCEDURE — 3008F BODY MASS INDEX DOCD: CPT | Mod: CPTII,S$GLB,, | Performed by: PSYCHIATRY & NEUROLOGY

## 2019-09-03 PROCEDURE — 99215 PR OFFICE/OUTPT VISIT, EST, LEVL V, 40-54 MIN: ICD-10-PCS | Mod: S$GLB,,, | Performed by: PSYCHIATRY & NEUROLOGY

## 2019-09-03 PROCEDURE — 3008F PR BODY MASS INDEX (BMI) DOCUMENTED: ICD-10-PCS | Mod: CPTII,S$GLB,, | Performed by: PSYCHIATRY & NEUROLOGY

## 2019-09-03 PROCEDURE — 99215 OFFICE O/P EST HI 40 MIN: CPT | Mod: S$GLB,,, | Performed by: PSYCHIATRY & NEUROLOGY

## 2019-09-03 PROCEDURE — 99999 PR PBB SHADOW E&M-EST. PATIENT-LVL III: ICD-10-PCS | Mod: PBBFAC,,, | Performed by: PSYCHIATRY & NEUROLOGY

## 2019-09-03 NOTE — PROGRESS NOTES
"Name: Adam Dalal  MRN: [id number]   CSN: [id number]      Date: 09/03/2019    Chief Complaint / Interval History:   - last seen 4 months ago  - good vacation in [place]  - no falls!  - still taking Azilect and cd/ld - sometimes an extra dose at night - almost never more than total of three tabs per day  - has not tried taking more  - feeling more normal  - would consider higher dose medication  - would consdier MM  - interested in research trials      History of Present Illness (HPI):  57 yo here for evaluation for his presumed PD.  Progressed with slowness and stiffness about 4 years ago.  Tried to blame on aging at the time.  Generally was slower getting out of couches.  Was shuffling and unable to keep up.  Would drag the R foot.  BEhind and above the R knee tends to get stiffer.  Now more progressive slowness and trouble with dressing.  Still driving a car, more issues with backing up and trouble seeing around while driving.      Wife Berenice is here.  She notes that he "feels like he is failing    Works at the [employer] on [address].    Nonmotor/Premotor ROS:  Hyposmia (HENT)?Yes - a little diminished  RBD/sleep issues (Constitutional)?No, used to snore more   Depression/anxiety (Psychiatric)?Yes - especially at night  Fatigue (Constitutional)?Yes  Constipation (GI)?No  Urinary issues ()?Yes - urgency  Sexual dysfunction ()?Yes - mild ED  Orthostasis (Cardiovascular)?No  Leg swelling (Cardiovascular)? 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    Social History: The patient  reports " "that he quit smoking about 13 years ago. He has a 1.00 pack-year smoking history. He has never used smokeless tobacco. He reports that he drinks about 12.0 oz of alcohol per week. He reports that he does not use drugs.    Family History: Their family history includes Arthritis in his brother and mother; Cataracts in his maternal grandmother and paternal grandmother; Hearing loss in his mother; Sleep apnea in his brother.    Allergies: Patient has no known allergies.     Meds:   Current Outpatient Medications on File Prior to Visit   Medication Sig Dispense Refill    ALPRAZolam (XANAX) 0.5 MG tablet TAKE 1 TABLET(0.5 MG) BY MOUTH EVERY NIGHT 30 tablet 3    carbidopa-levodopa  mg (SINEMET)  mg per tablet TAKE 1/2 TABLET BY MOUTH IN THE MORNING, 1 TABLET MID DAY, AND 1/2 TABLET EVERY EVENING 180 tablet 0    pravastatin (PRAVACHOL) 80 MG tablet TAKE 1 TABLET(80 MG) BY MOUTH EVERY MORNING 90 tablet 0    rasagiline (AZILECT) 1 mg Tab Take 1 tablet (1 mg total) by mouth once daily. 30 tablet 11     No current facility-administered medications on file prior to visit.        Exam:  /78 (BP Location: Right arm, Patient Position: Sitting)   Pulse 84   Ht 6' 1" (1.854 m)   Wt 101.5 kg (223 lb 12.3 oz)   BMI 29.52 kg/m²     Constitutional  Well-developed, well-nourished, appears stated age   Ophthalmoscopic  No papilledema with no hemorrhages or exudates bilaterally   Cardiovascular  Radial pulses 2+ and symmetric, no LE edema bilaterally   Neurological    * Mental status  MOCA =      - Orientation  Oriented to person, place, time, and situation     - Memory   Intact recent and remote     - Attention/concentration  Attentive, vigilant during exam     - Language  Naming & repetition intact, +2-step commands     - Fund of knowledge  Aware of current events     - Executive  Well-organized thoughts     - Other     * Cranial nerves       - CN II  PERRL, visual fields full to confrontation     - CN III, IV, " VI  Extraocular movements full but some drift of the R eye out (history of strabismus eye surgery), normal pursuits and saccades     - CN V  Sensation V1 - V3 intact     - CN VII  Face strong and symmetric bilaterally     - CN VIII  Hearing intact bilaterally     - CN IX, X  Palate raises midline and symmetric     - CN XI  SCM and trapezius 5/5 bilaterally     - CN XII  Tongue midline   * Motor  Muscle bulk normal, strength 5/5 throughout   * Sensory   Intact to temperature and vibration throughout   * Coordination  No dysmetria with finger-to-nose or heel-to-shin   * Gait  See below.   * Deep tendon reflexes  2+ and symmetric throughout   Babinski downgoing bilaterally   * Specialized movement exam    R>L mild-mod symptoms, akinetic rigid     Medical Record Review:  - Lab Results:  No visits with results within 3 Month(s) from this visit.   Latest known visit with results is:   Lab Visit on 06/25/2018   Component Date Value Ref Range Status    Cholesterol 06/25/2018 173  120 - 199 mg/dL Final    Triglycerides 06/25/2018 86  30 - 150 mg/dL Final    HDL 06/25/2018 59  40 - 75 mg/dL Final    LDL Cholesterol 06/25/2018 96.8  63.0 - 159.0 mg/dL Final    Hdl/Cholesterol Ratio 06/25/2018 34.1  20.0 - 50.0 % Final    Total Cholesterol/HDL Ratio 06/25/2018 2.9  2.0 - 5.0 Final    Non-HDL Cholesterol 06/25/2018 114  mg/dL Final    PSA, SCREEN 06/25/2018 0.32  0.00 - 4.00 ng/mL Final    Sodium 06/25/2018 138  136 - 145 mmol/L Final    Potassium 06/25/2018 4.3  3.5 - 5.1 mmol/L Final    Chloride 06/25/2018 103  95 - 110 mmol/L Final    CO2 06/25/2018 27  23 - 29 mmol/L Final    Glucose 06/25/2018 114* 70 - 110 mg/dL Final    BUN, Bld 06/25/2018 13  6 - 20 mg/dL Final    Creatinine 06/25/2018 0.9  0.5 - 1.4 mg/dL Final    Calcium 06/25/2018 8.9  8.7 - 10.5 mg/dL Final    Total Protein 06/25/2018 7.3  6.0 - 8.4 g/dL Final    Albumin 06/25/2018 4.2  3.5 - 5.2 g/dL Final    Total Bilirubin 06/25/2018 0.9  0.1 -  1.0 mg/dL Final    Alkaline Phosphatase 2018 54* 55 - 135 U/L Final    AST 2018 24  10 - 40 U/L Final    ALT 2018 12  10 - 44 U/L Final    Anion Gap 2018 8  8 - 16 mmol/L Final    eGFR if African American 2018 >60  >60 mL/min/1.73 m^2 Final    eGFR if non African American 2018 >60  >60 mL/min/1.73 m^2 Final    WBC 2018 6.93  3.90 - 12.70 K/uL Final    RBC 2018 4.67  4.60 - 6.20 M/uL Final    Hemoglobin 2018 14.8  14.0 - 18.0 g/dL Final    Hematocrit 2018 42.6  40.0 - 54.0 % Final    Mean Corpuscular Volume 2018 91  82 - 98 fL Final    Mean Corpuscular Hemoglobin 2018 31.7* 27.0 - 31.0 pg Final    Mean Corpuscular Hemoglobin Conc 2018 34.7  32.0 - 36.0 g/dL Final    RDW 2018 13.4  11.5 - 14.5 % Final    Platelets 2018 165  150 - 350 K/uL Final    MPV 2018 11.7  9.2 - 12.9 fL Final    Gran # (ANC) 2018 4.9  1.8 - 7.7 K/uL Final    Lymph # 2018 1.3  1.0 - 4.8 K/uL Final    Mono # 2018 0.7  0.3 - 1.0 K/uL Final    Eos # 2018 0.1  0.0 - 0.5 K/uL Final    Baso # 2018 0.05  0.00 - 0.20 K/uL Final    Gran% 2018 70.0  38.0 - 73.0 % Final    Lymph% 2018 18.0  18.0 - 48.0 % Final    Mono% 2018 10.0  4.0 - 15.0 % Final    Eosinophil% 2018 1.0  0.0 - 8.0 % Final    Basophil% 2018 0.7  0.0 - 1.9 % Final    Differential Method 2018 Automated   Final    Hemoglobin A1C 2018 5.4  4.0 - 5.6 % Final    Estimated Avg Glucose 2018 108  68 - 131 mg/dL Final       Diagnoses:          PD, akinetic-rigid.  Eye movements are abnml from NM perspective - old strabismus - but watching closely.  NO clear slowed pursuits or saccades, but some intrusive jerks - might all still be from longstanding changes/surgeries.    Medical Decision Makin) cd/ld divided 200-300 per day now,   2) continue azilect 1 mg now  3) discussed MM  4) PT/OT --> LSVT  BIg/LOUD now  5) MEdi diet  6) Dona in 3-4 months        Tyree Babcock MD, MPH  Division of Movement and Memory Disorders  [facility]  [phone]

## 2019-10-09 ENCOUNTER — PATIENT MESSAGE (OUTPATIENT)
Dept: INTERNAL MEDICINE | Facility: CLINIC | Age: 59
End: 2019-10-09

## 2019-10-09 ENCOUNTER — TELEPHONE (OUTPATIENT)
Dept: INTERNAL MEDICINE | Facility: CLINIC | Age: 59
End: 2019-10-09

## 2019-10-09 RX ORDER — ALPRAZOLAM 0.5 MG/1
TABLET ORAL
Qty: 30 TABLET | Refills: 0 | Status: SHIPPED | OUTPATIENT
Start: 2019-10-09 | End: 2019-10-28 | Stop reason: SDUPTHER

## 2019-10-24 ENCOUNTER — PATIENT MESSAGE (OUTPATIENT)
Dept: INTERNAL MEDICINE | Facility: CLINIC | Age: 59
End: 2019-10-24

## 2019-10-28 ENCOUNTER — OFFICE VISIT (OUTPATIENT)
Dept: INTERNAL MEDICINE | Facility: CLINIC | Age: 59
End: 2019-10-28
Payer: COMMERCIAL

## 2019-10-28 ENCOUNTER — LAB VISIT (OUTPATIENT)
Dept: LAB | Facility: HOSPITAL | Age: 59
End: 2019-10-28
Attending: INTERNAL MEDICINE
Payer: COMMERCIAL

## 2019-10-28 ENCOUNTER — PATIENT MESSAGE (OUTPATIENT)
Dept: INTERNAL MEDICINE | Facility: CLINIC | Age: 59
End: 2019-10-28

## 2019-10-28 VITALS
WEIGHT: 225.31 LBS | OXYGEN SATURATION: 99 % | BODY MASS INDEX: 29.86 KG/M2 | HEART RATE: 66 BPM | SYSTOLIC BLOOD PRESSURE: 118 MMHG | DIASTOLIC BLOOD PRESSURE: 82 MMHG | HEIGHT: 73 IN

## 2019-10-28 DIAGNOSIS — K13.70 LESION OF BUCCAL MUCOSA: ICD-10-CM

## 2019-10-28 DIAGNOSIS — G20.A1 PARKINSON'S DISEASE: ICD-10-CM

## 2019-10-28 DIAGNOSIS — Z12.5 SCREENING FOR PROSTATE CANCER: ICD-10-CM

## 2019-10-28 DIAGNOSIS — Z74.09 IMPAIRED FUNCTIONAL MOBILITY, BALANCE, GAIT, AND ENDURANCE: ICD-10-CM

## 2019-10-28 DIAGNOSIS — E78.5 HYPERLIPIDEMIA, UNSPECIFIED HYPERLIPIDEMIA TYPE: ICD-10-CM

## 2019-10-28 DIAGNOSIS — Z00.00 ROUTINE PHYSICAL EXAMINATION: Primary | ICD-10-CM

## 2019-10-28 DIAGNOSIS — K13.70 ORAL LESION: ICD-10-CM

## 2019-10-28 DIAGNOSIS — R49.9 VOICE AND RESONANCE DISORDER: ICD-10-CM

## 2019-10-28 DIAGNOSIS — L98.9 SKIN LESION OF LEFT ARM: ICD-10-CM

## 2019-10-28 DIAGNOSIS — Z00.00 ROUTINE PHYSICAL EXAMINATION: ICD-10-CM

## 2019-10-28 LAB
BASOPHILS # BLD AUTO: 0.08 K/UL (ref 0–0.2)
BASOPHILS NFR BLD: 1 % (ref 0–1.9)
CHOLEST SERPL-MCNC: 204 MG/DL (ref 120–199)
CHOLEST SERPL-MCNC: 204 MG/DL (ref 120–199)
CHOLEST/HDLC SERPL: 3.1 {RATIO} (ref 2–5)
COMPLEXED PSA SERPL-MCNC: 0.44 NG/ML (ref 0–4)
DIFFERENTIAL METHOD: ABNORMAL
EOSINOPHIL # BLD AUTO: 0.2 K/UL (ref 0–0.5)
EOSINOPHIL NFR BLD: 2 % (ref 0–8)
ERYTHROCYTE [DISTWIDTH] IN BLOOD BY AUTOMATED COUNT: 13.7 % (ref 11.5–14.5)
ESTIMATED AVG GLUCOSE: 117 MG/DL (ref 68–131)
HBA1C MFR BLD HPLC: 5.7 % (ref 4–5.6)
HCT VFR BLD AUTO: 43.3 % (ref 40–54)
HDLC SERPL-MCNC: 66 MG/DL (ref 40–75)
HDLC SERPL: 32.4 % (ref 20–50)
HGB BLD-MCNC: 15.1 G/DL (ref 14–18)
LDLC SERPL CALC-MCNC: 127 MG/DL (ref 63–159)
LYMPHOCYTES # BLD AUTO: 1.9 K/UL (ref 1–4.8)
LYMPHOCYTES NFR BLD: 23.5 % (ref 18–48)
MCH RBC QN AUTO: 32.5 PG (ref 27–31)
MCHC RBC AUTO-ENTMCNC: 34.9 G/DL (ref 32–36)
MCV RBC AUTO: 93 FL (ref 82–98)
MONOCYTES # BLD AUTO: 0.7 K/UL (ref 0.3–1)
MONOCYTES NFR BLD: 9.3 % (ref 4–15)
NEUTROPHILS # BLD AUTO: 4.9 K/UL (ref 1.8–7.7)
NEUTROPHILS NFR BLD: 64.2 % (ref 38–73)
NONHDLC SERPL-MCNC: 138 MG/DL
PLATELET # BLD AUTO: 197 K/UL (ref 150–350)
PMV BLD AUTO: 11.2 FL (ref 9.2–12.9)
RBC # BLD AUTO: 4.64 M/UL (ref 4.6–6.2)
TRIGL SERPL-MCNC: 55 MG/DL (ref 30–150)
WBC # BLD AUTO: 7.87 K/UL (ref 3.9–12.7)

## 2019-10-28 PROCEDURE — 85025 COMPLETE CBC W/AUTO DIFF WBC: CPT

## 2019-10-28 PROCEDURE — 80061 LIPID PANEL: CPT

## 2019-10-28 PROCEDURE — 99396 PR PREVENTIVE VISIT,EST,40-64: ICD-10-PCS | Mod: S$GLB,,, | Performed by: INTERNAL MEDICINE

## 2019-10-28 PROCEDURE — 36415 COLL VENOUS BLD VENIPUNCTURE: CPT

## 2019-10-28 PROCEDURE — 83036 HEMOGLOBIN GLYCOSYLATED A1C: CPT

## 2019-10-28 PROCEDURE — 99396 PREV VISIT EST AGE 40-64: CPT | Mod: S$GLB,,, | Performed by: INTERNAL MEDICINE

## 2019-10-28 PROCEDURE — 99999 PR PBB SHADOW E&M-EST. PATIENT-LVL IV: CPT | Mod: PBBFAC,,, | Performed by: INTERNAL MEDICINE

## 2019-10-28 PROCEDURE — 84153 ASSAY OF PSA TOTAL: CPT

## 2019-10-28 PROCEDURE — 99999 PR PBB SHADOW E&M-EST. PATIENT-LVL IV: ICD-10-PCS | Mod: PBBFAC,,, | Performed by: INTERNAL MEDICINE

## 2019-10-28 RX ORDER — PRAVASTATIN SODIUM 80 MG/1
TABLET ORAL
Qty: 90 TABLET | Refills: 3 | Status: SHIPPED | OUTPATIENT
Start: 2019-10-28 | End: 2020-10-02 | Stop reason: SDUPTHER

## 2019-10-28 RX ORDER — ALPRAZOLAM 0.5 MG/1
TABLET ORAL
Qty: 30 TABLET | Refills: 4 | Status: SHIPPED | OUTPATIENT
Start: 2019-10-28 | End: 2020-09-14 | Stop reason: SDUPTHER

## 2019-10-28 NOTE — PROGRESS NOTES
Subjective:       Patient ID: Adam Dalal is a 59 y.o. male.    Chief Complaint: Annual Exam    HPI:  59-year-old male here for annual exam.  He sees his neurologist for parkinsonism on a regular basis.  He denies any problems with his parkinsonism and as a matter fact with the new med addition in the last year he feels he is doing much better.  Did have a fall on vacation but he said it was truly a slip, not of Parkinson's fall.  He had a going to urgent care because he scalp his left shin.  He was told it was not fractured and was treated topically.  Labs are pending.  He had a blood count that is unremarkable.  We will review the other labs when available.  He still uses alprazolam periodically for sleep.  I will place an updated prescription.  He continues on pravastatin.  He does have a skin lesion on the left forearm.  He is due to see his dermatologist now anyway so he will make that appointment.  He also has a skin or mucosal lesion inside the right cheek.  It is been present probably over 8 months by now.  It did not go away so he would like to get an evaluation.  He did smoke some years ago.  He did not chew tobacco.      Review of Systems   Constitutional: Negative for activity change, chills, fatigue, fever and unexpected weight change.   HENT: Negative for hearing loss, nosebleeds, rhinorrhea and trouble swallowing.    Eyes: Negative for pain, discharge and visual disturbance.   Respiratory: Negative for cough, chest tightness, shortness of breath and wheezing.    Cardiovascular: Positive for leg swelling ( Trace ankle). Negative for chest pain and palpitations.   Gastrointestinal: Negative for abdominal pain, blood in stool, constipation, diarrhea, nausea and vomiting.   Endocrine: Negative for polydipsia and polyuria.   Genitourinary: Negative for difficulty urinating, hematuria and urgency.   Musculoskeletal: Positive for neck stiffness. Negative for arthralgias, joint swelling and neck  pain.   Skin: Positive for wound (Healing abrasion left shin). Negative for rash.   Neurological: Positive for tremors. Negative for dizziness, weakness and headaches.   Hematological: Does not bruise/bleed easily.   Psychiatric/Behavioral: Negative for confusion, dysphoric mood, sleep disturbance and suicidal ideas.       Objective:      Physical Exam   Constitutional: He is oriented to person, place, and time. He appears well-developed and well-nourished. No distress.   HENT:   Head: Normocephalic and atraumatic.   Right Ear: External ear normal.   Left Ear: External ear normal.   Mouth/Throat: Oropharynx is clear and moist. No oropharyngeal exudate.   TM's clear, pharynx clear  Right buccal mucosa posteriorly has a small lesion or area of hypertrophy.  There may even be a small ulceration although patient says not tender.  No drainage. Suspect benign because it has not changed much over toenail is but with smoking history will have him see ENT   Eyes: Pupils are equal, round, and reactive to light. Conjunctivae and EOM are normal. No scleral icterus.   Neck: Normal range of motion. Neck supple. No thyromegaly present.   No supraclavicular nodes palpated   Cardiovascular: Normal rate, regular rhythm and normal heart sounds.   No murmur heard.  Pulmonary/Chest: Effort normal and breath sounds normal. He has no wheezes.   Abdominal: Soft. Bowel sounds are normal. He exhibits no mass. There is no tenderness.   Musculoskeletal: He exhibits no edema.   Lymphadenopathy:     He has no cervical adenopathy.   Neurological: He is alert and oriented to person, place, and time. He exhibits abnormal muscle tone. Coordination abnormal.   Skin: No rash noted. No erythema. No pallor.   Small keratotic lesion left forearm.  Healing superficial abrasion of the left shin running from just below the knee to the ankle.  Mild redness but no tenderness..  Patient saw urgent care and is using a topical antibiotic.  X-rays were done and  negative   Psychiatric: He has a normal mood and affect. His behavior is normal.       Assessment:       1. Routine physical examination    2. Lesion of buccal mucosa    3. Oral lesion    4. Parkinson's disease    5. Voice and resonance disorder    6. Hyperlipidemia, unspecified hyperlipidemia type    7. Impaired functional mobility, balance, gait, and endurance    8. Skin lesion of left arm    9. Screening for prostate cancer        Plan:       Adam was seen today for annual exam.    Diagnoses and all orders for this visit:    Routine physical examination  -     TSH; Future  -     Hemoglobin A1c; Future  -     Lipid panel; Future  -     Comprehensive metabolic panel; Future  -     CBC auto differential; Future  -     Vitamin D; Future    Lesion of buccal mucosa  -     Ambulatory referral to ENT    Oral lesion  -     Ambulatory referral to ENT    Parkinson's disease    Voice and resonance disorder    Hyperlipidemia, unspecified hyperlipidemia type    Impaired functional mobility, balance, gait, and endurance    Skin lesion of left arm  Comments:  Is due to see his Derm now. He will call.     Screening for prostate cancer  -     PSA, Screening; Future    Other orders  -     ALPRAZolam (XANAX) 0.5 MG tablet; TAKE 1 TABLET(0.5 MG) BY MOUTH EVERY NIGHT FOR INSOMNIA/ANXIETY  -     pravastatin (PRAVACHOL) 80 MG tablet; TAKE 1 TABLET(80 MG) BY MOUTH EVERY MORNING

## 2019-11-04 NOTE — PROGRESS NOTES
I, Elo Cantrell, DONNYT, met with Chasidy Field PTA to discuss this pt's POC. Pt is participating in LSVT BIG program, has been provided with folder with HEP. Pt is able to perform daily exercises without UE support. Functional components: wiping counter/ walls in big circles, unscrewing screws, standing knee tap, and buttons. Hierarchy task is dressing in standing.      Elo Cantrell DPT  1/8/2018    Face to face consultation with supervision PT held on 01/08/2018    Chasidy Field PTA    .     No

## 2019-11-05 ENCOUNTER — PATIENT MESSAGE (OUTPATIENT)
Dept: OTOLARYNGOLOGY | Facility: CLINIC | Age: 59
End: 2019-11-05

## 2019-11-05 ENCOUNTER — OFFICE VISIT (OUTPATIENT)
Dept: OTOLARYNGOLOGY | Facility: CLINIC | Age: 59
End: 2019-11-05
Payer: COMMERCIAL

## 2019-11-05 VITALS
SYSTOLIC BLOOD PRESSURE: 126 MMHG | BODY MASS INDEX: 29.61 KG/M2 | DIASTOLIC BLOOD PRESSURE: 79 MMHG | WEIGHT: 224.44 LBS | HEART RATE: 77 BPM

## 2019-11-05 DIAGNOSIS — K13.70 ORAL LESION: ICD-10-CM

## 2019-11-05 PROBLEM — Z98.890 POST-OPERATIVE STATE: Status: RESOLVED | Noted: 2017-01-30 | Resolved: 2019-11-05

## 2019-11-05 PROCEDURE — 99999 PR PBB SHADOW E&M-EST. PATIENT-LVL III: CPT | Mod: PBBFAC,,, | Performed by: NURSE PRACTITIONER

## 2019-11-05 PROCEDURE — 3008F PR BODY MASS INDEX (BMI) DOCUMENTED: ICD-10-PCS | Mod: CPTII,S$GLB,, | Performed by: NURSE PRACTITIONER

## 2019-11-05 PROCEDURE — 99999 PR PBB SHADOW E&M-EST. PATIENT-LVL III: ICD-10-PCS | Mod: PBBFAC,,, | Performed by: NURSE PRACTITIONER

## 2019-11-05 PROCEDURE — 99213 PR OFFICE/OUTPT VISIT, EST, LEVL III, 20-29 MIN: ICD-10-PCS | Mod: S$GLB,,, | Performed by: NURSE PRACTITIONER

## 2019-11-05 PROCEDURE — 99213 OFFICE O/P EST LOW 20 MIN: CPT | Mod: S$GLB,,, | Performed by: NURSE PRACTITIONER

## 2019-11-05 PROCEDURE — 3008F BODY MASS INDEX DOCD: CPT | Mod: CPTII,S$GLB,, | Performed by: NURSE PRACTITIONER

## 2019-11-05 NOTE — ASSESSMENT & PLAN NOTE
Small benign appearing lesion to right RMT/soft palate. We discussed that this is benign appearing and the time course is reassuring. He will continue to monitor this and let me know if he has any changes or any other concerns. Questions answered.

## 2019-11-05 NOTE — LETTER
November 5, 2019      Rashi Gee MD  [address]  [facility] [address]           [facility]  [address]  [facility] [address]  Phone: [phone]  Fax: [phone]          Patient: Adam Dalal   MR Number: [id number]   YOB: 1960   Date of Visit: 11/5/2019       Dear Dr. Rashi Gee:    Thank you for referring Adam Dalal to me for evaluation. Attached you will find relevant portions of my assessment and plan of care.    If you have questions, please do not hesitate to call me. I look forward to following Adam Dalal along with you.    Sincerely,    Lakesha Yo, KATH    Enclosure  CC:  No Recipients    If you would like to receive this communication electronically, please contact [email or web address] or [phone] to request more information on [employer] access.    For providers and/or their staff who would like to refer a patient to [facility], please contact us through our one-stop-shop provider referral line, [facility], at [phone].    If you feel you have received this communication in error or would no longer like to receive these types of communications, please e-mail [email or web address]

## 2019-11-05 NOTE — PROGRESS NOTES
Subjective:       Patient ID: Adam Dalal is a 59 y.o. male.    Chief Complaint: Mouth Lesions    HPI     Adam Dalal is a 59 year old male who was referred to the head and neck clinic for an oral lesion. He states he noticed a lesion to his right mouth nearly 1 year ago. He thinks it may have slightly increased in size. There is no pain or bleeding. He denies sore throat, dysphagia, odynophagia, or otalgia. There is no adenopathy. He is a former smoker. He has been evaluated by his dentist who did not think it was concerning. No other complaints.    Past Medical History:   Diagnosis Date    Anxiety     Esotropia of right eye 5/2/2013    Hearing deficit, bilateral     High cholesterol     Hyperlipidemia     Ringing in ear, bilateral        Past Surgical History:   Procedure Laterality Date    CATARACT EXTRACTION W/  INTRAOCULAR LENS IMPLANT Right 01/09/2017    Dr marin     CATARACT EXTRACTION W/  INTRAOCULAR LENS IMPLANT Left 01/30/2017    Dr. Marin    HERNIA REPAIR      STRABISMUS SURGERY  4yrs    right eye         Current Outpatient Medications:     ALPRAZolam (XANAX) 0.5 MG tablet, TAKE 1 TABLET(0.5 MG) BY MOUTH EVERY NIGHT FOR INSOMNIA/ANXIETY, Disp: 30 tablet, Rfl: 4    carbidopa-levodopa  mg (SINEMET)  mg per tablet, TAKE 1/2 TABLET BY MOUTH IN THE MORNING, 1 TABLET MID DAY, AND 1/2 TABLET EVERY EVENING, Disp: 180 tablet, Rfl: 0    pravastatin (PRAVACHOL) 80 MG tablet, TAKE 1 TABLET(80 MG) BY MOUTH EVERY MORNING, Disp: 90 tablet, Rfl: 3    rasagiline (AZILECT) 1 mg Tab, Take 1 tablet (1 mg total) by mouth once daily., Disp: 30 tablet, Rfl: 11    Review of patient's allergies indicates:  No Known Allergies    Social History     Socioeconomic History    Marital status:      Spouse name: Not on file    Number of children: Not on file    Years of education: Not on file    Highest education level: Not on file   Occupational History    Not on file   Social Needs     Financial resource strain: Not hard at all    Food insecurity:     Worry: Never true     Inability: Never true    Transportation needs:     Medical: No     Non-medical: No   Tobacco Use    Smoking status: Former Smoker     Packs/day: 1.00     Years: 1.00     Pack years: 1.00     Last attempt to quit: 2006     Years since quittin.8    Smokeless tobacco: Never Used   Substance and Sexual Activity    Alcohol use: Yes     Alcohol/week: 20.0 standard drinks     Types: 20 Glasses of wine per week     Frequency: 4 or more times a week     Drinks per session: 1 or 2     Binge frequency: Monthly    Drug use: No    Sexual activity: Yes     Partners: Female   Lifestyle    Physical activity:     Days per week: 3 days     Minutes per session: 30 min    Stress: To some extent   Relationships    Social connections:     Talks on phone: Once a week     Gets together: Once a week     Attends Yarsani service: Not on file     Active member of club or organization: No     Attends meetings of clubs or organizations: Never     Relationship status:    Other Topics Concern    Not on file   Social History Narrative    Not on file       Family History   Problem Relation Age of Onset    Arthritis Mother     Hearing loss Mother     Atrial fibrillation Sister     Sleep apnea Brother     Arthritis Brother     Cataracts Maternal Grandmother     Cataracts Paternal Grandmother     Amblyopia Neg Hx     Blindness Neg Hx     Glaucoma Neg Hx     Macular degeneration Neg Hx     Retinal detachment Neg Hx     Strabismus Neg Hx     Melanoma Neg Hx        Review of Systems   Constitutional: Negative for appetite change, chills, diaphoresis, fatigue, fever and unexpected weight change.   HENT: Negative for congestion, dental problem, drooling, ear discharge, ear pain, facial swelling, hearing loss, mouth sores, nosebleeds, postnasal drip, rhinorrhea, sinus pressure, sneezing, sore throat, tinnitus, trouble  swallowing and voice change.    Eyes: Negative for pain, discharge, redness and itching.   Respiratory: Negative for cough and shortness of breath.    Cardiovascular: Negative for chest pain.   Gastrointestinal: Negative for abdominal distention, abdominal pain, diarrhea, nausea and vomiting.   Endocrine: Negative for cold intolerance and heat intolerance.   Genitourinary: Negative for difficulty urinating.   Musculoskeletal: Negative for neck pain and neck stiffness.   Skin: Negative for rash.   Neurological: Negative for dizziness, weakness and headaches.   Hematological: Negative for adenopathy.       Objective:      Physical Exam   Constitutional: He is oriented to person, place, and time. He appears well-developed and well-nourished. He is cooperative. He does not appear ill. No distress.   HENT:   Head: Normocephalic and atraumatic.   Right Ear: External ear normal.   Left Ear: External ear normal.   Nose: Nose normal. No mucosal edema, rhinorrhea or nasal deformity. No epistaxis.  No foreign bodies. Right sinus exhibits no maxillary sinus tenderness and no frontal sinus tenderness. Left sinus exhibits no maxillary sinus tenderness and no frontal sinus tenderness.   Mouth/Throat: Uvula is midline, oropharynx is clear and moist and mucous membranes are normal. Mucous membranes are not pale, not dry and not cyanotic. He does not have dentures. No oral lesions. No trismus in the jaw. Normal dentition. No uvula swelling or dental caries. No oropharyngeal exudate, posterior oropharyngeal edema, posterior oropharyngeal erythema or tonsillar abscesses.       Eyes: Conjunctivae are normal. Right eye exhibits no discharge. Left eye exhibits no discharge. No scleral icterus.   Neck: Trachea normal, normal range of motion and phonation normal. Neck supple. No JVD present. No tracheal tenderness present. No tracheal deviation present. No thyroid mass and no thyromegaly present.   Salivary glands - there are no lesions or  asymmetric findings in the submandibular or parotid glands     Cardiovascular: Normal rate.   Pulmonary/Chest: Effort normal. No stridor. No respiratory distress.   Lymphadenopathy:        Head (right side): No submental, no submandibular, no tonsillar and no preauricular adenopathy present.        Head (left side): No submental, no submandibular, no tonsillar and no preauricular adenopathy present.     He has no cervical adenopathy.   Neurological: He is alert and oriented to person, place, and time. No cranial nerve deficit.   Skin: Skin is warm and dry. No rash noted. He is not diaphoretic. No erythema. No pallor.   Psychiatric: He has a normal mood and affect. His behavior is normal. Thought content normal.   Vitals reviewed.               Assessment:       1. Oral lesion        Plan:       Problem List Items Addressed This Visit        ENT    Oral lesion     Small benign appearing lesion to right RMT/soft palate. We discussed that this is benign appearing and the time course is reassuring. He will continue to monitor this and let me know if he has any changes or any other concerns. Questions answered.

## 2019-12-04 ENCOUNTER — PATIENT OUTREACH (OUTPATIENT)
Dept: ADMINISTRATIVE | Facility: OTHER | Age: 59
End: 2019-12-04

## 2019-12-05 ENCOUNTER — OFFICE VISIT (OUTPATIENT)
Dept: NEUROLOGY | Facility: CLINIC | Age: 59
End: 2019-12-05
Payer: COMMERCIAL

## 2019-12-05 VITALS
HEIGHT: 73 IN | DIASTOLIC BLOOD PRESSURE: 78 MMHG | WEIGHT: 223 LBS | BODY MASS INDEX: 29.55 KG/M2 | HEART RATE: 81 BPM | SYSTOLIC BLOOD PRESSURE: 123 MMHG

## 2019-12-05 DIAGNOSIS — G20.A1 PARKINSON'S DISEASE: Primary | ICD-10-CM

## 2019-12-05 PROCEDURE — 99215 PR OFFICE/OUTPT VISIT, EST, LEVL V, 40-54 MIN: ICD-10-PCS | Mod: S$GLB,,, | Performed by: NURSE PRACTITIONER

## 2019-12-05 PROCEDURE — 3008F PR BODY MASS INDEX (BMI) DOCUMENTED: ICD-10-PCS | Mod: CPTII,S$GLB,, | Performed by: NURSE PRACTITIONER

## 2019-12-05 PROCEDURE — 99999 PR PBB SHADOW E&M-EST. PATIENT-LVL III: CPT | Mod: PBBFAC,,, | Performed by: NURSE PRACTITIONER

## 2019-12-05 PROCEDURE — 99999 PR PBB SHADOW E&M-EST. PATIENT-LVL III: ICD-10-PCS | Mod: PBBFAC,,, | Performed by: NURSE PRACTITIONER

## 2019-12-05 PROCEDURE — 99215 OFFICE O/P EST HI 40 MIN: CPT | Mod: S$GLB,,, | Performed by: NURSE PRACTITIONER

## 2019-12-05 PROCEDURE — 3008F BODY MASS INDEX DOCD: CPT | Mod: CPTII,S$GLB,, | Performed by: NURSE PRACTITIONER

## 2019-12-05 RX ORDER — IBUPROFEN 800 MG/1
TABLET ORAL
Refills: 0 | COMMUNITY
Start: 2019-10-22 | End: 2022-01-04

## 2019-12-05 NOTE — LETTER
December 5, 2019      Tyree Babcock MD  [address]  [facility] [address]           [facility]  [address]  [facility] [address]  Phone: [phone]  Fax: [phone]          Patient: Adam Dalal   MR Number: [id number]   YOB: 1960   Date of Visit: 12/5/2019       Dear Dr. Tyree Babcock:    Thank you for referring Adam Dalal to me for evaluation. Attached you will find relevant portions of my assessment and plan of care.    If you have questions, please do not hesitate to call me. I look forward to following Adam Dalal along with you.    Sincerely,    Dona Jean, KATH    Enclosure  CC:  No Recipients    If you would like to receive this communication electronically, please contact [email or web address] or [phone] to request more information on [employer] access.    For providers and/or their staff who would like to refer a patient to [facility], please contact us through our one-stop-shop provider referral line, [facility], at [phone].    If you feel you have received this communication in error or would no longer like to receive these types of communications, please e-mail [email or web address]

## 2019-12-05 NOTE — PROGRESS NOTES
Name: Adam Dalal  MRN: [id number]   CSN: [id number]      Date: 12-5-19      Referring physician:  Tyree Babcock MD  [address] [place] [address]    Subjective:      Chief Complaint: PD    History of Present Illness (HPI):    Adam Dalal is a 59 y.o.  male who presents today for a follow-up evaluation of Parkinson's Disease and is alone. Last seen in office by Dr. Babcock 9-3-19.    Diagnosed 2 years ago but says he had sx about 2 years before as recalls thinking he must be old as he was getting slow/dragging around.     Doing well. Happy with current regimen. No issues.     Takes carbidopa/levodopa 0530- noon-1730. definitely helps and does feel lasts between doses. Helps stiffness in R hamstring, leg cramps, slowness/hesitation with movement. Sometimes he will have to take a dose earlier when planning ahead if has to work thru lunch but not generally because the dose wore off early.   Also finds Azilect very helpful- takes in AM.     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 Does fine with this.   One fall since last visit- was on jesus in [place] and was on [employer] tile. scarped leg but did not hit head.     Sometimes has tremor RH when stretches in AM or if gets worked up about something.   Balance and walking pretty good. Can get get short/shuffly steps at times.   Does BIG/LOUD.          Review of Systems   Constitutional: Negative for appetite change.   HENT: Negative for drooling and trouble swallowing.    Eyes: Negative for visual disturbance.   Respiratory: Negative for cough and choking.    Gastrointestinal: Negative for constipation and diarrhea.   Genitourinary: Negative.         No diff with ED   Musculoskeletal: Negative for gait problem.   Neurological: Positive for tremors. Negative for dizziness and light-headedness.   Psychiatric/Behavioral: Negative for dysphoric mood, hallucinations and sleep disturbance (not aware of RBD). The patient is not nervous/anxious.   "      Past Medical History: The patient  has a past medical history of Anxiety, Esotropia of right eye (5/2/2013), Hearing deficit, bilateral, High cholesterol, Hyperlipidemia, and Ringing in ear, bilateral.    Social History: The patient  reports that he quit smoking about 13 years ago. He has a 1.00 pack-year smoking history. He has never used smokeless tobacco. He reports that he drinks about 20.0 standard drinks of alcohol per week. He reports that he does not use drugs.    Family History: Their family history includes Arthritis in his brother and mother; Atrial fibrillation in his sister; Cataracts in his maternal grandmother and paternal grandmother; Hearing loss in his mother; Sleep apnea in his brother.    Allergies: Patient has no known allergies.     Meds:   Current Outpatient Medications on File Prior to Visit   Medication Sig Dispense Refill    ALPRAZolam (XANAX) 0.5 MG tablet TAKE 1 TABLET(0.5 MG) BY MOUTH EVERY NIGHT FOR INSOMNIA/ANXIETY 30 tablet 4    carbidopa-levodopa  mg (SINEMET)  mg per tablet TAKE 1/2 TABLET BY MOUTH IN THE MORNING, 1 TABLET MID DAY, AND 1/2 TABLET EVERY EVENING 180 tablet 0    ibuprofen (ADVIL,MOTRIN) 800 MG tablet TK 1 T PO TID FOR 10 DAYS  0    pravastatin (PRAVACHOL) 80 MG tablet TAKE 1 TABLET(80 MG) BY MOUTH EVERY MORNING 90 tablet 3    rasagiline (AZILECT) 1 mg Tab Take 1 tablet (1 mg total) by mouth once daily. 30 tablet 11     No current facility-administered medications on file prior to visit.        Objective:     Physical Exam:    Vitals:    12/05/19 0904   BP: 123/78   Pulse: 81   Weight: 101.2 kg (223 lb)   Height: 6' 1" (1.854 m)     Body mass index is 29.42 kg/m².    Constitutional  Well-developed, well-nourished, appears stated age   Cardiovascular  Radial pulses 2+ and symmetric, no LE edema bilaterally     ..  * Cranial nerves       - CN II  PERRL, visual fields full to confrontation     - CN III, IV, VI  Extraocular movements full with some " drift R eye (strabismus surgery), normal pursuits and saccades     - CN V  Sensation V1 - V3 intact     - CN VII  Face strong and symmetric bilaterally     - CN VIII  Hearing intact to coversation     - CN IX, X  Palate raises midline and symmetric     - CN XI  SCM and trapezius 5/5 bilaterally     - CN XII  Tongue midline         ..III.  MOTOR EXAMINATION -  Last dose was 0530- exam time 0920       Speech  2 - Monotone, slurred but understandable; moderately impaired.   Facial Expression  3 - Moderate hypomimia; lips parted some of the time.   Tremor at Rest:      Face, lips, chin 0 - Absent.    Hands:      right 0 - Absent.    left 0 - Absent.    Feet:      right 0 - Absent.    left 0 - Absent.    Action or Postural Tremor of Hands      right 0 - Absent.    left 0 - Absent.    Rigidity      Neck 1 - Slight or detectable only when activated by mirror or other movements.   Upper Extremity: Right 1 - Slight or detectable only when activated by mirror or other movements.   Upper Extremity: Left 2 - Mild or moderate.   Lower Extremity: Right 1 - Slight or detectable only when activated by mirror or other movements.   Lower Extremity: Left 1 - Slight or detectable only when activated by mirror or other movements.   Finger Taps      right 3 - Severely impaired. Frequent hesitation in initiating movements or arrests in ongoing movement.   left 2 - Moderately impaired. Definite and early fatiguing. May have occasional arrests in movement.   Hand Movements      right 3 - Severely impaired. Frequent hesitation in initiating movements or arrests in ongoing movement.   left 1 - Mild slowing and/or reduction in amplitude.   Rapid Alternating Movements of Hands      right 1 - Mild slowing and/or reduction in amplitude.   left 0 - Normal.   Leg Agility      right 1 - Mild slowing and/or reduction in amplitude.   left 1 - Mild slowing and/or reduction in amplitude.   Arising from Chair  1 - Slow; or may need more than one attempt.    Posture  1 - Not quite erect, slightly stooped posture; could be normal for older person.   Gait  2 - Walks with difficulty, but requires little or no assistance; may have some festination, short steps, or propulsion.   Postural Stability (Response to sudden, strong posterior displacement produced by pull on shoulders while patient erect with eyes open and feet slightly apart. Patient is prepared, and can have had some practice runs.)  deerred   Body Bradykinesia and Hypokinesia (Combining slowness, hesitancy, decreased armswing, small amplitude, and poverty of movement in general)  2 - Mild degree of slowness and poverty of movement which is definitely abnormal.         Laboratory Results:  Lab Visit on 10/28/2019   Component Date Value Ref Range Status    PSA, SCREEN 10/28/2019 0.44  0.00 - 4.00 ng/mL Final    WBC 10/28/2019 7.87  3.90 - 12.70 K/uL Final    RBC 10/28/2019 4.64  4.60 - 6.20 M/uL Final    Hemoglobin 10/28/2019 15.1  14.0 - 18.0 g/dL Final    Hematocrit 10/28/2019 43.3  40.0 - 54.0 % Final    Mean Corpuscular Volume 10/28/2019 93  82 - 98 fL Final    Mean Corpuscular Hemoglobin 10/28/2019 32.5* 27.0 - 31.0 pg Final    Mean Corpuscular Hemoglobin Conc 10/28/2019 34.9  32.0 - 36.0 g/dL Final    RDW 10/28/2019 13.7  11.5 - 14.5 % Final    Platelets 10/28/2019 197  150 - 350 K/uL Final    MPV 10/28/2019 11.2  9.2 - 12.9 fL Final    Gran # (ANC) 10/28/2019 4.9  1.8 - 7.7 K/uL Final    Lymph # 10/28/2019 1.9  1.0 - 4.8 K/uL Final    Mono # 10/28/2019 0.7  0.3 - 1.0 K/uL Final    Eos # 10/28/2019 0.2  0.0 - 0.5 K/uL Final    Baso # 10/28/2019 0.08  0.00 - 0.20 K/uL Final    Gran% 10/28/2019 64.2  38.0 - 73.0 % Final    Lymph% 10/28/2019 23.5  18.0 - 48.0 % Final    Mono% 10/28/2019 9.3  4.0 - 15.0 % Final    Eosinophil% 10/28/2019 2.0  0.0 - 8.0 % Final    Basophil% 10/28/2019 1.0  0.0 - 1.9 % Final    Differential Method 10/28/2019 Automated   Final    Cholesterol 10/28/2019 204*  120 - 199 mg/dL Final    Hemoglobin A1C 10/28/2019 5.7* 4.0 - 5.6 % Final    Estimated Avg Glucose 10/28/2019 117  68 - 131 mg/dL Final    Cholesterol 10/28/2019 204* 120 - 199 mg/dL Final    Triglycerides 10/28/2019 55  30 - 150 mg/dL Final    HDL 10/28/2019 66  40 - 75 mg/dL Final    LDL Cholesterol 10/28/2019 127.0  63.0 - 159.0 mg/dL Final    Hdl/Cholesterol Ratio 10/28/2019 32.4  20.0 - 50.0 % Final    Total Cholesterol/HDL Ratio 10/28/2019 3.1  2.0 - 5.0 Final    Non-HDL Cholesterol 10/28/2019 138  mg/dL Final       Imaging:   Independent review of images:               Impression         No MRI evidence of acute intracranial abnormality.    Mild generalized cerebral volume loss.      Electronically signed by: RAMONA CEJA  Date: 11/18/17             Assessment and Plan     Parkinson's disease  Comments:  akinetic- rigid     Per DR. BETTS note:                                                                                           PD, akinetic-rigid.  Eye movements are abnml from NM perspective - old strabismus - but watching closely.  NO clear slowed pursuits or saccades, but some intrusive jerks - might all still be from longstanding changes/surgeries.      Medical Decision Making:    Stable. He is happy with his current level of control.  He is nearly 4 hours into dose at time of exam and does have some moderate bradykinesia with SMILEY on R side. Discussed possibility of wear off before next dose and may need to change dose times.  Answered questions about Parkinson's disease, progression, commercials about hallucinations (which he does not have), potential risks with Parkinson's disease - falls and choking.   Continue activity!  Follow up 4 mos with Dr. Betts.          I spent 45 minutes face-to-face with the patient with >50% of the time spent with counseling and education regarding:  - results of data, diagnosis, and recommendations stated above  - the prognosis of PD  - risks and  benefits of carbidopa/levodopa   - importance of diet and exercise    Dona Jean, CINDY, NP-C  Division of Movement and Memory Disorders  [facility]  [phone]

## 2019-12-23 ENCOUNTER — PATIENT MESSAGE (OUTPATIENT)
Dept: NEUROLOGY | Facility: CLINIC | Age: 59
End: 2019-12-23

## 2019-12-23 DIAGNOSIS — G20.A1 PARKINSON'S DISEASE: Primary | ICD-10-CM

## 2019-12-23 RX ORDER — CARBIDOPA AND LEVODOPA 25; 100 MG/1; MG/1
TABLET ORAL
Qty: 270 TABLET | Refills: 3 | Status: SHIPPED | OUTPATIENT
Start: 2019-12-23 | End: 2020-12-23

## 2020-03-06 ENCOUNTER — TELEPHONE (OUTPATIENT)
Dept: NEUROLOGY | Facility: CLINIC | Age: 60
End: 2020-03-06

## 2020-04-23 ENCOUNTER — PATIENT OUTREACH (OUTPATIENT)
Dept: ADMINISTRATIVE | Facility: OTHER | Age: 60
End: 2020-04-23

## 2020-04-24 ENCOUNTER — OFFICE VISIT (OUTPATIENT)
Dept: NEUROLOGY | Facility: CLINIC | Age: 60
End: 2020-04-24
Payer: COMMERCIAL

## 2020-04-24 DIAGNOSIS — G20.A1 PARKINSON DISEASE: Primary | ICD-10-CM

## 2020-04-24 PROCEDURE — 99214 PR OFFICE/OUTPT VISIT, EST, LEVL IV, 30-39 MIN: ICD-10-PCS | Mod: 95,,, | Performed by: PSYCHIATRY & NEUROLOGY

## 2020-04-24 PROCEDURE — 99214 OFFICE O/P EST MOD 30 MIN: CPT | Mod: 95,,, | Performed by: PSYCHIATRY & NEUROLOGY

## 2020-04-24 NOTE — PROGRESS NOTES
Neurology Telemedicine Note     Name: Adam Dalal  MRN: [id number]   CSN: [id number]      Date: 04/24/2020    The patient location is: Home  The chief complaint leading to consultation is: f/u  Visit type: Virtual visit with synchronous audio and video    TOTAL TIME SPENT: 3:07 - 3:30    Each patient to whom I provide medical services by telemedicine is:  (1) informed of the relationship between the physician and patient and the respective role of any other health care provider with respect to management of the patient; and (2) notified that they may decline to receive medical services by telemedicine and may withdraw from such care at any time.    History of Present Illness (HPI):   - had great trip to [facility]  - had a stumble/slip while on vacation  - still taking 1 cd/ld tid now  -  Misses periodically daytime dosing  - some more exercising at home, going to the park  - missing a little bit of the big and loud  - mood and memory are pretty good  - balance is holding well  - more cautious, appropriate  - toe curling, see in his shows - not clear how related to dopa dosing if at all      Nonmotor/Premotor ROS: as per HPI, and all other systems are negative    Past Medical History: The patient  has a past medical history of Anxiety, Esotropia of right eye (5/2/2013), Hearing deficit, bilateral, High cholesterol, Hyperlipidemia, and Ringing in ear, bilateral.    Social History: The patient  reports that he quit smoking about 14 years ago. He has a 1.00 pack-year smoking history. He has never used smokeless tobacco. He reports that he drinks about 20.0 standard drinks of alcohol per week. He reports that he does not use drugs.    Family History: Their family history includes Arthritis in his brother and mother; Atrial fibrillation in his sister; Cataracts in his maternal grandmother and paternal grandmother; Hearing loss in his mother; Sleep apnea in his brother.    Allergies: Patient has no known  allergies.     Meds:   Current Outpatient Medications on File Prior to Visit   Medication Sig Dispense Refill    ALPRAZolam (XANAX) 0.5 MG tablet TAKE 1 TABLET(0.5 MG) BY MOUTH EVERY NIGHT FOR INSOMNIA/ANXIETY 30 tablet 4    carbidopa-levodopa  mg (SINEMET)  mg per tablet TAKE 1TABLET BY MOUTH IN THE MORNING, 1 TABLET MID DAY, AND 1 TABLET EVERY EVENING 270 tablet 3    ibuprofen (ADVIL,MOTRIN) 800 MG tablet TK 1 T PO TID FOR 10 DAYS  0    pravastatin (PRAVACHOL) 80 MG tablet TAKE 1 TABLET(80 MG) BY MOUTH EVERY MORNING 90 tablet 3    rasagiline (AZILECT) 1 mg Tab Take 1 tablet (1 mg total) by mouth once daily. 30 tablet 11     No current facility-administered medications on file prior to visit.        Exam:  Vital Signs deferred with home visit    Constitutional  Well-developed, well-nourished, appears stated age   Eyes  No scleral icterus   ENT  Moist oral mucosa   Cardiovascular  No lower extremity edema    Respiratory  No labored breathing    Skin  No rashes   Hematologic  No bruising   Psychiatric  Normal mood and affect   Other  GI/ deferred    Neurological     * Mental status  Alert and oriented to person, place, time, and situation; no dysarthria; no aphasia; normal recent and remote memory; follows commands   * Cranial nerves     - CN II  Pupils midposition and symmetric   - CN III, IV, VI  Extraocular movements full, no nystagmus visualized   - CN V  Unable to test   - CN VII  Face strong and symmetric bilaterally    - CN VIII  Hearing grossly intact with conversation    - CN IX, X  Palate raises midline and symmetric    - CN XI  Strong shoulder shrug B    - CN XII  Tongue appears midline    * Motor  Normal bulk by appearance, no drift    * Sensory  Not tested objectively, no changes described by the patient   * Deep tendon reflexes  Not tested   * Coord/Movemt/Gait + hypophonic speech.  Mod facial masking.  Mod B bradykinesia, bit worse on the R .  No tremor with rest, posture, kinesis,  or intention.   No chorea, athetosis, dystonia, myoclonus, or tics.   No motor impersistence.  Gait is deferred for safety.       Medical Record Review:  - Lab Results:  No visits with results within 3 Month(s) from this visit.   Latest known visit with results is:   Lab Visit on 10/28/2019   Component Date Value Ref Range Status    PSA, SCREEN 10/28/2019 0.44  0.00 - 4.00 ng/mL Final    WBC 10/28/2019 7.87  3.90 - 12.70 K/uL Final    RBC 10/28/2019 4.64  4.60 - 6.20 M/uL Final    Hemoglobin 10/28/2019 15.1  14.0 - 18.0 g/dL Final    Hematocrit 10/28/2019 43.3  40.0 - 54.0 % Final    Mean Corpuscular Volume 10/28/2019 93  82 - 98 fL Final    Mean Corpuscular Hemoglobin 10/28/2019 32.5* 27.0 - 31.0 pg Final    Mean Corpuscular Hemoglobin Conc 10/28/2019 34.9  32.0 - 36.0 g/dL Final    RDW 10/28/2019 13.7  11.5 - 14.5 % Final    Platelets 10/28/2019 197  150 - 350 K/uL Final    MPV 10/28/2019 11.2  9.2 - 12.9 fL Final    Gran # (ANC) 10/28/2019 4.9  1.8 - 7.7 K/uL Final    Lymph # 10/28/2019 1.9  1.0 - 4.8 K/uL Final    Mono # 10/28/2019 0.7  0.3 - 1.0 K/uL Final    Eos # 10/28/2019 0.2  0.0 - 0.5 K/uL Final    Baso # 10/28/2019 0.08  0.00 - 0.20 K/uL Final    Gran% 10/28/2019 64.2  38.0 - 73.0 % Final    Lymph% 10/28/2019 23.5  18.0 - 48.0 % Final    Mono% 10/28/2019 9.3  4.0 - 15.0 % Final    Eosinophil% 10/28/2019 2.0  0.0 - 8.0 % Final    Basophil% 10/28/2019 1.0  0.0 - 1.9 % Final    Differential Method 10/28/2019 Automated   Final    Cholesterol 10/28/2019 204* 120 - 199 mg/dL Final    Hemoglobin A1C 10/28/2019 5.7* 4.0 - 5.6 % Final    Estimated Avg Glucose 10/28/2019 117  68 - 131 mg/dL Final    Cholesterol 10/28/2019 204* 120 - 199 mg/dL Final    Triglycerides 10/28/2019 55  30 - 150 mg/dL Final    HDL 10/28/2019 66  40 - 75 mg/dL Final    LDL Cholesterol 10/28/2019 127.0  63.0 - 159.0 mg/dL Final    Hdl/Cholesterol Ratio 10/28/2019 32.4  20.0 - 50.0 % Final    Total  Cholesterol/HDL Ratio 10/28/2019 3.1  2.0 - 5.0 Final    Non-HDL Cholesterol 10/28/2019 138  mg/dL Final       Diagnoses:                                                                                    PD, akinetic-rigid.  Stable for 6 months.  More foot dystonia, not clear if off/on/diphasic.     Medical Decision Makin) cd/ld divided 300 daily now  2) continue azilect 1 mg now  3) consider amantadine - will diary his dystonia now  4) COVID19 precautions - hand washing, sanitizing home/deliveries, physical distancing, avoiding large groups, wearing mask in public        I spent 21 minutes with the patient with >50% of the time spent with counseling and education regarding:  - results of data, diagnosis, and recommendations stated above  - the prognosis of PD  - risks and benefits of amantadine  - importance of diet and exercise        Tyree Babcock MD, MPH  Division of Movement and Memory Disorders  [facility]  [phone]

## 2020-05-14 ENCOUNTER — PATIENT MESSAGE (OUTPATIENT)
Dept: NEUROLOGY | Facility: CLINIC | Age: 60
End: 2020-05-14

## 2020-05-15 ENCOUNTER — PATIENT MESSAGE (OUTPATIENT)
Dept: OPTOMETRY | Facility: CLINIC | Age: 60
End: 2020-05-15

## 2020-05-15 ENCOUNTER — TELEPHONE (OUTPATIENT)
Dept: OPTOMETRY | Facility: CLINIC | Age: 60
End: 2020-05-15

## 2020-05-15 ENCOUNTER — PATIENT MESSAGE (OUTPATIENT)
Dept: NEUROLOGY | Facility: CLINIC | Age: 60
End: 2020-05-15

## 2020-05-30 RX ORDER — RASAGILINE 1 MG/1
TABLET ORAL
Qty: 30 TABLET | Refills: 11 | Status: SHIPPED | OUTPATIENT
Start: 2020-05-30 | End: 2021-04-29

## 2020-07-16 ENCOUNTER — OFFICE VISIT (OUTPATIENT)
Dept: DERMATOLOGY | Facility: CLINIC | Age: 60
End: 2020-07-16
Payer: COMMERCIAL

## 2020-07-16 DIAGNOSIS — L82.1 SK (SEBORRHEIC KERATOSIS): ICD-10-CM

## 2020-07-16 DIAGNOSIS — L57.0 AK (ACTINIC KERATOSIS): Primary | ICD-10-CM

## 2020-07-16 DIAGNOSIS — L81.4 LENTIGO: ICD-10-CM

## 2020-07-16 DIAGNOSIS — D18.01 CHERRY ANGIOMA: ICD-10-CM

## 2020-07-16 DIAGNOSIS — D22.9 NEVUS: ICD-10-CM

## 2020-07-16 PROCEDURE — 99213 OFFICE O/P EST LOW 20 MIN: CPT | Mod: 25,S$GLB,, | Performed by: DERMATOLOGY

## 2020-07-16 PROCEDURE — 17003 DESTRUCT PREMALG LES 2-14: CPT | Mod: S$GLB,,, | Performed by: DERMATOLOGY

## 2020-07-16 PROCEDURE — 17000 PR DESTRUCTION(LASER SURGERY,CRYOSURGERY,CHEMOSURGERY),PREMALIGNANT LESIONS,FIRST LESION: ICD-10-PCS | Mod: S$GLB,,, | Performed by: DERMATOLOGY

## 2020-07-16 PROCEDURE — 99999 PR PBB SHADOW E&M-EST. PATIENT-LVL II: CPT | Mod: PBBFAC,,, | Performed by: DERMATOLOGY

## 2020-07-16 PROCEDURE — 99213 PR OFFICE/OUTPT VISIT, EST, LEVL III, 20-29 MIN: ICD-10-PCS | Mod: 25,S$GLB,, | Performed by: DERMATOLOGY

## 2020-07-16 PROCEDURE — 17003 DESTRUCTION, PREMALIGNANT LESIONS; SECOND THROUGH 14 LESIONS: ICD-10-PCS | Mod: S$GLB,,, | Performed by: DERMATOLOGY

## 2020-07-16 PROCEDURE — 99999 PR PBB SHADOW E&M-EST. PATIENT-LVL II: ICD-10-PCS | Mod: PBBFAC,,, | Performed by: DERMATOLOGY

## 2020-07-16 PROCEDURE — 17000 DESTRUCT PREMALG LESION: CPT | Mod: S$GLB,,, | Performed by: DERMATOLOGY

## 2020-07-16 NOTE — PROGRESS NOTES
Subjective:       Patient ID:  Adam Dalal is a 60 y.o. male who presents for No chief complaint on file.    Pt here for skin check. Has lesion on right hand.  Present for 1 year. Getting larger. No tx  Has similar lesion on left forearm.       Review of Systems   Constitutional: Negative for fever and chills.   Skin: Positive for activity-related sunscreen use. Negative for daily sunscreen use.   Hematologic/Lymphatic: Does not bruise/bleed easily.        Objective:    Physical Exam   Constitutional: He appears well-developed and well-nourished. No distress.   Neurological: He is alert and oriented to person, place, and time. He is not disoriented.   Psychiatric: He has a normal mood and affect.   Skin:   Areas Examined (abnormalities noted in diagram):   Scalp / Hair Palpated and Inspected  Head / Face Inspection Performed  Neck Inspection Performed  Chest / Axilla Inspection Performed  Abdomen Inspection Performed  Back Inspection Performed  RUE Inspected  LUE Inspection Performed                   Diagram Legend     Erythematous scaling macule/papule c/w actinic keratosis       Vascular papule c/w angioma      Pigmented verrucoid papule/plaque c/w seborrheic keratosis      Yellow umbilicated papule c/w sebaceous hyperplasia      Irregularly shaped tan macule c/w lentigo     1-2 mm smooth white papules consistent with Milia      Movable subcutaneous cyst with punctum c/w epidermal inclusion cyst      Subcutaneous movable cyst c/w pilar cyst      Firm pink to brown papule c/w dermatofibroma      Pedunculated fleshy papule(s) c/w skin tag(s)      Evenly pigmented macule c/w junctional nevus     Mildly variegated pigmented, slightly irregular-bordered macule c/w mildly atypical nevus      Flesh colored to evenly pigmented papule c/w intradermal nevus       Pink pearly papule/plaque c/w basal cell carcinoma      Erythematous hyperkeratotic cursted plaque c/w SCC      Surgical scar with no sign of skin  cancer recurrence      Open and closed comedones      Inflammatory papules and pustules      Verrucoid papule consistent consistent with wart     Erythematous eczematous patches and plaques     Dystrophic onycholytic nail with subungual debris c/w onychomycosis     Umbilicated papule    Erythematous-base heme-crusted tan verrucoid plaque consistent with inflamed seborrheic keratosis     Erythematous Silvery Scaling Plaque c/w Psoriasis     See annotation      Assessment / Plan:        AK (actinic keratosis)  Cryosurgery Procedure Note    Verbal consent from the patient is obtained including, but not limited to, risk of hypopigmentation/hyperpigmentation, scar, recurrence of lesion. The patient is aware of the precancerous quality and need for treatment of these lesions. Liquid nitrogen cryosurgery is applied to the 6 actinic keratoses, as detailed in the physical exam, to produce a freeze injury. The patient is aware that blisters may form and is instructed on wound care with gentle cleansing and use of vaseline ointment to keep moist until healed. The patient is supplied a handout on cryosurgery and is instructed to call if lesions do not completely resolve.    Nevus  Discussed ABCDE's of nevi.  Monitor for new mole or moles that are becoming bigger, darker, irritated, or developing irregular borders. Brochure provided.    SK (seborrheic keratosis)  These are benign inherited growths without a malignant potential. Reassurance given to patient. No treatment is necessary.     Lentigo  This is a benign hyperpigmented sun induced lesion. Daily sun protection will reduce the number of new lesions. Treatment of these benign lesions are considered cosmetic.  The nature of sun-induced photo-aging and skin cancers is discussed.  Sun avoidance, protective clothing, and the use of 30-SPF sunscreens is advised. Observe closely for skin damage/changes, and call if such occurs.    Cherry angioma  These are benign vascular  lesions that are inherited.  Treatment is not necessary.    Upper body skin examination performed today including at least 6 points as noted in physical examination. No lesions suspicious for malignancy noted.             Follow up in about 1 year (around 7/16/2021).

## 2020-07-17 ENCOUNTER — OFFICE VISIT (OUTPATIENT)
Dept: OPTOMETRY | Facility: CLINIC | Age: 60
End: 2020-07-17
Payer: COMMERCIAL

## 2020-07-17 DIAGNOSIS — H50.00 ESOTROPIA: Primary | ICD-10-CM

## 2020-07-17 PROCEDURE — 99999 PR PBB SHADOW E&M-EST. PATIENT-LVL III: CPT | Mod: PBBFAC,,, | Performed by: OPTOMETRIST

## 2020-07-17 PROCEDURE — 92014 COMPRE OPH EXAM EST PT 1/>: CPT | Mod: S$GLB,,, | Performed by: OPTOMETRIST

## 2020-07-17 PROCEDURE — 99999 PR PBB SHADOW E&M-EST. PATIENT-LVL III: ICD-10-PCS | Mod: PBBFAC,,, | Performed by: OPTOMETRIST

## 2020-07-17 PROCEDURE — 92015 PR REFRACTION: ICD-10-PCS | Mod: S$GLB,,, | Performed by: OPTOMETRIST

## 2020-07-17 PROCEDURE — 92014 PR EYE EXAM, EST PATIENT,COMPREHESV: ICD-10-PCS | Mod: S$GLB,,, | Performed by: OPTOMETRIST

## 2020-07-17 PROCEDURE — 92015 DETERMINE REFRACTIVE STATE: CPT | Mod: S$GLB,,, | Performed by: OPTOMETRIST

## 2020-09-15 RX ORDER — ALPRAZOLAM 0.5 MG/1
TABLET ORAL
Qty: 30 TABLET | Refills: 4 | Status: SHIPPED | OUTPATIENT
Start: 2020-09-15 | End: 2021-04-05 | Stop reason: SDUPTHER

## 2020-09-28 ENCOUNTER — PATIENT MESSAGE (OUTPATIENT)
Dept: INTERNAL MEDICINE | Facility: CLINIC | Age: 60
End: 2020-09-28

## 2020-09-28 DIAGNOSIS — E78.5 HYPERLIPIDEMIA, UNSPECIFIED HYPERLIPIDEMIA TYPE: ICD-10-CM

## 2020-09-28 DIAGNOSIS — R73.09 ELEVATED GLUCOSE: ICD-10-CM

## 2020-09-28 DIAGNOSIS — Z12.5 SCREENING FOR PROSTATE CANCER: Primary | ICD-10-CM

## 2020-09-28 DIAGNOSIS — Z00.00 ROUTINE PHYSICAL EXAMINATION: ICD-10-CM

## 2020-09-28 DIAGNOSIS — Z20.822 EXPOSURE TO COVID-19 VIRUS: ICD-10-CM

## 2020-10-02 ENCOUNTER — IMMUNIZATION (OUTPATIENT)
Dept: INTERNAL MEDICINE | Facility: CLINIC | Age: 60
End: 2020-10-02
Payer: COMMERCIAL

## 2020-10-02 ENCOUNTER — PATIENT MESSAGE (OUTPATIENT)
Dept: INTERNAL MEDICINE | Facility: CLINIC | Age: 60
End: 2020-10-02

## 2020-10-02 ENCOUNTER — OFFICE VISIT (OUTPATIENT)
Dept: INTERNAL MEDICINE | Facility: CLINIC | Age: 60
End: 2020-10-02
Payer: COMMERCIAL

## 2020-10-02 VITALS
HEART RATE: 69 BPM | OXYGEN SATURATION: 98 % | HEIGHT: 73 IN | BODY MASS INDEX: 30.27 KG/M2 | DIASTOLIC BLOOD PRESSURE: 78 MMHG | WEIGHT: 228.38 LBS | SYSTOLIC BLOOD PRESSURE: 120 MMHG

## 2020-10-02 DIAGNOSIS — F41.9 ANXIETY: ICD-10-CM

## 2020-10-02 DIAGNOSIS — Z00.00 ROUTINE PHYSICAL EXAMINATION: Primary | ICD-10-CM

## 2020-10-02 DIAGNOSIS — E78.5 HYPERLIPIDEMIA, UNSPECIFIED HYPERLIPIDEMIA TYPE: ICD-10-CM

## 2020-10-02 DIAGNOSIS — G47.33 OBSTRUCTIVE SLEEP APNEA: ICD-10-CM

## 2020-10-02 DIAGNOSIS — Z20.822 EXPOSURE TO COVID-19 VIRUS: ICD-10-CM

## 2020-10-02 DIAGNOSIS — G20.A1 PARKINSON'S DISEASE: ICD-10-CM

## 2020-10-02 PROCEDURE — 90686 IIV4 VACC NO PRSV 0.5 ML IM: CPT | Mod: S$GLB,,, | Performed by: INTERNAL MEDICINE

## 2020-10-02 PROCEDURE — 90471 FLU VACCINE (QUAD) GREATER THAN OR EQUAL TO 3YO PRESERVATIVE FREE IM: ICD-10-PCS | Mod: S$GLB,,, | Performed by: INTERNAL MEDICINE

## 2020-10-02 PROCEDURE — 99999 PR PBB SHADOW E&M-EST. PATIENT-LVL IV: ICD-10-PCS | Mod: PBBFAC,,, | Performed by: INTERNAL MEDICINE

## 2020-10-02 PROCEDURE — 99396 PREV VISIT EST AGE 40-64: CPT | Mod: 25,S$GLB,, | Performed by: INTERNAL MEDICINE

## 2020-10-02 PROCEDURE — 99999 PR PBB SHADOW E&M-EST. PATIENT-LVL IV: CPT | Mod: PBBFAC,,, | Performed by: INTERNAL MEDICINE

## 2020-10-02 PROCEDURE — 3008F PR BODY MASS INDEX (BMI) DOCUMENTED: ICD-10-PCS | Mod: CPTII,S$GLB,, | Performed by: INTERNAL MEDICINE

## 2020-10-02 PROCEDURE — 90471 IMMUNIZATION ADMIN: CPT | Mod: S$GLB,,, | Performed by: INTERNAL MEDICINE

## 2020-10-02 PROCEDURE — 90686 FLU VACCINE (QUAD) GREATER THAN OR EQUAL TO 3YO PRESERVATIVE FREE IM: ICD-10-PCS | Mod: S$GLB,,, | Performed by: INTERNAL MEDICINE

## 2020-10-02 PROCEDURE — 99396 PR PREVENTIVE VISIT,EST,40-64: ICD-10-PCS | Mod: 25,S$GLB,, | Performed by: INTERNAL MEDICINE

## 2020-10-02 PROCEDURE — 3008F BODY MASS INDEX DOCD: CPT | Mod: CPTII,S$GLB,, | Performed by: INTERNAL MEDICINE

## 2020-10-02 RX ORDER — PRAVASTATIN SODIUM 80 MG/1
TABLET ORAL
Qty: 90 TABLET | Refills: 3 | Status: SHIPPED | OUTPATIENT
Start: 2020-10-02 | End: 2021-10-11

## 2020-10-02 NOTE — PROGRESS NOTES
Subjective:       Patient ID: Adam Dalal is a 60 y.o. male.    Chief Complaint: Annual Exam    HPI:  Patient with history of parkinsonism comes in for annual exam.  Anxiety has been stable.  He and his wife in family are doing well.  No Coronavirus problems.  He is traveling a little bit for work but being careful.      Review of Systems   Constitutional: Negative for activity change, chills, fatigue, fever and unexpected weight change.   HENT: Negative for hearing loss, nosebleeds, rhinorrhea and trouble swallowing.    Eyes: Negative for pain, discharge and visual disturbance.   Respiratory: Negative for cough, chest tightness, shortness of breath and wheezing.    Cardiovascular: Negative for chest pain and palpitations.   Gastrointestinal: Negative for abdominal pain, blood in stool, constipation, diarrhea, nausea and vomiting.   Endocrine: Negative for polydipsia and polyuria.   Genitourinary: Negative for difficulty urinating, hematuria and urgency.   Musculoskeletal: Positive for neck stiffness. Negative for arthralgias, joint swelling and neck pain.   Integumentary:  Negative for rash.   Neurological: Positive for disturbances in coordination (Mild spasticity due to parkinsonism) and coordination difficulties (Mild spasticity due to parkinsonism). Negative for dizziness, weakness and headaches.   Hematological: Does not bruise/bleed easily.   Psychiatric/Behavioral: Negative for confusion, dysphoric mood, sleep disturbance and suicidal ideas.         Objective:      Physical Exam  Constitutional:       General: He is not in acute distress.     Appearance: Normal appearance. He is well-developed.   HENT:      Head: Normocephalic and atraumatic.      Right Ear: Tympanic membrane and ear canal normal.      Left Ear: Tympanic membrane and ear canal normal.      Nose: No congestion or rhinorrhea.      Mouth/Throat:      Pharynx: No oropharyngeal exudate or posterior oropharyngeal erythema.   Eyes:       General: No scleral icterus.     Conjunctiva/sclera: Conjunctivae normal.      Pupils: Pupils are equal, round, and reactive to light.   Neck:      Musculoskeletal: Neck rigidity present.      Thyroid: No thyromegaly.      Comments: No supraclavicular nodes palpated  Cardiovascular:      Rate and Rhythm: Normal rate and regular rhythm.      Heart sounds: Normal heart sounds. No murmur.   Pulmonary:      Effort: Pulmonary effort is normal.      Breath sounds: Normal breath sounds. No wheezing.   Abdominal:      General: Bowel sounds are normal.      Palpations: Abdomen is soft. There is no mass.      Tenderness: There is no abdominal tenderness.   Musculoskeletal:         General: No tenderness.      Right lower leg: No edema.      Left lower leg: No edema.   Lymphadenopathy:      Cervical: No cervical adenopathy.   Skin:     Coloration: Skin is not pale.      Findings: No rash.   Neurological:      Mental Status: He is alert and oriented to person, place, and time.      Coordination: Coordination abnormal (Spasticity due to parkinsonism).   Psychiatric:         Mood and Affect: Mood normal.         Behavior: Behavior normal.         Assessment:       1. Routine physical examination    2. Hyperlipidemia, unspecified hyperlipidemia type    3. Anxiety    4. Parkinson's disease    5. Obstructive sleep apnea    6. Exposure to COVID-19 virus        Plan:       Adam was seen today for annual exam.    Diagnoses and all orders for this visit:    Routine physical examination    Hyperlipidemia, unspecified hyperlipidemia type    Anxiety    Parkinson's disease    Obstructive sleep apnea    Exposure to COVID-19 virus    Other orders  -     pravastatin (PRAVACHOL) 80 MG tablet; TAKE 1 TABLET(80 MG) BY MOUTH EVERY MORNING        labs previously ordered will be done today including COVID antibody.  He had a few minor illnesses in the early part of this year and is just curious.

## 2020-10-04 ENCOUNTER — PATIENT MESSAGE (OUTPATIENT)
Dept: INTERNAL MEDICINE | Facility: CLINIC | Age: 60
End: 2020-10-04

## 2021-03-25 ENCOUNTER — PATIENT MESSAGE (OUTPATIENT)
Dept: INTERNAL MEDICINE | Facility: CLINIC | Age: 61
End: 2021-03-25

## 2021-03-30 ENCOUNTER — OFFICE VISIT (OUTPATIENT)
Dept: PODIATRY | Facility: CLINIC | Age: 61
End: 2021-03-30
Payer: COMMERCIAL

## 2021-03-30 ENCOUNTER — TELEPHONE (OUTPATIENT)
Dept: PODIATRY | Facility: CLINIC | Age: 61
End: 2021-03-30

## 2021-03-30 VITALS
HEIGHT: 73 IN | HEART RATE: 127 BPM | BODY MASS INDEX: 30.22 KG/M2 | WEIGHT: 228 LBS | SYSTOLIC BLOOD PRESSURE: 123 MMHG | DIASTOLIC BLOOD PRESSURE: 82 MMHG

## 2021-03-30 DIAGNOSIS — M67.479 GANGLION CYST OF FOOT: Primary | ICD-10-CM

## 2021-03-30 PROCEDURE — 3008F BODY MASS INDEX DOCD: CPT | Mod: CPTII,S$GLB,, | Performed by: PODIATRIST

## 2021-03-30 PROCEDURE — 1125F AMNT PAIN NOTED PAIN PRSNT: CPT | Mod: S$GLB,,, | Performed by: PODIATRIST

## 2021-03-30 PROCEDURE — 1125F PR PAIN SEVERITY QUANTIFIED, PAIN PRESENT: ICD-10-PCS | Mod: S$GLB,,, | Performed by: PODIATRIST

## 2021-03-30 PROCEDURE — 99203 PR OFFICE/OUTPT VISIT, NEW, LEVL III, 30-44 MIN: ICD-10-PCS | Mod: 25,S$GLB,, | Performed by: PODIATRIST

## 2021-03-30 PROCEDURE — 20612 GANGLION CYST: ICD-10-PCS | Mod: RT,S$GLB,, | Performed by: PODIATRIST

## 2021-03-30 PROCEDURE — 20612 ASPIRATE/INJ GANGLION CYST: CPT | Mod: RT,S$GLB,, | Performed by: PODIATRIST

## 2021-03-30 PROCEDURE — 99203 OFFICE O/P NEW LOW 30 MIN: CPT | Mod: 25,S$GLB,, | Performed by: PODIATRIST

## 2021-03-30 PROCEDURE — 99999 PR PBB SHADOW E&M-EST. PATIENT-LVL III: CPT | Mod: PBBFAC,,, | Performed by: PODIATRIST

## 2021-03-30 PROCEDURE — 99999 PR PBB SHADOW E&M-EST. PATIENT-LVL III: ICD-10-PCS | Mod: PBBFAC,,, | Performed by: PODIATRIST

## 2021-03-30 PROCEDURE — 3008F PR BODY MASS INDEX (BMI) DOCUMENTED: ICD-10-PCS | Mod: CPTII,S$GLB,, | Performed by: PODIATRIST

## 2021-03-30 RX ORDER — DEXAMETHASONE SODIUM PHOSPHATE 4 MG/ML
4 INJECTION, SOLUTION INTRA-ARTICULAR; INTRALESIONAL; INTRAMUSCULAR; INTRAVENOUS; SOFT TISSUE
Status: DISCONTINUED | OUTPATIENT
Start: 2021-03-30 | End: 2021-03-30 | Stop reason: HOSPADM

## 2021-03-30 RX ORDER — LIDOCAINE HYDROCHLORIDE 10 MG/ML
1 INJECTION INFILTRATION; PERINEURAL
Status: DISCONTINUED | OUTPATIENT
Start: 2021-03-30 | End: 2021-03-30 | Stop reason: HOSPADM

## 2021-03-30 RX ORDER — TRIAMCINOLONE ACETONIDE 40 MG/ML
40 INJECTION, SUSPENSION INTRA-ARTICULAR; INTRAMUSCULAR
Status: DISCONTINUED | OUTPATIENT
Start: 2021-03-30 | End: 2021-03-30 | Stop reason: HOSPADM

## 2021-03-30 RX ORDER — BUPIVACAINE HYDROCHLORIDE 5 MG/ML
5 INJECTION, SOLUTION PERINEURAL
Status: DISCONTINUED | OUTPATIENT
Start: 2021-03-30 | End: 2021-03-30 | Stop reason: HOSPADM

## 2021-03-30 RX ADMIN — LIDOCAINE HYDROCHLORIDE 1 ML: 10 INJECTION INFILTRATION; PERINEURAL at 02:03

## 2021-03-30 RX ADMIN — TRIAMCINOLONE ACETONIDE 40 MG: 40 INJECTION, SUSPENSION INTRA-ARTICULAR; INTRAMUSCULAR at 02:03

## 2021-03-30 RX ADMIN — DEXAMETHASONE SODIUM PHOSPHATE 4 MG: 4 INJECTION, SOLUTION INTRA-ARTICULAR; INTRALESIONAL; INTRAMUSCULAR; INTRAVENOUS; SOFT TISSUE at 02:03

## 2021-03-30 RX ADMIN — BUPIVACAINE HYDROCHLORIDE 5 ML: 5 INJECTION, SOLUTION PERINEURAL at 02:03

## 2021-04-05 RX ORDER — ALPRAZOLAM 0.5 MG/1
TABLET ORAL
Qty: 30 TABLET | Refills: 4 | Status: SHIPPED | OUTPATIENT
Start: 2021-04-05 | End: 2021-12-29 | Stop reason: SDUPTHER

## 2021-04-06 ENCOUNTER — OFFICE VISIT (OUTPATIENT)
Dept: PODIATRY | Facility: CLINIC | Age: 61
End: 2021-04-06
Payer: COMMERCIAL

## 2021-04-06 VITALS
SYSTOLIC BLOOD PRESSURE: 136 MMHG | HEART RATE: 80 BPM | HEIGHT: 73 IN | BODY MASS INDEX: 30.22 KG/M2 | DIASTOLIC BLOOD PRESSURE: 80 MMHG | WEIGHT: 228 LBS

## 2021-04-06 DIAGNOSIS — M67.479 GANGLION CYST OF FOOT: Primary | ICD-10-CM

## 2021-04-06 DIAGNOSIS — L97.512 TOE ULCER, RIGHT, WITH FAT LAYER EXPOSED: ICD-10-CM

## 2021-04-06 PROCEDURE — 1126F AMNT PAIN NOTED NONE PRSNT: CPT | Mod: S$GLB,,, | Performed by: PODIATRIST

## 2021-04-06 PROCEDURE — 99212 OFFICE O/P EST SF 10 MIN: CPT | Mod: S$GLB,,, | Performed by: PODIATRIST

## 2021-04-06 PROCEDURE — 1126F PR PAIN SEVERITY QUANTIFIED, NO PAIN PRESENT: ICD-10-PCS | Mod: S$GLB,,, | Performed by: PODIATRIST

## 2021-04-06 PROCEDURE — 99212 PR OFFICE/OUTPT VISIT, EST, LEVL II, 10-19 MIN: ICD-10-PCS | Mod: S$GLB,,, | Performed by: PODIATRIST

## 2021-04-06 PROCEDURE — 3008F PR BODY MASS INDEX (BMI) DOCUMENTED: ICD-10-PCS | Mod: CPTII,S$GLB,, | Performed by: PODIATRIST

## 2021-04-06 PROCEDURE — 99999 PR PBB SHADOW E&M-EST. PATIENT-LVL III: CPT | Mod: PBBFAC,,, | Performed by: PODIATRIST

## 2021-04-06 PROCEDURE — 3008F BODY MASS INDEX DOCD: CPT | Mod: CPTII,S$GLB,, | Performed by: PODIATRIST

## 2021-04-06 PROCEDURE — 99999 PR PBB SHADOW E&M-EST. PATIENT-LVL III: ICD-10-PCS | Mod: PBBFAC,,, | Performed by: PODIATRIST

## 2021-04-19 ENCOUNTER — TELEPHONE (OUTPATIENT)
Dept: OTOLARYNGOLOGY | Facility: CLINIC | Age: 61
End: 2021-04-19

## 2021-04-19 ENCOUNTER — PATIENT MESSAGE (OUTPATIENT)
Dept: PODIATRY | Facility: CLINIC | Age: 61
End: 2021-04-19

## 2021-04-19 ENCOUNTER — TELEPHONE (OUTPATIENT)
Dept: NEUROLOGY | Facility: CLINIC | Age: 61
End: 2021-04-19

## 2021-04-21 ENCOUNTER — PATIENT MESSAGE (OUTPATIENT)
Dept: NEUROLOGY | Facility: CLINIC | Age: 61
End: 2021-04-21

## 2021-04-26 ENCOUNTER — PATIENT MESSAGE (OUTPATIENT)
Dept: NEUROLOGY | Facility: CLINIC | Age: 61
End: 2021-04-26

## 2021-04-30 ENCOUNTER — PATIENT MESSAGE (OUTPATIENT)
Dept: NEUROLOGY | Facility: CLINIC | Age: 61
End: 2021-04-30

## 2021-05-01 ENCOUNTER — PATIENT MESSAGE (OUTPATIENT)
Dept: NEUROLOGY | Facility: CLINIC | Age: 61
End: 2021-05-01

## 2021-05-03 ENCOUNTER — PATIENT MESSAGE (OUTPATIENT)
Dept: NEUROLOGY | Facility: CLINIC | Age: 61
End: 2021-05-03

## 2021-05-10 DIAGNOSIS — H91.90 HEARING DISORDER, UNSPECIFIED LATERALITY: Primary | ICD-10-CM

## 2021-05-13 ENCOUNTER — OFFICE VISIT (OUTPATIENT)
Dept: OTOLARYNGOLOGY | Facility: CLINIC | Age: 61
End: 2021-05-13

## 2021-05-13 VITALS
DIASTOLIC BLOOD PRESSURE: 78 MMHG | HEIGHT: 73 IN | HEART RATE: 66 BPM | WEIGHT: 232 LBS | SYSTOLIC BLOOD PRESSURE: 139 MMHG | BODY MASS INDEX: 30.75 KG/M2 | TEMPERATURE: 98 F

## 2021-05-13 DIAGNOSIS — H61.23 BILATERAL IMPACTED CERUMEN: ICD-10-CM

## 2021-05-13 DIAGNOSIS — H91.93 HEARING DIFFICULTY OF BOTH EARS: ICD-10-CM

## 2021-05-13 DIAGNOSIS — H93.13 TINNITUS, BILATERAL: ICD-10-CM

## 2021-05-13 PROCEDURE — 99213 PR OFFICE/OUTPT VISIT, EST, LEVL III, 20-29 MIN: ICD-10-PCS | Mod: 25,S$GLB,, | Performed by: OTOLARYNGOLOGY

## 2021-05-13 PROCEDURE — 99213 OFFICE O/P EST LOW 20 MIN: CPT | Mod: 25,S$GLB,, | Performed by: OTOLARYNGOLOGY

## 2021-05-13 PROCEDURE — 69210 EAR CERUMEN REMOVAL: ICD-10-PCS | Mod: S$GLB,,, | Performed by: OTOLARYNGOLOGY

## 2021-05-13 PROCEDURE — 3008F BODY MASS INDEX DOCD: CPT | Mod: CPTII,S$GLB,, | Performed by: OTOLARYNGOLOGY

## 2021-05-13 PROCEDURE — 3008F PR BODY MASS INDEX (BMI) DOCUMENTED: ICD-10-PCS | Mod: CPTII,S$GLB,, | Performed by: OTOLARYNGOLOGY

## 2021-05-13 PROCEDURE — 69210 REMOVE IMPACTED EAR WAX UNI: CPT | Mod: S$GLB,,, | Performed by: OTOLARYNGOLOGY

## 2021-05-13 RX ORDER — OFLOXACIN 3 MG/ML
5 SOLUTION AURICULAR (OTIC) 2 TIMES DAILY
Qty: 10 ML | Refills: 0 | Status: SHIPPED | OUTPATIENT
Start: 2021-05-13 | End: 2023-04-04

## 2021-06-10 ENCOUNTER — OFFICE VISIT (OUTPATIENT)
Dept: OTOLARYNGOLOGY | Facility: CLINIC | Age: 61
End: 2021-06-10
Payer: COMMERCIAL

## 2021-06-10 ENCOUNTER — CLINICAL SUPPORT (OUTPATIENT)
Dept: OTOLARYNGOLOGY | Facility: CLINIC | Age: 61
End: 2021-06-10
Payer: COMMERCIAL

## 2021-06-10 ENCOUNTER — LAB VISIT (OUTPATIENT)
Dept: LAB | Facility: OTHER | Age: 61
End: 2021-06-10
Attending: OTOLARYNGOLOGY
Payer: COMMERCIAL

## 2021-06-10 VITALS
HEART RATE: 82 BPM | HEIGHT: 73 IN | WEIGHT: 229.88 LBS | BODY MASS INDEX: 30.47 KG/M2 | TEMPERATURE: 98 F | SYSTOLIC BLOOD PRESSURE: 123 MMHG | DIASTOLIC BLOOD PRESSURE: 73 MMHG

## 2021-06-10 DIAGNOSIS — H90.3 ASYMMETRIC SNHL (SENSORINEURAL HEARING LOSS): ICD-10-CM

## 2021-06-10 DIAGNOSIS — Z82.2 FAMILY HISTORY OF HEARING LOSS: ICD-10-CM

## 2021-06-10 DIAGNOSIS — Z77.122 HISTORY OF EXPOSURE TO NOISE: ICD-10-CM

## 2021-06-10 DIAGNOSIS — H90.3 ASYMMETRICAL SENSORINEURAL HEARING LOSS: Primary | ICD-10-CM

## 2021-06-10 DIAGNOSIS — Z86.69 HISTORY OF IMPACTED CERUMEN: ICD-10-CM

## 2021-06-10 DIAGNOSIS — R29.2 ABNORMAL ACOUSTIC REFLEX: ICD-10-CM

## 2021-06-10 DIAGNOSIS — H61.22 EXCESSIVE CERUMEN IN EAR CANAL, LEFT: ICD-10-CM

## 2021-06-10 DIAGNOSIS — G20.A1 PARKINSON'S DISEASE: ICD-10-CM

## 2021-06-10 DIAGNOSIS — H93.13 BILATERAL TINNITUS: ICD-10-CM

## 2021-06-10 LAB
CREAT SERPL-MCNC: 1.2 MG/DL (ref 0.5–1.4)
EST. GFR  (AFRICAN AMERICAN): >60 ML/MIN/1.73 M^2
EST. GFR  (NON AFRICAN AMERICAN): >60 ML/MIN/1.73 M^2

## 2021-06-10 PROCEDURE — 99214 OFFICE O/P EST MOD 30 MIN: CPT | Mod: S$GLB,,, | Performed by: OTOLARYNGOLOGY

## 2021-06-10 PROCEDURE — 99214 PR OFFICE/OUTPT VISIT, EST, LEVL IV, 30-39 MIN: ICD-10-PCS | Mod: S$GLB,,, | Performed by: OTOLARYNGOLOGY

## 2021-06-10 PROCEDURE — 92550 PR TYMPANOMETRY AND REFLEX THRESHOLD MEASUREMENTS: ICD-10-PCS | Mod: S$GLB,,, | Performed by: AUDIOLOGIST-HEARING AID FITTER

## 2021-06-10 PROCEDURE — 3008F PR BODY MASS INDEX (BMI) DOCUMENTED: ICD-10-PCS | Mod: CPTII,S$GLB,, | Performed by: OTOLARYNGOLOGY

## 2021-06-10 PROCEDURE — 82565 ASSAY OF CREATININE: CPT | Performed by: OTOLARYNGOLOGY

## 2021-06-10 PROCEDURE — 92557 PR COMPREHENSIVE HEARING TEST: ICD-10-PCS | Mod: S$GLB,,, | Performed by: AUDIOLOGIST-HEARING AID FITTER

## 2021-06-10 PROCEDURE — 3008F BODY MASS INDEX DOCD: CPT | Mod: CPTII,S$GLB,, | Performed by: OTOLARYNGOLOGY

## 2021-06-10 PROCEDURE — 92557 COMPREHENSIVE HEARING TEST: CPT | Mod: S$GLB,,, | Performed by: AUDIOLOGIST-HEARING AID FITTER

## 2021-06-10 PROCEDURE — 36415 COLL VENOUS BLD VENIPUNCTURE: CPT | Performed by: OTOLARYNGOLOGY

## 2021-06-10 PROCEDURE — 92550 TYMPANOMETRY & REFLEX THRESH: CPT | Mod: S$GLB,,, | Performed by: AUDIOLOGIST-HEARING AID FITTER

## 2021-06-19 NOTE — PROGRESS NOTES
HPI     DLS; 3/11/19  Pt states he was seeing floaters in more of his right eye for about a   month but they have seemed to go away. Pt states no VA problems   No flashes   systane gtts     Last edited by Sury Roque MA on 7/17/2020  1:28 PM. (History)        ROS     Positive for: Eyes (strabismus/cat surgeery)    Negative for: Constitutional, Gastrointestinal, Neurological, Skin,   Genitourinary, Musculoskeletal, HENT, Endocrine, Cardiovascular,   Respiratory, Psychiatric, Allergic/Imm, Heme/Lymph    Last edited by Vikas Villegas, OD on 7/17/2020  1:32 PM. (History)        Assessment /Plan     For exam results, see Encounter Report.    Esotropia      1. AET (OD>>OS) sp EOM surgery. Discussed surgery (pt reports some depth perception problems) but he wishes to wait  2. OHT Hx.   iop flux 20-25 without meds prior to cat surgery.  CD wnl--see OCT.  Last VF  wnl.  -fam hx. pach: 579/601. Doubt glauc now--will monitor   3. Sp MFIOL/YAG OU.  Pt happy without spex    PLAN:    rtc 1 yr                  You can access the FollowMyHealth Patient Portal offered by [facility] by registering at the following website: [email or web address]. By joining [facility]’s FollowMyHealth portal, you will also be able to view your health information using other applications (apps) compatible with our system.

## 2021-07-01 ENCOUNTER — HOSPITAL ENCOUNTER (OUTPATIENT)
Dept: RADIOLOGY | Facility: OTHER | Age: 61
Discharge: HOME | End: 2021-07-01
Attending: OTOLARYNGOLOGY
Payer: COMMERCIAL

## 2021-07-01 DIAGNOSIS — H90.3 ASYMMETRIC SNHL (SENSORINEURAL HEARING LOSS): ICD-10-CM

## 2021-07-01 PROCEDURE — 70553 MRI IAC/TEMPORAL BONES W W/O CONTRAST: ICD-10-PCS | Mod: 26,,, | Performed by: RADIOLOGY

## 2021-07-01 PROCEDURE — 70553 MRI BRAIN STEM W/O & W/DYE: CPT | Mod: TC

## 2021-07-01 PROCEDURE — 25500020 PHARM REV CODE 255: Performed by: OTOLARYNGOLOGY

## 2021-07-01 PROCEDURE — A9585 GADOBUTROL INJECTION: HCPCS | Performed by: OTOLARYNGOLOGY

## 2021-07-01 PROCEDURE — 70553 MRI BRAIN STEM W/O & W/DYE: CPT | Mod: 26,,, | Performed by: RADIOLOGY

## 2021-07-01 RX ORDER — GADOBUTROL 604.72 MG/ML
10 INJECTION INTRAVENOUS
Status: COMPLETED | OUTPATIENT
Start: 2021-07-01 | End: 2021-07-01

## 2021-07-01 RX ADMIN — GADOBUTROL 10 ML: 604.72 INJECTION INTRAVENOUS at 02:07

## 2021-07-15 ENCOUNTER — APPOINTMENT (OUTPATIENT)
Dept: RADIOLOGY | Facility: OTHER | Age: 61
End: 2021-07-15
Attending: PODIATRIST
Payer: COMMERCIAL

## 2021-07-15 ENCOUNTER — OFFICE VISIT (OUTPATIENT)
Dept: PODIATRY | Facility: CLINIC | Age: 61
End: 2021-07-15
Payer: COMMERCIAL

## 2021-07-15 VITALS
SYSTOLIC BLOOD PRESSURE: 126 MMHG | HEART RATE: 78 BPM | HEIGHT: 73 IN | WEIGHT: 229.94 LBS | DIASTOLIC BLOOD PRESSURE: 75 MMHG | BODY MASS INDEX: 30.47 KG/M2

## 2021-07-15 DIAGNOSIS — M79.672 LEFT FOOT PAIN: ICD-10-CM

## 2021-07-15 DIAGNOSIS — M76.72 PERONEAL TENDINITIS OF LEFT LOWER EXTREMITY: ICD-10-CM

## 2021-07-15 DIAGNOSIS — M79.672 LEFT FOOT PAIN: Primary | ICD-10-CM

## 2021-07-15 PROCEDURE — 99213 OFFICE O/P EST LOW 20 MIN: CPT | Mod: S$GLB,,, | Performed by: PODIATRIST

## 2021-07-15 PROCEDURE — 73630 XR FOOT COMPLETE 3 VIEW LEFT: ICD-10-PCS | Mod: 26,LT,, | Performed by: RADIOLOGY

## 2021-07-15 PROCEDURE — 99213 PR OFFICE/OUTPT VISIT, EST, LEVL III, 20-29 MIN: ICD-10-PCS | Mod: S$GLB,,, | Performed by: PODIATRIST

## 2021-07-15 PROCEDURE — 3008F PR BODY MASS INDEX (BMI) DOCUMENTED: ICD-10-PCS | Mod: CPTII,S$GLB,, | Performed by: PODIATRIST

## 2021-07-15 PROCEDURE — 99999 PR PBB SHADOW E&M-EST. PATIENT-LVL III: CPT | Mod: PBBFAC,,, | Performed by: PODIATRIST

## 2021-07-15 PROCEDURE — 1126F AMNT PAIN NOTED NONE PRSNT: CPT | Mod: S$GLB,,, | Performed by: PODIATRIST

## 2021-07-15 PROCEDURE — 1126F PR PAIN SEVERITY QUANTIFIED, NO PAIN PRESENT: ICD-10-PCS | Mod: S$GLB,,, | Performed by: PODIATRIST

## 2021-07-15 PROCEDURE — 73630 X-RAY EXAM OF FOOT: CPT | Mod: 26,LT,, | Performed by: RADIOLOGY

## 2021-07-15 PROCEDURE — 3008F BODY MASS INDEX DOCD: CPT | Mod: CPTII,S$GLB,, | Performed by: PODIATRIST

## 2021-07-15 PROCEDURE — 99999 PR PBB SHADOW E&M-EST. PATIENT-LVL III: ICD-10-PCS | Mod: PBBFAC,,, | Performed by: PODIATRIST

## 2021-07-15 PROCEDURE — 73630 X-RAY EXAM OF FOOT: CPT | Mod: TC,LT

## 2021-07-15 RX ORDER — DICLOFENAC SODIUM 30 MG/G
GEL TOPICAL 2 TIMES DAILY PRN
Qty: 100 G | Refills: 1 | Status: SHIPPED | OUTPATIENT
Start: 2021-07-15 | End: 2022-01-04

## 2021-07-23 ENCOUNTER — PATIENT MESSAGE (OUTPATIENT)
Dept: PODIATRY | Facility: CLINIC | Age: 61
End: 2021-07-23

## 2021-07-23 DIAGNOSIS — M76.72 PERONEAL TENDINITIS OF LEFT LOWER EXTREMITY: Primary | ICD-10-CM

## 2021-08-27 ENCOUNTER — TELEPHONE (OUTPATIENT)
Dept: NEUROLOGY | Facility: CLINIC | Age: 61
End: 2021-08-27

## 2021-09-14 ENCOUNTER — CLINICAL SUPPORT (OUTPATIENT)
Dept: REHABILITATION | Facility: OTHER | Age: 61
End: 2021-09-14
Attending: PODIATRIST
Payer: COMMERCIAL

## 2021-09-14 DIAGNOSIS — M76.72 PERONEAL TENDINITIS OF LEFT LOWER EXTREMITY: ICD-10-CM

## 2021-09-14 DIAGNOSIS — R26.9 GAIT ABNORMALITY: ICD-10-CM

## 2021-09-14 DIAGNOSIS — M25.572 LEFT LATERAL ANKLE PAIN: ICD-10-CM

## 2021-09-14 PROCEDURE — 97110 THERAPEUTIC EXERCISES: CPT | Mod: PN | Performed by: PHYSICAL THERAPIST

## 2021-09-14 PROCEDURE — 97162 PT EVAL MOD COMPLEX 30 MIN: CPT | Mod: PN | Performed by: PHYSICAL THERAPIST

## 2021-09-15 ENCOUNTER — OFFICE VISIT (OUTPATIENT)
Dept: NEUROLOGY | Facility: CLINIC | Age: 61
End: 2021-09-15
Payer: COMMERCIAL

## 2021-09-15 VITALS
HEART RATE: 89 BPM | WEIGHT: 229.94 LBS | HEIGHT: 73 IN | BODY MASS INDEX: 30.47 KG/M2 | SYSTOLIC BLOOD PRESSURE: 128 MMHG | DIASTOLIC BLOOD PRESSURE: 81 MMHG

## 2021-09-15 DIAGNOSIS — G20.A1 PARKINSON DISEASE: Primary | ICD-10-CM

## 2021-09-15 PROCEDURE — 1159F PR MEDICATION LIST DOCUMENTED IN MEDICAL RECORD: ICD-10-PCS | Mod: CPTII,S$GLB,, | Performed by: PSYCHIATRY & NEUROLOGY

## 2021-09-15 PROCEDURE — 99214 PR OFFICE/OUTPT VISIT, EST, LEVL IV, 30-39 MIN: ICD-10-PCS | Mod: S$GLB,,, | Performed by: PSYCHIATRY & NEUROLOGY

## 2021-09-15 PROCEDURE — 3008F PR BODY MASS INDEX (BMI) DOCUMENTED: ICD-10-PCS | Mod: CPTII,S$GLB,, | Performed by: PSYCHIATRY & NEUROLOGY

## 2021-09-15 PROCEDURE — 3079F PR MOST RECENT DIASTOLIC BLOOD PRESSURE 80-89 MM HG: ICD-10-PCS | Mod: CPTII,S$GLB,, | Performed by: PSYCHIATRY & NEUROLOGY

## 2021-09-15 PROCEDURE — 1159F MED LIST DOCD IN RCRD: CPT | Mod: CPTII,S$GLB,, | Performed by: PSYCHIATRY & NEUROLOGY

## 2021-09-15 PROCEDURE — 99999 PR PBB SHADOW E&M-EST. PATIENT-LVL III: ICD-10-PCS | Mod: PBBFAC,,, | Performed by: PSYCHIATRY & NEUROLOGY

## 2021-09-15 PROCEDURE — 3074F PR MOST RECENT SYSTOLIC BLOOD PRESSURE < 130 MM HG: ICD-10-PCS | Mod: CPTII,S$GLB,, | Performed by: PSYCHIATRY & NEUROLOGY

## 2021-09-15 PROCEDURE — 3074F SYST BP LT 130 MM HG: CPT | Mod: CPTII,S$GLB,, | Performed by: PSYCHIATRY & NEUROLOGY

## 2021-09-15 PROCEDURE — 99214 OFFICE O/P EST MOD 30 MIN: CPT | Mod: S$GLB,,, | Performed by: PSYCHIATRY & NEUROLOGY

## 2021-09-15 PROCEDURE — 3008F BODY MASS INDEX DOCD: CPT | Mod: CPTII,S$GLB,, | Performed by: PSYCHIATRY & NEUROLOGY

## 2021-09-15 PROCEDURE — 99999 PR PBB SHADOW E&M-EST. PATIENT-LVL III: CPT | Mod: PBBFAC,,, | Performed by: PSYCHIATRY & NEUROLOGY

## 2021-09-15 PROCEDURE — 3079F DIAST BP 80-89 MM HG: CPT | Mod: CPTII,S$GLB,, | Performed by: PSYCHIATRY & NEUROLOGY

## 2021-09-22 ENCOUNTER — CLINICAL SUPPORT (OUTPATIENT)
Dept: REHABILITATION | Facility: OTHER | Age: 61
End: 2021-09-22
Payer: COMMERCIAL

## 2021-09-22 DIAGNOSIS — M25.572 LEFT LATERAL ANKLE PAIN: Primary | ICD-10-CM

## 2021-09-22 DIAGNOSIS — R26.9 GAIT ABNORMALITY: ICD-10-CM

## 2021-09-22 PROCEDURE — 97110 THERAPEUTIC EXERCISES: CPT | Mod: PN | Performed by: PHYSICAL THERAPIST

## 2021-09-29 ENCOUNTER — CLINICAL SUPPORT (OUTPATIENT)
Dept: REHABILITATION | Facility: OTHER | Age: 61
End: 2021-09-29
Payer: COMMERCIAL

## 2021-09-29 DIAGNOSIS — M25.572 LEFT LATERAL ANKLE PAIN: ICD-10-CM

## 2021-09-29 DIAGNOSIS — R26.9 GAIT ABNORMALITY: ICD-10-CM

## 2021-09-29 PROCEDURE — 97110 THERAPEUTIC EXERCISES: CPT | Mod: PN,CQ

## 2021-09-29 PROCEDURE — 97112 NEUROMUSCULAR REEDUCATION: CPT | Mod: PN,CQ

## 2021-10-04 ENCOUNTER — PATIENT MESSAGE (OUTPATIENT)
Dept: ADMINISTRATIVE | Facility: HOSPITAL | Age: 61
End: 2021-10-04

## 2021-10-04 ENCOUNTER — PATIENT MESSAGE (OUTPATIENT)
Dept: NEUROLOGY | Facility: CLINIC | Age: 61
End: 2021-10-04

## 2021-10-04 DIAGNOSIS — G20.A1 PARKINSON DISEASE: Primary | ICD-10-CM

## 2021-10-06 ENCOUNTER — CLINICAL SUPPORT (OUTPATIENT)
Dept: REHABILITATION | Facility: OTHER | Age: 61
End: 2021-10-06
Payer: COMMERCIAL

## 2021-10-06 DIAGNOSIS — M25.572 LEFT LATERAL ANKLE PAIN: ICD-10-CM

## 2021-10-06 DIAGNOSIS — R26.9 GAIT ABNORMALITY: ICD-10-CM

## 2021-10-06 PROCEDURE — 97110 THERAPEUTIC EXERCISES: CPT | Mod: PN,CQ

## 2021-10-06 PROCEDURE — 97112 NEUROMUSCULAR REEDUCATION: CPT | Mod: PN,CQ

## 2021-10-08 RX ORDER — LEVODOPA AND CARBIDOPA 95; 23.75 MG/1; MG/1
3 CAPSULE, EXTENDED RELEASE ORAL 3 TIMES DAILY
Qty: 270 CAPSULE | Refills: 11 | Status: SHIPPED | OUTPATIENT
Start: 2021-10-08 | End: 2022-09-23 | Stop reason: SDUPTHER

## 2021-10-11 RX ORDER — PRAVASTATIN SODIUM 80 MG/1
TABLET ORAL
Qty: 90 TABLET | Refills: 0 | Status: SHIPPED | OUTPATIENT
Start: 2021-10-11 | End: 2022-01-13

## 2021-10-13 ENCOUNTER — CLINICAL SUPPORT (OUTPATIENT)
Dept: REHABILITATION | Facility: OTHER | Age: 61
End: 2021-10-13
Payer: COMMERCIAL

## 2021-10-13 DIAGNOSIS — M25.572 LEFT LATERAL ANKLE PAIN: Primary | ICD-10-CM

## 2021-10-13 DIAGNOSIS — R26.9 GAIT ABNORMALITY: ICD-10-CM

## 2021-10-13 PROCEDURE — 97110 THERAPEUTIC EXERCISES: CPT | Mod: PN | Performed by: PHYSICAL THERAPIST

## 2021-10-13 RX ORDER — LEVODOPA AND CARBIDOPA 95; 23.75 MG/1; MG/1
3 CAPSULE, EXTENDED RELEASE ORAL 3 TIMES DAILY
Qty: 270 CAPSULE | Refills: 5 | Status: SHIPPED | OUTPATIENT
Start: 2021-10-13 | End: 2023-06-19 | Stop reason: SDUPTHER

## 2021-12-29 ENCOUNTER — LAB VISIT (OUTPATIENT)
Dept: LAB | Facility: HOSPITAL | Age: 61
End: 2021-12-29
Attending: INTERNAL MEDICINE
Payer: COMMERCIAL

## 2021-12-29 ENCOUNTER — OFFICE VISIT (OUTPATIENT)
Dept: INTERNAL MEDICINE | Facility: CLINIC | Age: 61
End: 2021-12-29
Payer: COMMERCIAL

## 2021-12-29 VITALS
WEIGHT: 228.63 LBS | HEART RATE: 76 BPM | DIASTOLIC BLOOD PRESSURE: 80 MMHG | RESPIRATION RATE: 16 BRPM | HEIGHT: 73 IN | OXYGEN SATURATION: 97 % | BODY MASS INDEX: 30.3 KG/M2 | SYSTOLIC BLOOD PRESSURE: 130 MMHG | TEMPERATURE: 98 F

## 2021-12-29 DIAGNOSIS — F41.9 ANXIETY: ICD-10-CM

## 2021-12-29 DIAGNOSIS — G47.33 OBSTRUCTIVE SLEEP APNEA: ICD-10-CM

## 2021-12-29 DIAGNOSIS — Z00.00 ROUTINE PHYSICAL EXAMINATION: ICD-10-CM

## 2021-12-29 DIAGNOSIS — G20.A1 PARKINSON'S DISEASE: ICD-10-CM

## 2021-12-29 DIAGNOSIS — Z00.00 ROUTINE PHYSICAL EXAMINATION: Primary | ICD-10-CM

## 2021-12-29 DIAGNOSIS — Z12.5 SCREENING FOR PROSTATE CANCER: ICD-10-CM

## 2021-12-29 DIAGNOSIS — Z78.9 IMPAIRED MOTOR CONTROL: ICD-10-CM

## 2021-12-29 DIAGNOSIS — Z82.49 FAMILY HISTORY OF HEART DISEASE: ICD-10-CM

## 2021-12-29 DIAGNOSIS — E78.5 HYPERLIPIDEMIA, UNSPECIFIED HYPERLIPIDEMIA TYPE: ICD-10-CM

## 2021-12-29 LAB
ALBUMIN SERPL BCP-MCNC: 4.3 G/DL (ref 3.5–5.2)
ALP SERPL-CCNC: 66 U/L (ref 55–135)
ALT SERPL W/O P-5'-P-CCNC: 8 U/L (ref 10–44)
ANION GAP SERPL CALC-SCNC: 5 MMOL/L (ref 8–16)
AST SERPL-CCNC: 30 U/L (ref 10–40)
BILIRUB SERPL-MCNC: 0.9 MG/DL (ref 0.1–1)
BILIRUB UR QL STRIP: NEGATIVE
BUN SERPL-MCNC: 12 MG/DL (ref 6–20)
CALCIUM SERPL-MCNC: 9.2 MG/DL (ref 8.7–10.5)
CHLORIDE SERPL-SCNC: 102 MMOL/L (ref 95–110)
CHOLEST SERPL-MCNC: 212 MG/DL (ref 120–199)
CHOLEST/HDLC SERPL: 3.4 {RATIO} (ref 2–5)
CLARITY UR REFRACT.AUTO: CLEAR
CO2 SERPL-SCNC: 31 MMOL/L (ref 23–29)
COLOR UR AUTO: YELLOW
COMPLEXED PSA SERPL-MCNC: 0.4 NG/ML (ref 0–4)
CREAT SERPL-MCNC: 1 MG/DL (ref 0.5–1.4)
EST. GFR  (AFRICAN AMERICAN): >60 ML/MIN/1.73 M^2
EST. GFR  (NON AFRICAN AMERICAN): >60 ML/MIN/1.73 M^2
ESTIMATED AVG GLUCOSE: 117 MG/DL (ref 68–131)
GLUCOSE SERPL-MCNC: 108 MG/DL (ref 70–110)
GLUCOSE UR QL STRIP: NEGATIVE
HBA1C MFR BLD: 5.7 % (ref 4–5.6)
HDLC SERPL-MCNC: 62 MG/DL (ref 40–75)
HDLC SERPL: 29.2 % (ref 20–50)
HGB UR QL STRIP: NEGATIVE
KETONES UR QL STRIP: ABNORMAL
LDLC SERPL CALC-MCNC: 131.6 MG/DL (ref 63–159)
LEUKOCYTE ESTERASE UR QL STRIP: NEGATIVE
NITRITE UR QL STRIP: NEGATIVE
NONHDLC SERPL-MCNC: 150 MG/DL
PH UR STRIP: 7 [PH] (ref 5–8)
POTASSIUM SERPL-SCNC: 4.6 MMOL/L (ref 3.5–5.1)
PROT SERPL-MCNC: 7.9 G/DL (ref 6–8.4)
PROT UR QL STRIP: NEGATIVE
SODIUM SERPL-SCNC: 138 MMOL/L (ref 136–145)
SP GR UR STRIP: 1.02 (ref 1–1.03)
TRIGL SERPL-MCNC: 92 MG/DL (ref 30–150)
URN SPEC COLLECT METH UR: ABNORMAL

## 2021-12-29 PROCEDURE — 3079F DIAST BP 80-89 MM HG: CPT | Mod: CPTII,S$GLB,, | Performed by: INTERNAL MEDICINE

## 2021-12-29 PROCEDURE — 36415 COLL VENOUS BLD VENIPUNCTURE: CPT | Performed by: INTERNAL MEDICINE

## 2021-12-29 PROCEDURE — 3008F PR BODY MASS INDEX (BMI) DOCUMENTED: ICD-10-PCS | Mod: CPTII,S$GLB,, | Performed by: INTERNAL MEDICINE

## 2021-12-29 PROCEDURE — 1160F RVW MEDS BY RX/DR IN RCRD: CPT | Mod: CPTII,S$GLB,, | Performed by: INTERNAL MEDICINE

## 2021-12-29 PROCEDURE — 83036 HEMOGLOBIN GLYCOSYLATED A1C: CPT | Performed by: INTERNAL MEDICINE

## 2021-12-29 PROCEDURE — 99396 PREV VISIT EST AGE 40-64: CPT | Mod: S$GLB,,, | Performed by: INTERNAL MEDICINE

## 2021-12-29 PROCEDURE — 80053 COMPREHEN METABOLIC PANEL: CPT | Performed by: INTERNAL MEDICINE

## 2021-12-29 PROCEDURE — 3075F PR MOST RECENT SYSTOLIC BLOOD PRESS GE 130-139MM HG: ICD-10-PCS | Mod: CPTII,S$GLB,, | Performed by: INTERNAL MEDICINE

## 2021-12-29 PROCEDURE — 84153 ASSAY OF PSA TOTAL: CPT | Performed by: INTERNAL MEDICINE

## 2021-12-29 PROCEDURE — 3079F PR MOST RECENT DIASTOLIC BLOOD PRESSURE 80-89 MM HG: ICD-10-PCS | Mod: CPTII,S$GLB,, | Performed by: INTERNAL MEDICINE

## 2021-12-29 PROCEDURE — 99999 PR PBB SHADOW E&M-EST. PATIENT-LVL V: CPT | Mod: PBBFAC,,, | Performed by: INTERNAL MEDICINE

## 2021-12-29 PROCEDURE — 81003 URINALYSIS AUTO W/O SCOPE: CPT | Performed by: INTERNAL MEDICINE

## 2021-12-29 PROCEDURE — 1160F PR REVIEW ALL MEDS BY PRESCRIBER/CLIN PHARMACIST DOCUMENTED: ICD-10-PCS | Mod: CPTII,S$GLB,, | Performed by: INTERNAL MEDICINE

## 2021-12-29 PROCEDURE — 99999 PR PBB SHADOW E&M-EST. PATIENT-LVL V: ICD-10-PCS | Mod: PBBFAC,,, | Performed by: INTERNAL MEDICINE

## 2021-12-29 PROCEDURE — 80061 LIPID PANEL: CPT | Performed by: INTERNAL MEDICINE

## 2021-12-29 PROCEDURE — 99396 PR PREVENTIVE VISIT,EST,40-64: ICD-10-PCS | Mod: S$GLB,,, | Performed by: INTERNAL MEDICINE

## 2021-12-29 PROCEDURE — 3075F SYST BP GE 130 - 139MM HG: CPT | Mod: CPTII,S$GLB,, | Performed by: INTERNAL MEDICINE

## 2021-12-29 PROCEDURE — 1159F MED LIST DOCD IN RCRD: CPT | Mod: CPTII,S$GLB,, | Performed by: INTERNAL MEDICINE

## 2021-12-29 PROCEDURE — 3008F BODY MASS INDEX DOCD: CPT | Mod: CPTII,S$GLB,, | Performed by: INTERNAL MEDICINE

## 2021-12-29 PROCEDURE — 1159F PR MEDICATION LIST DOCUMENTED IN MEDICAL RECORD: ICD-10-PCS | Mod: CPTII,S$GLB,, | Performed by: INTERNAL MEDICINE

## 2021-12-29 RX ORDER — ALPRAZOLAM 0.5 MG/1
TABLET ORAL
Qty: 30 TABLET | Refills: 4 | Status: SHIPPED | OUTPATIENT
Start: 2021-12-29 | End: 2022-10-04

## 2021-12-30 ENCOUNTER — PATIENT MESSAGE (OUTPATIENT)
Dept: INTERNAL MEDICINE | Facility: CLINIC | Age: 61
End: 2021-12-30
Payer: COMMERCIAL

## 2022-01-04 ENCOUNTER — OFFICE VISIT (OUTPATIENT)
Dept: NEUROLOGY | Facility: CLINIC | Age: 62
End: 2022-01-04
Payer: COMMERCIAL

## 2022-01-04 VITALS
HEIGHT: 73 IN | DIASTOLIC BLOOD PRESSURE: 81 MMHG | WEIGHT: 228.63 LBS | HEART RATE: 72 BPM | SYSTOLIC BLOOD PRESSURE: 129 MMHG | BODY MASS INDEX: 30.3 KG/M2

## 2022-01-04 DIAGNOSIS — Z71.89 COUNSELING REGARDING GOALS OF CARE: ICD-10-CM

## 2022-01-04 DIAGNOSIS — F32.A DEPRESSION, UNSPECIFIED DEPRESSION TYPE: ICD-10-CM

## 2022-01-04 DIAGNOSIS — G20.A1 PARKINSON DISEASE: Primary | ICD-10-CM

## 2022-01-04 PROCEDURE — 1160F PR REVIEW ALL MEDS BY PRESCRIBER/CLIN PHARMACIST DOCUMENTED: ICD-10-PCS | Mod: CPTII,S$GLB,, | Performed by: PHYSICIAN ASSISTANT

## 2022-01-04 PROCEDURE — 3074F PR MOST RECENT SYSTOLIC BLOOD PRESSURE < 130 MM HG: ICD-10-PCS | Mod: CPTII,S$GLB,, | Performed by: PHYSICIAN ASSISTANT

## 2022-01-04 PROCEDURE — 3079F PR MOST RECENT DIASTOLIC BLOOD PRESSURE 80-89 MM HG: ICD-10-PCS | Mod: CPTII,S$GLB,, | Performed by: PHYSICIAN ASSISTANT

## 2022-01-04 PROCEDURE — 3008F BODY MASS INDEX DOCD: CPT | Mod: CPTII,S$GLB,, | Performed by: PHYSICIAN ASSISTANT

## 2022-01-04 PROCEDURE — 99214 PR OFFICE/OUTPT VISIT, EST, LEVL IV, 30-39 MIN: ICD-10-PCS | Mod: S$GLB,,, | Performed by: PHYSICIAN ASSISTANT

## 2022-01-04 PROCEDURE — 99999 PR PBB SHADOW E&M-EST. PATIENT-LVL III: ICD-10-PCS | Mod: PBBFAC,,, | Performed by: PHYSICIAN ASSISTANT

## 2022-01-04 PROCEDURE — 1160F RVW MEDS BY RX/DR IN RCRD: CPT | Mod: CPTII,S$GLB,, | Performed by: PHYSICIAN ASSISTANT

## 2022-01-04 PROCEDURE — 3079F DIAST BP 80-89 MM HG: CPT | Mod: CPTII,S$GLB,, | Performed by: PHYSICIAN ASSISTANT

## 2022-01-04 PROCEDURE — 99214 OFFICE O/P EST MOD 30 MIN: CPT | Mod: S$GLB,,, | Performed by: PHYSICIAN ASSISTANT

## 2022-01-04 PROCEDURE — 3008F PR BODY MASS INDEX (BMI) DOCUMENTED: ICD-10-PCS | Mod: CPTII,S$GLB,, | Performed by: PHYSICIAN ASSISTANT

## 2022-01-04 PROCEDURE — 1159F PR MEDICATION LIST DOCUMENTED IN MEDICAL RECORD: ICD-10-PCS | Mod: CPTII,S$GLB,, | Performed by: PHYSICIAN ASSISTANT

## 2022-01-04 PROCEDURE — 1159F MED LIST DOCD IN RCRD: CPT | Mod: CPTII,S$GLB,, | Performed by: PHYSICIAN ASSISTANT

## 2022-01-04 PROCEDURE — 3074F SYST BP LT 130 MM HG: CPT | Mod: CPTII,S$GLB,, | Performed by: PHYSICIAN ASSISTANT

## 2022-01-04 PROCEDURE — 99999 PR PBB SHADOW E&M-EST. PATIENT-LVL III: CPT | Mod: PBBFAC,,, | Performed by: PHYSICIAN ASSISTANT

## 2022-01-04 NOTE — PROGRESS NOTES
"Name: Adam Dalal  MRN: [id number]   CSN: [id number]      Date: 01/04/2022    Chief Complaint / Interval History:   - doing pretty well   - a little more depression   - on rytary now, doing well   - some toe curling, worse when stressed   - still taking rasagiline   - one day he forgot to take rasagiline and it really felt it   - no falls  - denies constipation or hallucinations   - still working   - appetite is good, sleep is okay -- some nocturia   - not exercising, wants to get into it with the new year        From Sept 2021  - last seen April 2020   - taking cd/ld qid, takes 1 at 6, 1/2 at 8, 1 noon and 1 at 5p.  Also taking   - taking welcana at night  - interested in taking longer acting  - had some off dystonia while driving to [place] and "needing more dopamine" - but also describing more foot dystonia, not clear if on or off  - looking for more research options down the line  - still planning on working for 5-6 years, wonders how he will do  - sleeping ok, appetite, ok, some weight gain        From April 2020  - had great trip to [facility]  - had a stumble/slip while on vacation  - still taking 1 cd/ld tid now  -  Misses periodically daytime dosing  - some more exercising at home, going to the park  - missing a little bit of the big and loud  - mood and memory are pretty good  - balance is holding well  - more cautious, appropriate          From 2019  - last seen 4 months ago  - good vacation in [place]  - no falls!  - still taking Azilect and cd/ld - sometimes an extra dose at night - almost never more than total of three tabs per day  - has not tried taking more  - feeling more normal  - would consider higher dose medication  - would consdier MM  - interested in research trials      History of Present Illness (HPI):  59 yo here for evaluation for his presumed PD.  Progressed with slowness and stiffness about 4 years ago.  Tried to blame on aging at the time.  Generally was slower getting out of " "couches.  Was shuffling and unable to keep up.  Would drag the R foot.  BEhind and above the R knee tends to get stiffer.  Now more progressive slowness and trouble with dressing.  Still driving a car, more issues with backing up and trouble seeing around while driving.      Wife Berenice is here.  She notes that he "feels like he is failing    Works at the [employer] on [employer].    Nonmotor/Premotor ROS:  Hyposmia (HENT)?Yes - a little diminished  RBD/sleep issues (Constitutional)?No, used to snore more   Depression/anxiety (Psychiatric)?Yes - especially at night  Fatigue (Constitutional)?Yes  Constipation (GI)?No  Urinary issues ()?Yes - urgency  Sexual dysfunction ()?Yes - mild ED  Orthostasis (Cardiovascular)?No  Leg swelling (Cardiovascular)? 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    Social History: The patient  reports that he quit smoking about 16 years ago. He has a 1.00 pack-year smoking history. He has never used smokeless tobacco. He reports current alcohol use of about 20.0 standard drinks of alcohol per week. He reports that he does not use drugs.    Family History: Their family history includes Arthritis in his brother and mother; Atrial fibrillation in his sister; Cataracts in his maternal grandmother and paternal grandmother; Hearing loss in his mother; Heart attack in his father; Sleep apnea in his brother.    Allergies: Patient has no known allergies.     Meds:   Current Outpatient Medications on File Prior to Visit   Medication Sig Dispense Refill    ALPRAZolam (XANAX) 0.5 " "MG tablet TAKE 1 TABLET(0.5 MG) BY MOUTH EVERY NIGHT FOR INSOMNIA/ANXIETY 30 tablet 4    ofloxacin (FLOXIN) 0.3 % otic solution Place 5 drops into the left ear 2 (two) times daily. 10 mL 0    pravastatin (PRAVACHOL) 80 MG tablet TAKE 1 TABLET(80 MG) BY MOUTH EVERY MORNING 90 tablet 0    rasagiline (AZILECT) 1 mg Tab Take 1 tablet (1 mg total) by mouth once daily. 30 tablet 11    carbidopa-levodopa (RYTARY) 23.75-95 mg CpSR Take 3 capsules by mouth 3 (three) times daily. (Patient not taking: Reported on 1/4/2022) 270 capsule 5    carbidopa-levodopa (RYTARY) 23.75-95 mg CpSR Take 3 capsules by mouth 3 (three) times daily. 270 capsule 11    carbidopa-levodopa  mg (SINEMET)  mg per tablet TAKE 1 TABLET BY MOUTH IN THE MORNING, 1 TABLET MID DAY, AND 1 TABLET EVERY EVENING 270 tablet 3    diclofenac sodium (SOLARAZE) 3 % gel Apply topically 2 (two) times daily as needed (for foot pain). 100 g 1    ibuprofen (ADVIL,MOTRIN) 800 MG tablet TK 1 T PO TID FOR 10 DAYS  0     No current facility-administered medications on file prior to visit.       Exam:  /81   Pulse 72   Ht 6' 1" (1.854 m)   Wt 103.7 kg (228 lb 9.9 oz)   BMI 30.16 kg/m²     Constitutional  Well-developed, well-nourished, appears stated age   Ophthalmoscopic  No papilledema with no hemorrhages or exudates bilaterally   Cardiovascular  Radial pulses 2+ and symmetric, no LE edema bilaterally   Neurological    * Mental status  MOCA =      - Orientation  Oriented to person, place, time, and situation     - Memory   Intact recent and remote     - Attention/concentration  Attentive, vigilant during exam     - Language  Naming & repetition intact, +2-step commands     - Fund of knowledge  Aware of current events     - Executive  Well-organized thoughts     - Other     * Cranial nerves       - CN II  PERRL, visual fields full to confrontation     - CN III, IV, VI  Extraocular movements full but some drift of the R eye out (history of " strabismus eye surgery), normal pursuits and saccades     - CN V  Sensation V1 - V3 intact     - CN VII  Face strong and symmetric bilaterally     - CN VIII  Hearing intact bilaterally     - CN IX, X  Palate raises midline and symmetric     - CN XI  SCM and trapezius 5/5 bilaterally     - CN XII  Tongue midline   * Motor  Muscle bulk normal, strength 5/5 throughout   * Sensory   Intact to temperature and vibration throughout   * Coordination  No dysmetria with finger-to-nose or heel-to-shin   * Gait  See below.   * Deep tendon reflexes  2+ and symmetric throughout   Babinski downgoing bilaterally   * Specialized movement exam    R>L mild-mod symptoms, akinetic rigid     Medical Record Review:  - Lab Results:  Lab Visit on 12/29/2021   Component Date Value Ref Range Status    PSA, Screen 12/29/2021 0.40  0.00 - 4.00 ng/mL Final    Cholesterol 12/29/2021 212* 120 - 199 mg/dL Final    Triglycerides 12/29/2021 92  30 - 150 mg/dL Final    HDL 12/29/2021 62  40 - 75 mg/dL Final    LDL Cholesterol 12/29/2021 131.6  63.0 - 159.0 mg/dL Final    HDL/Cholesterol Ratio 12/29/2021 29.2  20.0 - 50.0 % Final    Total Cholesterol/HDL Ratio 12/29/2021 3.4  2.0 - 5.0 Final    Non-HDL Cholesterol 12/29/2021 150  mg/dL Final    Sodium 12/29/2021 138  136 - 145 mmol/L Final    Potassium 12/29/2021 4.6  3.5 - 5.1 mmol/L Final    Chloride 12/29/2021 102  95 - 110 mmol/L Final    CO2 12/29/2021 31* 23 - 29 mmol/L Final    Glucose 12/29/2021 108  70 - 110 mg/dL Final    BUN 12/29/2021 12  6 - 20 mg/dL Final    Creatinine 12/29/2021 1.0  0.5 - 1.4 mg/dL Final    Calcium 12/29/2021 9.2  8.7 - 10.5 mg/dL Final    Total Protein 12/29/2021 7.9  6.0 - 8.4 g/dL Final    Albumin 12/29/2021 4.3  3.5 - 5.2 g/dL Final    Total Bilirubin 12/29/2021 0.9  0.1 - 1.0 mg/dL Final    Alkaline Phosphatase 12/29/2021 66  55 - 135 U/L Final    AST 12/29/2021 30  10 - 40 U/L Final    ALT 12/29/2021 8* 10 - 44 U/L Final    Anion Gap  2021 5* 8 - 16 mmol/L Final    eGFR if African American 2021 >60.0  >60 mL/min/1.73 m^2 Final    eGFR if non African American 2021 >60.0  >60 mL/min/1.73 m^2 Final    Hemoglobin A1C 2021 5.7* 4.0 - 5.6 % Final    Estimated Avg Glucose 2021 117  68 - 131 mg/dL Final   Office Visit on 2021   Component Date Value Ref Range Status    Specimen UA 2021 Urine, Clean Catch   Final    Color, UA 2021 Yellow  Yellow, Straw, Bria Final    Appearance, UA 2021 Clear  Clear Final    pH, UA 2021 7.0  5.0 - 8.0 Final    Specific [place], UA 2021 1.020  1.005 - 1.030 Final    Protein, UA 2021 Negative  Negative Final    Glucose, UA 2021 Negative  Negative Final    Ketones, UA 2021 Trace* Negative Final    Bilirubin (UA) 2021 Negative  Negative Final    Occult Blood UA 2021 Negative  Negative Final    Nitrite, UA 2021 Negative  Negative Final    Leukocytes, UA 2021 Negative  Negative Final       Diagnoses:          PD, akinetic-rigid.  Eye movements are abnml from NM perspective - old strabismus - but watching closely.  NO clear slowed pursuits or saccades, but some intrusive jerks - might all still be from longstanding changes/surgeries.    Medical Decision Makin) continue rytary   2) continue azilect 1 mg now  3) discussed MM  4) PT/OT --> LSVT BIg/LOUD now  5) MEdi diet  6) exercise as able -- for both mood and movement, hold off on addition of SSRI for now   7) discussed DBS, wants to rediscuss in 6 months with [facility]         F/u in 6 months with [facility]      Collaborating Physician, Dr. Babcock, was available during today's encounter. Any change to plan along with cosign to appear in the EMR.       I spent 35 minutes with the patient, reviewing past encounters, labs and imaging.        Jes Paniagua PA-C   [facility]  Department of Neurology  Movement Disorders

## 2022-01-09 NOTE — TELEPHONE ENCOUNTER
No new care gaps identified.  Powered by [employer]. Reference number: [id number].   1/09/2022 5:56:23 AM CST

## 2022-01-13 RX ORDER — PRAVASTATIN SODIUM 80 MG/1
TABLET ORAL
Qty: 90 TABLET | Refills: 3 | Status: SHIPPED | OUTPATIENT
Start: 2022-01-13 | End: 2023-04-04 | Stop reason: SDUPTHER

## 2022-01-13 NOTE — TELEPHONE ENCOUNTER
Refill Authorization Note   Adam Dalal  is requesting a refill authorization.  Brief Assessment and Rationale for Refill:  Approve     Medication Therapy Plan:       Medication Reconciliation Completed: No   Comments:   --->Care Gap information included below if applicable.   Orders Placed This Encounter    pravastatin (PRAVACHOL) 80 MG tablet      Requested Prescriptions   Signed Prescriptions Disp Refills    pravastatin (PRAVACHOL) 80 MG tablet 90 tablet 3     Sig: TAKE 1 TABLET(80 MG) BY MOUTH EVERY MORNING       Cardiovascular:  Antilipid - Statins Passed - 1/13/2022  1:23 AM        Passed - Patient is at least 18 years old        Passed - Valid encounter within last 15 months     Recent Visits  Date Type Provider Dept   12/29/21 Office Visit Rashi Gee MD [facility]   10/02/20 Office Visit Rashi Gee MD [facility]   Showing recent visits within past 720 days and meeting all other requirements  Future Appointments  No visits were found meeting these conditions.  Showing future appointments within next 150 days and meeting all other requirements                Passed - ALT is 131 or below and within 360 days     ALT   Date Value Ref Range Status   12/29/2021 8 (L) 10 - 44 U/L Final   10/02/2020 14 10 - 44 U/L Final   06/25/2018 12 10 - 44 U/L Final              Passed - AST is 119 or below and within 360 days     AST   Date Value Ref Range Status   12/29/2021 30 10 - 40 U/L Final   10/02/2020 32 10 - 40 U/L Final   06/25/2018 24 10 - 40 U/L Final              Passed - Total Cholesterol within 360 days     Lab Results   Component Value Date    CHOL 212 (H) 12/29/2021    CHOL 196 10/02/2020    CHOL 204 (H) 10/28/2019    CHOL 204 (H) 10/28/2019              Passed - LDL within 360 days     LDL Cholesterol   Date Value Ref Range Status   12/29/2021 131.6 63.0 - 159.0 mg/dL Final     Comment:     The National Cholesterol Education Program (NCEP) has set the  following  guidelines (reference values) for LDL Cholesterol:  Optimal.......................<130 mg/dL  Borderline High...............130-159 mg/dL  High..........................160-189 mg/dL  Very High.....................>190 mg/dL              Passed - HDL within 360 days     HDL   Date Value Ref Range Status   12/29/2021 62 40 - 75 mg/dL Final     Comment:     The National Cholesterol Education Program (NCEP) has set the  following guidelines (reference values) for HDL Cholesterol:  Low...............<40 mg/dL  Optimal...........>60 mg/dL              Passed - Triglycerides within 360 days     Lab Results   Component Value Date    TRIG 92 12/29/2021    TRIG 75 10/02/2020    TRIG 55 10/28/2019                  Appointments  past 12m or future 3m with PCP    Date Provider   Last Visit   12/29/2021 Rashi Gee MD   Next Visit   Visit date not found Rashi Gee MD   ED visits in past 90 days: 0     Note composed:1:24 AM 01/13/2022

## 2022-04-01 ENCOUNTER — OFFICE VISIT (OUTPATIENT)
Dept: OPTOMETRY | Facility: CLINIC | Age: 62
End: 2022-04-01
Payer: COMMERCIAL

## 2022-04-01 DIAGNOSIS — H50.00 ESOTROPIA: ICD-10-CM

## 2022-04-01 DIAGNOSIS — H52.13 MYOPIA OF BOTH EYES: Primary | ICD-10-CM

## 2022-04-01 PROCEDURE — 92014 COMPRE OPH EXAM EST PT 1/>: CPT | Mod: S$GLB,,, | Performed by: OPTOMETRIST

## 2022-04-01 PROCEDURE — 92015 DETERMINE REFRACTIVE STATE: CPT | Mod: S$GLB,,, | Performed by: OPTOMETRIST

## 2022-04-01 PROCEDURE — 99999 PR PBB SHADOW E&M-EST. PATIENT-LVL III: CPT | Mod: PBBFAC,,, | Performed by: OPTOMETRIST

## 2022-04-01 PROCEDURE — 92014 PR EYE EXAM, EST PATIENT,COMPREHESV: ICD-10-PCS | Mod: S$GLB,,, | Performed by: OPTOMETRIST

## 2022-04-01 PROCEDURE — 92015 PR REFRACTION: ICD-10-PCS | Mod: S$GLB,,, | Performed by: OPTOMETRIST

## 2022-04-01 PROCEDURE — 99999 PR PBB SHADOW E&M-EST. PATIENT-LVL III: ICD-10-PCS | Mod: PBBFAC,,, | Performed by: OPTOMETRIST

## 2022-04-01 NOTE — PROGRESS NOTES
HPI     ILIANA: 7/17/2020    Presents today for annual eye exam.  Pt reports blurry vision--mainly OD.  Occ floater, no flashes  No gtts      Last edited by Deepa Dejesus MA on 4/1/2022  7:59 AM. (History)        ROS     Positive for: Eyes (strabismus/cat surgeery)    Negative for: Constitutional, Gastrointestinal, Neurological, Skin,   Genitourinary, Musculoskeletal, HENT, Endocrine, Cardiovascular,   Respiratory, Psychiatric, Allergic/Imm, Heme/Lymph    Last edited by Vikas Villegas, OD on 4/1/2022  8:14 AM. (History)        Assessment /Plan     For exam results, see Encounter Report.    Myopia of both eyes    Esotropia      1. AET (OD>>OS) sp EOM surgery. Discussed surgery (pt reports some depth perception problems) but he wishes to wait  2. OHT Hx.   iop flux 20-25 without meds prior to cat surgery.  Today iop wnl without meds.  CD wnl--see OCT.  Last VF  wnl.  -fam hx. pach: 579/601. Doubt glauc now--will monitor   3. Sp MFIOL/YAG OU.  Pt having some problems seeing TV--wrote optional Rx for TV/driving if he wishes to fill    PLAN:    rtc 1 yr

## 2022-05-31 ENCOUNTER — PATIENT MESSAGE (OUTPATIENT)
Dept: INTERNAL MEDICINE | Facility: CLINIC | Age: 62
End: 2022-05-31
Payer: COMMERCIAL

## 2022-06-29 ENCOUNTER — OFFICE VISIT (OUTPATIENT)
Dept: DERMATOLOGY | Facility: CLINIC | Age: 62
End: 2022-06-29
Payer: COMMERCIAL

## 2022-06-29 DIAGNOSIS — D22.9 NEVUS: ICD-10-CM

## 2022-06-29 DIAGNOSIS — L82.1 SEBORRHEIC KERATOSES: ICD-10-CM

## 2022-06-29 DIAGNOSIS — L57.0 AK (ACTINIC KERATOSIS): ICD-10-CM

## 2022-06-29 DIAGNOSIS — L81.4 LENTIGO: ICD-10-CM

## 2022-06-29 DIAGNOSIS — D18.01 CHERRY ANGIOMA: ICD-10-CM

## 2022-06-29 DIAGNOSIS — D48.5 NEOPLASM OF UNCERTAIN BEHAVIOR OF SKIN: Primary | ICD-10-CM

## 2022-06-29 PROCEDURE — 88342 CHG IMMUNOCYTOCHEMISTRY: ICD-10-PCS | Mod: 26,,, | Performed by: PATHOLOGY

## 2022-06-29 PROCEDURE — 11103 PR TANGENTIAL BIOPSY, SKIN, EA ADDTL LESION: ICD-10-PCS | Mod: S$GLB,,, | Performed by: DERMATOLOGY

## 2022-06-29 PROCEDURE — 17000 DESTRUCT PREMALG LESION: CPT | Mod: 59,S$GLB,, | Performed by: DERMATOLOGY

## 2022-06-29 PROCEDURE — 99999 PR PBB SHADOW E&M-EST. PATIENT-LVL III: ICD-10-PCS | Mod: PBBFAC,,, | Performed by: DERMATOLOGY

## 2022-06-29 PROCEDURE — 88305 TISSUE EXAM BY PATHOLOGIST: CPT | Performed by: PATHOLOGY

## 2022-06-29 PROCEDURE — 17003 DESTRUCT PREMALG LES 2-14: CPT | Mod: 59,S$GLB,, | Performed by: DERMATOLOGY

## 2022-06-29 PROCEDURE — 88342 IMHCHEM/IMCYTCHM 1ST ANTB: CPT | Mod: 59 | Performed by: PATHOLOGY

## 2022-06-29 PROCEDURE — 17003 DESTRUCTION, PREMALIGNANT LESIONS; SECOND THROUGH 14 LESIONS: ICD-10-PCS | Mod: 59,S$GLB,, | Performed by: DERMATOLOGY

## 2022-06-29 PROCEDURE — 1160F PR REVIEW ALL MEDS BY PRESCRIBER/CLIN PHARMACIST DOCUMENTED: ICD-10-PCS | Mod: CPTII,S$GLB,, | Performed by: DERMATOLOGY

## 2022-06-29 PROCEDURE — 11102 TANGNTL BX SKIN SINGLE LES: CPT | Mod: S$GLB,,, | Performed by: DERMATOLOGY

## 2022-06-29 PROCEDURE — 1160F RVW MEDS BY RX/DR IN RCRD: CPT | Mod: CPTII,S$GLB,, | Performed by: DERMATOLOGY

## 2022-06-29 PROCEDURE — 88305 TISSUE EXAM BY PATHOLOGIST: CPT | Mod: 26,,, | Performed by: PATHOLOGY

## 2022-06-29 PROCEDURE — 1159F PR MEDICATION LIST DOCUMENTED IN MEDICAL RECORD: ICD-10-PCS | Mod: CPTII,S$GLB,, | Performed by: DERMATOLOGY

## 2022-06-29 PROCEDURE — 88342 IMHCHEM/IMCYTCHM 1ST ANTB: CPT | Mod: 26,,, | Performed by: PATHOLOGY

## 2022-06-29 PROCEDURE — 88305 TISSUE EXAM BY PATHOLOGIST: ICD-10-PCS | Mod: 26,,, | Performed by: PATHOLOGY

## 2022-06-29 PROCEDURE — 99213 OFFICE O/P EST LOW 20 MIN: CPT | Mod: 25,S$GLB,, | Performed by: DERMATOLOGY

## 2022-06-29 PROCEDURE — 99213 PR OFFICE/OUTPT VISIT, EST, LEVL III, 20-29 MIN: ICD-10-PCS | Mod: 25,S$GLB,, | Performed by: DERMATOLOGY

## 2022-06-29 PROCEDURE — 17000 PR DESTRUCTION(LASER SURGERY,CRYOSURGERY,CHEMOSURGERY),PREMALIGNANT LESIONS,FIRST LESION: ICD-10-PCS | Mod: 59,S$GLB,, | Performed by: DERMATOLOGY

## 2022-06-29 PROCEDURE — 1159F MED LIST DOCD IN RCRD: CPT | Mod: CPTII,S$GLB,, | Performed by: DERMATOLOGY

## 2022-06-29 PROCEDURE — 11102 PR TANGENTIAL BIOPSY, SKIN, SINGLE LESION: ICD-10-PCS | Mod: S$GLB,,, | Performed by: DERMATOLOGY

## 2022-06-29 PROCEDURE — 11103 TANGNTL BX SKIN EA SEP/ADDL: CPT | Mod: S$GLB,,, | Performed by: DERMATOLOGY

## 2022-06-29 PROCEDURE — 99999 PR PBB SHADOW E&M-EST. PATIENT-LVL III: CPT | Mod: PBBFAC,,, | Performed by: DERMATOLOGY

## 2022-06-29 NOTE — PROGRESS NOTES
"  Subjective:       Patient ID:  Adam Dalal is a 61 y.o. male who presents for   Chief Complaint   Patient presents with    Skin Check    Lesion     Patient is here today for a "mole" check.   Pt has a history of  moderate sun exposure in the past.   Pt recalls several blistering sunburns in the past- yes  Pt has history of tanning bed use- no  Pt has  had moles removed in the past- no  Pt has history of melanoma in first degree relatives-  no    Pt c/o flesh colored lesion near left eye x 2-3 months. No bleeding, pain or prev tx.       Review of Systems   Skin: Positive for activity-related sunscreen use. Negative for daily sunscreen use, tendency to form keloidal scars and recent sunburn.   Hematologic/Lymphatic: Does not bruise/bleed easily.        Objective:    Physical Exam   Constitutional: He appears well-developed and well-nourished. No distress.   Neurological: He is alert and oriented to person, place, and time. He is not disoriented.   Psychiatric: He has a normal mood and affect.   Skin:   Areas Examined (abnormalities noted in diagram):   Scalp / Hair Palpated and Inspected  Head / Face Inspection Performed  Neck Inspection Performed  Chest / Axilla Inspection Performed  Abdomen Inspection Performed  Back Inspection Performed  RUE Inspected  LUE Inspection Performed                     L lateral infraorbit     r lateral infraorbit          Diagram Legend     Erythematous scaling macule/papule c/w actinic keratosis       Vascular papule c/w angioma      Pigmented verrucoid papule/plaque c/w seborrheic keratosis      Yellow umbilicated papule c/w sebaceous hyperplasia      Irregularly shaped tan macule c/w lentigo     1-2 mm smooth white papules consistent with Milia      Movable subcutaneous cyst with punctum c/w epidermal inclusion cyst      Subcutaneous movable cyst c/w pilar cyst      Firm pink to brown papule c/w dermatofibroma      Pedunculated fleshy papule(s) c/w skin tag(s)      " Evenly pigmented macule c/w junctional nevus     Mildly variegated pigmented, slightly irregular-bordered macule c/w mildly atypical nevus      Flesh colored to evenly pigmented papule c/w intradermal nevus       Pink pearly papule/plaque c/w basal cell carcinoma      Erythematous hyperkeratotic cursted plaque c/w SCC      Surgical scar with no sign of skin cancer recurrence      Open and closed comedones      Inflammatory papules and pustules      Verrucoid papule consistent consistent with wart     Erythematous eczematous patches and plaques     Dystrophic onycholytic nail with subungual debris c/w onychomycosis     Umbilicated papule    Erythematous-base heme-crusted tan verrucoid plaque consistent with inflamed seborrheic keratosis     Erythematous Silvery Scaling Plaque c/w Psoriasis     See annotation      Assessment / Plan:      Pathology Orders:     Normal Orders This Visit    Specimen to Pathology, Dermatology     Questions:    Procedure Type: Dermatology and skin neoplasms    Number of Specimens: 2    ------------------------: -------------------------    Spec 1 Procedure: Biopsy    Spec 1 Clinical Impression: r/o atypical lentigo    Spec 1 Source: right lateral infraorbit    ------------------------: -------------------------    Spec 2 Procedure: Biopsy    Spec 2 Clinical Impression: r/o atypical lentigo    Spec 2 Source: left lateral infraorbit    Release to patient:         Neoplasm of uncertain behavior of skin x 2   Shave biopsy procedure note:    Shave biopsy performed after verbal consent including risk of infection, scar, recurrence, need for additional treatment of site. Area prepped with alcohol, anesthetized with approximately 1.0cc of 1% lidocaine with epinephrine. Lesional tissue shaved with razor blade. Hemostasis achieved with application of aluminum chloride followed by hyfrecation. No complications. Dressing applied. Wound care explained.    If biopsy positive for malignancy, refer to   [facility] for Mohs surgery consultation.  -     Specimen to Pathology, Dermatology    AK (actinic keratosis)  Cryosurgery Procedure Note    Verbal consent from the patient is obtained including, but not limited to, risk of hypopigmentation/hyperpigmentation, scar, recurrence of lesion. The patient is aware of the precancerous quality and need for treatment of these lesions. Liquid nitrogen cryosurgery is applied to the 6 actinic keratoses, as detailed in the physical exam, to produce a freeze injury. The patient is aware that blisters may form and is instructed on wound care with gentle cleansing and use of vaseline ointment to keep moist until healed. The patient is supplied a handout on cryosurgery and is instructed to call if lesions do not completely resolve.    Nevus  Discussed ABCDE's of nevi.  Monitor for new mole or moles that are becoming bigger, darker, irritated, or developing irregular borders. Brochure provided. Instructed patient to observe lesion(s) for changes and follow up in clinic if changes are noted. Patient to monitor skin at home for new or changing lesions.     Seborrheic keratoses  These are benign inherited growths without a malignant potential. Reassurance given to patient. No treatment is necessary.     Lentigo  This is a benign hyperpigmented sun induced lesion. Recommend daily sun protection/avoidance and use of at least SPF 30, broad spectrum sunscreen (OTC drug) will reduce the number of new lesions. Treatment of these benign lesions are considered cosmetic.    The nature of sun-induced photo-aging and skin cancers is discussed.  Sun avoidance, protective clothing, and the use of 30-SPF sunscreens is advised. Observe closely for skin damage/changes, and call if such occurs.    Cherry angioma  These are benign vascular lesions that are inherited.  Treatment is not necessary.        Upper body skin examination performed today including at least 6 points as noted in physical examination.  Suspicious lesions noted.  Pt with hx of parkinsons, discussed small increased risk of melanoma  Recommend daily sun protection/avoidance and use of at least SPF 30, broad spectrum sunscreen (OTC drug).              Follow up for prn bx report.

## 2022-06-29 NOTE — PATIENT INSTRUCTIONS
Shave Biopsy Wound Care    Your doctor has performed a shave biopsy today.  A band aid and vaseline ointment has been placed over the site.  This should remain in place for NO LONGER THAN 48 hours.  It is fine to remove the bandaid after 24 hours, if the area is no longer bleeding. It is recommended that you keep the area dry (do not wet)) for the first 24 hours.  After 24 hours, wash the area with warm soap and water and apply Vaseline jelly.  Many patients prefer to use Neosporin or Bacitracin ointment.  This is acceptable; however, know that you can develop an allergy to this medication even if you have used it safely for years.  It is important to keep the area moist.  Letting it dry out and get air slows healing time, and will worsen the scar.        If you notice increasing redness, tenderness, pain, or yellow drainage at the biopsy site, please notify your doctor.  These are signs of an infection.    If your biopsy site is bleeding, apply firm pressure for 15 minutes straight.  Repeat for another 15 minutes, if it is still bleeding.   If the surgical site continues to bleed, then please contact your doctor.      For [facility] users:   You will receive your biopsy results in [facility] as soon as they are available. Please be assured that your physician/provider will review your results and will then determine what further treatment, evaluation, or planning is required. You should be contacted by your physician's/provider's office within 5 business days of receiving your results; If not, please reach out to directly. This is one more way [facility] is putting you first.     [facility] [place] [address]/ [phone] FAX/ [email or web address]      Shave Biopsy Wound Care    Your doctor has performed a shave biopsy today.  A band aid and vaseline ointment has been placed over the site.  This should remain in place for NO LONGER THAN 48 hours.  It is fine to remove the bandaid after 24  hours, if the area is no longer bleeding. It is recommended that you keep the area dry (do not wet)) for the first 24 hours.  After 24 hours, wash the area with warm soap and water and apply Vaseline jelly.  Many patients prefer to use Neosporin or Bacitracin ointment.  This is acceptable; however, know that you can develop an allergy to this medication even if you have used it safely for years.  It is important to keep the area moist.  Letting it dry out and get air slows healing time, and will worsen the scar.        If you notice increasing redness, tenderness, pain, or yellow drainage at the biopsy site, please notify your doctor.  These are signs of an infection.    If your biopsy site is bleeding, apply firm pressure for 15 minutes straight.  Repeat for another 15 minutes, if it is still bleeding.   If the surgical site continues to bleed, then please contact your doctor.      For [facility] users:   You will receive your biopsy results in [facility] as soon as they are available. Please be assured that your physician/provider will review your results and will then determine what further treatment, evaluation, or planning is required. You should be contacted by your physician's/provider's office within 5 business days of receiving your results; If not, please reach out to directly. This is one more way [facility] is putting you first.     [facility] [place] [address]/ [phone] FAX/ [email or web address]

## 2022-07-06 LAB
FINAL PATHOLOGIC DIAGNOSIS: NORMAL
GROSS: NORMAL
Lab: NORMAL
MICROSCOPIC EXAM: NORMAL

## 2022-07-06 NOTE — PROGRESS NOTES
Please call patient to schedule a Mohs consultation.     Skin , right lateral infraorbital, shave biopsy:   -MALIGNANT MELANOMA IN-SITU (pTis), LENTIGO MALIGNA TYPE   This lesion is skin cancer. You will be contacted regarding treatment.    Discussed report with pt

## 2022-07-11 ENCOUNTER — TELEPHONE (OUTPATIENT)
Dept: DERMATOLOGY | Facility: CLINIC | Age: 62
End: 2022-07-11
Payer: COMMERCIAL

## 2022-07-11 NOTE — TELEPHONE ENCOUNTER
----- Message from Neena Keita RN sent at 7/11/2022  4:57 PM CDT -----    ----- Message -----  From: Olamide Duncan  Sent: 7/11/2022  11:48 AM CDT  To: Patricia Kaufman S Staff    Pt is returning a missed call from someone he was referred by Dr. Gomez please call and adv @  [phone]

## 2022-07-12 ENCOUNTER — OFFICE VISIT (OUTPATIENT)
Dept: DERMATOLOGY | Facility: CLINIC | Age: 62
End: 2022-07-12
Payer: COMMERCIAL

## 2022-07-12 VITALS
BODY MASS INDEX: 30.22 KG/M2 | WEIGHT: 228 LBS | HEIGHT: 73 IN | DIASTOLIC BLOOD PRESSURE: 72 MMHG | HEART RATE: 74 BPM | SYSTOLIC BLOOD PRESSURE: 115 MMHG

## 2022-07-12 DIAGNOSIS — D03.39 MELANOMA IN SITU OF CHEEK: Primary | ICD-10-CM

## 2022-07-12 PROCEDURE — 3074F PR MOST RECENT SYSTOLIC BLOOD PRESSURE < 130 MM HG: ICD-10-PCS | Mod: CPTII,S$GLB,, | Performed by: DERMATOLOGY

## 2022-07-12 PROCEDURE — 1159F PR MEDICATION LIST DOCUMENTED IN MEDICAL RECORD: ICD-10-PCS | Mod: CPTII,S$GLB,, | Performed by: DERMATOLOGY

## 2022-07-12 PROCEDURE — 3008F PR BODY MASS INDEX (BMI) DOCUMENTED: ICD-10-PCS | Mod: CPTII,S$GLB,, | Performed by: DERMATOLOGY

## 2022-07-12 PROCEDURE — 3078F DIAST BP <80 MM HG: CPT | Mod: CPTII,S$GLB,, | Performed by: DERMATOLOGY

## 2022-07-12 PROCEDURE — 99214 OFFICE O/P EST MOD 30 MIN: CPT | Mod: S$GLB,,, | Performed by: DERMATOLOGY

## 2022-07-12 PROCEDURE — 3078F PR MOST RECENT DIASTOLIC BLOOD PRESSURE < 80 MM HG: ICD-10-PCS | Mod: CPTII,S$GLB,, | Performed by: DERMATOLOGY

## 2022-07-12 PROCEDURE — 99214 PR OFFICE/OUTPT VISIT, EST, LEVL IV, 30-39 MIN: ICD-10-PCS | Mod: S$GLB,,, | Performed by: DERMATOLOGY

## 2022-07-12 PROCEDURE — 1159F MED LIST DOCD IN RCRD: CPT | Mod: CPTII,S$GLB,, | Performed by: DERMATOLOGY

## 2022-07-12 PROCEDURE — 3008F BODY MASS INDEX DOCD: CPT | Mod: CPTII,S$GLB,, | Performed by: DERMATOLOGY

## 2022-07-12 PROCEDURE — 1160F PR REVIEW ALL MEDS BY PRESCRIBER/CLIN PHARMACIST DOCUMENTED: ICD-10-PCS | Mod: CPTII,S$GLB,, | Performed by: DERMATOLOGY

## 2022-07-12 PROCEDURE — 1160F RVW MEDS BY RX/DR IN RCRD: CPT | Mod: CPTII,S$GLB,, | Performed by: DERMATOLOGY

## 2022-07-12 PROCEDURE — 99999 PR PBB SHADOW E&M-EST. PATIENT-LVL III: ICD-10-PCS | Mod: PBBFAC,,, | Performed by: DERMATOLOGY

## 2022-07-12 PROCEDURE — 99999 PR PBB SHADOW E&M-EST. PATIENT-LVL III: CPT | Mod: PBBFAC,,, | Performed by: DERMATOLOGY

## 2022-07-12 PROCEDURE — 3074F SYST BP LT 130 MM HG: CPT | Mod: CPTII,S$GLB,, | Performed by: DERMATOLOGY

## 2022-07-12 NOTE — PROGRESS NOTES
REFERRING PROVIDER:  Rachell Sunshine M.D.    CHIEF COMPLAINT:  New patient being consulted for Mohs' surgery evaluation.    HISTORY OF PRESENT ILLNESS:  62 y.o. male presents with a 1+ year(s) history of growth on the R lateral infraorbital. Patient states it was initially a flat, dark mole that started to grow and got darker over past year.  (+) sun exposure in past with >20+ sunburns.     Negative for scabbing.  Negative for crusting.  Negative for bleeding.  Negative for itching.    Biopsy consistent with melanoma in situ.     No prior treatment.    Pacemaker: No  Defibrillator: No  Artificial joints: No  Artificial heart valves: No    PAST MEDICAL HISTORY:  Past Medical History:   Diagnosis Date    Anxiety     Esotropia of right eye 05/02/2013    Hearing deficit, bilateral     High cholesterol     Hyperlipidemia     Melanoma in situ of cheek     Ringing in ear, bilateral    Parkinsons.    PAST SURGICAL HISTORY:  Past Surgical History:   Procedure Laterality Date    CATARACT EXTRACTION W/  INTRAOCULAR LENS IMPLANT Right 01/09/2017    Dr marin     CATARACT EXTRACTION W/  INTRAOCULAR LENS IMPLANT Left 01/30/2017    Dr. Marin    HERNIA REPAIR      STRABISMUS SURGERY  4yrs    right eye        SOCIAL HISTORY:  Dependencies:  former smoker    PERTINENT MEDICATIONS:  See medications list.    ALLERGIES:  Patient has no known allergies.    ROS:  Skin: See HPI  Constitutional: No fatigue, fever, malaise, weight loss, or night sweats.  Cardiovascular: No chest pain, palpitations, or edema.  Respiratory: No coughing, wheezing, SOB, or sputum production.    Physical Exam   HENT:   Head:           General: Mood and affect normal. Alert and orient X3. Normal appearance.  Eyelids:  no suspicious lesions  Head/Face: R lateral infraorbital with a 5 x 5 mm pink bx site located 8.5 cm anteriorly from the right inferior lobe attachment and 4 cm superiorly. No nodularity.  Lips/Teeth/Gums:  no suspicious lesions   Lymph nodes:  Bilateral pre-auricular, post-auricular, anterior cervical, posterior cervical, sublingual, submental, and occipital are not enlarged    IMPRESSION:  Biopsy proven melanoma in situ- R lateral infraorbital, path# [id number].    PLAN:  The diagnosis and the pathology report were discussed in detail with the patient. Treatment options were reviewed, including Mohs Micrographic Surgery, radiation, topical therapy, and standard excision.  After careful review of patient's history and physical exam, and after discussion of treatment options, the decision was made to perform 360 degree continuous en face margin controlled excision with 5 mm margins with rush permanent sections and subsequent delayed reconstruction pending negative margins.    Will schedule patient for slow Mohs. Risks, benefits, and alternatives of Mohs' surgery discussed with the patient. Discussed repair options including complex closure, skin flap, skin graft and second intention healing with the patient. Disc possibility of larger than anticipated defect requiring plastics reconstruction, but won't know this until week of procedure. Patient and wife verbalized understanding.  Pre-operative instructions provided to the patient.    Disc need for q 3 month skin checks and annual eye exam.   Disc sun protective measures.

## 2022-07-14 ENCOUNTER — OFFICE VISIT (OUTPATIENT)
Dept: PODIATRY | Facility: CLINIC | Age: 62
End: 2022-07-14
Payer: COMMERCIAL

## 2022-07-14 ENCOUNTER — APPOINTMENT (OUTPATIENT)
Dept: RADIOLOGY | Facility: OTHER | Age: 62
End: 2022-07-14
Attending: PODIATRIST
Payer: COMMERCIAL

## 2022-07-14 VITALS
SYSTOLIC BLOOD PRESSURE: 135 MMHG | HEIGHT: 73 IN | HEART RATE: 77 BPM | WEIGHT: 228 LBS | BODY MASS INDEX: 30.22 KG/M2 | DIASTOLIC BLOOD PRESSURE: 79 MMHG

## 2022-07-14 DIAGNOSIS — S96.911S ANKLE STRAIN, RIGHT, SEQUELA: ICD-10-CM

## 2022-07-14 DIAGNOSIS — M25.571 RIGHT ANKLE PAIN, UNSPECIFIED CHRONICITY: ICD-10-CM

## 2022-07-14 DIAGNOSIS — S99.911S ANKLE INJURY, RIGHT, SEQUELA: ICD-10-CM

## 2022-07-14 DIAGNOSIS — M25.571 RIGHT ANKLE PAIN, UNSPECIFIED CHRONICITY: Primary | ICD-10-CM

## 2022-07-14 PROCEDURE — 73610 X-RAY EXAM OF ANKLE: CPT | Mod: 26,RT,, | Performed by: INTERNAL MEDICINE

## 2022-07-14 PROCEDURE — 3075F PR MOST RECENT SYSTOLIC BLOOD PRESS GE 130-139MM HG: ICD-10-PCS | Mod: CPTII,S$GLB,, | Performed by: PODIATRIST

## 2022-07-14 PROCEDURE — 73610 X-RAY EXAM OF ANKLE: CPT | Mod: TC,RT

## 2022-07-14 PROCEDURE — 1160F RVW MEDS BY RX/DR IN RCRD: CPT | Mod: CPTII,S$GLB,, | Performed by: PODIATRIST

## 2022-07-14 PROCEDURE — 99214 OFFICE O/P EST MOD 30 MIN: CPT | Mod: S$GLB,,, | Performed by: PODIATRIST

## 2022-07-14 PROCEDURE — 3008F BODY MASS INDEX DOCD: CPT | Mod: CPTII,S$GLB,, | Performed by: PODIATRIST

## 2022-07-14 PROCEDURE — 3008F PR BODY MASS INDEX (BMI) DOCUMENTED: ICD-10-PCS | Mod: CPTII,S$GLB,, | Performed by: PODIATRIST

## 2022-07-14 PROCEDURE — 3078F PR MOST RECENT DIASTOLIC BLOOD PRESSURE < 80 MM HG: ICD-10-PCS | Mod: CPTII,S$GLB,, | Performed by: PODIATRIST

## 2022-07-14 PROCEDURE — 1159F PR MEDICATION LIST DOCUMENTED IN MEDICAL RECORD: ICD-10-PCS | Mod: CPTII,S$GLB,, | Performed by: PODIATRIST

## 2022-07-14 PROCEDURE — 99214 PR OFFICE/OUTPT VISIT, EST, LEVL IV, 30-39 MIN: ICD-10-PCS | Mod: S$GLB,,, | Performed by: PODIATRIST

## 2022-07-14 PROCEDURE — 1159F MED LIST DOCD IN RCRD: CPT | Mod: CPTII,S$GLB,, | Performed by: PODIATRIST

## 2022-07-14 PROCEDURE — 1160F PR REVIEW ALL MEDS BY PRESCRIBER/CLIN PHARMACIST DOCUMENTED: ICD-10-PCS | Mod: CPTII,S$GLB,, | Performed by: PODIATRIST

## 2022-07-14 PROCEDURE — 3075F SYST BP GE 130 - 139MM HG: CPT | Mod: CPTII,S$GLB,, | Performed by: PODIATRIST

## 2022-07-14 PROCEDURE — 99999 PR PBB SHADOW E&M-EST. PATIENT-LVL III: ICD-10-PCS | Mod: PBBFAC,,, | Performed by: PODIATRIST

## 2022-07-14 PROCEDURE — 3078F DIAST BP <80 MM HG: CPT | Mod: CPTII,S$GLB,, | Performed by: PODIATRIST

## 2022-07-14 PROCEDURE — 99999 PR PBB SHADOW E&M-EST. PATIENT-LVL III: CPT | Mod: PBBFAC,,, | Performed by: PODIATRIST

## 2022-07-14 PROCEDURE — 73610 XR ANKLE COMPLETE 3 VIEW RIGHT: ICD-10-PCS | Mod: 26,RT,, | Performed by: INTERNAL MEDICINE

## 2022-07-14 NOTE — PROGRESS NOTES
Chief Complaint   Patient presents with    Ankle Pain     Having issues with a painful right ankle           HPI:   Adam Dalal is a 62 y.o. male who presents to clinic for right medial ankle pain.  He bumped into a chair around the end of May.  He had pain initially, able to bear weight.  Pain has been on and off since, worse with inversion or impingement at the medial malleolar area.  A small scar is noted at the painful.     He has been using Voltaren gel to the area.   He has history of Parkinsons.         Patient Active Problem List   Diagnosis    Hyperlipidemia    Anxiety    Obstructive sleep apnea    Esotropia of right eye    Ocular hypertension - Both Eyes    Nuclear sclerosis    Nuclear sclerotic cataract of left eye    Parkinson disease    Hypophonia    Voice and resonance disorder    Dysarthria    Impaired functional mobility, balance, gait, and endurance    Impaired motor control    Excessive drinking alcohol    Oral lesion           Current Outpatient Medications on File Prior to Visit   Medication Sig Dispense Refill    ALPRAZolam (XANAX) 0.5 MG tablet TAKE 1 TABLET(0.5 MG) BY MOUTH EVERY NIGHT FOR INSOMNIA/ANXIETY 30 tablet 4    carbidopa-levodopa (RYTARY) 23.75-95 mg CpSR Take 3 capsules by mouth 3 (three) times daily. 270 capsule 5    carbidopa-levodopa (RYTARY) 23.75-95 mg CpSR Take 3 capsules by mouth 3 (three) times daily. 270 capsule 11    ofloxacin (FLOXIN) 0.3 % otic solution Place 5 drops into the left ear 2 (two) times daily. 10 mL 0    pravastatin (PRAVACHOL) 80 MG tablet TAKE 1 TABLET(80 MG) BY MOUTH EVERY MORNING 90 tablet 3    rasagiline (AZILECT) 1 mg Tab TAKE 1 TABLET(1 MG) BY MOUTH EVERY DAY 30 tablet 11     No current facility-administered medications on file prior to visit.       ALLG:  Review of patient's allergies indicates:  No Known Allergies        ROS:  General ROS: negative for - chills or fever  Respiratory ROS: no cough, shortness of breath,  "or wheezing  Cardiovascular ROS: no chest pain or dyspnea on exertion  Musculoskeletal ROS: negative for - gait disturbance  Dermatological ROS: negative for pruritus and rash, positive for lump        EXAM:   Vitals:    07/14/22 0902   BP: 135/79   Pulse: 77   Weight: 103.4 kg (228 lb)   Height: 6' 1" (1.854 m)       General:  Alert and oriented x 3, WDWN, in no apparent distress.    Physical exam:     Vasc: DP and PT pulses are palpable in both feet.  The feet appropriately warm to touch, capillary refill time is within normal limit.    Neuro: Sharp/dull sensation is intact.  No Tinel's noted.  Patient does have history of Parkinson's.    Derm:  No bruising noted.  No erythema.  Very small healed wound noted at the site of pain.   No swelling.         MSK:  Limited range of motion bilateral feet. Mild tremors.  There is tenderness to palpation at the medial malleolus.          7/14/22:  Xrays:  RIGHT ANKLE: FINDINGS:  There is a skin marker overlying the medial malleolus.  There is a transverse sclerotic line extending through the medial malleolus, which could represent a healing subacute fracture.  Alignment is anatomic.  No acute fractures are identified.  The talar dome is intact and the ankle mortise is relatively symmetric.  Mild degenerative changes of the tibiotalar joint.  Achilles enthesopathy.  No focal soft tissue swelling.          ASSESSMENT/PLAN       Problem List Items Addressed This Visit    None     Visit Diagnoses     Right ankle pain, unspecified chronicity    -  Primary    Relevant Orders    X-Ray Ankle Complete Right (Completed)    Ankle strain, right, sequela        Ankle injury, right, sequela              I counseled the patient on the patient's conditions, their implications and medical management.  I ordered xrays and reviewed the xrays with the patient.  Possible subacute fracture at the area.  However, when compared to contralateral xrays, similar sclerotic line is seen.    Rest and ice " the area as needed.   MalleoTrain compression ankle sleeve to wear.   Adjust position as needed when sleeping to avoid impingement to the area.   Continue Voltaren gel.   Call or return to clinic prn if these symptoms worsen or fail to improve as anticipated.

## 2022-08-01 ENCOUNTER — PROCEDURE VISIT (OUTPATIENT)
Dept: DERMATOLOGY | Facility: CLINIC | Age: 62
End: 2022-08-01
Payer: COMMERCIAL

## 2022-08-01 VITALS
SYSTOLIC BLOOD PRESSURE: 135 MMHG | WEIGHT: 228 LBS | HEIGHT: 73 IN | DIASTOLIC BLOOD PRESSURE: 82 MMHG | BODY MASS INDEX: 30.22 KG/M2 | HEART RATE: 74 BPM

## 2022-08-01 DIAGNOSIS — D03.39 MELANOMA IN SITU OF CHEEK: Primary | ICD-10-CM

## 2022-08-01 PROCEDURE — 88305 TISSUE EXAM BY PATHOLOGIST: CPT | Performed by: PATHOLOGY

## 2022-08-01 PROCEDURE — 88305 TISSUE EXAM BY PATHOLOGIST: CPT | Mod: 26,,, | Performed by: PATHOLOGY

## 2022-08-01 PROCEDURE — 99499 UNLISTED E&M SERVICE: CPT | Mod: ,,, | Performed by: DERMATOLOGY

## 2022-08-01 PROCEDURE — 11642 PR EXC SKIN MALIG 1.1-2 CM FACE,FACIAL: ICD-10-PCS | Mod: ,,, | Performed by: DERMATOLOGY

## 2022-08-01 PROCEDURE — 11642 EXC F/E/E/N/L MAL+MRG 1.1-2: CPT | Mod: ,,, | Performed by: DERMATOLOGY

## 2022-08-01 PROCEDURE — 99499 NO LOS: ICD-10-PCS | Mod: ,,, | Performed by: DERMATOLOGY

## 2022-08-01 PROCEDURE — 88305 TISSUE EXAM BY PATHOLOGIST: ICD-10-PCS | Mod: 26,,, | Performed by: PATHOLOGY

## 2022-08-01 NOTE — PROGRESS NOTES
PROCEDURE: 360-degree continuous en face margin-controlled excision with rush permanent sections and subsequent delayed repair    REFERRING PROVIDER: Rachell Sunshine M.D.    ANESTHETIC: 6 cc 1% Lidocaine with Epinephrine 1:100,000    SURGICAL PREP: Hibiclens, prepped by Shakira Foster MA    SURGEON: Shae Gomez MD    ASSISTANTS: Rose Hawk PA-C and Shakira Foster MA    PREOPERATIVE DIAGNOSIS: Melanoma in situ, path # [id number]    POSTOPERATIVE DIAGNOSIS: Melanoma in situ    PATHOLOGIC DIAGNOSIS: Pending    LOCATION: right lateral infraorbital. Location verified with Dr. Sunshine's clinical photograph. Patient also verified location with hand held mirror.    INITIAL LESION SIZE: 0.5 x 0.5 cm    DEFECT SIZE: 1.5 x 1.5 cm    THICKNESS OF MELANOMA: melanoma in situ    EXCISED MARGINS: 0.5 cm    DEPTH OF EXCISION DOWN TO FASCIA: yes    PREPARATION:  The diagnosis, procedure, alternatives, benefits and risks, including but not limited to: drug reactions, pain, scar or cosmetic defect, local sensation disturbances, and/or recurrence of present condition were explained to the patient. The patient elected to proceed.    PROCEDURE:  The area involved by the melanoma in situ was marked out with a surgical marking pen. The area of right lateral infraorbital was prepped, draped, and anesthetized in the usual sterile fashion. Lesional tissue was carefully marked with at least 5 mm margins of clinically normal skin in all directions. An excision was performed with a #15 blade carried down completely through the dermis to the deep subcutaneous tissue plane just above fascia. A short suture was placed superiorly at the 12 o'clock position and a long suture was placed medially at the 3 o'clock position. The lesion was then removed in total and submitted for histological margin evaluation. Then, electrocoagulation was used to obtain hemostasis. Blood loss was minimal.    The patient tolerated the procedure well.    The area was  "cleaned and dressed appropriately and the patient was given wound care instructions, as well as an appointment for follow-up evaluation tomorrow for possible further excision pending pathology report.    Vitals:    08/01/22 0753 08/01/22 0836   BP: 125/73 135/82   BP Location: Right arm    Patient Position: Sitting    BP Method: Medium (Automatic)    Pulse: 81 74   Weight: 103.4 kg (228 lb)    Height: 6' 1" (1.854 m)        "

## 2022-08-02 ENCOUNTER — PROCEDURE VISIT (OUTPATIENT)
Dept: DERMATOLOGY | Facility: CLINIC | Age: 62
End: 2022-08-02
Payer: COMMERCIAL

## 2022-08-02 VITALS
DIASTOLIC BLOOD PRESSURE: 86 MMHG | SYSTOLIC BLOOD PRESSURE: 133 MMHG | HEART RATE: 71 BPM | HEIGHT: 73 IN | WEIGHT: 228 LBS | BODY MASS INDEX: 30.22 KG/M2

## 2022-08-02 DIAGNOSIS — D03.39 MELANOMA IN SITU OF CHEEK: Primary | ICD-10-CM

## 2022-08-02 LAB
FINAL PATHOLOGIC DIAGNOSIS: NORMAL
GROSS: NORMAL
Lab: NORMAL
MICROSCOPIC EXAM: NORMAL

## 2022-08-02 PROCEDURE — 88305 TISSUE EXAM BY PATHOLOGIST: CPT | Performed by: PATHOLOGY

## 2022-08-02 PROCEDURE — 99499 UNLISTED E&M SERVICE: CPT | Mod: ,,, | Performed by: DERMATOLOGY

## 2022-08-02 PROCEDURE — 11643 EXC F/E/E/N/L MAL+MRG 2.1-3: CPT | Mod: 58,,, | Performed by: DERMATOLOGY

## 2022-08-02 PROCEDURE — 99499 NO LOS: ICD-10-PCS | Mod: ,,, | Performed by: DERMATOLOGY

## 2022-08-02 PROCEDURE — 88305 TISSUE EXAM BY PATHOLOGIST: CPT | Mod: 26,,, | Performed by: PATHOLOGY

## 2022-08-02 PROCEDURE — 11643 PR EXC SKIN MALIG 2.1-3 CM FACE,FACIAL: ICD-10-PCS | Mod: 58,,, | Performed by: DERMATOLOGY

## 2022-08-02 PROCEDURE — 88305 TISSUE EXAM BY PATHOLOGIST: ICD-10-PCS | Mod: 26,,, | Performed by: PATHOLOGY

## 2022-08-02 NOTE — PROGRESS NOTES
PROCEDURE: 360-degree continuous en face margin-controlled excision with rush permanent sections and subsequent delayed repair, Stage II. Verbal (+) margins from 12-3, 6-9, and 9-12 around yesterday's section per Dr. Mcpherson.    REFERRING PROVIDER: Rachell Sunshine M.D.    ANESTHETIC: 7 cc 1% Lidocaine with Epinephrine 1:100,000    SURGICAL PREP: Betadine, prepped by Shakira Foster MA    SURGEON: Shae Gomez MD    ASSISTANTS: Shakira Foster MA    PREOPERATIVE DIAGNOSIS: Melanoma in situ    POSTOPERATIVE DIAGNOSIS: Melanoma in situ    PATHOLOGIC DIAGNOSIS: Pending    LOCATION: right lateral infraorbital.     INITIAL LESION SIZE: 1.5 x 1.5 cm    DEFECT SIZE: 2.5 x 2.5 cm    THICKNESS OF MELANOMA: melanoma in situ    EXCISED MARGINS: 0.5 cm    DEPTH OF EXCISION DOWN TO FASCIA: yes    PREPARATION:  The diagnosis, procedure, alternatives, benefits and risks, including but not limited to: drug reactions, pain, scar or cosmetic defect, local sensation disturbances, and/or recurrence of present condition were explained to the patient. The patient elected to proceed.    PROCEDURE:  The area involved by the melanoma in situ was marked out with a surgical marking pen. The area of right lateral infraorbital was prepped, draped, and anesthetized in the usual sterile fashion. Lesional tissue was carefully marked with at least 5 mm margins of clinically normal skin from 12-3, 6-9, and 9-12 o'clock. An excision was performed with a #15 blade carried down completely through the dermis to the deep subcutaneous tissue plane just above fascia. A short suture was placed at 12 o'clock and a long suture was placed at 3 o'clock. A short suture was placed at 6 o' clock and a long suture was placed at 9 o clock. A short suture was placed at 12 o'clock and a long suture was placed at 9 o'clock. The lesion was then removed in total and submitted for histological margin evaluation. Three specimens were removed in total: 12-3, 6-9 and 9-12, with  "green ink identifying the new epidermal margin. Then, electrocoagulation was used to obtain hemostasis. Blood loss was minimal.    The patient tolerated the procedure well.    The area was cleaned and dressed appropriately and the patient was given wound care instructions, as well as an appointment for follow-up evaluation tomorrow for possible further excision pending pathology report.    Vitals:    08/02/22 1011 08/02/22 1137   BP: (!) 142/85 133/86   BP Location: Left arm    Patient Position: Sitting    BP Method: Medium (Automatic)    Pulse: 70 71   Weight: 103.4 kg (228 lb)    Height: 6' 1" (1.854 m)        "

## 2022-08-03 ENCOUNTER — PROCEDURE VISIT (OUTPATIENT)
Dept: DERMATOLOGY | Facility: CLINIC | Age: 62
End: 2022-08-03
Payer: COMMERCIAL

## 2022-08-03 VITALS
WEIGHT: 228 LBS | SYSTOLIC BLOOD PRESSURE: 119 MMHG | HEIGHT: 73 IN | HEART RATE: 68 BPM | BODY MASS INDEX: 30.22 KG/M2 | DIASTOLIC BLOOD PRESSURE: 75 MMHG

## 2022-08-03 DIAGNOSIS — M95.2 MOHS DEFECT OF RIGHT CHEEK: Primary | ICD-10-CM

## 2022-08-03 DIAGNOSIS — D03.39 MELANOMA IN SITU OF CHEEK: Primary | ICD-10-CM

## 2022-08-03 DIAGNOSIS — Z98.890 MOHS DEFECT OF RIGHT CHEEK: Primary | ICD-10-CM

## 2022-08-03 LAB
FINAL PATHOLOGIC DIAGNOSIS: NORMAL
GROSS: NORMAL
Lab: NORMAL
MICROSCOPIC EXAM: NORMAL

## 2022-08-03 PROCEDURE — 11642 PR EXC SKIN MALIG 1.1-2 CM FACE,FACIAL: ICD-10-PCS | Mod: 58,,, | Performed by: DERMATOLOGY

## 2022-08-03 PROCEDURE — 88305 TISSUE EXAM BY PATHOLOGIST: CPT | Performed by: PATHOLOGY

## 2022-08-03 PROCEDURE — 11642 EXC F/E/E/N/L MAL+MRG 1.1-2: CPT | Mod: 58,,, | Performed by: DERMATOLOGY

## 2022-08-03 PROCEDURE — 88305 TISSUE EXAM BY PATHOLOGIST: CPT | Mod: 26,,, | Performed by: PATHOLOGY

## 2022-08-03 PROCEDURE — 99499 UNLISTED E&M SERVICE: CPT | Mod: ,,, | Performed by: DERMATOLOGY

## 2022-08-03 PROCEDURE — 88305 TISSUE EXAM BY PATHOLOGIST: ICD-10-PCS | Mod: 26,,, | Performed by: PATHOLOGY

## 2022-08-03 PROCEDURE — 99499 NO LOS: ICD-10-PCS | Mod: ,,, | Performed by: DERMATOLOGY

## 2022-08-03 NOTE — PROGRESS NOTES
PROCEDURE: 360-degree continuous en face margin-controlled excision with rush permanent sections and subsequent delayed repair, Stage III. Verbal of (+) margins in the 8:30 region from yesterday's sections per Dr. Mcpherson.    REFERRING PROVIDER: Rachell Sunshine M.D.    ANESTHETIC: 4.5 cc 1% Lidocaine with Epinephrine 1:100,000    SURGICAL PREP: Betadine, prepped by Shakira Foster MA    SURGEON: Shae Gomez MD    ASSISTANTS: Shakira Foster MA    PREOPERATIVE DIAGNOSIS: Melanoma in situ     POSTOPERATIVE DIAGNOSIS: Melanoma in situ    PATHOLOGIC DIAGNOSIS: Pending    LOCATION: right lateral infraorbital, 7-9 o'clock.     INITIAL LESION SIZE: 2.5 x 2.5 cm    DEFECT SIZE: 2.5 x 4.2 cm    THICKNESS OF MELANOMA: melanoma in situ    EXCISED MARGINS: 0.5 cm    DEPTH OF EXCISION DOWN TO FASCIA: yes    PREPARATION:  The diagnosis, procedure, alternatives, benefits and risks, including but not limited to: drug reactions, pain, scar or cosmetic defect, local sensation disturbances, and/or recurrence of present condition were explained to the patient. The patient elected to proceed.    PROCEDURE:  The area involved by the melanoma in situ was marked out with a surgical marking pen. The area of right lateral infraorbital was prepped, draped, and anesthetized in the usual sterile fashion. Lesional tissue was carefully marked with at least 5 mm margins of clinically normal skin from 7 o' clock to 9 o'clock. An excision was performed with a #15 blade carried down completely through the dermis to the deep subcutaneous tissue plane. A short suture was placed at the 7 o'clock position and a long suture was placed at the 9 o'clock position. The lesion was then removed in total and submitted for histological margin evaluation. One specimen was removed in total: 7-9, with green ink identifying the new epidermal margin. Then, electrocoagulation was used to obtain hemostasis. Blood loss was minimal.    The patient tolerated the procedure  "well.    The area was cleaned and dressed appropriately and the patient was given wound care instructions, as well as an appointment for follow-up evaluation tomorrow for possible further excision pending pathology report.  Plan is for Dr. Hickman in [facility] to do reconstruction tomorrow, pending negative margins.     Vitals:    08/03/22 1037 08/03/22 1117   BP: 128/84 119/75   BP Location: Left arm    Patient Position: Sitting    BP Method: Medium (Automatic)    Pulse: 74 68   Weight: 103.4 kg (228 lb)    Height: 6' 1" (1.854 m)      ADDENDUM 8/4/22: Margins now negative s/p 3 stages of slow Mohs.  Pt notified and to proceed with repair by Dr. Hickman later today.   "

## 2022-08-04 ENCOUNTER — PATIENT MESSAGE (OUTPATIENT)
Dept: SURGERY | Facility: HOSPITAL | Age: 62
End: 2022-08-04
Payer: COMMERCIAL

## 2022-08-04 ENCOUNTER — ANESTHESIA EVENT (OUTPATIENT)
Dept: SURGERY | Facility: HOSPITAL | Age: 62
End: 2022-08-04
Payer: COMMERCIAL

## 2022-08-04 ENCOUNTER — HOSPITAL ENCOUNTER (OUTPATIENT)
Facility: HOSPITAL | Age: 62
Discharge: HOME | End: 2022-08-04
Attending: OTOLARYNGOLOGY | Admitting: OTOLARYNGOLOGY
Payer: COMMERCIAL

## 2022-08-04 ENCOUNTER — ANESTHESIA (OUTPATIENT)
Dept: SURGERY | Facility: HOSPITAL | Age: 62
End: 2022-08-04
Payer: COMMERCIAL

## 2022-08-04 VITALS
BODY MASS INDEX: 29.82 KG/M2 | WEIGHT: 225 LBS | DIASTOLIC BLOOD PRESSURE: 81 MMHG | HEIGHT: 73 IN | TEMPERATURE: 98 F | OXYGEN SATURATION: 95 % | HEART RATE: 64 BPM | RESPIRATION RATE: 20 BRPM | SYSTOLIC BLOOD PRESSURE: 135 MMHG

## 2022-08-04 DIAGNOSIS — C43.30 MALIGNANT MELANOMA OF FACE: Primary | ICD-10-CM

## 2022-08-04 DIAGNOSIS — Z98.890 MOHS DEFECT OF RIGHT CHEEK: ICD-10-CM

## 2022-08-04 DIAGNOSIS — M95.2 MOHS DEFECT OF RIGHT CHEEK: ICD-10-CM

## 2022-08-04 LAB
FINAL PATHOLOGIC DIAGNOSIS: NORMAL
GROSS: NORMAL
Lab: NORMAL

## 2022-08-04 PROCEDURE — 36000709 HC OR TIME LEV III EA ADD 15 MIN: Performed by: OTOLARYNGOLOGY

## 2022-08-04 PROCEDURE — 37000009 HC ANESTHESIA EA ADD 15 MINS: Performed by: OTOLARYNGOLOGY

## 2022-08-04 PROCEDURE — D9220A PRA ANESTHESIA: Mod: CRNA,,, | Performed by: NURSE ANESTHETIST, CERTIFIED REGISTERED

## 2022-08-04 PROCEDURE — D9220A PRA ANESTHESIA: ICD-10-PCS | Mod: CRNA,,, | Performed by: NURSE ANESTHETIST, CERTIFIED REGISTERED

## 2022-08-04 PROCEDURE — 25000003 PHARM REV CODE 250: Performed by: STUDENT IN AN ORGANIZED HEALTH CARE EDUCATION/TRAINING PROGRAM

## 2022-08-04 PROCEDURE — 36000708 HC OR TIME LEV III 1ST 15 MIN: Performed by: OTOLARYNGOLOGY

## 2022-08-04 PROCEDURE — D9220A PRA ANESTHESIA: Mod: ANES,,, | Performed by: ANESTHESIOLOGY

## 2022-08-04 PROCEDURE — 14302 PR ADJ TISS XFER ANY AREA,EA ADD 30.0 SQCM: ICD-10-PCS | Mod: ,,, | Performed by: OTOLARYNGOLOGY

## 2022-08-04 PROCEDURE — 71000044 HC DOSC ROUTINE RECOVERY FIRST HOUR: Performed by: OTOLARYNGOLOGY

## 2022-08-04 PROCEDURE — 71000015 HC POSTOP RECOV 1ST HR: Performed by: OTOLARYNGOLOGY

## 2022-08-04 PROCEDURE — 37000008 HC ANESTHESIA 1ST 15 MINUTES: Performed by: OTOLARYNGOLOGY

## 2022-08-04 PROCEDURE — 14301 TIS TRNFR ANY 30.1-60 SQ CM: CPT | Mod: ,,, | Performed by: OTOLARYNGOLOGY

## 2022-08-04 PROCEDURE — 25000003 PHARM REV CODE 250: Performed by: OTOLARYNGOLOGY

## 2022-08-04 PROCEDURE — D9220A PRA ANESTHESIA: ICD-10-PCS | Mod: ANES,,, | Performed by: ANESTHESIOLOGY

## 2022-08-04 PROCEDURE — 14301 PR ADJ TISS XFER ANY AREA,30.1-60 SQCM: ICD-10-PCS | Mod: ,,, | Performed by: OTOLARYNGOLOGY

## 2022-08-04 PROCEDURE — 25000003 PHARM REV CODE 250: Performed by: NURSE ANESTHETIST, CERTIFIED REGISTERED

## 2022-08-04 PROCEDURE — 14302 TIS TRNFR ADDL 30 SQ CM: CPT | Mod: ,,, | Performed by: OTOLARYNGOLOGY

## 2022-08-04 PROCEDURE — 63600175 PHARM REV CODE 636 W HCPCS: Performed by: NURSE ANESTHETIST, CERTIFIED REGISTERED

## 2022-08-04 RX ORDER — LIDOCAINE HYDROCHLORIDE 10 MG/ML
INJECTION, SOLUTION INTRAVENOUS
Status: DISCONTINUED | OUTPATIENT
Start: 2022-08-04 | End: 2022-08-04

## 2022-08-04 RX ORDER — LIDOCAINE HYDROCHLORIDE AND EPINEPHRINE 15; 5 MG/ML; UG/ML
INJECTION, SOLUTION EPIDURAL
Status: DISCONTINUED | OUTPATIENT
Start: 2022-08-04 | End: 2022-08-04 | Stop reason: HOSPADM

## 2022-08-04 RX ORDER — HYDROMORPHONE HYDROCHLORIDE 1 MG/ML
0.2 INJECTION, SOLUTION INTRAMUSCULAR; INTRAVENOUS; SUBCUTANEOUS EVERY 5 MIN PRN
Status: DISCONTINUED | OUTPATIENT
Start: 2022-08-04 | End: 2022-08-04 | Stop reason: HOSPADM

## 2022-08-04 RX ORDER — CEFAZOLIN SODIUM/WATER 2 G/20 ML
2 SYRINGE (ML) INTRAVENOUS
Status: COMPLETED | OUTPATIENT
Start: 2022-08-04 | End: 2022-08-04

## 2022-08-04 RX ORDER — IBUPROFEN 600 MG/1
600 TABLET ORAL EVERY 6 HOURS PRN
COMMUNITY
Start: 2022-08-04 | End: 2023-03-09

## 2022-08-04 RX ORDER — OXYCODONE HYDROCHLORIDE 5 MG/1
10 TABLET ORAL EVERY 4 HOURS PRN
Status: DISCONTINUED | OUTPATIENT
Start: 2022-08-04 | End: 2022-08-04 | Stop reason: HOSPADM

## 2022-08-04 RX ORDER — PROPOFOL 10 MG/ML
VIAL (ML) INTRAVENOUS
Status: DISCONTINUED | OUTPATIENT
Start: 2022-08-04 | End: 2022-08-04

## 2022-08-04 RX ORDER — METOCLOPRAMIDE HYDROCHLORIDE 5 MG/ML
5 INJECTION INTRAMUSCULAR; INTRAVENOUS EVERY 6 HOURS PRN
Status: DISCONTINUED | OUTPATIENT
Start: 2022-08-04 | End: 2022-08-04 | Stop reason: HOSPADM

## 2022-08-04 RX ORDER — OXYCODONE HYDROCHLORIDE 5 MG/1
5 TABLET ORAL
Status: DISCONTINUED | OUTPATIENT
Start: 2022-08-04 | End: 2022-08-04 | Stop reason: HOSPADM

## 2022-08-04 RX ORDER — LIDOCAINE HYDROCHLORIDE 10 MG/ML
1 INJECTION, SOLUTION EPIDURAL; INFILTRATION; INTRACAUDAL; PERINEURAL ONCE
Status: DISCONTINUED | OUTPATIENT
Start: 2022-08-04 | End: 2022-08-04 | Stop reason: HOSPADM

## 2022-08-04 RX ORDER — SODIUM CHLORIDE 0.9 % (FLUSH) 0.9 %
2 SYRINGE (ML) INJECTION
Status: DISCONTINUED | OUTPATIENT
Start: 2022-08-04 | End: 2022-08-04 | Stop reason: HOSPADM

## 2022-08-04 RX ORDER — HYDROCODONE BITARTRATE AND ACETAMINOPHEN 5; 325 MG/1; MG/1
1 TABLET ORAL EVERY 6 HOURS PRN
Qty: 12 TABLET | Refills: 0 | Status: SHIPPED | OUTPATIENT
Start: 2022-08-04 | End: 2022-11-23

## 2022-08-04 RX ORDER — FENTANYL CITRATE 50 UG/ML
INJECTION, SOLUTION INTRAMUSCULAR; INTRAVENOUS
Status: DISCONTINUED | OUTPATIENT
Start: 2022-08-04 | End: 2022-08-04

## 2022-08-04 RX ORDER — ONDANSETRON 8 MG/1
8 TABLET, ORALLY DISINTEGRATING ORAL EVERY 6 HOURS PRN
Status: DISCONTINUED | OUTPATIENT
Start: 2022-08-04 | End: 2022-08-04 | Stop reason: HOSPADM

## 2022-08-04 RX ORDER — DEXAMETHASONE SODIUM PHOSPHATE 4 MG/ML
INJECTION, SOLUTION INTRA-ARTICULAR; INTRALESIONAL; INTRAMUSCULAR; INTRAVENOUS; SOFT TISSUE
Status: DISCONTINUED | OUTPATIENT
Start: 2022-08-04 | End: 2022-08-04

## 2022-08-04 RX ORDER — DEXMEDETOMIDINE HYDROCHLORIDE 100 UG/ML
INJECTION, SOLUTION INTRAVENOUS
Status: DISCONTINUED | OUTPATIENT
Start: 2022-08-04 | End: 2022-08-04

## 2022-08-04 RX ORDER — ACETAMINOPHEN 325 MG/1
650 TABLET ORAL EVERY 4 HOURS PRN
Status: DISCONTINUED | OUTPATIENT
Start: 2022-08-04 | End: 2022-08-04 | Stop reason: HOSPADM

## 2022-08-04 RX ORDER — SUCCINYLCHOLINE CHLORIDE 20 MG/ML
INJECTION INTRAMUSCULAR; INTRAVENOUS
Status: DISCONTINUED | OUTPATIENT
Start: 2022-08-04 | End: 2022-08-04

## 2022-08-04 RX ORDER — MIDAZOLAM HYDROCHLORIDE 1 MG/ML
INJECTION, SOLUTION INTRAMUSCULAR; INTRAVENOUS
Status: DISCONTINUED | OUTPATIENT
Start: 2022-08-04 | End: 2022-08-04

## 2022-08-04 RX ORDER — HYDROCODONE BITARTRATE AND ACETAMINOPHEN 5; 325 MG/1; MG/1
1 TABLET ORAL EVERY 4 HOURS PRN
Status: DISCONTINUED | OUTPATIENT
Start: 2022-08-04 | End: 2022-08-04 | Stop reason: HOSPADM

## 2022-08-04 RX ADMIN — DEXMEDETOMIDINE HYDROCHLORIDE 8 MCG: 100 INJECTION, SOLUTION, CONCENTRATE INTRAVENOUS at 01:08

## 2022-08-04 RX ADMIN — PROPOFOL 50 MG: 10 INJECTION, EMULSION INTRAVENOUS at 01:08

## 2022-08-04 RX ADMIN — LIDOCAINE HYDROCHLORIDE 50 MG: 10 INJECTION, SOLUTION INTRAVENOUS at 01:08

## 2022-08-04 RX ADMIN — Medication 2 G: at 01:08

## 2022-08-04 RX ADMIN — SODIUM CHLORIDE, SODIUM GLUCONATE, SODIUM ACETATE, POTASSIUM CHLORIDE, MAGNESIUM CHLORIDE, SODIUM PHOSPHATE, DIBASIC, AND POTASSIUM PHOSPHATE: .53; .5; .37; .037; .03; .012; .00082 INJECTION, SOLUTION INTRAVENOUS at 01:08

## 2022-08-04 RX ADMIN — OXYCODONE 10 MG: 5 TABLET ORAL at 03:08

## 2022-08-04 RX ADMIN — DEXMEDETOMIDINE HYDROCHLORIDE 12 MCG: 100 INJECTION, SOLUTION, CONCENTRATE INTRAVENOUS at 02:08

## 2022-08-04 RX ADMIN — FENTANYL CITRATE 100 MCG: 50 INJECTION, SOLUTION INTRAMUSCULAR; INTRAVENOUS at 01:08

## 2022-08-04 RX ADMIN — PROPOFOL 100 MG: 10 INJECTION, EMULSION INTRAVENOUS at 01:08

## 2022-08-04 RX ADMIN — MIDAZOLAM HYDROCHLORIDE 2 MG: 1 INJECTION, SOLUTION INTRAMUSCULAR; INTRAVENOUS at 01:08

## 2022-08-04 RX ADMIN — DEXAMETHASONE SODIUM PHOSPHATE 8 MG: 4 INJECTION, SOLUTION INTRAMUSCULAR; INTRAVENOUS at 01:08

## 2022-08-04 RX ADMIN — SUCCINYLCHOLINE CHLORIDE 100 MG: 20 INJECTION, SOLUTION INTRAMUSCULAR; INTRAVENOUS at 01:08

## 2022-08-04 RX ADMIN — SODIUM CHLORIDE: 0.9 INJECTION, SOLUTION INTRAVENOUS at 01:08

## 2022-08-04 NOTE — H&P
Otorhinolaryngology-Head & Neck Surgery  History and Physical      Subjective:      CC: here today for surgery    HPI   62M presents today for reconstruction of his right face. He underwent Mohs excision of right facial melanoma in situ yesterday with dermatology. He has a  2.5 x 4.2 cm wound on his right face. This lesion was noticed over 1 year ago. It was initially a flat, drak mole that started to grow and became darker. Endorses signifcant sun exposure.     ROS: ENT-focused ROS completed and is negative unless otherwise stated in the HPI.     Past Medical History:   Diagnosis Date    Anxiety     Esotropia of right eye 2013    Hearing deficit, bilateral     High cholesterol     Hyperlipidemia     Melanoma in situ of cheek     Parkinson's disease     Ringing in ear, bilateral      Past Surgical History:   Procedure Laterality Date    CATARACT EXTRACTION W/  INTRAOCULAR LENS IMPLANT Right 2017    Dr marin     CATARACT EXTRACTION W/  INTRAOCULAR LENS IMPLANT Left 2017    Dr. Marin    HERNIA REPAIR      STRABISMUS SURGERY  4yrs    right eye     Social History     Tobacco Use    Smoking status: Former Smoker     Packs/day: 1.00     Years: 1.00     Pack years: 1.00     Quit date: 2006     Years since quittin.6    Smokeless tobacco: Never Used   Substance Use Topics    Alcohol use: Yes     Alcohol/week: 20.0 standard drinks     Types: 20 Glasses of wine per week     Family History   Problem Relation Age of Onset    Arthritis Mother     Hearing loss Mother     Heart attack Father     Atrial fibrillation Sister     Sleep apnea Brother     Arthritis Brother     Cataracts Maternal Grandmother     Cataracts Paternal Grandmother     Amblyopia Neg Hx     Blindness Neg Hx     Glaucoma Neg Hx     Macular degeneration Neg Hx     Retinal detachment Neg Hx     Strabismus Neg Hx     Melanoma Neg Hx        Current Facility-Administered Medications:     ceFAZolin in sterile  water 2 gram/20 mL IV syringe 2,000 mg, 2 g, Intravenous, On Call Procedure, Joseph Cole MD    LIDOcaine (PF) 10 mg/ml (1%) injection 10 mg, 1 mL, Intradermal, Once, Joseph Cole MD    sodium chloride 0.9% flush 2 mL, 2 mL, Intravenous, PRN, Joseph Cole MD  Patient has no known allergies.      Objective:     Temp:  [97.6 °F (36.4 °C)] 97.6 °F (36.4 °C)  Pulse:  [57-68] 57  Resp:  [16] 16  SpO2:  [98 %] 98 %  BP: (119-136)/(75-82) 136/82    General Appearance:   Awake, Alert and Oriented. NAD. Appropriate affect and appearance     Neuro:   Spontaneous eye opening, appropriate verbal responses, follows commands  Pupils equal, round . EOMI, no proptosis, no spontaneous nystagmus  Vision grossly intact, Hearing grossly intact  JORGE, normal tone    Head and Face:    2.5 x 4.2 cm wound on his right face. Dressed.     Nose:   External nose is symmetric, no skin lesions    Neck:  Neck is symmetric, non-edematous, non-erythematous  Trachea is midline and easily palpable,  No palpable adenopathy or masses in levels I-VI    Respiratory:  Normal work of breathing, no accessory muscle use, no stridor    Voice:  Normal vocal quality, volume, prosody and articulation                   Assessment/Plan:      Melanoma in situ s/p Mohs surgery with dermatology 8/3, now with right facial defect    - To OR for facial reconstruction.   - Surgical plan, risks, benefits and alternatives discussed. Consent signed.       Joseph Cole DO  Otorhinolaryngology-Head & Neck Surgery  [facility]  08/04/2022

## 2022-08-04 NOTE — TRANSFER OF CARE
"Anesthesia Transfer of Care Note    Patient: Adam Sawyer Dalal    Procedure(s) Performed: Procedure(s) (LRB):  RECONSTRUCTION, FACE (Right)    Patient location: PACU    Anesthesia Type: general    Transport from OR: Transported from OR on 6-10 L/min O2 by face mask with adequate spontaneous ventilation    Post pain: adequate analgesia    Post assessment: no apparent anesthetic complications and tolerated procedure well    Post vital signs: stable    Level of consciousness: sedated and responds to stimulation    Nausea/Vomiting: no nausea/vomiting    Complications: none    Transfer of care protocol was followed      Last vitals:   Visit Vitals  /82 (BP Location: Right arm, Patient Position: Lying)   Pulse (!) 57   Temp 36.4 °C (97.6 °F) (Oral)   Resp 16   Ht 6' 1" (1.854 m)   Wt 102.1 kg (225 lb)   SpO2 98%   BMI 29.69 kg/m²     "

## 2022-08-04 NOTE — ANESTHESIA PREPROCEDURE EVALUATION
2022  Adam Dalal is a 62 y.o., male.  Pre-operative evaluation for RECONSTRUCTION, FACE (Right)    Chief Complaint: Melanoma Mohs defect    PMH:  Parkinson's disease  CHU  Anxiety-using Xanax  HLP  Esotropia right eye  Past Surgical History:   Procedure Laterality Date    CATARACT EXTRACTION W/  INTRAOCULAR LENS IMPLANT Right 2017    Dr marin     CATARACT EXTRACTION W/  INTRAOCULAR LENS IMPLANT Left 2017    Dr. Marin    HERNIA REPAIR      STRABISMUS SURGERY  4yrs    right eye         Vital Signs Range (Last 24H):  Temp:  [36.4 °C (97.6 °F)]   Pulse:  [57]   Resp:  [16]   BP: (136)/(82)   SpO2:  [98 %]       CBC:   No results for input(s): WBC, RBC, HGB, HCT, PLT, MCV, MCH, MCHC in the last 720 hours.    CMP: No results for input(s): NA, K, CL, CO2, BUN, CREATININE, GLU, MG, PHOS, CALCIUM, ALBUMIN, PROT, ALKPHOS, ALT, AST, BILITOT in the last 720 hours.    INR:  No results for input(s): PT, INR, PROTIME, APTT in the last 720 hours.      Diagnostic Studies:      EKD Echo:    Pre-op Assessment          Review of Systems         Anesthesia Plan  Type of Anesthesia, risks & benefits discussed:    Anesthesia Type: Gen ETT  Intra-op Monitoring Plan: Standard ASA Monitors  Post Op Pain Control Plan: multimodal analgesia  Induction:  IV  Airway Plan: Direct  Informed Consent: Informed consent signed with the Patient and all parties understand the risks and agree with anesthesia plan.  All questions answered.   ASA Score: 3  Day of Surgery Review of History & Physical: H&P Update referred to the surgeon/provider.    Ready For Surgery From Anesthesia Perspective.     .

## 2022-08-04 NOTE — DISCHARGE INSTRUCTIONS
Use vaseline or A&D ointment over incision to keep it moist. Avoid getting water on incision.     Follow-up with Dr. Hickman in one week.

## 2022-08-04 NOTE — BRIEF OP NOTE
Gavin Hightower - Surgery ([facility])  Brief Operative Note     SUMMARY     Surgery Date:   8/4/2022     SURGEON(S):   Surgeon(s) and Role:     * Yaneli Hickman MD - Primary     * Joseph Cole MD - Resident - Assisting    ANESTHESIA STAFF:   Mamadou Berg Jr., MD    OR STAFF:   Circulator: Avtar Finch RN  Scrub Person: Cassandra Sharp      Pre-op Diagnosis:  Mohs defect of right cheek [M95.2, Z98.890]    Post-op Diagnosis:  Post-Op Diagnosis Codes:     * Mohs defect of right cheek [M95.2, Z98.890]    Procedure(s) (LRB):  RECONSTRUCTION, FACE (Right)    Anesthesia: General    Description of the procedure: right cheek advancement flap for closure of superior right cheek mohs defect    Findings: satisfactory facial reconstruction    Estimated Blood Loss: 20cc         Specimens:   Specimen (24h ago, onward)            None          LENGTH OF SURGERY:   * Missing case tracking time(s) *    Event Time In   In Facility [id number]   In Pre-Procedure 1008   Physician Available    Anesthesia Available    Pre-Op: Bedside Procedure Start    Pre-Op: Bedside Procedure Stop    Pre-Procedure Complete 1051   Out of Pre-Procedure    Holding Start    Holding Stop    Anesthesia Start 1302   Anesthesia Start Data Collection    Setup Start    Setup Complete    In Room 1305   Prep Start    Procedure Prep Complete    Procedure Start 1336   Procedure Closing 1412   Emergence 1429   Procedure Finish 1433   Out of Room    In OR Recovery    Out of OR Recovery    Cleanup Start    Cleanup Complete    Cosmetic Start    Cosmetic Stop    Pain Mgmt In Room    Pain Mgmt Out Room    In Recovery    Anesthesia Finish    Bedside Procedure Start    Bedside Procedure Stop    Recovery Care Complete    Out of Recovery    In Diagnostic Recovery    Out Diagnostic Recovery    In PACU Phase II    Out PACU Phase II    In PACU Ext    Out PACU Ext    In Recovery DOSC    Out Recovery DOSC    Obs Rec Start    Obs Rec Stop    To Phase II    In Phase II    Phase II Care  Complete    Out of Phase II    In Phase II Ext    Out Phase II Ext    Procedural Care Complete    Pain Follow Up Needed    Pain Follow Up Complete    PACU Bed Request      Discharge Note    SUMMARY     Admit Date: 8/4/2022    Discharge Date and Time:   2:37 PM    Hospital Course: Please see the preoperative H&P and other available documentation for full details related to history prior to this admission.  Briefly, pt presented to [facility] for elective outpatient procedure. Procedure, risks and benefits were discussed with patient. All questions were answered. Informed consent was obtained. Pt was taken to the OR and underwent the above procedures without complications. Pt  was transferred to PACU in stable condition and discharged to home the same day.     Final Diagnosis: Post-Op Diagnosis Codes:     * Mohs defect of right cheek [M95.2, Z98.890]    Disposition: Home/Self Care    Discharge Medication List:     Medication List      START taking these medications    HYDROcodone-acetaminophen 5-325 mg per tablet  Commonly known as: NORCO  Take 1 tablet by mouth every 6 (six) hours as needed for Pain.     ibuprofen 600 MG tablet  Commonly known as: ADVIL,MOTRIN  Take 1 tablet (600 mg total) by mouth every 6 (six) hours as needed for Pain.        CONTINUE taking these medications    ALPRAZolam 0.5 MG tablet  Commonly known as: XANAX  TAKE 1 TABLET(0.5 MG) BY MOUTH EVERY NIGHT FOR INSOMNIA/ANXIETY     ofloxacin 0.3 % otic solution  Commonly known as: FLOXIN  Place 5 drops into the left ear 2 (two) times daily.     pravastatin 80 MG tablet  Commonly known as: PRAVACHOL  TAKE 1 TABLET(80 MG) BY MOUTH EVERY MORNING     rasagiline 1 mg Tab  Commonly known as: AZILECT  TAKE 1 TABLET(1 MG) BY MOUTH EVERY DAY     * RYTARY 23.75-95 mg Cpsr  Generic drug: carbidopa-levodopa  Take 3 capsules by mouth 3 (three) times daily.     * RYTARY 23.75-95 mg Cpsr  Generic drug: carbidopa-levodopa  Take 3 capsules by mouth 3 (three) times daily.          * This list has 2 medication(s) that are the same as other medications prescribed for you. Read the directions carefully, and ask your doctor or other care provider to review them with you.               Where to Get Your Medications      You can get these medications from any pharmacy    Bring a paper prescription for each of these medications  · HYDROcodone-acetaminophen 5-325 mg per tablet  You don't need a prescription for these medications  · ibuprofen 600 MG tablet         Discharge Procedure Orders   Diet general     No dressing needed     Call MD for:  difficulty breathing, headache or visual disturbances     Call MD for:  redness, tenderness, or signs of infection (pain, swelling, redness, odor or green/yellow discharge around incision site)     Call MD for:  hives     Call MD for:   Order Comments: Persistent bleeding     Activity as tolerated       Follow Up:    Follow-up Information     Lakesha Yo NP Follow up on 8/12/2022.    Specialty: Otolaryngology  Why: For suture removal  Contact information:  [address]  [facility] [address]  [phone]

## 2022-08-04 NOTE — ANESTHESIA PROCEDURE NOTES
Intubation    Date/Time: 8/4/2022 1:16 PM  Performed by: Tresa Proctor CRNA  Authorized by: Amol Wick MD     Intubation:     Induction:  Intravenous    Intubated:  Postinduction    Mask Ventilation:  Moderately difficult with oral airway    Attempts:  1    Attempted By:  Student    Method of Intubation:  Direct    Blade:  Calderon 2    Laryngeal View Grade: Grade I - full view of cords      Difficult Airway Encountered?: No      Complications:  None    Airway Device:  Oral endotracheal tube    Airway Device Size:  7.5    Style/Cuff Inflation:  Cuffed    Tube secured:  23    Secured at:  The lips    Placement Verified By:  Colorimetric ETCO2 device    Complicating Factors:  None    Findings Post-Intubation:  BS equal bilateral

## 2022-08-04 NOTE — OP NOTE
Date of Service: 8/4/2022    Pre-operative Diagnosis:   Mohs defect of the right cheek    Post-operative Diagnosis:  Same    Procedures Performed:  Adjacent tissue transfer with rotational advancement flap to cover cheek defect with advanced area being 10 x 7 cm.     Surgeon: Yaneli Hickman MD    Assistant(s):  Joseph Cole MD    Anesthesia: General Endotracheal    EBL:  20 cc    Specimens:  None    Findings:  Approximately 4 by 2.5 cm defect of the right cheek was closed with advancement rotation flap with standing cutaneous deformity excised.    Indications for Procedure:  Mr. Dalal is a 62-year-old male seen by Dr. Gomez with slow Mohs surgery undertaken for melanoma of the right cheek.  After clearing margins he was sent to me for reconstruction.     Procedure in Detail:  After informed consent was obtained from patient in holding area the risks and benefits reviewed patient was taken back to OR 13. Anesthesia proceeded with general endotracheal anesthesia and patient was turned 90° with time-out undertaken with verification of patient and correct procedure.  The area surrounding the defect inferiorly and laterally was injected with approximately 10 cc of 1.5% lidocaine with [id number] of epinephrine.  Right cheek was prepped and draped in usual sterile fashion.    Starting from the inferior and the lateral edge of the defect subcutaneous elevation was undertaken with face-lift scissors in a subcutaneous plane staying just below the hair follicles above the SMAS plane.  This was undertaken wildly laterally and then inferiorly.  Advancement rotation flap was fashioned with 2 times the width of the defect which was 4 cm and this was extended 8 cm to the preauricular area.  Incision was made with a 15 blade and continued to communicate with the subdermal elevation.  This allowed good rotation advancement of the flap into the defect.  Care was taken to secure the tip of the advancement rotation flap to the lateral  canthus area and not the lower eyelid portion in order to not cause any malposition of the right lower eyelid.  The total undermined area including the standing cutaneous deformity that was later excised was 10 x 7 cm.  Advancement flap was secured with deep 4-0 PDS suture and standing cutaneous deformity towards the medial cheek was excised with a 15 blade.  Remainder of the flap and secondary incision at the standing cutaneous deformity was also closed with deep 4-0 PDS and skin was closed with a running locking 5 0 Prolene suture.  Ointment was placed photo documentation was obtained and patient was turned over to Anesthesia awakened and taken to recovery in stable condition.      Complications:  None    Attestation:  I was present for the entire procedure    DISCLAIMER: This note was prepared with [employer] voice recognition transcription software. Garbled syntax, mangled pronouns, and other bizarre constructions may be attributed to that software system. While efforts were made to correct any mistakes made by this voice recognition program, some errors and/or omissions may remain in the note that were missed when the note was originally created.

## 2022-08-04 NOTE — ANESTHESIA POSTPROCEDURE EVALUATION
Anesthesia Post Evaluation    Patient: Adam Dalal    Procedure(s) Performed: Procedure(s) (LRB):  RECONSTRUCTION, FACE (Right)    Final Anesthesia Type: general      Patient location during evaluation: PACU  Patient participation: Yes- Able to Participate  Level of consciousness: awake and alert  Post-procedure vital signs: reviewed and stable  Pain management: adequate  Airway patency: patent    PONV status at discharge: No PONV  Anesthetic complications: no      Cardiovascular status: blood pressure returned to baseline  Respiratory status: spontaneous ventilation and room air  Hydration status: euvolemic  Follow-up not needed.          Vitals Value Taken Time   /81 08/04/22 1555   Temp 36.6 °C (97.9 °F) 08/04/22 1555   Pulse 64 08/04/22 1555   Resp 20 08/04/22 1555   SpO2 95 % 08/04/22 1555         No case tracking events are documented in the log.      Pain/Bárbara Score: Pain Rating Prior to Med Admin: 7 (8/4/2022  3:04 PM)  Pain Rating Post Med Admin: 5 (8/4/2022  3:55 PM)  Bárbara Score: 10 (8/4/2022  3:55 PM)

## 2022-08-05 ENCOUNTER — PATIENT MESSAGE (OUTPATIENT)
Dept: DERMATOLOGY | Facility: CLINIC | Age: 62
End: 2022-08-05
Payer: COMMERCIAL

## 2022-08-09 ENCOUNTER — OFFICE VISIT (OUTPATIENT)
Dept: OTOLARYNGOLOGY | Facility: CLINIC | Age: 62
End: 2022-08-09
Payer: COMMERCIAL

## 2022-08-09 VITALS
SYSTOLIC BLOOD PRESSURE: 115 MMHG | WEIGHT: 225.75 LBS | BODY MASS INDEX: 29.78 KG/M2 | DIASTOLIC BLOOD PRESSURE: 70 MMHG | HEART RATE: 65 BPM

## 2022-08-09 DIAGNOSIS — Z09 POSTOP CHECK: Primary | ICD-10-CM

## 2022-08-09 PROCEDURE — 99999 PR PBB SHADOW E&M-EST. PATIENT-LVL III: ICD-10-PCS | Mod: PBBFAC,,, | Performed by: OTOLARYNGOLOGY

## 2022-08-09 PROCEDURE — 3074F SYST BP LT 130 MM HG: CPT | Mod: CPTII,S$GLB,, | Performed by: OTOLARYNGOLOGY

## 2022-08-09 PROCEDURE — 3008F PR BODY MASS INDEX (BMI) DOCUMENTED: ICD-10-PCS | Mod: CPTII,S$GLB,, | Performed by: OTOLARYNGOLOGY

## 2022-08-09 PROCEDURE — 99999 PR PBB SHADOW E&M-EST. PATIENT-LVL III: CPT | Mod: PBBFAC,,, | Performed by: OTOLARYNGOLOGY

## 2022-08-09 PROCEDURE — 1159F PR MEDICATION LIST DOCUMENTED IN MEDICAL RECORD: ICD-10-PCS | Mod: CPTII,S$GLB,, | Performed by: OTOLARYNGOLOGY

## 2022-08-09 PROCEDURE — 99024 PR POST-OP FOLLOW-UP VISIT: ICD-10-PCS | Mod: S$GLB,,, | Performed by: OTOLARYNGOLOGY

## 2022-08-09 PROCEDURE — 1159F MED LIST DOCD IN RCRD: CPT | Mod: CPTII,S$GLB,, | Performed by: OTOLARYNGOLOGY

## 2022-08-09 PROCEDURE — 99024 POSTOP FOLLOW-UP VISIT: CPT | Mod: S$GLB,,, | Performed by: OTOLARYNGOLOGY

## 2022-08-09 PROCEDURE — 1160F RVW MEDS BY RX/DR IN RCRD: CPT | Mod: CPTII,S$GLB,, | Performed by: OTOLARYNGOLOGY

## 2022-08-09 PROCEDURE — 1160F PR REVIEW ALL MEDS BY PRESCRIBER/CLIN PHARMACIST DOCUMENTED: ICD-10-PCS | Mod: CPTII,S$GLB,, | Performed by: OTOLARYNGOLOGY

## 2022-08-09 PROCEDURE — 3078F PR MOST RECENT DIASTOLIC BLOOD PRESSURE < 80 MM HG: ICD-10-PCS | Mod: CPTII,S$GLB,, | Performed by: OTOLARYNGOLOGY

## 2022-08-09 PROCEDURE — 3074F PR MOST RECENT SYSTOLIC BLOOD PRESSURE < 130 MM HG: ICD-10-PCS | Mod: CPTII,S$GLB,, | Performed by: OTOLARYNGOLOGY

## 2022-08-09 PROCEDURE — 3078F DIAST BP <80 MM HG: CPT | Mod: CPTII,S$GLB,, | Performed by: OTOLARYNGOLOGY

## 2022-08-09 PROCEDURE — 3008F BODY MASS INDEX DOCD: CPT | Mod: CPTII,S$GLB,, | Performed by: OTOLARYNGOLOGY

## 2022-08-10 NOTE — PROGRESS NOTES
Subjective: 62 y.o. male seen in follow up s/p cervical facial advancement for closure of right cheek and portion of inferior eyelid Mohs defect.  Patient reports no issues postop.  He has been cleaning incision and placing Vaseline.      Objective:  /70   Pulse 65   Wt 102.4 kg (225 lb 12 oz)   BMI 29.78 kg/m²     Exam:  General No acute distress  Prolenes removed from the right cheek.  There is complete eye closure with some displacement of the lower lid laterally but no ectropion or vertical malposition of the lower eyelid.      Assessment / Plan:    Healing well from Mohs repair.  Wound care discussed.  Follow-up in 6 weeks for reassessment

## 2022-08-12 ENCOUNTER — TELEPHONE (OUTPATIENT)
Dept: NEUROLOGY | Facility: CLINIC | Age: 62
End: 2022-08-12
Payer: COMMERCIAL

## 2022-08-12 NOTE — TELEPHONE ENCOUNTER
----- Message from Rocio Jerome sent at 8/12/2022  3:56 PM CDT -----  Regarding: appt  Contact: PT @ [phone]  Pt is calling to speak to someone with Dr. Babcock's office to schedule an appt; f/u Parkinson's. No available appts in Epic. Please call. Thanks.

## 2022-08-18 ENCOUNTER — PATIENT MESSAGE (OUTPATIENT)
Dept: NEUROLOGY | Facility: CLINIC | Age: 62
End: 2022-08-18
Payer: COMMERCIAL

## 2022-08-18 NOTE — TELEPHONE ENCOUNTER
Spoke with Rajan Katlin to discuss scheduling plans and settled on Friday 9/2 at 8AM for a virtual visit with Jes. Our goal will be to then extend for a longer interim period to ensure next visit with Dr. Babcock.    - - -  Communicated goals of appointment to assess baseline and confirm medications.

## 2022-08-23 ENCOUNTER — PATIENT MESSAGE (OUTPATIENT)
Dept: OTOLARYNGOLOGY | Facility: CLINIC | Age: 62
End: 2022-08-23
Payer: COMMERCIAL

## 2022-09-23 ENCOUNTER — OFFICE VISIT (OUTPATIENT)
Dept: NEUROLOGY | Facility: CLINIC | Age: 62
End: 2022-09-23
Payer: COMMERCIAL

## 2022-09-23 DIAGNOSIS — Z71.89 COUNSELING REGARDING GOALS OF CARE: ICD-10-CM

## 2022-09-23 DIAGNOSIS — F32.A DEPRESSION, UNSPECIFIED DEPRESSION TYPE: ICD-10-CM

## 2022-09-23 DIAGNOSIS — G20.A1 PARKINSON DISEASE: Primary | ICD-10-CM

## 2022-09-23 DIAGNOSIS — R41.3 MEMORY CHANGE: ICD-10-CM

## 2022-09-23 PROCEDURE — 1160F RVW MEDS BY RX/DR IN RCRD: CPT | Mod: CPTII,95,, | Performed by: PHYSICIAN ASSISTANT

## 2022-09-23 PROCEDURE — 1159F MED LIST DOCD IN RCRD: CPT | Mod: CPTII,95,, | Performed by: PHYSICIAN ASSISTANT

## 2022-09-23 PROCEDURE — 1159F PR MEDICATION LIST DOCUMENTED IN MEDICAL RECORD: ICD-10-PCS | Mod: CPTII,95,, | Performed by: PHYSICIAN ASSISTANT

## 2022-09-23 PROCEDURE — 99213 PR OFFICE/OUTPT VISIT, EST, LEVL III, 20-29 MIN: ICD-10-PCS | Mod: 95,,, | Performed by: PHYSICIAN ASSISTANT

## 2022-09-23 PROCEDURE — 1160F PR REVIEW ALL MEDS BY PRESCRIBER/CLIN PHARMACIST DOCUMENTED: ICD-10-PCS | Mod: CPTII,95,, | Performed by: PHYSICIAN ASSISTANT

## 2022-09-23 PROCEDURE — 99213 OFFICE O/P EST LOW 20 MIN: CPT | Mod: 95,,, | Performed by: PHYSICIAN ASSISTANT

## 2022-09-23 RX ORDER — LEVODOPA AND CARBIDOPA 95; 23.75 MG/1; MG/1
4 CAPSULE, EXTENDED RELEASE ORAL 3 TIMES DAILY
Qty: 360 CAPSULE | Refills: 11 | Status: SHIPPED | OUTPATIENT
Start: 2022-09-23 | End: 2023-10-15 | Stop reason: SDUPTHER

## 2022-09-23 NOTE — PROGRESS NOTES
"Neurology Telemedicine Note     Name: Adam Dalal  MRN: [id number]   CSN: [id number]      Date: 09/23/2022    The patient location is: Home  The chief complaint leading to consultation is: PD  Visit type: Virtual visit with synchronous audio and video    TOTAL TIME SPENT: 25 min    Each patient to whom I provide medical services by telemedicine is:  (1) informed of the relationship between the physician and patient and the respective role of any other health care provider with respect to management of the patient; and (2) notified that they may decline to receive medical services by telemedicine and may withdraw from such care at any time.    Interval History:  - rytary 95s 3 caps TID + rasagiline 1 mg   - gait is affected, shuffling   - no falls   - worse whenever he is due for his medicine   - walks 6 blocks to work   - 5:30 am, 11:00 am, 5 pm   - happy with current regimen   - not exercising aside in the morning to work   - a bit more apathy, "a little depression here and there, impending doom"   - MM has helped with sleep and some with mood and anxiety, started about a week and a half ago   - had covid in May, having more issues with word finding or explaining things   - no tremor   - no constipation  - no hallucinations  - having more issues typing       From Jan 2022  - doing pretty well   - a little more depression   - on rytary now, doing well   - some toe curling, worse when stressed   - still taking rasagiline   - one day he forgot to take rasagiline and it really felt it   - no falls  - denies constipation or hallucinations   - still working   - appetite is good, sleep is okay -- some nocturia   - not exercising, wants to get into it with the new year    Nonmotor/Premotor ROS: as per HPI, and all other systems are negative    Past Medical History: The patient  has a past medical history of Anxiety, Esotropia of right eye (05/02/2013), Hearing deficit, bilateral, High cholesterol, Hyperlipidemia, " Melanoma in situ of cheek, Parkinson's disease, and Ringing in ear, bilateral.    Social History: The patient  reports that he quit smoking about 16 years ago. He has a 1.00 pack-year smoking history. He has never used smokeless tobacco. He reports current alcohol use of about 20.0 standard drinks per week. He reports that he does not use drugs.    Family History: Their family history includes Arthritis in his brother and mother; Atrial fibrillation in his sister; Cataracts in his maternal grandmother and paternal grandmother; Hearing loss in his mother; Heart attack in his father; Sleep apnea in his brother.    Allergies: Patient has no known allergies.     Meds:   Current Outpatient Medications on File Prior to Visit   Medication Sig Dispense Refill    ALPRAZolam (XANAX) 0.5 MG tablet TAKE 1 TABLET(0.5 MG) BY MOUTH EVERY NIGHT FOR INSOMNIA/ANXIETY 30 tablet 4    carbidopa-levodopa (RYTARY) 23.75-95 mg CpSR Take 3 capsules by mouth 3 (three) times daily. 270 capsule 5    carbidopa-levodopa (RYTARY) 23.75-95 mg CpSR Take 3 capsules by mouth 3 (three) times daily. 270 capsule 11    HYDROcodone-acetaminophen (NORCO) 5-325 mg per tablet Take 1 tablet by mouth every 6 (six) hours as needed for Pain. 12 tablet 0    ibuprofen (ADVIL,MOTRIN) 600 MG tablet Take 1 tablet (600 mg total) by mouth every 6 (six) hours as needed for Pain.      ofloxacin (FLOXIN) 0.3 % otic solution Place 5 drops into the left ear 2 (two) times daily. 10 mL 0    pravastatin (PRAVACHOL) 80 MG tablet TAKE 1 TABLET(80 MG) BY MOUTH EVERY MORNING 90 tablet 3    rasagiline (AZILECT) 1 mg Tab TAKE 1 TABLET(1 MG) BY MOUTH EVERY DAY 30 tablet 11     No current facility-administered medications on file prior to visit.       Exam:  Vital Signs deferred with home visit    Constitutional  Well-developed, well-nourished, appears stated age   Eyes  No scleral icterus   ENT  Moist oral mucosa   Cardiovascular  No lower extremity edema    Respiratory  No labored  "breathing    Skin  No rashes   Hematologic  No bruising   Psychiatric  Normal mood and affect   Other  GI/ deferred    Neurological     * Mental status  Alert and oriented to person, place, time, and situation; no dysarthria; no aphasia; normal recent and remote memory; follows commands   * Cranial nerves     - CN II  Pupils midposition and symmetric   - CN III, IV, VI  Extraocular movements full, no nystagmus visualized   - CN V  Unable to test   - CN VII  Face strong and symmetric bilaterally    - CN VIII  Hearing grossly intact with conversation    - CN IX, X  Palate raises midline and symmetric    - CN XI  Strong shoulder shrug B    - CN XII  Tongue appears midline    * Motor  Normal bulk by appearance, no drift    * Sensory  Not tested objectively, no changes described by the patient   * Deep tendon reflexes  Not tested   * Coord/Movemt/Gait No hypophonic speech, mild festination of speech   mild facial masking.  B bradykinesia.  No tremor with rest, posture, kinesis, or intention.   No chorea, athetosis, dystonia, myoclonus, or tics.   No motor impersistence.  Gait is deferred for safety.       Medical Record Review:  - Lab Results:  Procedure visit on 08/03/2022   Component Date Value Ref Range Status    Final Pathologic Diagnosis 08/03/2022    Final                    Value:Skin, right lateral infraorbital 7:00 o'clock through 9:00 o'clock, slow Mohs  stage III excision:  -NEGATIVE FOR MALIGNANT MELANOMA IN-SITU      Gross 08/03/2022    Final                    Value:MRN # [id number]  Received in formalin, labeled with the patients name and MRN, designated as  "7-9 R lateral infraorbital", is an oriented fragment of skin measuring 1.7 x  0.5 cm and a depth of 0.5 cm.  Per requisition, specimen is oriented short  stitch = 7 o'clock and long stitch = 9 o'clock.  Specimen was previously  inked green to represent new epidermal margin.  The new epidermal margin is  inked orange at the 9 o 'clock end.  Stitches are " "removed specimen placed in  cassette with the green margin down.  Specimen is photographed and entirely  cassette [id number].    Arun Lambert MD, MS  Pathologists' Assistant      Disclaimer 08/03/2022    Final                    Value:Unless the case is a 'gross only' or additional testing only, the final  diagnosis for each specimen is based on a microscopic examination of  appropriate tissue sections.     Procedure visit on 08/02/2022   Component Date Value Ref Range Status    Final Pathologic Diagnosis 08/02/2022    Final                    Value:1. Skin, right lateral infraorbital 12:00 o'clock to 3:00 o'clock, slow Mohs  excision (stage II):  -NEGATIVE FOR RESIDUAL MALIGNANT MELANOMA IN-SITU  2. Skin, right lateral infraorbital 6:00 o'clock to 9:00 o'clock, slow Mohs  excision (stage II):  -FOCAL RESIDUAL MALIGNANT MELANOMA IN-SITU AT APPROXIMATELY 8:30 O'CLOCK  3. Skin, right lateral infraorbital 9:00 o'clock to 12:00 o'clock, slow Mohs  excision (stage II):  -NEGATIVE FOR RESIDUAL MALIGNANT MELANOMA IN-SITU      Gross 08/02/2022    Final                    Value:Patient MRN:  [id number]  1:  The specimen is received in formalin with proper patient identification,  labeled "R lateral infraorbital 12-3". The specimen consists of a 2.8 x 0.6  cm strip of pink-tan skin excised to depth of 0.4 cm.  One surface is inked  green and is designated as a new margin per the requisition form.  There is a  short suture designating 12 o'clock and a long suture designating 3 o'clock  per the requisition form.  The 3 o'clock end is inked orange. The specimen is  submitted in cassette [id number].  2: The specimen is received in formalin with proper patient identification,  labeled "R lateral infraorbital 6-9". The specimen consists of a 1.6 x 0.6 cm  strip of pink-tan skin excised to depth of 0.4 cm.  One surface is inked  green and is designated as a new margin per the requisition form.  There is a  short suture " "designating 6 o'clock and a long suture designating 9 o'clock  per the requisition form.  The 9 o'clock end is inked orange. The specimen is  submi                          tted in cassette [id number].  3: The specimen is received in formalin with proper patient identification,  labeled "R lateral infraorbital 9-12". The specimen consists of a 1.6 x 0.6  cm strip of pink-tan skin excised to depth of 0.2 cm.  One surface is inked  green and is designated as a new margin per the requisition form.  There is a  long suture designating 9 o'clock and a short suture designating 12 o'clock  per the requisition form.  The 12 o'clock end is inked orange. The specimen  is submitted in cassette [id number].  Winter Neely MS, PA([facility])      Microscopic Exam 08/02/2022    Final                    Value:1. Sections show a fragment of skin which is negative for residual malignant  melanoma in-situ.  2. Sections show a fragment of skin with a very focal residual area showing  increased crowded melanocytes along the dermal epidermal junction.  These  melanocytes show moderate amounts of gray cytoplasm.  These increased crowded  melanocytes are located at approximately at the 8:30 o'clock area.  3. Sections show a fragment of skin which is negative for residual malignant  melanoma in-situ.      Disclaimer 08/02/2022    Final                    Value:Unless the case is a 'gross only' or additional testing only, the final  diagnosis for each specimen is based on a microscopic examination of  appropriate tissue sections.     Procedure visit on 08/01/2022   Component Date Value Ref Range Status    Final Pathologic Diagnosis 08/01/2022    Final                    Value:Skin, right lateral infraorbital, slow mohs excision (stage 1):  -RESIDUAL MALIGNANT MELANOMA IN-SITU EXTENDING TO THE 6:00 O'CLOCK THROUGH  3:00 O'CLOCK PERIPHERAL SURGICAL MARGINS  -SCAR (POST-SURGICAL)      Gross 08/01/2022    Final                    " Value:Patient ID/Pathology ID:  [id number]  Received in formalin is an ovoid fragment of non pigmented skin with attached  subcutaneous tissue.  There is a short stitch marking superior 12:00 o'clock  and a long stitch marking medial 03:00 o'clock.  The specimen measures 1.5 cm  from 12-6, 1.2 cm from 3-9 and has a maximum depth of 0.3 cm.  The skin  surface has a slightly eccentric, 0.4 x 0.4 cm hypopigmented area.  The  margin is inked green.  Entirely submitted in cassettes [id number]:  A:  En face 12-3 (orange ink at 12)  B:  En face 3-6 (orange ink at 3)  C:  En face 6-9 (orange ink at 6)  D: En face 9-12 (orange ink at 9)  E:  Central serial sections.  Elder CARD ([facility])       Microscopic Exam 08/01/2022    Final                    Value:Central serial sections (block 1 E) show a postsurgical scar along with  residual malignant melanoma in situ characterized by rare small nests of  melanocytes and predominantly single crowded and confluent melanocytes along  the dermal epidermal junction.  An invasive component is not appreciated in  the sections examined.  There are rare small nests and increased crowded  single melanocytes along the 6:00 to 9:00 o'clock (block 1 C), 9:00 to 12:00  o'clock (block 1D), and 12:00 to 3:00 o'clock (block 1A).  The 3:00 to 6:00  o'clock peripheral surgical margin (block 1 B) does not appear involved by  the malignant melanoma in situ.      Disclaimer 08/01/2022    Final                    Value:Unless the case is a 'gross only' or additional testing only, the final  diagnosis for each specimen is based on a microscopic examination of  appropriate tissue sections.     Office Visit on 06/29/2022   Component Date Value Ref Range Status    Final Pathologic Diagnosis 06/29/2022    Final                    Value:1. Skin , right lateral infraorbital, shave biopsy:  -MALIGNANT MELANOMA IN-SITU (pTis), LENTIGO MALIGNA TYPE  This lesion is skin cancer. You will be contacted regarding  "treatment.  2. Skin, left lateral infraorbital, shave biopsy:  -ACTINIC KERATOSIS (PIGMENTED)  This lesion is atypical and further treatment may be required. You will be  contacted by your provider's office.      Gross 06/29/2022    Final                    Value:Patient ID/Pathology ID:  [id number]  Received in 2 parts  Part 1  Received in formalin labeled "right lateral infraorbit" is a shave of  gray-tan skin measuring 7 x 4 mm.  Inked blue on the deep surface, bisected,  and submitted entirely in cassette [id number]  Part 2  Received in formalin labeled "left lateral infraorbit" is a shave of gray-tan  skin measuring 7 x 4 mm.  Inked blue on the deep surface, bisected, and  submitted entirely in cassette [id number]  Grossed by RIMMA Henley      Microscopic Exam 06/29/2022    Final                    Value:1. Shave biopsy sections show a broad and heterogeneous junctional  melanocytic lesion characterized by small nests along the dermal epidermal  junction as well as numerous inconspicuous single melanocytes along the  dermal epidermal junction.  Mart 1 highlights the broad and heterogeneous  nature of the lesion including these small nests as well as single crowded  confluent melanocytes along the dermal epidermal junction.  [place] 1 also  highlights some areas of pagetoid spread as well as adnexal involvement.  Immunohistochemical stain was reviewed in conjunction with adequate positive  control.  Although there are a few scattered Mart 1 positive cells of unknown  significance, an invasive component is not appreciated in the sections  examined.  The lesion extends to the peripheral biopsy edges.  2. Sections show a combination between a solar lentigo and pigmented actinic  keratosis.  There is mild  keratinocyte pleomorphism and dysmaturation.  The  keratinocytes are hyperpigmented.  Ma                          rt 1 highlights single periodically  space melanocytes along the dermal epidermal junction.  " Immunohistochemical  stain was reviewed in conjunction with adequate positive control.      Disclaimer 2022    Final                    Value:Unless the case is a 'gross only' or additional testing only, the final  diagnosis for each specimen is based on a microscopic examination of  appropriate tissue sections.           Diagnoses:                                                                                    PD, akinetic-rigid.  Eye movements are abnml from NM perspective - old strabismus - but watching closely.  NO clear slowed pursuits or saccades, but some intrusive jerks - might all still be from longstanding changes/surgeries.     Medical Decision Makin) push rytary to 4 caps TID   2) continue azilect 1 mg  3) MM is helping with sleep   4) PT/OT --> LSVT BIg/LOUD now  5) MEdi diet  6) exercise as able -- for both mood and movement, hold off on addition of SSRI for now   7) discussed DBS, wants to rediscuss in 3-4 months with [facility]   8) ruling out reversible causes of memory change   9) repeating neuropsych testing       F/u with [facility] in 3-4 months      Collaborating Physician, Dr. Babcock, was available during today's encounter. Any change to plan along with cosign to appear in the EMR.       I spent 25 minutes with the patient, reviewing past labs, encounters and imaging.          Jes Paniagua PA-C   [facility]  Department of Neurology  Movement Disorders

## 2022-09-23 NOTE — Clinical Note
This appt was supposed to be with [facility]. Can we get his follow up with [facility] in 3-4 months?

## 2022-10-02 NOTE — TELEPHONE ENCOUNTER
Care Due:                  Date            Visit Type   Department     Provider  --------------------------------------------------------------------------------                                MYCHART                              ANNUAL                              CHECKUP/PHY  [facility] INTERNAL  Last Visit: 12-      [facility]       Rashi Gee  Next Visit: None Scheduled  None         None Found                                                            Last  Test          Frequency    Reason                     Performed    Due Date  --------------------------------------------------------------------------------    Office Visit  12 months..  pravastatin..............  12- 12-    CMP.........  12 months..  pravastatin..............  12- 12-    Lipid Panel.  12 months..  pravastatin..............  12- 12-    [employer] Embedded Care Gaps. Reference number: [id number]. 10/02/2022   12:05:22 PM CDT

## 2022-10-03 NOTE — TELEPHONE ENCOUNTER
Refill Routing Note   Medication(s) are not appropriate for processing by [facility] for the following reason(s):      - Outside of protocol    [facility] action(s):  Route          Medication reconciliation completed: No     Appointments  past 12m or future 3m with PCP    Date Provider   Last Visit   12/29/2021 Rashi Gee MD   Next Visit   Visit date not found Rashi Gee MD   ED visits in past 90 days: 0        Note composed:10:02 AM 10/03/2022

## 2022-10-04 RX ORDER — ALPRAZOLAM 0.5 MG/1
TABLET ORAL
Qty: 30 TABLET | Refills: 1 | Status: SHIPPED | OUTPATIENT
Start: 2022-10-04 | End: 2023-04-04 | Stop reason: SDUPTHER

## 2022-10-24 ENCOUNTER — OFFICE VISIT (OUTPATIENT)
Dept: DERMATOLOGY | Facility: CLINIC | Age: 62
End: 2022-10-24
Payer: COMMERCIAL

## 2022-10-24 DIAGNOSIS — D18.01 CHERRY ANGIOMA: ICD-10-CM

## 2022-10-24 DIAGNOSIS — L81.4 LENTIGO: ICD-10-CM

## 2022-10-24 DIAGNOSIS — Z86.006 HISTORY OF MELANOMA IN SITU: ICD-10-CM

## 2022-10-24 DIAGNOSIS — D22.9 NEVUS: ICD-10-CM

## 2022-10-24 DIAGNOSIS — L57.0 AK (ACTINIC KERATOSIS): ICD-10-CM

## 2022-10-24 DIAGNOSIS — L82.1 SK (SEBORRHEIC KERATOSIS): ICD-10-CM

## 2022-10-24 DIAGNOSIS — L90.5 SCAR: Primary | ICD-10-CM

## 2022-10-24 PROCEDURE — 1159F PR MEDICATION LIST DOCUMENTED IN MEDICAL RECORD: ICD-10-PCS | Mod: CPTII,S$GLB,, | Performed by: DERMATOLOGY

## 2022-10-24 PROCEDURE — 99999 PR PBB SHADOW E&M-EST. PATIENT-LVL III: ICD-10-PCS | Mod: PBBFAC,,, | Performed by: DERMATOLOGY

## 2022-10-24 PROCEDURE — 1160F PR REVIEW ALL MEDS BY PRESCRIBER/CLIN PHARMACIST DOCUMENTED: ICD-10-PCS | Mod: CPTII,S$GLB,, | Performed by: DERMATOLOGY

## 2022-10-24 PROCEDURE — 1160F RVW MEDS BY RX/DR IN RCRD: CPT | Mod: CPTII,S$GLB,, | Performed by: DERMATOLOGY

## 2022-10-24 PROCEDURE — 17003 DESTRUCT PREMALG LES 2-14: CPT | Mod: S$GLB,,, | Performed by: DERMATOLOGY

## 2022-10-24 PROCEDURE — 17000 DESTRUCT PREMALG LESION: CPT | Mod: S$GLB,,, | Performed by: DERMATOLOGY

## 2022-10-24 PROCEDURE — 1159F MED LIST DOCD IN RCRD: CPT | Mod: CPTII,S$GLB,, | Performed by: DERMATOLOGY

## 2022-10-24 PROCEDURE — 99999 PR PBB SHADOW E&M-EST. PATIENT-LVL III: CPT | Mod: PBBFAC,,, | Performed by: DERMATOLOGY

## 2022-10-24 PROCEDURE — 17003 DESTRUCTION, PREMALIGNANT LESIONS; SECOND THROUGH 14 LESIONS: ICD-10-PCS | Mod: S$GLB,,, | Performed by: DERMATOLOGY

## 2022-10-24 PROCEDURE — 99214 OFFICE O/P EST MOD 30 MIN: CPT | Mod: 25,S$GLB,, | Performed by: DERMATOLOGY

## 2022-10-24 PROCEDURE — 17000 PR DESTRUCTION(LASER SURGERY,CRYOSURGERY,CHEMOSURGERY),PREMALIGNANT LESIONS,FIRST LESION: ICD-10-PCS | Mod: S$GLB,,, | Performed by: DERMATOLOGY

## 2022-10-24 PROCEDURE — 99214 PR OFFICE/OUTPT VISIT, EST, LEVL IV, 30-39 MIN: ICD-10-PCS | Mod: 25,S$GLB,, | Performed by: DERMATOLOGY

## 2022-10-24 RX ORDER — FLUOROURACIL 50 MG/G
CREAM TOPICAL
Qty: 40 G | Refills: 1 | Status: SHIPPED | OUTPATIENT
Start: 2022-10-24

## 2022-10-24 NOTE — PATIENT INSTRUCTIONS
Efudex/Florouracil treatment: upper forehead 2x per day for 7 days  Discussed to treat as directed and that area will be red, inflamed, and irritated which is a normal reaction.  Medication should be discontinued if area treated is ulcerated or bleeding.  Pt should wait 2 weeks to use medication if areas that are to be treated have also been treated with cryosurgery/freezing. Pt should avoid medication contacting eyes or mouth and wear sunscreen,  sun protective clothing, hat,  and sunglasses if going to be in the sun while undergoing treatment.  Pt can send photo or call if concerned about reaction.

## 2022-10-24 NOTE — PROGRESS NOTES
Subjective:       Patient ID:  Adam Dalal is a 62 y.o. male who presents for   Chief Complaint   Patient presents with    Skin Check     Date of biopsy: 6/29/22 ( MM in situ )  Location: right lateral infraorbital  Date of excision: 8/1/22    Pt has a history of  moderate sun exposure in the past.   Pt recalls several blistering sunburns in the past:  yes  Pt has history of tanning bed use: no  Pt has had atypical moles removed in the past: no  Pt has personal history of breast cancer: no  Pt has history of melanoma in first degree relatives:  no  Pt has family history of pancreatic cancer: no  Pt is currently immunosuppressed: no    Burton Type: I    Last dental exam: 8/2022  Last eye exam: 2021    Pt here for skin check. Denies any new or changing lesions today             Review of Systems   Constitutional:  Negative for fever, chills, weight loss, fatigue, night sweats and malaise.   HENT:  Negative for headaches.    Respiratory:  Negative for cough and shortness of breath.    Gastrointestinal:  Negative for indigestion.   Skin:  Positive for daily sunscreen use and activity-related sunscreen use. Negative for tendency to form keloidal scars, recent sunburn and wears hat.   Neurological:  Negative for headaches.   Hematologic/Lymphatic: Negative for adenopathy. Does not bruise/bleed easily.      Objective:    Physical Exam   Constitutional: He appears well-developed and well-nourished. No distress.   HENT:   Mouth/Throat: Gums normal.  Lips normal.  Normal dentition.   Eyes: Lids are normal.  No conjunctival no injection.   Lymphadenopathy: No supraclavicular adenopathy is present.     He has no cervical adenopathy.     He has no axillary adenopathy.     He has no inguinal adenopathy.   Neurological: He is alert and oriented to person, place, and time. He is not disoriented.   Psychiatric: He has a normal mood and affect.   Skin:   Areas Examined (abnormalities noted in diagram):   Scalp / Hair  Palpated and Inspected  Head / Face Inspection Performed  Neck Inspection Performed  Chest / Axilla Inspection Performed  Abdomen Inspection Performed  Genitals / Buttocks / Groin Inspection Performed  Back Inspection Performed  RUE Inspected  LUE Inspection Performed  RLE Inspected  LLE Inspection Performed  Nails and Digits Inspection Performed                 Diagram Legend     Erythematous scaling macule/papule c/w actinic keratosis       Vascular papule c/w angioma      Pigmented verrucoid papule/plaque c/w seborrheic keratosis      Yellow umbilicated papule c/w sebaceous hyperplasia      Irregularly shaped tan macule c/w lentigo     1-2 mm smooth white papules consistent with Milia      Movable subcutaneous cyst with punctum c/w epidermal inclusion cyst      Subcutaneous movable cyst c/w pilar cyst      Firm pink to brown papule c/w dermatofibroma      Pedunculated fleshy papule(s) c/w skin tag(s)      Evenly pigmented macule c/w junctional nevus     Mildly variegated pigmented, slightly irregular-bordered macule c/w mildly atypical nevus      Flesh colored to evenly pigmented papule c/w intradermal nevus       Pink pearly papule/plaque c/w basal cell carcinoma      Erythematous hyperkeratotic cursted plaque c/w SCC      Surgical scar with no sign of skin cancer recurrence      Open and closed comedones      Inflammatory papules and pustules      Verrucoid papule consistent consistent with wart     Erythematous eczematous patches and plaques     Dystrophic onycholytic nail with subungual debris c/w onychomycosis     Umbilicated papule    Erythematous-base heme-crusted tan verrucoid plaque consistent with inflamed seborrheic keratosis     Erythematous Silvery Scaling Plaque c/w Psoriasis     See annotation      Assessment / Plan:        Scar  History of melanoma in situ- R lateral infraorbit 6/2022  Area of previous melanoma in situ examined. Site well healed with no signs of recurrence.    Total body skin  examination performed today including at least 12 points as noted in physical examination. No lesions suspicious for malignancy noted.    Recommend daily sun protection/avoidance, use of at least SPF 30, broad spectrum sunscreen (OTC drug), skin self examinations, and routine physician surveillance to optimize early detection      Cherry angioma  These are benign vascular lesions that are inherited.  Treatment is not necessary.    SK (seborrheic keratosis)  These are benign inherited growths without a malignant potential. Reassurance given to patient. No treatment is necessary.     Nevus  Discussed ABCDE's of nevi.  Monitor for new mole or moles that are becoming bigger, darker, irritated, or developing irregular borders. Brochure provided. Instructed patient to observe lesion(s) for changes and follow up in clinic if changes are noted. Patient to monitor skin at home for new or changing lesions.     Lentigo  This is a benign hyperpigmented sun induced lesion. Recommend daily sun protection/avoidance and use of at least SPF 30, broad spectrum sunscreen (OTC drug) will reduce the number of new lesions. Treatment of these benign lesions are considered cosmetic.  The nature of sun-induced photo-aging and skin cancers is discussed.  Sun avoidance, protective clothing, and the use of 30-SPF sunscreens is advised. Observe closely for skin damage/changes, and call if such occurs.    AK (actinic keratosis)  -     fluorouraciL (EFUDEX) 5 % cream; Mix with calcipotriene 0.005% cream  and Apply thin film to upper forehead  2 times per day for 7 days; d/c if area bleeding or ulcerated; avoid eyes or mouth  Dispense: 40 g; Refill: 1  Efudex/Florouracil treatment: upper forehead 2x per day for 7 days  Discussed to treat as directed and that area will be red, inflamed, and irritated which is a normal reaction.  Medication should be discontinued if area treated is ulcerated or bleeding.  Pt should wait 2 weeks to use medication if  areas that are to be treated have also been treated with cryosurgery/freezing. Pt should avoid medication contacting eyes or mouth and wear sunscreen,  sun protective clothing, hat,  and sunglasses if going to be in the sun while undergoing treatment.  Pt can send photo or call if concerned about reaction.           Follow up in about 3 months (around 1/24/2023) for TBSE.

## 2022-11-21 ENCOUNTER — OFFICE VISIT (OUTPATIENT)
Dept: OTOLARYNGOLOGY | Facility: CLINIC | Age: 62
End: 2022-11-21
Payer: COMMERCIAL

## 2022-11-21 VITALS
BODY MASS INDEX: 29.29 KG/M2 | HEART RATE: 84 BPM | WEIGHT: 222 LBS | DIASTOLIC BLOOD PRESSURE: 75 MMHG | SYSTOLIC BLOOD PRESSURE: 119 MMHG

## 2022-11-21 DIAGNOSIS — M95.2 MOHS DEFECT OF RIGHT CHEEK: Primary | ICD-10-CM

## 2022-11-21 DIAGNOSIS — Z98.890 MOHS DEFECT OF RIGHT CHEEK: Primary | ICD-10-CM

## 2022-11-21 DIAGNOSIS — Z86.006 HISTORY OF MELANOMA IN SITU: ICD-10-CM

## 2022-11-21 PROCEDURE — 1160F PR REVIEW ALL MEDS BY PRESCRIBER/CLIN PHARMACIST DOCUMENTED: ICD-10-PCS | Mod: CPTII,S$GLB,, | Performed by: OTOLARYNGOLOGY

## 2022-11-21 PROCEDURE — 3008F BODY MASS INDEX DOCD: CPT | Mod: CPTII,S$GLB,, | Performed by: OTOLARYNGOLOGY

## 2022-11-21 PROCEDURE — 99214 PR OFFICE/OUTPT VISIT, EST, LEVL IV, 30-39 MIN: ICD-10-PCS | Mod: S$GLB,,, | Performed by: OTOLARYNGOLOGY

## 2022-11-21 PROCEDURE — 3008F PR BODY MASS INDEX (BMI) DOCUMENTED: ICD-10-PCS | Mod: CPTII,S$GLB,, | Performed by: OTOLARYNGOLOGY

## 2022-11-21 PROCEDURE — 1159F MED LIST DOCD IN RCRD: CPT | Mod: CPTII,S$GLB,, | Performed by: OTOLARYNGOLOGY

## 2022-11-21 PROCEDURE — 3074F SYST BP LT 130 MM HG: CPT | Mod: CPTII,S$GLB,, | Performed by: OTOLARYNGOLOGY

## 2022-11-21 PROCEDURE — 3078F PR MOST RECENT DIASTOLIC BLOOD PRESSURE < 80 MM HG: ICD-10-PCS | Mod: CPTII,S$GLB,, | Performed by: OTOLARYNGOLOGY

## 2022-11-21 PROCEDURE — 3074F PR MOST RECENT SYSTOLIC BLOOD PRESSURE < 130 MM HG: ICD-10-PCS | Mod: CPTII,S$GLB,, | Performed by: OTOLARYNGOLOGY

## 2022-11-21 PROCEDURE — 99214 OFFICE O/P EST MOD 30 MIN: CPT | Mod: S$GLB,,, | Performed by: OTOLARYNGOLOGY

## 2022-11-21 PROCEDURE — 3078F DIAST BP <80 MM HG: CPT | Mod: CPTII,S$GLB,, | Performed by: OTOLARYNGOLOGY

## 2022-11-21 PROCEDURE — 1159F PR MEDICATION LIST DOCUMENTED IN MEDICAL RECORD: ICD-10-PCS | Mod: CPTII,S$GLB,, | Performed by: OTOLARYNGOLOGY

## 2022-11-21 PROCEDURE — 1160F RVW MEDS BY RX/DR IN RCRD: CPT | Mod: CPTII,S$GLB,, | Performed by: OTOLARYNGOLOGY

## 2022-11-21 PROCEDURE — 99999 PR PBB SHADOW E&M-EST. PATIENT-LVL III: CPT | Mod: PBBFAC,,, | Performed by: OTOLARYNGOLOGY

## 2022-11-21 PROCEDURE — 99999 PR PBB SHADOW E&M-EST. PATIENT-LVL III: ICD-10-PCS | Mod: PBBFAC,,, | Performed by: OTOLARYNGOLOGY

## 2022-11-21 NOTE — PROGRESS NOTES
History of Present Illness:   Adam Dalal is a 62 y.o. year old male evaluated on 11/23/2022, in the Otolaryngology-Head and Neck Surgery Clinic at [facility]. The patient is s/p cervical facial advancement for closure of right cheek and portion of inferior eyelid Mohs defect on 08/04/2022.  Patient did send a MyChart message with a small area opening up that was at the distal part of the flap that has now scarred in.  He has 2 small hypertrophic bumps at the area.  He does have mild retraction of the right lower lid.  He reports no dry eye symptoms or any issues with irritation of the eye or vision changes.  He is overall pleased with the cosmesis.  He does have remaining bump inferiorly.  He has a follow-up with Dermatology.            Past Medical/Surgical History  Past Medical History:   Diagnosis Date    Anxiety     Esotropia of right eye 05/02/2013    Hearing deficit, bilateral     High cholesterol     Hyperlipidemia     Malignant melanoma of skin, unspecified 06/29/2022    in situ right lateral infraorbit    Melanoma in situ of cheek     Parkinson's disease     Ringing in ear, bilateral      His  has a past surgical history that includes Hernia repair; Strabismus surgery (4yrs); Cataract extraction w/  intraocular lens implant (Right, 01/09/2017); Cataract extraction w/  intraocular lens implant (Left, 01/30/2017); and Reconstruction of face (Right, 8/4/2022).     Past Family/Social History  His family history includes Arthritis in his brother and mother; Atrial fibrillation in his sister; Cataracts in his maternal grandmother and paternal grandmother; Hearing loss in his mother; Heart attack in his father; Sleep apnea in his brother.  He  reports that he quit smoking about 16 years ago. He has a 1.00 pack-year smoking history. He has never used smokeless tobacco. He reports current alcohol use of about 20.0 standard drinks per week. He reports that he does not use drugs.      Medications/Allergies/Immunizations  His current medication(s) include:   Current Outpatient Medications   Medication Sig Dispense Refill    ALPRAZolam (XANAX) 0.5 MG tablet TAKE 1 TABLET(0.5 MG) BY MOUTH EVERY NIGHT FOR INSOMNIA OR ANXIETY 30 tablet 1    carbidopa-levodopa (RYTARY) 23.75-95 mg CpSR Take 3 capsules by mouth 3 (three) times daily. 270 capsule 5    carbidopa-levodopa (RYTARY) 23.75-95 mg CpSR Take 4 capsules by mouth 3 (three) times daily. 360 capsule 11    fluorouraciL (EFUDEX) 5 % cream Mix with calcipotriene 0.005% cream  and Apply thin film to upper forehead  2 times per day for 7 days; d/c if area bleeding or ulcerated; avoid eyes or mouth 40 g 1    HYDROcodone-acetaminophen (NORCO) 5-325 mg per tablet Take 1 tablet by mouth every 6 (six) hours as needed for Pain. 12 tablet 0    ibuprofen (ADVIL,MOTRIN) 600 MG tablet Take 1 tablet (600 mg total) by mouth every 6 (six) hours as needed for Pain.      ofloxacin (FLOXIN) 0.3 % otic solution Place 5 drops into the left ear 2 (two) times daily. 10 mL 0    pravastatin (PRAVACHOL) 80 MG tablet TAKE 1 TABLET(80 MG) BY MOUTH EVERY MORNING 90 tablet 3    rasagiline (AZILECT) 1 mg Tab TAKE 1 TABLET(1 MG) BY MOUTH EVERY DAY 30 tablet 11     No current facility-administered medications for this visit.        Allergies: Patient has no known allergies.     Immunizations:   Immunization History   Administered Date(s) Administered    COVID-19, MRNA, LN-S, PF (MODERNA FULL 0.5 ML DOSE) 02/03/2021    Influenza 11/19/2007, 12/28/2010, 10/10/2011, 12/05/2012, 10/28/2013, 12/08/2014, 11/05/2015, 10/25/2016, 09/11/2017    Influenza - Quadrivalent - PF *Preferred* (6 months and older) 09/16/2018, 10/02/2020    Influenza Split 11/19/2007, 12/28/2010, 10/10/2011, 12/05/2012, 10/28/2013, 12/08/2014    Td (ADULT) 08/31/2005    Tdap 04/17/2016    Zoster Recombinant 04/16/2018, 06/25/2018         Review of Systems   Constitutional: Negative for fever, weight loss and weight  gain.  Skin: Negative for rash, itchiness, dryness  HENT:  As per HPI  Cardiovascular: Negative for chest pain and dyspnea on exertion .   Respiratory: Is not experiencing shortness of breath.   Gastrointestinal: Negative for nausea and vomiting.   Neurological: Negative for headaches.   Lymph/Heme: Negative for lymphadenopathy or easy bruising  Musculoskeletal: Negative for joint or muscle pain  Psychiatric: The patient is not nervous/anxious.        All other systems are negative except for that listed in the HPI.      PHYSICAL EXAM:   Vital Signs:  /75 (Patient Position: Sitting)   Pulse 84   Wt 100.7 kg (222 lb)   BMI 29.29 kg/m²      General:  Well-developed, well-nourished  Communication and Voice:  Clear pitch and clarity  Hearing: Hearing adequate for verbal communication bilaterally   Inspection:  Healing right cheek facial advancement flap with 2 small punctate areas 2-3 mm of hypertrophic scarring inferior anterior limb with standing cutaneous deformity approximately 5 x 5 mm Palpation:  Facial skeleton intact without bony stepoffs  Parotid Glands:  No mass or tenderness  Facial Strength:  Facial motility symmetric and full bilaterally  Pinna:  External ear intact and fully developed  External canal:  Canal is patent with intact skin  Tympanic Membrane:  Clear and mobile  External nose:  No scar or anatomic deformity  Internal Nose:  Septum intact and midline.  No edema, polyp, or rhinorrhea.  TMJ:  No pain to palpation with full mobility  Oral cavity, Lips, Teeth, and Gums:  Mucosa and teeth intact and viable, No lesions, masses or ulcers  Oropharynx: No erythema or exudate, no masses or ulcerations, non-obstructive tonsils  Nasopharynx:  No mass or lesion with intact mucosa  Hypopharynx:  Not well visualized secondary to gagging  Larynx:  Not well visualized secondary to gagging  Neck, Trachea, Lymphatics:  Midline trachea without mass or lesion, no lymphadenopathy  Thyroid:  No mass or  nodularity  Eyes: No nystagmus with equal extraocular motion bilaterally  Neuro/Psych/Balance: Patient oriented and appropriate in interaction;  Appropriate mood and affect;  Gait is intact with no imbalance; Cranial nerves I-XII are intact  Respiratory effort:  Equal inspiration and expiration without stridor  Peripheral Vascular:  Warm extremities with equal pulses       ASSESSMENT:   1. Mohs defect of right cheek    2. History of melanoma in situ            PLAN:   Healing well from Mohs defect reconstruction with good cosmesis.  I discussed with him briefly revision for hypertrophic area as well as excision of standing cutaneous deformity.  He is not sure he wants to proceed with this but will call back if he would like to proceed with revision of the flap.      I believe that Mr. Dalal has a good understanding of the issues involved and I answered all of his questions.     DISCLAIMER: This note was prepared with [employer] voice recognition transcription software. Garbled syntax, mangled pronouns, and other bizarre constructions may be attributed to that software system. While efforts were made to correct any mistakes made by this voice recognition program, some errors and/or omissions may remain in the note that were missed when the note was originally created.

## 2022-11-28 ENCOUNTER — PATIENT MESSAGE (OUTPATIENT)
Dept: OTOLARYNGOLOGY | Facility: CLINIC | Age: 62
End: 2022-11-28
Payer: COMMERCIAL

## 2022-11-29 ENCOUNTER — OFFICE VISIT (OUTPATIENT)
Dept: NEUROLOGY | Facility: CLINIC | Age: 62
End: 2022-11-29
Payer: COMMERCIAL

## 2022-11-29 DIAGNOSIS — F43.23 ADJUSTMENT DISORDER WITH MIXED ANXIETY AND DEPRESSED MOOD: ICD-10-CM

## 2022-11-29 DIAGNOSIS — F10.10 ALCOHOL USE DISORDER, MILD, ABUSE: ICD-10-CM

## 2022-11-29 DIAGNOSIS — G20.A1 PARKINSON DISEASE: ICD-10-CM

## 2022-11-29 DIAGNOSIS — R41.9 COGNITIVE COMPLAINTS: Primary | ICD-10-CM

## 2022-11-29 PROCEDURE — 99499 NO LOS: ICD-10-PCS | Mod: S$GLB,,, | Performed by: PSYCHIATRY & NEUROLOGY

## 2022-11-29 PROCEDURE — 96116 NUBHVL XM PHYS/QHP 1ST HR: CPT | Mod: S$GLB,,, | Performed by: PSYCHIATRY & NEUROLOGY

## 2022-11-29 PROCEDURE — 96116 PR NEUROBEHAVIORAL STATUS EXAM BY PSYCH/PHYS: ICD-10-PCS | Mod: S$GLB,,, | Performed by: PSYCHIATRY & NEUROLOGY

## 2022-11-29 PROCEDURE — 99499 UNLISTED E&M SERVICE: CPT | Mod: S$GLB,,, | Performed by: PSYCHIATRY & NEUROLOGY

## 2022-11-29 NOTE — PROGRESS NOTES
NEUROPSYCHOLOGY CONSULT   Referral Information  Name: Adam Dalal  MRN: [id number]  : 1960  Age: 62 y.o.  Race: White  Gender: male  REFERRAL SOURCE: Jes Paniagua PA-C  DATE CONDUCTED: 2022  SOURCES OF INFORMATION:  The following was gathered from a clinical interview with Mr. Adam Dalal and review of the available medical records. Mr. Dalal expressed an understanding of the purpose of the evaluation and consented to all procedures. Total licensed billing psychologists professional time including clinical interview, test administration and interpretation of tests administered by the billing psychologist, integration of test results and other clinical data, preparing the final report, and personally reporting results to the patient   [place]illin - 60 minutes    NEUROPSYCHOLOGICAL EVALUATION - CONFIDENTIAL    SUMMARY/TREATMENT PLAN   Mr. Dalal is a 62 year old male who was diagnosed with Parkinson's disease (PD) in 2017. He underwent neuropsychological evaluation in 2018, which was largely within normal limits. Mild cognitive weaknesses at that time were attributed to longstanding inattention, daily alcohol use, and untreated severe CHU. Mr. Dalal reported possible progressive, but mild cognitive decline since that time. He continues to work full-time and was even promoted into a leadership role. He manages complex activities of daily living with some error proneness. He continues to drink 2 glasses of wine on a daily basis. A voicemail was left for his wife for collateral.     Mr. Dalal reported some interest, but hesitation in, pursuing work-up for DBS. Full impressions to follow after testing. With the current information, cognitive changes appear relatively mild and would not be a contraindication to DBS. His history of longstanding alcohol use is noteworthy. It would be helpful to obtain collateral information from his wife regarding his current use. Otherwise, he has no  "other history of impulse control disorders or remarkable psychiatric symptoms with the exception of claustrophobia.     Diagnoses  Problem List Items Addressed This Visit          Neuro    Parkinson disease    Cognitive complaints - Primary    Current Assessment & Plan     Compensatory Mechanisms: He may benefit from employing organizational strategies in daily life, including use of a calendar and setting an alarm for medication times. It may also be useful to establish a consistent routine for bill payments as he has paid some late fees.     Collateral Information: Will continue to reach out to wife.     Follow-up: Testing on 12/6.             Psychiatric    Adjustment disorder with mixed anxiety and depressed mood    Current Assessment & Plan     Treatment: He may benefit from counseling, describing himself as feeling more "lackluster" as of late. Counseling may also beneficial for reducing alcohol consumption.          Alcohol use disorder, mild, abuse    Overview     Drinks ~4 glasses of wine every night           Mr. Dalal will be provided the results of the evaluation.     Thank you for allowing me to participate in Mr. Cotter care.  If you have any questions, please contact me at [phone].    Vikas Meyer Psy.D., ABPP  Board Certified in Clinical Neuropsychology  Department of Neurology    HISTORY OF PRESENT ILLNESS: Mr. Adam Dalal is a 62 y.o., right-handed, male with 18 years of education who was referred for a neuropsychological evaluation in the setting of Parkinsons disease (diagnosed in 11/2017). He previously underwent neuropsychological testing in 2018.     2/2018 HPI: Both Mr. Dalal and his wife reported mild inattentiveness/distractibility at baseline. Neither has noticed significant changes to his cognitive or daily functioning since the diagnosis of PD or over the past several years. He has no history of impulse control issues, both currently and pre-morbidly. He has occasional " instances of forgetting to take his medication, but this has reduced since he started using a pillbox. No changes in judgment or personality were noted. He has no difficulties managing a schedule, cooking, or driving. He continues to meet his job demands with no problems.     2/2018 NEUROPSYCH RESULTS: Mr. Berg neuropsychological profile was within normal limits and not suggestive of a cognitive diagnosis at the present time. Mild inefficiencies were noted in encoding/retrieval and planning/organization. These may be pre-morbid weaknesses based on his history of inattention. Otherwise, he demonstrated intact functioning in all other domains of cognitive functioning. The results can act as baseline for future comparison. Mr. Berg risks for current and future cognitive dysfunction include: Parkinsons disease, chronic moderate to heavy alcohol use, and untreated obstructive sleep apnea (CHU). In fact, alcohol use and untreated CHU may be contributing to mild cognitive inefficiencies at the present time. Several recommendations are offered to assist in his care.     11/2022 INTERVAL HISTORY:   -Mr. Dalal reported possible mild cognitive decline since the previous evaluation. He again noted a baseline attention weakness, feeling that he has always been prone to distractibility and losing his train of thought. He indicated that his wife will comment on him not always paying attention in conversation. He endorsed word finding difficulty and possible mild bradyphrenia. He finds that he is prone to stuttering when stressed.   -He feels that PD sx are relatively well managed on his current medication regimen. He noted right side stiffness/tightness and reported some difficulty with typing. He has expressed some interest in DBS and will discuss this at his next appointment with Dr. Babcock.   -He continues to work at the [employer] and received a promotion to  2 months ago. He had applied for  "the position in the past, but wasn't selected and eventually wasn't sure if he wanted it since there was a lot of turnover in the role. However, he decided to apply again as he ended up with many of the responsibilities anyway given the high turnover. He has "slipped up" a few times since starting the new role, but feels that he is doing well all things considered. He would like to work for until 5 years until long-term.       -LVM for wife.     Neuropsychiatric Symptoms:  Hallucinations: Denied other than occasionally perceiving shadows in his periphery.   Depression/Labile Mood: Endorsed, feeling a little lackluster lately. He reads the new with his wfie and maybe contemplates the news a little more. No si. Never in therapy. Never a mde  Anxiety: Denied  Impulse Control Disorders: Longstanding history of daily alcohol consumption, estimating that he currently has 2 glasses of red wine per day. He denied any recent increase in his use and stated his wife has not expressed concern about his consumption. He denied any increases in spending, gambling, or sweet/carbohydrate cravings.     DAILY FUNCTIONING:  IADLS:  Support System: Resides with wife and dog.   Appointment Management: independent, he indicated that he doesn't take regularly take notes/use a calendar, instead relying on text reminders for medical appointments. He tracks personal appointments/events from memory.    Medication Compliance: independent, he takes his medications first thing upon awakening. He keeps a pill packet on his person as well. He has a post-it note on his door that reminds keys, phone, and meds. He rarely forgets to take a dose, but he may take it a little later than planned depending on busy he is at work. If he ever forgets to take it completely, his body/symptoms remind him that he needs to take his medications.    Financial Management: independent, a few instances of paying late fees. He will receive a text alert for a bill, not " "pay it immediately, and then forget. There was also a recent instance when he remembered the wrong due date.   Cooking: He continues to regularly cook. There are times when he forgets that cast iron is on the stove when heating it after cleaning.    Driving: He continues to drive without restriction. He notices instances of his foot "getting caught" every once in a while. No recent MVCs, describing himself as a careful .     BRAIN HEALTH RISK FACTORS:  Falls: Denied  Sleep: Severe CHU per  sleep study, unable to tolerate CPAP due to claustrophobia.He can get drowsy after lunch after work. He takes Xanax to sleep about 1 day per week.     MEDICAL HISTORY: Mr. Dalal  has a past medical history of Anxiety, Esotropia of right eye (2013), Hearing deficit, bilateral, High cholesterol, Hyperlipidemia, Malignant melanoma of skin, unspecified (2022), Melanoma in situ of cheek, Parkinson's disease, and Ringing in ear, bilateral.    NEUROIMAGIN2017 Brain MRI: "No MRI evidence of acute intracranial abnormality. Mild generalized cerebral volume loss.    SUBSTANCE USE: Mr. Dalal briefly smoked cigarettes and has not smoked in nearly 20 years. Mr. Dalal described a longstanding history of moderate to heavy alcohol use. He estimated drinking fairly heavily in his 30s, averaging about 4 alcoholic beverages per day. He has consumed alcohol on a daily basis for the majority of his adult life. He does not believe his use has ever been a problem or reached the point of abuse. He denied a history of illicit drug use/abuse.    CURRENT MEDICATIONS:    Current Outpatient Medications:     ALPRAZolam (XANAX) 0.5 MG tablet, TAKE 1 TABLET(0.5 MG) BY MOUTH EVERY NIGHT FOR INSOMNIA OR ANXIETY, Disp: 30 tablet, Rfl: 1    carbidopa-levodopa (RYTARY) 23.75-95 mg CpSR, Take 3 capsules by mouth 3 (three) times daily., Disp: 270 capsule, Rfl: 5    carbidopa-levodopa (RYTARY) 23.75-95 mg CpSR, Take 4 capsules by mouth 3 " (three) times daily., Disp: 360 capsule, Rfl: 11    fluorouraciL (EFUDEX) 5 % cream, Mix with calcipotriene 0.005% cream  and Apply thin film to upper forehead  2 times per day for 7 days; d/c if area bleeding or ulcerated; avoid eyes or mouth, Disp: 40 g, Rfl: 1    ibuprofen (ADVIL,MOTRIN) 600 MG tablet, Take 1 tablet (600 mg total) by mouth every 6 (six) hours as needed for Pain., Disp: , Rfl:     ofloxacin (FLOXIN) 0.3 % otic solution, Place 5 drops into the left ear 2 (two) times daily., Disp: 10 mL, Rfl: 0    pravastatin (PRAVACHOL) 80 MG tablet, TAKE 1 TABLET(80 MG) BY MOUTH EVERY MORNING, Disp: 90 tablet, Rfl: 3    rasagiline (AZILECT) 1 mg Tab, TAKE 1 TABLET(1 MG) BY MOUTH EVERY DAY, Disp: 30 tablet, Rfl: 11     2018 PSYCHIATRIC HISTORY: Longstanding history of mild anxiety with claustrophobia. He has been prescribed Xanax for the past 5 years, taking about 2x per week before sleep after a particularly stressful day at work. He also takes when he flies. He reported difficulties using a CPAP machine due to claustrophobia. Otherwise, he does not believe that anxiety impacts his daily functioning.     Mr. Dalal denied a history of depression, with his wife commenting on his very positive mood and temperament. He denied any changes in mood at the present time. He has been very proactive in his treatment/therapies for PD. He feels that PD has had little impact on his life, with the exception of noting occasional gait changes while at work. He denied active suicidal ideation, plan, or intent. He denied experiencing hallucinations. He saw a therapist at one point due to trepidation about moving to [place], but he has otherwise not been engaged in consistent psychotherapy. Xanax currently prescribed by PCP.     FAMILY HISTORY: family history includes Arthritis in his brother and mother; Atrial fibrillation in his sister; Cataracts in his maternal grandmother and paternal grandmother; Hearing loss in his mother;  Heart attack in his father; Sleep apnea in his brother. Father with Bipolar Disorder    PSYCHOSOCIAL HISTORY:   Education:   Level Attained: He graduated from [employer] with a Masters degree in fine arts.   Learning Difficulties: Endorsed, Mild inattentiveness throughout his education, with difficulty taking notes and listening to lectures simultaneously. Academic performance improved as he went to college at [employer] and a Masters program at [employer].   Repeated Grade: Denied     Vocation:   Highest Attained: Employed at the [employer] for many years.  for 20 years until recent promotion.     Relationship Status:   : yes, since 2006   : yes, 1x   Children: None    MENTAL STATUS AND OBSERVATIONS:  APPEARANCE: Appropriately dressed/groomed.   ALERTNESS/ORIENTATION: Attentive and alert.   GAIT/MOTOR: Mildly slow and unsteady gait.   SENSORY: Unremarkable.   SPEECH/LANGUAGE: Normal in rate, rhythm, tone, and volume. Expressive and receptive language were grossly intact.  STATED MOOD/AFFECT: Mood was euthymic.   INTERPERSONAL BEHAVIOR: Rapport was quickly and easily established   THOUGHT PROCESSES: Thoughts seemed logical and goal-directed.

## 2022-11-30 ENCOUNTER — PATIENT MESSAGE (OUTPATIENT)
Dept: DERMATOLOGY | Facility: CLINIC | Age: 62
End: 2022-11-30
Payer: COMMERCIAL

## 2022-12-02 PROBLEM — R41.9 COGNITIVE COMPLAINTS: Status: ACTIVE | Noted: 2022-12-02

## 2022-12-02 NOTE — ASSESSMENT & PLAN NOTE
Compensatory Mechanisms: He may benefit from employing organizational strategies in daily life, including use of a calendar and setting an alarm for medication times. It may also be useful to establish a consistent routine for bill payments as he has paid some late fees.     Collateral Information: Will continue to reach out to wife.     Follow-up: Testing on 12/6.

## 2022-12-02 NOTE — ASSESSMENT & PLAN NOTE
"Treatment: He may benefit from counseling, describing himself as feeling more "lackluster" as of late. Counseling may also beneficial for reducing alcohol consumption.   "

## 2022-12-06 ENCOUNTER — OFFICE VISIT (OUTPATIENT)
Dept: NEUROLOGY | Facility: CLINIC | Age: 62
End: 2022-12-06
Payer: COMMERCIAL

## 2022-12-06 DIAGNOSIS — G20.A1 PARKINSON DISEASE: ICD-10-CM

## 2022-12-06 DIAGNOSIS — F06.70 MILD NEUROCOGNITIVE DISORDER DUE TO MULTIPLE ETIOLOGIES: Primary | ICD-10-CM

## 2022-12-06 DIAGNOSIS — R41.3 MEMORY CHANGE: ICD-10-CM

## 2022-12-06 DIAGNOSIS — F10.10 ALCOHOL USE DISORDER, MILD, ABUSE: ICD-10-CM

## 2022-12-06 PROCEDURE — 96133 NRPSYC TST EVAL PHYS/QHP EA: CPT | Mod: S$GLB,,, | Performed by: PSYCHIATRY & NEUROLOGY

## 2022-12-06 PROCEDURE — 99999 PR PBB SHADOW E&M-EST. PATIENT-LVL I: CPT | Mod: PBBFAC,,,

## 2022-12-06 PROCEDURE — 96138 PSYCL/NRPSYC TECH 1ST: CPT | Mod: S$GLB,,, | Performed by: PSYCHIATRY & NEUROLOGY

## 2022-12-06 PROCEDURE — 96133 PR NEUROPSYCHOLOGIC TEST EVAL SVCS, EA ADDTL HR: ICD-10-PCS | Mod: S$GLB,,, | Performed by: PSYCHIATRY & NEUROLOGY

## 2022-12-06 PROCEDURE — 96139 PR PSYCH/NEUROPSYCH TEST ADMIN/SCORING, BY TECH, 2+ TESTS, EA ADDTL 30 MIN: ICD-10-PCS | Mod: S$GLB,,, | Performed by: PSYCHIATRY & NEUROLOGY

## 2022-12-06 PROCEDURE — 99499 UNLISTED E&M SERVICE: CPT | Mod: S$GLB,,, | Performed by: PSYCHIATRY & NEUROLOGY

## 2022-12-06 PROCEDURE — 99999 PR PBB SHADOW E&M-EST. PATIENT-LVL I: ICD-10-PCS | Mod: PBBFAC,,,

## 2022-12-06 PROCEDURE — 96132 PR NEUROPSYCHOLOGIC TEST EVAL SVCS, 1ST HR: ICD-10-PCS | Mod: S$GLB,,, | Performed by: PSYCHIATRY & NEUROLOGY

## 2022-12-06 PROCEDURE — 99499 NO LOS: ICD-10-PCS | Mod: S$GLB,,, | Performed by: PSYCHIATRY & NEUROLOGY

## 2022-12-06 PROCEDURE — 96138 PR PSYCH/NEUROPSYCH TEST ADMIN/SCORING, BY TECH, 2+ TESTS, 1ST 30 MIN: ICD-10-PCS | Mod: S$GLB,,, | Performed by: PSYCHIATRY & NEUROLOGY

## 2022-12-06 PROCEDURE — 96139 PSYCL/NRPSYC TST TECH EA: CPT | Mod: S$GLB,,, | Performed by: PSYCHIATRY & NEUROLOGY

## 2022-12-06 PROCEDURE — 96132 NRPSYC TST EVAL PHYS/QHP 1ST: CPT | Mod: S$GLB,,, | Performed by: PSYCHIATRY & NEUROLOGY

## 2022-12-06 NOTE — PROGRESS NOTES
NEUROPSYCHOLOGY CONSULT   Referral Information  Name: Adam Dalal  MRN: [id number]  : 1960  Age: 62 y.o.  Race: White  Gender: male  REFERRAL SOURCE: Jes Paniagua PA-C  DATE CONDUCTED: 2022  SOURCES OF INFORMATION:  The following was gathered from a clinical interview with Mr. Adam Dalal, his wife, and review of the available medical records. Mr. Dalal expressed an understanding of the purpose of the evaluation and consented to all procedures. Total licensed billing psychologists professional time including clinical interview, test administration and interpretation of tests administered by the billing psychologist, integration of test results and other clinical data, preparing the final report, and personally reporting results to the patient   [place] - 60 minutes (2022), [id number] - 120 minutes (2022), [id number] - 187 minutes (2022)    NEUROPSYCHOLOGICAL EVALUATION - CONFIDENTIAL    SUMMARY/TREATMENT PLAN   Mr. Dalal is a 62 year old male who was diagnosed with Parkinson's disease (PD) in 2017. He underwent neuropsychological evaluation in 2018, which was largely within normal limits. Mild cognitive weaknesses at that time were attributed to longstanding inattention, daily alcohol use, and untreated severe CHU. Mr. Dalal reported possible progressive, but mild cognitive decline since that time. 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 He manages complex activities of daily living with minimal compensatory strategies, which seems to lead to some errors.    Neuropsychological testing revealed mild executive dysfunction, including reduced reasoning/abstraction, set shifting, encoding, and cognitive organization/retrieval. He also employed inefficient test taking strategies, including working too fast at times, which led to seemingly avoidable errors. Consistent with Mr. Dalal' impression, his performance  revealed minimal to mild cognitive decline since 2018. Similar to the previous evaluation, there may be several contributing factors to cognitive inefficiencies, including PD, untreated CHU, and daily alcohol use.     Mr. Dalal reported some interest, but hesitation in, pursuing work-up for DBS. He plans to discuss with his neurology team at his next appointment.     Procedure Understanding/Capacity  No issues/concerns regarding ability to understand and/or consent to treatment.    Ability to Manage Pre/Agatha/Post Operative Treatment  No issues/concerns. His wife is his primary source of support and would benefit from being involved in the pre-surgical process.     Cognitive Functioning  Normal risk for post-surgical cognitive decline, which is most commonly seen in verbal fluency.     Psychiatric/Neuropsychiatric Considerations  No history of or current impulse control disorders with the exception of alcohol use. He has consumed alcohol on a daily basis for the majority of his adulthood with especially heavy use during his 30's. He currently drinks about 2 glasses of wine per day. His wife feels that his alcohol use has been stable, if not reduced, over the past several years. Alcohol use does not appear to be a complete contraindication to DBS, but worth monitoring. He has no psychiatric diagnoses beyond claustrophobia.     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 It may also be useful to establish a consistent routine for bill payments as he has paid some late fees.     Sleep Medicine: He may benefit from reviewing alternatives to CHU treatment.     Mood: He may benefit from counseling, describing himself  "as feeling more "lackluster" as of late. Counseling may also beneficial if he is interested in further reducing alcohol consumption.      Follow-up: Interval testing in 2-3 years.             Psychiatric    Alcohol use disorder, mild, abuse     Other Visit Diagnoses       Memory change               Mr. Dalal will be provided the results of the evaluation.     Thank you for allowing me to participate in Mr. Cotter care.  If you have any questions, please contact me at [phone].    Vikas Meyer Psy.D., DANIELAP  Board Certified in Clinical Neuropsychology  Department of Neurology    HISTORY OF PRESENT ILLNESS: Mr. Adam Dalal is a 62 y.o., right-handed, male with 18 years of education who was referred for a neuropsychological evaluation in the setting of Parkinsons disease (diagnosed in 11/2017). He previously underwent neuropsychological testing in 2018.     2/2018 HPI: Both Mr. Dalal and his wife reported mild inattentiveness/distractibility at baseline. Neither has noticed significant changes to his cognitive or daily functioning since the diagnosis of PD or over the past several years. He has no history of impulse control issues, both currently and pre-morbidly. He has occasional instances of forgetting to take his medication, but this has reduced since he started using a pillbox. No changes in judgment or personality were noted. He has no difficulties managing a schedule, cooking, or driving. He continues to meet his job demands with no problems.     2/2018 NEUROPSYCH RESULTS: Mr. Berg neuropsychological profile was within normal limits and not suggestive of a cognitive diagnosis at the present time. Mild inefficiencies were noted in encoding/retrieval and planning/organization. These may be pre-morbid weaknesses based on his history of inattention. Otherwise, he demonstrated intact functioning in all other domains of cognitive functioning. The results can act as baseline for future comparison. Mr." "Otis risks for current and future cognitive dysfunction include: Parkinsons disease, chronic moderate to heavy alcohol use, and untreated obstructive sleep apnea (CHU). In fact, alcohol use and untreated CHU may be contributing to mild cognitive inefficiencies at the present time. Several recommendations are offered to assist in his care.     11/2022 INTERVAL HISTORY:   -Mr. Dalal reported possible mild cognitive decline since the previous evaluation. He again noted a baseline attention weakness, feeling that he has always been prone to distractibility and losing his train of thought. He indicated that his wife will comment on him not always paying attention in conversation. He endorsed word finding difficulty and possible mild bradyphrenia. He finds that he is prone to stuttering when stressed. His wife also noted a mildly delayed response latency when tired.   -He feels that PD sx are relatively well managed on his current medication regimen. He noted right side stiffness/tightness and reported some difficulty with typing. He has expressed some interest in DBS and will discuss this at his next appointment with Dr. Babcock.   -He continues to work at the [employer] and received a promotion to  2 months ago. He had applied for the position in the past, but wasn't selected and eventually wasn't sure if he wanted it since there was a lot of turnover in the role. However, he decided to apply again as he ended up with many of the responsibilities anyway given the high turnover. He has "slipped up" a few times since starting the new role, but feels that he is doing well all things considered. He would like to work for until 5 years until long term.     Neuropsychiatric Symptoms:  Hallucinations: Denied other than occasionally perceiving shadows in his periphery.   Depression/Labile Mood: Endorsed, feeling a little lackluster lately. He regularly reads the news with his wife and feels that " "he may contemplate it more than in the past. No SI. Never in therapy.   Anxiety: Denied  Impulse Control Disorders: Longstanding history of daily alcohol consumption, estimating that he currently has 2 glasses of red wine per day. He denied any recent increase in his use and stated his wife has not expressed concern about his consumption. He denied any increases in spending, gambling, or sweet/carbohydrate cravings.     DAILY FUNCTIONING:  IADLS:  Support System: Resides with wife and dog.   Appointment Management: independent, he indicated that he doesn't take regularly take notes/use a calendar, instead relying on text reminders for medical appointments. He tracks personal appointments/events from memory.    Medication Compliance: independent, he takes his medications first thing upon awakening. He keeps a pill packet on his person as well. He has a post-it note on his door as a reminder for his keys, phone, and meds. He rarely forgets to take a dose, but he may take it a little later than planned depending on how busy he is at work. If he ever forgets to take it completely, his body/symptoms remind him that he needs to take it.    Financial Management: independent, a few instances of paying late fees. He will receive a text alert for a bill, not pay it immediately, and then forget. There was also a recent instance when he remembered the wrong due date.   Cooking: He continues to regularly cook. There are times when he forgets that cast iron is on the stove when heating it after cleaning.    Driving: He continues to drive without restriction. He notices instances of his foot "getting caught" every once in a while. No recent MVCs, describing himself as a careful .     BRAIN HEALTH RISK FACTORS:  Falls: Denied  Sleep: Severe CHU per 2015 sleep study, unable to tolerate CPAP due to claustrophobia.He can get drowsy after lunch after work. He takes Xanax to sleep about 1 day per week.     MEDICAL HISTORY: Mr." "Katlin  has a past medical history of Anxiety, Esotropia of right eye (2013), Hearing deficit, bilateral, High cholesterol, Hyperlipidemia, Malignant melanoma of skin, unspecified (2022), Melanoma in situ of cheek, Parkinson's disease, and Ringing in ear, bilateral.    NEUROIMAGIN2017 Brain MRI: "No MRI evidence of acute intracranial abnormality. Mild generalized cerebral volume loss.    SUBSTANCE USE: Mr. Dalal briefly smoked cigarettes and has not smoked in nearly 20 years. Mr. Dalal described a longstanding history of moderate to heavy alcohol use. He estimated drinking fairly heavily in his 30s, averaging about 4 alcoholic beverages per day. He has consumed alcohol on a daily basis for the majority of his adult life. He does not believe his use has ever been a problem or reached the point of abuse. He denied a history of illicit drug use/abuse.    CURRENT MEDICATIONS:    Current Outpatient Medications:     ALPRAZolam (XANAX) 0.5 MG tablet, TAKE 1 TABLET(0.5 MG) BY MOUTH EVERY NIGHT FOR INSOMNIA OR ANXIETY, Disp: 30 tablet, Rfl: 1    carbidopa-levodopa (RYTARY) 23.75-95 mg CpSR, Take 3 capsules by mouth 3 (three) times daily., Disp: 270 capsule, Rfl: 5    carbidopa-levodopa (RYTARY) 23.75-95 mg CpSR, Take 4 capsules by mouth 3 (three) times daily., Disp: 360 capsule, Rfl: 11    fluorouraciL (EFUDEX) 5 % cream, Mix with calcipotriene 0.005% cream  and Apply thin film to upper forehead  2 times per day for 7 days; d/c if area bleeding or ulcerated; avoid eyes or mouth, Disp: 40 g, Rfl: 1    ibuprofen (ADVIL,MOTRIN) 600 MG tablet, Take 1 tablet (600 mg total) by mouth every 6 (six) hours as needed for Pain., Disp: , Rfl:     ofloxacin (FLOXIN) 0.3 % otic solution, Place 5 drops into the left ear 2 (two) times daily., Disp: 10 mL, Rfl: 0    pravastatin (PRAVACHOL) 80 MG tablet, TAKE 1 TABLET(80 MG) BY MOUTH EVERY MORNING, Disp: 90 tablet, Rfl: 3    rasagiline (AZILECT) 1 mg Tab, TAKE 1 TABLET(1 " MG) BY MOUTH EVERY DAY, Disp: 30 tablet, Rfl: 11     2018 PSYCHIATRIC HISTORY: Longstanding history of mild anxiety with claustrophobia. He has been prescribed Xanax for the past 5 years, taking about 2x per week before sleep after a particularly stressful day at work. He also takes when he flies. He reported difficulties using a CPAP machine due to claustrophobia. Otherwise, he does not believe that anxiety impacts his daily functioning.     Mr. Dalal denied a history of depression, with his wife commenting on his very positive mood and temperament. He denied any changes in mood at the present time. He has been very proactive in his treatment/therapies for PD. He feels that PD has had little impact on his life, with the exception of noting occasional gait changes while at work. He denied active suicidal ideation, plan, or intent. He denied experiencing hallucinations. He saw a therapist at one point due to trepidation about moving to [place], but he has otherwise not been engaged in consistent psychotherapy. Xanax currently prescribed by PCP.     FAMILY HISTORY: family history includes Arthritis in his brother and mother; Atrial fibrillation in his sister; Cataracts in his maternal grandmother and paternal grandmother; Hearing loss in his mother; Heart attack in his father; Sleep apnea in his brother. Father with Bipolar Disorder    PSYCHOSOCIAL HISTORY:   Education:   Level Attained: He graduated from [employer] with a Masters degree in fine arts.   Learning Difficulties: Endorsed, Mild inattentiveness throughout his education, with difficulty taking notes and listening to lectures simultaneously. Academic performance improved as he went to college at [employer] and a Masters program at [employer].   Repeated Grade: Denied     Vocation:   Highest Attained: Employed at the [employer] for many years.  for 20 years until recent promotion.     Relationship Status:   : yes, since 2006   :  yes, 1x   Children: None    MENTAL STATUS AND OBSERVATIONS:  APPEARANCE: Appropriately dressed/groomed.   ALERTNESS/ORIENTATION: Attentive and alert.   GAIT/MOTOR: Mildly slow and unsteady gait.   SENSORY: Unremarkable.   SPEECH/LANGUAGE: Normal in rate, rhythm, tone, and volume. Expressive and receptive language were grossly intact.  STATED MOOD/AFFECT: Mood was euthymic.   INTERPERSONAL BEHAVIOR: Rapport was quickly and easily established   THOUGHT PROCESSES: Thoughts seemed logical and goal-directed.    BEHAVIORAL OBSERVATIONS:  Scores on standalone and embedded PVTs were WNL. Scores are believed to be a valid/reliable measure of his current cognitive abilities. He recalled several tests from the previous neuropsychological evaluation, noting that he had practiced verbal fluency tasks.      APPENDIX/TEST RESULTS:  TESTS ADMINISTERED:  Clinical Interview and Review of Records, MSVT, Noel Cognitive Assessment (MoCA), selected subtests from the Wechsler Adult Intelligence Scale - 4th edition (WAIS-IV, [facility] Demographically Adjusted Norms), Alas Verbal Learning Test - Revised (HVLT-R), Logical Memory subtest from the WMS-IV ([facility] Demographically Adjusted Norms), Naming subtest from the [facility], Controlled Oral Word Association Test ([facility] Norms), Animal Fluency ([facility] Norms), Trailmaking Test ([facility] Norms), Luis Complex Figure (Copy Trial), Symbol Digit Modalities Test (SDMT), [place] Card Sorting Test - 64 card version (WCST-64), Generalized Anxiety Disorder - 7 (KITTY-7), and the Craig Depression Scale - 2nd edition (BDI-2).     Score Label T-Score Standard Score Z-Score Scaled Score %ile Rank   Exceptionally High > 70 > 130 > 2.0  > 16 > 98   Above Average 64-69 120-129 1.4-1.9 15 91-97   High Average 57-63 110-119 0.7-1.3 12-14 75-90   Average 44-56  0.6 to -0.6 8-11 25-74   Low Average 37-43 80-89 -1.3 to -0.7 6-7 9-24   Below Average 30-36 70-79 -2.0 to -1.4 4-5 2-8   Exceptionally Low < 30 < 70  < -2.0  < 4 < 2      Mental Status: Fully oriented to time and place.   02/2018 MoCA = 26/30  12/2022 MoCA = 24/30     Pre-morbid/Baseline: Estimated to be in the average/high average range.      Language: Exceptionally high performance on a test of letter verbal fluency, but he reported remembering and practicing this test since the previous evaluation. Similarly for semantic verbal fluency (high average). Perfect performance on a test of confrontation naming.      Visuospatial: He did not draw the outside contour of a clock face, but he included all of the numbers and in the correct location. The clock hands were correctly placed, but they were not the correct length. Some inefficiencies in his approach to drawing a complex figure. He made several slight errors that he corrected. Overall, there was no clear evidence of visuospatial dysfunction.      Learning/Memory: Overall encoding of a supraspan word list was low average as he recalled 6, 8, and 10 of 12 words across the learning trials. He did not accurately recall any words following a long delay, instead providing words from previous tests (MoCA, MSVT). Recognition was below average as he correctly identified 11/12 words with 2 false positive errors. He seemed to recognize that he was providing the wrong list of words during the recognition trial. He encoded 5/5 words after 2 trials on the MoCA. He freely recalled 3/5 words following a brief delay. He recalled 1 word with categorical cueing and correctly identified the remaining word with multiple choice cueing. Overall encoding of two short stories was low average. Retention was perfect following a long delay and his overall recall average. Responses to yes/no questions pertaining to the stories was WNL.      Executive Functioning: One trial learning/encoding was mildly below expectation. He provided 4/5 correct responses on a serial 7 subtraction task. Inconsistent performance on other tests of working memory. For  instance, he performed in the high average range on a digit span task, but in the below average range on a test of mental arithmetic. His performance on the arithmetic task seemed negatively impacted by his fast response style, seemingly contributing to avoidable errors. Average performance on tests of processing speed. Average performance on a test requiring him to maintain a complex set. Performance on a card sorting test was below expectation. He briefly perseverated on the incorrect category early in the test before switching to and completing the first category. He had trouble identifying the second category, very sporadically sorting to it for the remainder of the test. He had an elevated number of perseverative errors. Below average performance on a test of conceptual/abstract reasoning.      Mood: Responses on a self-reported inventory were not indicative of clinical depression or anxiety.       Raw Score Type of Standardized Score Standardized Score Percentile/CP   MSVT IR 95 - - -   MSVT  - - -   MSVT Cons 95 - - -   MSVT PA 90 - - -   MSVT FR 65 - - -   ACS LM II Rec 24 - - -   ACS RDS 12 - - -   HVLT-R Recognition Discrimination 9 - - -   INTELLECTUAL FUNCTIONING Raw Score Type of Standardized Score Standardized Score Percentile/CP   WAIS-IV       WMI - T 45    Similarities 24 T 36    Digit Span 35 T 57          DS Forward 14 ss 15 95         DS Backward 10 ss 12 75         DS Sequence 11 ss 14 91         Longest Digit Forward 9 - - -         Longest Digit Backward 6 - - -         Longest Digit Sequence 7 - - -   Arithmetic 13 T 35    COGNITIVE SCREENING Raw Score Type of Standardized Score Standardized Score Percentile/CP   MoCA 24/30 - - -   Orientation - Place 2/2 - - -   Orientation - Date 4/4 - - -   LANGUAGE FUNCTIONING Raw Score Type of Standardized Score Standardized Score Percentile/CP   WAIS-IV Similarities 24 ss 9 37   NAB Naming 31 Tscore 54 66   FAS 77 Tscore 74 99   Letter F  26  zscore FALSE 50   Animal Naming 26 Tscore 59.00 82   VISUOSPATIAL FUNCTIONING Raw Score Type of Standardized Score Standardized Score Percentile/CP   RCFT Copy 28 - - <1   RCFT Time to Copy 144 - - >16   LEARNING & MEMORY Raw Score Type of Standardized Score Standardized Score Percentile/CP   HVLT-R       Total Immediate 24 Tscore 41 18   Delayed Recall 0 Tscore <20 0.1   Retention % 0 Tscore <20 0.1   Hits 11 - - -   False Positives 2 - - -   Discrimination  9 Tscore 36 8   WMS-IV Subtests       LM I 20 T 38    LM II 23 T 49    LM Recognition 24 - - 26-50   ATTENTION/WORKING MEMORY Raw Score Type of Standardized Score Standardized Score Percentile/CP   WAIS-IV WMI -  70   WAIS-IV Digit Span 35 ss 14 91         DS Forward 14 ss 15 95         DS Backward 10 ss 12 75         DS Sequence 11 ss 14 91         Longest Digit Forward 9 - - -         Longest Digit Backward 6 - - -         Longest Digit Sequence 7 - - -   WAIS-IV Arithmetic 13 ss 9 37   MENTAL PROCESSING SPEED Raw Score Type of Standardized Score Standardized Score Percentile/CP   SMDT Written 47 zscore -0.07 47   SMDT Oral 57 zscore 0.28 61   TMT A  32 Tscore 47.00 38   TMT A errors 0 - - -   EXECUTIVE FUNCTIONING Raw Score Type of Standardized Score Standardized Score Percentile/CP   TMT B 65 Tscore 48.00 42   TMT B errors 0 - - -   Total Correct 34 SS - -   Total Errors 30 SS 74 4   Perseverative Resp. 23 SS 64 1   Perseverative Err. 20 SS 64 1   Nonperseverative Err. 10 SS 89 23   Concept. Level Response 24 SS 73 4   Categories Completed 1 - - 6-10   FMS 1 - - N/A   Learning to Learn N/A - - N/A   WAIS-IV Similarities 24 ss 9 37   MOOD & PERSONALITY Raw Score Type of Standardized Score Standardized Score Percentile/CP   BDI-2 3 - - -   KITTY-7 1 - - -

## 2022-12-16 PROBLEM — F06.70 MILD NEUROCOGNITIVE DISORDER DUE TO MULTIPLE ETIOLOGIES: Status: ACTIVE | Noted: 2022-12-02

## 2022-12-16 NOTE — ASSESSMENT & PLAN NOTE
"Compensatory Mechanisms: He may benefit from employing organizational strategies in daily life, including use of a calendar and setting an alarm for medication times. It may also be useful to establish a consistent routine for bill payments as he has paid some late fees.     Sleep Medicine: He may benefit from reviewing alternatives to CHU treatment.     Mood: He may benefit from counseling, describing himself as feeling more "lackluster" as of late. Counseling may also beneficial if he is interested in further reducing alcohol consumption.      Follow-up: Interval testing in 2-3 years.   "

## 2022-12-16 NOTE — ASSESSMENT & PLAN NOTE
"Treatment: He may benefit from counseling, describing himself as feeling more "lackluster" as of late. Counseling may also beneficial if he is interested in further reducing alcohol consumption.   "

## 2022-12-22 ENCOUNTER — OFFICE VISIT (OUTPATIENT)
Dept: NEUROLOGY | Facility: CLINIC | Age: 62
End: 2022-12-22
Payer: COMMERCIAL

## 2022-12-22 ENCOUNTER — PATIENT MESSAGE (OUTPATIENT)
Dept: NEUROLOGY | Facility: CLINIC | Age: 62
End: 2022-12-22

## 2022-12-22 DIAGNOSIS — G20.A1 PARKINSON DISEASE: ICD-10-CM

## 2022-12-22 DIAGNOSIS — F06.70 MILD NEUROCOGNITIVE DISORDER DUE TO MULTIPLE ETIOLOGIES: Primary | ICD-10-CM

## 2022-12-22 PROCEDURE — 99499 UNLISTED E&M SERVICE: CPT | Mod: 95,,, | Performed by: PSYCHIATRY & NEUROLOGY

## 2022-12-22 PROCEDURE — 99499 NO LOS: ICD-10-PCS | Mod: 95,,, | Performed by: PSYCHIATRY & NEUROLOGY

## 2022-12-22 NOTE — PROGRESS NOTES
NEUROPSYCHOLOGICAL EVALUATION - CONFIDENTIAL  FEEDBACK NOTE    On 12/22/2022, I provided Mr. Adam Dalal and his wife the neuropsychological evaluation results. Please see the full report for a comprehensive overview of the findings. Mr. Dalal was provided a copy of the report and invited to call with additional questions.      Vikas Meyer Psy.D., ABPP  Board Certified in Clinical Neuropsychology  [facility] - Department of Neurology

## 2023-01-05 ENCOUNTER — PATIENT MESSAGE (OUTPATIENT)
Dept: DERMATOLOGY | Facility: CLINIC | Age: 63
End: 2023-01-05
Payer: COMMERCIAL

## 2023-01-06 ENCOUNTER — PATIENT MESSAGE (OUTPATIENT)
Dept: INTERNAL MEDICINE | Facility: CLINIC | Age: 63
End: 2023-01-06
Payer: COMMERCIAL

## 2023-01-09 ENCOUNTER — PATIENT MESSAGE (OUTPATIENT)
Dept: DERMATOLOGY | Facility: CLINIC | Age: 63
End: 2023-01-09
Payer: COMMERCIAL

## 2023-01-09 ENCOUNTER — IMMUNIZATION (OUTPATIENT)
Dept: PHARMACY | Facility: CLINIC | Age: 63
End: 2023-01-09
Payer: COMMERCIAL

## 2023-01-09 ENCOUNTER — PROCEDURE VISIT (OUTPATIENT)
Dept: OTOLARYNGOLOGY | Facility: CLINIC | Age: 63
End: 2023-01-09
Payer: COMMERCIAL

## 2023-01-09 VITALS
SYSTOLIC BLOOD PRESSURE: 111 MMHG | BODY MASS INDEX: 29.7 KG/M2 | WEIGHT: 225.06 LBS | DIASTOLIC BLOOD PRESSURE: 69 MMHG | HEART RATE: 77 BPM

## 2023-01-09 DIAGNOSIS — Z23 NEED FOR VACCINATION: Primary | ICD-10-CM

## 2023-01-09 DIAGNOSIS — L98.9 SKIN LESION OF CHEEK: Primary | ICD-10-CM

## 2023-01-09 PROCEDURE — 11442 PR EXC SKIN BENIG 1.1-2 CM FACE,FACIAL: ICD-10-PCS | Mod: S$GLB,,, | Performed by: OTOLARYNGOLOGY

## 2023-01-09 PROCEDURE — 11442 EXC FACE-MM B9+MARG 1.1-2 CM: CPT | Mod: S$GLB,,, | Performed by: OTOLARYNGOLOGY

## 2023-01-12 NOTE — PROCEDURES
Procedures    Benign Skin Lesion Excision Procedure Note    Pre-operative Diagnosis:  Standing cutaneous deformity    Post-operative Diagnosis: same    Indications:  Standing cutaneous deformity    Anesthesia: 1% lidocaine with epinephrine    Procedure Details   The procedure, risks and complications have been discussed in detail (including, but not limited to pain, infection, bleeding, and scar formation) with the patient.    After adequate local anesthesia with injection of 3 ccs of 1% lidocaine with [id number] of epinephrine.The skin was sterilely prepped and draped over the affected area in the usual fashion. 15 blade was used to make an elliptical incision extending 1.3 x 1 cm through the skin circumferentially. Lesion was excised below subdermal place with iris scissors.  Hemostasis was obtained with silver nitrate.  The wound was closed with deep 4-0 Monocryl and 5 0 fast-absorbing gut for the skin.     Patient tolerated the the procedure well.    EBL:  Minimal less than 5 cc's      Condition:  Stable    Complications:  none.

## 2023-01-24 DIAGNOSIS — G20.A1 PARKINSON'S DISEASE: ICD-10-CM

## 2023-01-24 RX ORDER — RASAGILINE 1 MG/1
1 TABLET ORAL DAILY
Qty: 30 TABLET | Refills: 11 | Status: SHIPPED | OUTPATIENT
Start: 2023-01-24 | End: 2023-10-13

## 2023-03-08 ENCOUNTER — HOSPITAL ENCOUNTER (OUTPATIENT)
Dept: RADIOLOGY | Facility: HOSPITAL | Age: 63
Discharge: HOME | End: 2023-03-08
Attending: PHYSICIAN ASSISTANT
Payer: COMMERCIAL

## 2023-03-08 ENCOUNTER — OFFICE VISIT (OUTPATIENT)
Dept: ORTHOPEDICS | Facility: CLINIC | Age: 63
End: 2023-03-08
Payer: COMMERCIAL

## 2023-03-08 VITALS — HEIGHT: 73 IN | BODY MASS INDEX: 29.83 KG/M2 | WEIGHT: 225.06 LBS

## 2023-03-08 DIAGNOSIS — M51.36 DDD (DEGENERATIVE DISC DISEASE), LUMBAR: Primary | ICD-10-CM

## 2023-03-08 DIAGNOSIS — M54.50 ACUTE BILATERAL LOW BACK PAIN WITHOUT SCIATICA: ICD-10-CM

## 2023-03-08 DIAGNOSIS — M51.36 DDD (DEGENERATIVE DISC DISEASE), LUMBAR: ICD-10-CM

## 2023-03-08 DIAGNOSIS — M54.9 DORSALGIA, UNSPECIFIED: ICD-10-CM

## 2023-03-08 PROCEDURE — 1160F PR REVIEW ALL MEDS BY PRESCRIBER/CLIN PHARMACIST DOCUMENTED: ICD-10-PCS | Mod: CPTII,S$GLB,, | Performed by: PHYSICIAN ASSISTANT

## 2023-03-08 PROCEDURE — 72110 XR LUMBAR SPINE AP AND LAT WITH FLEX/EXT: ICD-10-PCS | Mod: 26,,, | Performed by: RADIOLOGY

## 2023-03-08 PROCEDURE — 1160F RVW MEDS BY RX/DR IN RCRD: CPT | Mod: CPTII,S$GLB,, | Performed by: PHYSICIAN ASSISTANT

## 2023-03-08 PROCEDURE — 1159F MED LIST DOCD IN RCRD: CPT | Mod: CPTII,S$GLB,, | Performed by: PHYSICIAN ASSISTANT

## 2023-03-08 PROCEDURE — 99999 PR PBB SHADOW E&M-EST. PATIENT-LVL III: CPT | Mod: PBBFAC,,, | Performed by: PHYSICIAN ASSISTANT

## 2023-03-08 PROCEDURE — 3008F PR BODY MASS INDEX (BMI) DOCUMENTED: ICD-10-PCS | Mod: CPTII,S$GLB,, | Performed by: PHYSICIAN ASSISTANT

## 2023-03-08 PROCEDURE — 72148 MRI LUMBAR SPINE WITHOUT CONTRAST: ICD-10-PCS | Mod: 26,,, | Performed by: RADIOLOGY

## 2023-03-08 PROCEDURE — 1159F PR MEDICATION LIST DOCUMENTED IN MEDICAL RECORD: ICD-10-PCS | Mod: CPTII,S$GLB,, | Performed by: PHYSICIAN ASSISTANT

## 2023-03-08 PROCEDURE — 72110 X-RAY EXAM L-2 SPINE 4/>VWS: CPT | Mod: TC

## 2023-03-08 PROCEDURE — 99204 OFFICE O/P NEW MOD 45 MIN: CPT | Mod: S$GLB,,, | Performed by: PHYSICIAN ASSISTANT

## 2023-03-08 PROCEDURE — 72148 MRI LUMBAR SPINE W/O DYE: CPT | Mod: TC

## 2023-03-08 PROCEDURE — 72110 X-RAY EXAM L-2 SPINE 4/>VWS: CPT | Mod: 26,,, | Performed by: RADIOLOGY

## 2023-03-08 PROCEDURE — 3008F BODY MASS INDEX DOCD: CPT | Mod: CPTII,S$GLB,, | Performed by: PHYSICIAN ASSISTANT

## 2023-03-08 PROCEDURE — 72148 MRI LUMBAR SPINE W/O DYE: CPT | Mod: 26,,, | Performed by: RADIOLOGY

## 2023-03-08 PROCEDURE — 99204 PR OFFICE/OUTPT VISIT, NEW, LEVL IV, 45-59 MIN: ICD-10-PCS | Mod: S$GLB,,, | Performed by: PHYSICIAN ASSISTANT

## 2023-03-08 PROCEDURE — 99999 PR PBB SHADOW E&M-EST. PATIENT-LVL III: ICD-10-PCS | Mod: PBBFAC,,, | Performed by: PHYSICIAN ASSISTANT

## 2023-03-08 RX ORDER — DIAZEPAM 2 MG/1
2 TABLET ORAL
Qty: 2 TABLET | Refills: 0 | Status: SHIPPED | OUTPATIENT
Start: 2023-03-08 | End: 2023-04-04

## 2023-03-08 RX ORDER — METHOCARBAMOL 750 MG/1
750 TABLET, FILM COATED ORAL 3 TIMES DAILY
Qty: 60 TABLET | Refills: 0 | Status: SHIPPED | OUTPATIENT
Start: 2023-03-08 | End: 2023-03-28

## 2023-03-08 NOTE — PROGRESS NOTES
DATE: 3/8/2023  PATIENT: Adam Dalal    Supervising Physician: Orlando Du M.D.    CHIEF COMPLAINT: back pain    HISTORY:  Adam Dalal is a 62 y.o. male here for initial evaluation of low back pain (Back - 7). The pain has been present for about a month. Pt says pain began after he picked something heavy up at work. The patient describes the pain as a severe ache on the L lower back.  The pain is worse with transfers or standing/sitting for long periods. Pain is constant but waxes and wanes with severity. There is no associated numbness and tingling or radiation to legs. There is no subjective weakness. Pt has tried ibuprofen, heating pad with vibrations, and one hydrocodone tablet which have provided mild relief but symptoms still return.     The patient denies myelopathic symptoms such as handwriting changes or difficulty with buttons/coins/keys. Denies perineal paresthesias, bowel/bladder dysfunction.    Pt works at a [employer].    PAST MEDICAL/SURGICAL HISTORY:  Past Medical History:   Diagnosis Date    Anxiety     Esotropia of right eye 05/02/2013    Hearing deficit, bilateral     High cholesterol     Hyperlipidemia     Malignant melanoma of skin, unspecified 06/29/2022    in situ right lateral infraorbit    Melanoma in situ of cheek     Parkinson's disease     Ringing in ear, bilateral      Past Surgical History:   Procedure Laterality Date    CATARACT EXTRACTION W/  INTRAOCULAR LENS IMPLANT Right 01/09/2017    Dr marin     CATARACT EXTRACTION W/  INTRAOCULAR LENS IMPLANT Left 01/30/2017    Dr. Marin    HERNIA REPAIR      RECONSTRUCTION OF FACE Right 8/4/2022    Procedure: RECONSTRUCTION, FACE;  Surgeon: Yaneli Hickman MD;  Location: [facility] OR [address] [place] [address];  Service: ENT;  Laterality: Right;    STRABISMUS SURGERY  4yrs    right eye       Current Medications:   Current Outpatient Medications:     ALPRAZolam (XANAX) 0.5 MG tablet, TAKE 1 TABLET(0.5 MG) BY MOUTH EVERY NIGHT FOR INSOMNIA OR ANXIETY,  Disp: 30 tablet, Rfl: 1    carbidopa-levodopa (RYTARY) 23.75-95 mg CpSR, Take 3 capsules by mouth 3 (three) times daily., Disp: 270 capsule, Rfl: 5    carbidopa-levodopa (RYTARY) 23.75-95 mg CpSR, Take 4 capsules by mouth 3 (three) times daily., Disp: 360 capsule, Rfl: 11    fluorouraciL (EFUDEX) 5 % cream, Mix with calcipotriene 0.005% cream  and Apply thin film to upper forehead  2 times per day for 7 days; d/c if area bleeding or ulcerated; avoid eyes or mouth, Disp: 40 g, Rfl: 1    ibuprofen (ADVIL,MOTRIN) 600 MG tablet, Take 1 tablet (600 mg total) by mouth every 6 (six) hours as needed for Pain., Disp: , Rfl:     ofloxacin (FLOXIN) 0.3 % otic solution, Place 5 drops into the left ear 2 (two) times daily., Disp: 10 mL, Rfl: 0    pravastatin (PRAVACHOL) 80 MG tablet, TAKE 1 TABLET(80 MG) BY MOUTH EVERY MORNING, Disp: 90 tablet, Rfl: 3    rasagiline (AZILECT) 1 mg Tab, Take 1 tablet (1 mg total) by mouth once daily., Disp: 30 tablet, Rfl: 11    diazePAM (VALIUM) 2 MG tablet, Take 1 tablet (2 mg total) by mouth On call Procedure for Anxiety (take 1 tab 30 min prior to procedure.  may take 2nd if needed)., Disp: 2 tablet, Rfl: 0    methocarbamoL (ROBAXIN) 750 MG Tab, Take 1 tablet (750 mg total) by mouth 3 (three) times daily. As needed for muscle spasms for 60 doses, Disp: 60 tablet, Rfl: 0    Social History:   Social History     Socioeconomic History    Marital status:    Tobacco Use    Smoking status: Former     Packs/day: 1.00     Years: 1.00     Pack years: 1.00     Types: Cigarettes     Quit date: 2006     Years since quittin.1    Smokeless tobacco: Never   Substance and Sexual Activity    Alcohol use: Yes     Alcohol/week: 20.0 standard drinks     Types: 20 Glasses of wine per week    Drug use: No    Sexual activity: Yes     Partners: Female       REVIEW OF SYSTEMS:  Constitution: Negative. Negative for chills, fever and night sweats.   Cardiovascular: Negative for chest pain and syncope.  "  Respiratory: Negative for cough and shortness of breath.   Gastrointestinal: See HPI. Negative for nausea/vomiting. Negative for abdominal pain.  Genitourinary: See HPI. Negative for discoloration or dysuria.  Skin: Negative for dry skin, itching and rash.   Hematologic/Lymphatic: Negative for bleeding problem. Does not bruise/bleed easily.   Musculoskeletal: Negative for falls and muscle weakness.   Neurological: See HPI. No seizures.   Endocrine: Negative for polydipsia, polyphagia and polyuria.   Allergic/Immunologic: Negative for hives and persistent infections.    PHYSICAL EXAMINATION:    Ht 6' 1" (1.854 m)   Wt 102.1 kg (225 lb 1.4 oz)   BMI 29.70 kg/m²     General: The patient is a pleasant 62 y.o. male in no apparent distress, the patient is oriented to person, place and time.   Psych: Normal mood and affect  HEENT: Vision grossly intact, hearing intact to the spoken word.  Lungs: Respirations unlabored.  Gait: Normal station and gait, no difficulty with toe or heel walk.   Skin: Dorsal lumbar skin negative for rashes, lesions, hairy patches and surgical scars.  Range of motion: Lumbar range of motion is acceptable. There is no lumbar tenderness to palpation.  Spinal Balance: Global saggital and coronal spinal balance acceptable, no significant for scoliosis and kyphosis.  Musculoskeletal: + pain upon flexion of L hip. No pain upon flexion of R hip. No trochanteric tenderness to palpation.  Vascular: Bilateral lower extremities warm and well perfused, Dorsalis pedis pulses 2+ bilaterally.  Neurological: Normal strength and tone in all major motor groups in the bilateral lower extremities. Normal sensation to light touch in the L2-S1 dermatomes bilaterally.  Deep tendon reflexes symmetric 2+ in the bilateral lower extremities.  Negative Babinski bilaterally.  Straight leg raise negative bilaterally.     IMAGING:   Today I personally reviewed AP, Lat and Flex/Ex upright L-spine films that demonstrate L4/5 " disc degeneration.       Body mass index is 29.7 kg/m².    Hemoglobin A1C   Date Value Ref Range Status   12/29/2021 5.7 (H) 4.0 - 5.6 % Final     Comment:     ADA Screening Guidelines:  5.7-6.4%  Consistent with prediabetes  >or=6.5%  Consistent with diabetes    High levels of fetal hemoglobin interfere with the HbA1C  assay. Heterozygous hemoglobin variants (HbS, HgC, etc)do  not significantly interfere with this assay.   However, presence of multiple variants may affect accuracy.     10/02/2020 5.5 4.0 - 5.6 % Final     Comment:     ADA Screening Guidelines:  5.7-6.4%  Consistent with prediabetes  >or=6.5%  Consistent with diabetes  High levels of fetal hemoglobin interfere with the HbA1C  assay. Heterozygous hemoglobin variants (HbS, HgC, etc)do  not significantly interfere with this assay.   However, presence of multiple variants may affect accuracy.     10/28/2019 5.7 (H) 4.0 - 5.6 % Final     Comment:     ADA Screening Guidelines:  5.7-6.4%  Consistent with prediabetes  >or=6.5%  Consistent with diabetes  High levels of fetal hemoglobin interfere with the HbA1C  assay. Heterozygous hemoglobin variants (HbS, HgC, etc)do  not significantly interfere with this assay.   However, presence of multiple variants may affect accuracy.           ASSESSMENT/PLAN:    Adam was seen today for low-back pain.    Diagnoses and all orders for this visit:    DDD (degenerative disc disease), lumbar  -     Ambulatory referral/consult to Physical/Occupational Therapy; Future    Dorsalgia, unspecified  -     MRI Lumbar Spine Without Contrast; Future    Acute bilateral low back pain without sciatica  -     Ambulatory referral/consult to Physical/Occupational Therapy; Future    Other orders  -     methocarbamoL (ROBAXIN) 750 MG Tab; Take 1 tablet (750 mg total) by mouth 3 (three) times daily. As needed for muscle spasms for 60 doses  -     diazePAM (VALIUM) 2 MG tablet; Take 1 tablet (2 mg total) by mouth On call Procedure for  Anxiety (take 1 tab 30 min prior to procedure.  may take 2nd if needed).      Today we discussed at length all of the different treatment options including anti-inflammatories, acetaminophen, rest, ice, heat, physical therapy including strengthening and stretching exercises, home exercises, ROM, aerobic conditioning, aqua therapy, other modalities including ultrasound, massage, and dry needling, epidural steroid injections and finally surgical intervention.      Referral for PT placed today to Nathaly RUBIO.  We will also go ahead and get an MRI as he would like to discuss injections.  I will call him after the MRI to discuss further.

## 2023-03-09 ENCOUNTER — TELEPHONE (OUTPATIENT)
Dept: ORTHOPEDICS | Facility: CLINIC | Age: 63
End: 2023-03-09
Payer: COMMERCIAL

## 2023-03-09 RX ORDER — DICLOFENAC SODIUM 75 MG/1
75 TABLET, DELAYED RELEASE ORAL 2 TIMES DAILY PRN
Qty: 60 TABLET | Refills: 0 | Status: SHIPPED | OUTPATIENT
Start: 2023-03-09 | End: 2024-01-17

## 2023-03-09 NOTE — TELEPHONE ENCOUNTER
Spoke with patient and informed him medication was sent to pharmacy he stated an verbal understanding.

## 2023-03-09 NOTE — TELEPHONE ENCOUNTER
Spoke with patient and he would like to know can he have something for pain because the Robaxin 750 mg is not working for the pain he is having now. Patient has an appointment for Monday to view results of MRI with CHERELLE Flores. Can you please send something for pain until then, when they can talk about injections. Pharmacy [facility] on  [address].     Please advise!

## 2023-03-09 NOTE — TELEPHONE ENCOUNTER
----- Message from Hannah Vizcarra sent at 3/9/2023  8:16 AM CST -----  Regarding: Pt's Wife Berenice Sharp called to speak to the nurse regarding an injection appt that the pt wants to have today due to having his MRI yesterday and pt states that his pain level is at a 10  Patient Advice:    Pt's Wife Berenice Sharp called to speak to the nurse regarding an injection appt that the pt wants to have today due to having his MRI yesterday and pt states that his pain level is at a 10 when getting up and at a 7 while sitting.    Mrs Sharp would like a call back today and can be reached at [phone]

## 2023-03-13 ENCOUNTER — OFFICE VISIT (OUTPATIENT)
Dept: ORTHOPEDICS | Facility: CLINIC | Age: 63
End: 2023-03-13
Payer: COMMERCIAL

## 2023-03-13 DIAGNOSIS — M51.36 DDD (DEGENERATIVE DISC DISEASE), LUMBAR: Primary | ICD-10-CM

## 2023-03-13 DIAGNOSIS — M54.50 ACUTE BILATERAL LOW BACK PAIN WITHOUT SCIATICA: ICD-10-CM

## 2023-03-13 PROCEDURE — 99213 OFFICE O/P EST LOW 20 MIN: CPT | Mod: 95,,, | Performed by: PHYSICIAN ASSISTANT

## 2023-03-13 PROCEDURE — 99213 PR OFFICE/OUTPT VISIT, EST, LEVL III, 20-29 MIN: ICD-10-PCS | Mod: 95,,, | Performed by: PHYSICIAN ASSISTANT

## 2023-03-13 NOTE — PROGRESS NOTES
Established Patient - Audio Only Telehealth Visit     The patient location is: home  The chief complaint leading to consultation is: MRI results  Visit type: Virtual visit with audio only (telephone)  Total time spent with patient: 15 minutes       The reason for the audio only service rather than synchronous audio and video virtual visit was related to technical difficulties or patient preference/necessity.     Each patient to whom I provide medical services by telemedicine is:  (1) informed of the relationship between the physician and patient and the respective role of any other health care provider with respect to management of the patient; and (2) notified that they may decline to receive medical services by telemedicine and may withdraw from such care at any time. Patient verbally consented to receive this service via voice-only telephone call.       DATE: 3/13/2023  PATIENT: Adam Dalal    Attending Physician: Orlando Du M.D.      HISTORY:  Adam Dalal is a 62 y.o. male who returns to me today for MRI results.  He was last seen by me 3/8/2023.  Today he is doing well but notes his pain has actually improved.    The Patient denies myelopathic symptoms such as handwriting changes or difficulty with buttons/coins/keys. Denies perineal paresthesias, bowel/bladder dysfunction.        IMAGING:    Today I personally re- reviewed AP, Lat and Flex/Ex  upright L-spine that demonstrate L4/5 disc degeneration.    MRI lumbar spine demonstrates a left extraforaminal zone disc protrusion at L3/4.  There is right L4 impingement at L4/5.     There is no height or weight on file to calculate BMI.    Hemoglobin A1C   Date Value Ref Range Status   12/29/2021 5.7 (H) 4.0 - 5.6 % Final     Comment:     ADA Screening Guidelines:  5.7-6.4%  Consistent with prediabetes  >or=6.5%  Consistent with diabetes    High levels of fetal hemoglobin interfere with the HbA1C  assay. Heterozygous hemoglobin variants (HbS,  HgC, etc)do  not significantly interfere with this assay.   However, presence of multiple variants may affect accuracy.     10/02/2020 5.5 4.0 - 5.6 % Final     Comment:     ADA Screening Guidelines:  5.7-6.4%  Consistent with prediabetes  >or=6.5%  Consistent with diabetes  High levels of fetal hemoglobin interfere with the HbA1C  assay. Heterozygous hemoglobin variants (HbS, HgC, etc)do  not significantly interfere with this assay.   However, presence of multiple variants may affect accuracy.     10/28/2019 5.7 (H) 4.0 - 5.6 % Final     Comment:     ADA Screening Guidelines:  5.7-6.4%  Consistent with prediabetes  >or=6.5%  Consistent with diabetes  High levels of fetal hemoglobin interfere with the HbA1C  assay. Heterozygous hemoglobin variants (HbS, HgC, etc)do  not significantly interfere with this assay.   However, presence of multiple variants may affect accuracy.           ASSESSMENT/PLAN:    Diagnoses and all orders for this visit:    DDD (degenerative disc disease), lumbar    Acute bilateral low back pain without sciatica      Today we discussed at length all of the different treatment options including anti-inflammatories, acetaminophen, rest, ice, heat, physical therapy including strengthening and stretching exercises, home exercises, ROM, aerobic conditioning, aqua therapy, other modalities including ultrasound, massage, and dry needling, epidural steroid injections and finally surgical intervention.      He says his pain has actually improved.  He will continue with PT.  He will hold off on injections at this time.            This service was not originating from a related E/M service provided within the previous 7 days nor will  to an E/M service or procedure within the next 24 hours or my soonest available appointment.  Prevailing standard of care was able to be met in this audio-only visit.

## 2023-04-04 ENCOUNTER — PATIENT MESSAGE (OUTPATIENT)
Dept: ORTHOPEDICS | Facility: CLINIC | Age: 63
End: 2023-04-04
Payer: COMMERCIAL

## 2023-04-04 ENCOUNTER — OFFICE VISIT (OUTPATIENT)
Dept: INTERNAL MEDICINE | Facility: CLINIC | Age: 63
End: 2023-04-04
Payer: COMMERCIAL

## 2023-04-04 ENCOUNTER — LAB VISIT (OUTPATIENT)
Dept: LAB | Facility: HOSPITAL | Age: 63
End: 2023-04-04
Attending: INTERNAL MEDICINE
Payer: COMMERCIAL

## 2023-04-04 VITALS
HEIGHT: 73 IN | SYSTOLIC BLOOD PRESSURE: 122 MMHG | WEIGHT: 226 LBS | OXYGEN SATURATION: 97 % | HEART RATE: 68 BPM | DIASTOLIC BLOOD PRESSURE: 88 MMHG | BODY MASS INDEX: 29.95 KG/M2

## 2023-04-04 DIAGNOSIS — Z12.11 COLON CANCER SCREENING: ICD-10-CM

## 2023-04-04 DIAGNOSIS — Z12.5 SCREENING FOR PROSTATE CANCER: ICD-10-CM

## 2023-04-04 DIAGNOSIS — F41.9 ANXIETY: ICD-10-CM

## 2023-04-04 DIAGNOSIS — E78.5 HYPERLIPIDEMIA, UNSPECIFIED HYPERLIPIDEMIA TYPE: ICD-10-CM

## 2023-04-04 DIAGNOSIS — G20.A1 PARKINSON DISEASE: ICD-10-CM

## 2023-04-04 DIAGNOSIS — Z00.00 ROUTINE PHYSICAL EXAMINATION: Primary | ICD-10-CM

## 2023-04-04 DIAGNOSIS — Z00.00 ROUTINE PHYSICAL EXAMINATION: ICD-10-CM

## 2023-04-04 DIAGNOSIS — R35.1 NOCTURIA: ICD-10-CM

## 2023-04-04 LAB
ANION GAP SERPL CALC-SCNC: 8 MMOL/L (ref 8–16)
BUN SERPL-MCNC: 17 MG/DL (ref 6–20)
CALCIUM SERPL-MCNC: 9.2 MG/DL (ref 8.7–10.5)
CHLORIDE SERPL-SCNC: 102 MMOL/L (ref 95–110)
CHOLEST SERPL-MCNC: 183 MG/DL (ref 120–199)
CHOLEST/HDLC SERPL: 3.5 {RATIO} (ref 2–5)
CO2 SERPL-SCNC: 29 MMOL/L (ref 23–29)
COMPLEXED PSA SERPL-MCNC: 0.27 NG/ML (ref 0–4)
CREAT SERPL-MCNC: 1 MG/DL (ref 0.5–1.4)
EST. GFR  (NO RACE VARIABLE): >60 ML/MIN/1.73 M^2
ESTIMATED AVG GLUCOSE: 120 MG/DL (ref 68–131)
GLUCOSE SERPL-MCNC: 109 MG/DL (ref 70–110)
HBA1C MFR BLD: 5.8 % (ref 4–5.6)
HDLC SERPL-MCNC: 52 MG/DL (ref 40–75)
HDLC SERPL: 28.4 % (ref 20–50)
LDLC SERPL CALC-MCNC: 111 MG/DL (ref 63–159)
NONHDLC SERPL-MCNC: 131 MG/DL
POTASSIUM SERPL-SCNC: 4.4 MMOL/L (ref 3.5–5.1)
SODIUM SERPL-SCNC: 139 MMOL/L (ref 136–145)
TRIGL SERPL-MCNC: 100 MG/DL (ref 30–150)

## 2023-04-04 PROCEDURE — 3074F SYST BP LT 130 MM HG: CPT | Mod: CPTII,S$GLB,, | Performed by: INTERNAL MEDICINE

## 2023-04-04 PROCEDURE — 99396 PR PREVENTIVE VISIT,EST,40-64: ICD-10-PCS | Mod: S$GLB,,, | Performed by: INTERNAL MEDICINE

## 2023-04-04 PROCEDURE — 1159F PR MEDICATION LIST DOCUMENTED IN MEDICAL RECORD: ICD-10-PCS | Mod: CPTII,S$GLB,, | Performed by: INTERNAL MEDICINE

## 2023-04-04 PROCEDURE — 99396 PREV VISIT EST AGE 40-64: CPT | Mod: S$GLB,,, | Performed by: INTERNAL MEDICINE

## 2023-04-04 PROCEDURE — 1160F RVW MEDS BY RX/DR IN RCRD: CPT | Mod: CPTII,S$GLB,, | Performed by: INTERNAL MEDICINE

## 2023-04-04 PROCEDURE — 1159F MED LIST DOCD IN RCRD: CPT | Mod: CPTII,S$GLB,, | Performed by: INTERNAL MEDICINE

## 2023-04-04 PROCEDURE — 3074F PR MOST RECENT SYSTOLIC BLOOD PRESSURE < 130 MM HG: ICD-10-PCS | Mod: CPTII,S$GLB,, | Performed by: INTERNAL MEDICINE

## 2023-04-04 PROCEDURE — 3079F PR MOST RECENT DIASTOLIC BLOOD PRESSURE 80-89 MM HG: ICD-10-PCS | Mod: CPTII,S$GLB,, | Performed by: INTERNAL MEDICINE

## 2023-04-04 PROCEDURE — 1160F PR REVIEW ALL MEDS BY PRESCRIBER/CLIN PHARMACIST DOCUMENTED: ICD-10-PCS | Mod: CPTII,S$GLB,, | Performed by: INTERNAL MEDICINE

## 2023-04-04 PROCEDURE — 36415 COLL VENOUS BLD VENIPUNCTURE: CPT | Performed by: INTERNAL MEDICINE

## 2023-04-04 PROCEDURE — 99999 PR PBB SHADOW E&M-EST. PATIENT-LVL III: ICD-10-PCS | Mod: PBBFAC,,, | Performed by: INTERNAL MEDICINE

## 2023-04-04 PROCEDURE — 80061 LIPID PANEL: CPT | Performed by: INTERNAL MEDICINE

## 2023-04-04 PROCEDURE — 3079F DIAST BP 80-89 MM HG: CPT | Mod: CPTII,S$GLB,, | Performed by: INTERNAL MEDICINE

## 2023-04-04 PROCEDURE — 80048 BASIC METABOLIC PNL TOTAL CA: CPT | Performed by: INTERNAL MEDICINE

## 2023-04-04 PROCEDURE — 3008F BODY MASS INDEX DOCD: CPT | Mod: CPTII,S$GLB,, | Performed by: INTERNAL MEDICINE

## 2023-04-04 PROCEDURE — 84153 ASSAY OF PSA TOTAL: CPT | Performed by: INTERNAL MEDICINE

## 2023-04-04 PROCEDURE — 83036 HEMOGLOBIN GLYCOSYLATED A1C: CPT | Performed by: INTERNAL MEDICINE

## 2023-04-04 PROCEDURE — 3008F PR BODY MASS INDEX (BMI) DOCUMENTED: ICD-10-PCS | Mod: CPTII,S$GLB,, | Performed by: INTERNAL MEDICINE

## 2023-04-04 PROCEDURE — 99999 PR PBB SHADOW E&M-EST. PATIENT-LVL III: CPT | Mod: PBBFAC,,, | Performed by: INTERNAL MEDICINE

## 2023-04-04 RX ORDER — PRAVASTATIN SODIUM 80 MG/1
TABLET ORAL
Qty: 90 TABLET | Refills: 3 | Status: SHIPPED | OUTPATIENT
Start: 2023-04-04

## 2023-04-04 RX ORDER — ALPRAZOLAM 0.5 MG/1
TABLET ORAL
Qty: 30 TABLET | Refills: 5 | Status: SHIPPED | OUTPATIENT
Start: 2023-04-04 | End: 2024-01-10 | Stop reason: SDUPTHER

## 2023-04-04 NOTE — PROGRESS NOTES
Subjective:       Patient ID: Adam Dalal is a 62 y.o. male.    Chief Complaint: Annual Exam    HPI:  Patient comes in for annual exam.  He has been busy with health issues including Mohs surgery for a right cheek melanoma.    Physical therapy for some back discomfort.  He has an upcoming appointment with Neurology follow-up parkinsonism.  Last labs his A1c was a little elevated and he has been urinating a little more at night so we will assess his A1c urine and labs.    Prescriptions updated    reviewed.  Side effects of alprazolam reviewed with the patient.  No escalation.  Driving and drowsy precautions discussed.    Review of Systems   Constitutional:  Negative for chills, fatigue and fever.   HENT:  Negative for nosebleeds and trouble swallowing.    Eyes:  Negative for pain and visual disturbance.   Respiratory:  Negative for cough, shortness of breath and wheezing.    Cardiovascular:  Negative for chest pain and palpitations.   Gastrointestinal:  Negative for abdominal pain, constipation, diarrhea, nausea and vomiting.   Genitourinary:  Positive for frequency (Nocturia). Negative for difficulty urinating and hematuria.   Musculoskeletal:  Positive for arthralgias and back pain. Negative for neck pain.   Integumentary:  Negative for rash.   Neurological:  Positive for tremors. Negative for dizziness and headaches.   Hematological:  Does not bruise/bleed easily.   Psychiatric/Behavioral:  Negative for dysphoric mood and sleep disturbance.          Past Medical History:   Diagnosis Date    Anxiety     Esotropia of right eye 05/02/2013    Hearing deficit, bilateral     High cholesterol     Hyperlipidemia     Malignant melanoma of skin, unspecified 06/29/2022    in situ right lateral infraorbit    Melanoma in situ of cheek     Parkinson's disease     Ringing in ear, bilateral      Past Surgical History:   Procedure Laterality Date    CATARACT EXTRACTION W/  INTRAOCULAR LENS IMPLANT Right 01/09/2017     Dr marin     CATARACT EXTRACTION W/  INTRAOCULAR LENS IMPLANT Left 01/30/2017    Dr. Marin    HERNIA REPAIR      RECONSTRUCTION OF FACE Right 8/4/2022    Procedure: RECONSTRUCTION, FACE;  Surgeon: Yaneli Hickman MD;  Location: [facility] OR [address] [place] [address];  Service: ENT;  Laterality: Right;    STRABISMUS SURGERY  4yrs    right eye      Patient Active Problem List   Diagnosis    Hyperlipidemia    Obstructive sleep apnea    Esotropia of right eye    Ocular hypertension - Both Eyes    Nuclear sclerosis    Nuclear sclerotic cataract of left eye    Parkinson disease    Hypophonia    Voice and resonance disorder    Dysarthria    Impaired functional mobility, balance, gait, and endurance    Impaired motor control    Alcohol use disorder, mild, abuse    Oral lesion    Mohs defect of right cheek    History of melanoma in situ    Mild neurocognitive disorder due to multiple etiologies        Objective:      Physical Exam  Constitutional:       General: He is not in acute distress.     Appearance: He is well-developed.   HENT:      Head: Normocephalic and atraumatic.      Right Ear: Tympanic membrane, ear canal and external ear normal.      Left Ear: Tympanic membrane, ear canal and external ear normal.      Mouth/Throat:      Pharynx: No oropharyngeal exudate or posterior oropharyngeal erythema.   Eyes:      General: No scleral icterus.     Conjunctiva/sclera: Conjunctivae normal.      Pupils: Pupils are equal, round, and reactive to light.   Neck:      Thyroid: No thyromegaly.      Comments: No supraclavicular nodes palpated  Cardiovascular:      Rate and Rhythm: Normal rate and regular rhythm.      Pulses: Normal pulses.      Heart sounds: Normal heart sounds. No murmur heard.  Pulmonary:      Effort: Pulmonary effort is normal.      Breath sounds: Normal breath sounds. No wheezing.   Abdominal:      General: Bowel sounds are normal.      Palpations: Abdomen is soft. There is no mass.      Tenderness: There is no abdominal tenderness.    Musculoskeletal:         General: No tenderness.      Cervical back: Normal range of motion and neck supple.      Right lower leg: No edema.      Left lower leg: No edema.   Lymphadenopathy:      Cervical: No cervical adenopathy.   Skin:     Coloration: Skin is not jaundiced or pale.      Comments: Healing surgical incision right cheek   Neurological:      General: No focal deficit present.      Mental Status: He is alert and oriented to person, place, and time.      Coordination: Coordination abnormal.   Psychiatric:         Mood and Affect: Mood normal.         Behavior: Behavior normal.       Assessment:       Problem List Items Addressed This Visit          Neuro    Parkinson disease    Relevant Orders    Basic Metabolic Panel       Cardiac/Vascular    Hyperlipidemia    Relevant Orders    Lipid Panel    Hemoglobin A1C    Basic Metabolic Panel     Other Visit Diagnoses       Routine physical examination    -  Primary    Relevant Orders    Lipid Panel    Hemoglobin A1C    Basic Metabolic Panel    Colon cancer screening        Relevant Orders    Cologuard Screening (Multitarget Stool DNA)    Screening for prostate cancer        Relevant Orders    PSA, Screening    Anxiety        Nocturia        Relevant Orders    Urinalysis, Reflex to Urine Culture            Plan:         Adam was seen today for annual exam.    Diagnoses and all orders for this visit:    Routine physical examination  -     Lipid Panel; Future  -     Hemoglobin A1C; Future  -     Basic Metabolic Panel; Future    Parkinson disease  -     Basic Metabolic Panel; Future    Colon cancer screening  -     Cologuard Screening (Multitarget Stool DNA); Future  -     Cologuard Screening (Multitarget Stool DNA)    Screening for prostate cancer  -     PSA, Screening; Future    Hyperlipidemia, unspecified hyperlipidemia type  -     Lipid Panel; Future  -     Hemoglobin A1C; Future  -     Basic Metabolic Panel; Future    Anxiety    Nocturia  -      "Urinalysis, Reflex to Urine Culture; Future    Other orders  -     ALPRAZolam (XANAX) 0.5 MG tablet; TAKE 1 TABLET(0.5 MG) BY MOUTH EVERY NIGHT FOR INSOMNIA OR ANXIETY  -     pravastatin (PRAVACHOL) 80 MG tablet; TAKE 1 TABLET(80 MG) BY MOUTH EVERY MORNING       Patient has decided to do a Cologuard instead of a colonoscopy.  Follow-up depending on results   Follow-up labs in urine   Continue to see specialty providers as above and continue medicine.              Portions of this note may have been created with voice recognition software. Occasional "wrong-word" or "sound-a-like" substitutions may have occurred due to the inherent limitations of voice recognition software. Please, read the note carefully and recognize, using context, where substitutions have occurred.  "

## 2023-04-13 ENCOUNTER — PATIENT MESSAGE (OUTPATIENT)
Dept: INTERNAL MEDICINE | Facility: CLINIC | Age: 63
End: 2023-04-13
Payer: COMMERCIAL

## 2023-04-20 ENCOUNTER — OFFICE VISIT (OUTPATIENT)
Dept: DERMATOLOGY | Facility: CLINIC | Age: 63
End: 2023-04-20
Payer: COMMERCIAL

## 2023-04-20 DIAGNOSIS — D22.9 NEVUS: ICD-10-CM

## 2023-04-20 DIAGNOSIS — L81.4 LENTIGO: Primary | ICD-10-CM

## 2023-04-20 DIAGNOSIS — D18.01 CHERRY ANGIOMA: ICD-10-CM

## 2023-04-20 DIAGNOSIS — L57.0 AK (ACTINIC KERATOSIS): ICD-10-CM

## 2023-04-20 DIAGNOSIS — L82.1 SK (SEBORRHEIC KERATOSIS): ICD-10-CM

## 2023-04-20 DIAGNOSIS — Z86.006 HISTORY OF MELANOMA IN SITU: ICD-10-CM

## 2023-04-20 PROCEDURE — 1159F MED LIST DOCD IN RCRD: CPT | Mod: CPTII,S$GLB,, | Performed by: DERMATOLOGY

## 2023-04-20 PROCEDURE — 1160F RVW MEDS BY RX/DR IN RCRD: CPT | Mod: CPTII,S$GLB,, | Performed by: DERMATOLOGY

## 2023-04-20 PROCEDURE — 99213 OFFICE O/P EST LOW 20 MIN: CPT | Mod: 25,S$GLB,, | Performed by: DERMATOLOGY

## 2023-04-20 PROCEDURE — 17000 DESTRUCT PREMALG LESION: CPT | Mod: S$GLB,,, | Performed by: DERMATOLOGY

## 2023-04-20 PROCEDURE — 99999 PR PBB SHADOW E&M-EST. PATIENT-LVL III: ICD-10-PCS | Mod: PBBFAC,,, | Performed by: DERMATOLOGY

## 2023-04-20 PROCEDURE — 99999 PR PBB SHADOW E&M-EST. PATIENT-LVL III: CPT | Mod: PBBFAC,,, | Performed by: DERMATOLOGY

## 2023-04-20 PROCEDURE — 99213 PR OFFICE/OUTPT VISIT, EST, LEVL III, 20-29 MIN: ICD-10-PCS | Mod: 25,S$GLB,, | Performed by: DERMATOLOGY

## 2023-04-20 PROCEDURE — 1160F PR REVIEW ALL MEDS BY PRESCRIBER/CLIN PHARMACIST DOCUMENTED: ICD-10-PCS | Mod: CPTII,S$GLB,, | Performed by: DERMATOLOGY

## 2023-04-20 PROCEDURE — 3044F PR MOST RECENT HEMOGLOBIN A1C LEVEL <7.0%: ICD-10-PCS | Mod: CPTII,S$GLB,, | Performed by: DERMATOLOGY

## 2023-04-20 PROCEDURE — 1159F PR MEDICATION LIST DOCUMENTED IN MEDICAL RECORD: ICD-10-PCS | Mod: CPTII,S$GLB,, | Performed by: DERMATOLOGY

## 2023-04-20 PROCEDURE — 17000 PR DESTRUCTION(LASER SURGERY,CRYOSURGERY,CHEMOSURGERY),PREMALIGNANT LESIONS,FIRST LESION: ICD-10-PCS | Mod: S$GLB,,, | Performed by: DERMATOLOGY

## 2023-04-20 PROCEDURE — 3044F HG A1C LEVEL LT 7.0%: CPT | Mod: CPTII,S$GLB,, | Performed by: DERMATOLOGY

## 2023-04-20 NOTE — PROGRESS NOTES
Subjective:      Patient ID:  Adam Dalal is a 62 y.o. male who presents for   Chief Complaint   Patient presents with    Skin Check     TBSE    Lesion     Left cheek    Follow-up     Efudex, fluorouracil, and calcipotriene     Date of biopsy: 6/29/22 ( MM in situ )  Location: right lateral infraorbital  Date of excision: 8/1/22    Pt has a history of  moderate sun exposure in the past.   Pt recalls several blistering sunburns in the past:  yes  Pt has history of tanning bed use: no  Pt has had atypical moles removed in the past: no  Pt has personal history of breast cancer: no  Pt has history of melanoma in first degree relatives:  no  Pt has family history of pancreatic cancer: no  Pt is currently immunosuppressed: no    Burton Type: I    Last dental exam: 2/2022  Last eye exam: 2021    Pt's c/o lesion on left cheek - noticed 1 year ago, raised, no prev tx  Pt was last seen on 10/24/2022 for skin check - pt used efu/fluorouracil/calcipotriene on forehead bid for 7 days.       Review of Systems   Constitutional:  Negative for fever, chills, weight loss, fatigue, night sweats and malaise.   HENT:  Negative for headaches.    Respiratory:  Negative for cough and shortness of breath.    Gastrointestinal:  Negative for indigestion.   Skin:  Positive for daily sunscreen use and activity-related sunscreen use. Negative for recent sunburn and wears hat.   Neurological:  Negative for headaches.   Hematologic/Lymphatic: Negative for adenopathy. Does not bruise/bleed easily.     Objective:   Physical Exam   Constitutional: He appears well-developed and well-nourished. No distress.   HENT:   Mouth/Throat: Gums normal.  Lips normal.  Normal dentition.   Eyes: Lids are normal.  No conjunctival no injection.   Musculoskeletal: Lymphadenopathy:      Cervical: No cervical adenopathy.      Upper Body:   No axillary adenopathy present.No supraclavicular adenopathy is present.      Lower Body:   No inguinal  adenopathy.    Lymphadenopathy: No supraclavicular adenopathy is present.     He has no cervical adenopathy.     He has no axillary adenopathy.     He has no inguinal adenopathy.   Neurological: He is alert and oriented to person, place, and time. He is not disoriented.   Psychiatric: He has a normal mood and affect.   Skin:   Areas Examined (abnormalities noted in diagram):   Scalp / Hair Palpated and Inspected  Head / Face Inspection Performed  Neck Inspection Performed  Chest / Axilla Inspection Performed  Abdomen Inspection Performed  Genitals / Buttocks / Groin Inspection Performed  Back Inspection Performed  RUE Inspected  LUE Inspection Performed  RLE Inspected  LLE Inspection Performed  Nails and Digits Inspection Performed               Diagram Legend     Erythematous scaling macule/papule c/w actinic keratosis       Vascular papule c/w angioma      Pigmented verrucoid papule/plaque c/w seborrheic keratosis      Yellow umbilicated papule c/w sebaceous hyperplasia      Irregularly shaped tan macule c/w lentigo     1-2 mm smooth white papules consistent with Milia      Movable subcutaneous cyst with punctum c/w epidermal inclusion cyst      Subcutaneous movable cyst c/w pilar cyst      Firm pink to brown papule c/w dermatofibroma      Pedunculated fleshy papule(s) c/w skin tag(s)      Evenly pigmented macule c/w junctional nevus     Mildly variegated pigmented, slightly irregular-bordered macule c/w mildly atypical nevus      Flesh colored to evenly pigmented papule c/w intradermal nevus       Pink pearly papule/plaque c/w basal cell carcinoma      Erythematous hyperkeratotic cursted plaque c/w SCC      Surgical scar with no sign of skin cancer recurrence      Open and closed comedones      Inflammatory papules and pustules      Verrucoid papule consistent consistent with wart     Erythematous eczematous patches and plaques     Dystrophic onycholytic nail with subungual debris c/w onychomycosis      Umbilicated papule    Erythematous-base heme-crusted tan verrucoid plaque consistent with inflamed seborrheic keratosis     Erythematous Silvery Scaling Plaque c/w Psoriasis     See annotation      Assessment / Plan:        Lentigo  This is a benign hyperpigmented sun induced lesion. Recommend daily sun protection/avoidance and use of at least SPF 30, broad spectrum sunscreen (OTC drug) will reduce the number of new lesions. Treatment of these benign lesions are considered cosmetic.  The nature of sun-induced photo-aging and skin cancers is discussed.  Sun avoidance, protective clothing, and the use of 30-SPF sunscreens is advised. Observe closely for skin damage/changes, and call if such occurs.    Cherry angioma  These are benign vascular lesions that are inherited.  Treatment is not necessary.    Nevus  Discussed ABCDE's of nevi.  Monitor for new mole or moles that are becoming bigger, darker, irritated, or developing irregular borders. Brochure provided. Instructed patient to observe lesion(s) for changes and follow up in clinic if changes are noted. Patient to monitor skin at home for new or changing lesions.     SK (seborrheic keratosis)  These are benign inherited growths without a malignant potential. Reassurance given to patient. No treatment is necessary.     History of melanoma in situ- R cheek  Area of previous melanoma in situ  examined. Site well healed with no signs of recurrence.    Total body skin examination performed today including at least 12 points as noted in physical examination. No lesions suspicious for malignancy noted.    Recommend daily sun protection/avoidance, use of at least SPF 30, broad spectrum sunscreen (OTC drug), skin self examinations, and routine physician surveillance to optimize early detection    AK (actinic keratosis)  Cryosurgery Procedure Note    Verbal consent from the patient is obtained including, but not limited to, risk of hypopigmentation/hyperpigmentation, scar,  recurrence of lesion. The patient is aware of the precancerous quality and need for treatment of these lesions. Liquid nitrogen cryosurgery is applied to the 1 actinic keratoses, as detailed in the physical exam, to produce a freeze injury. The patient is aware that blisters may form and is instructed on wound care with gentle cleansing and use of vaseline ointment to keep moist until healed. The patient is supplied a handout on cryosurgery and is instructed to call if lesions do not completely resolve.            Follow up in about 6 months (around 10/20/2023) for TBSE.

## 2023-04-20 NOTE — PATIENT INSTRUCTIONS
We would like to see you back in the clinic in 6 months.  You will be able to schedule this appointment by calling or by using your [facility] portal 3 months before this time. Because our schedule fills so quickly, please set a reminder in your phone or on your calendar to schedule 3 months before you are due to come in so that we can see you in a timely fashion.  You should also receive a reminder from us in the mail. This will help us ensure we can continue to provide excellent healthcare for you. Thank you.

## 2023-04-21 ENCOUNTER — PATIENT MESSAGE (OUTPATIENT)
Dept: ADMINISTRATIVE | Facility: HOSPITAL | Age: 63
End: 2023-04-21
Payer: COMMERCIAL

## 2023-04-23 LAB — NONINV COLON CA DNA+OCC BLD SCRN STL QL: NORMAL

## 2023-04-24 ENCOUNTER — PATIENT MESSAGE (OUTPATIENT)
Dept: ORTHOPEDICS | Facility: CLINIC | Age: 63
End: 2023-04-24
Payer: COMMERCIAL

## 2023-04-26 ENCOUNTER — PATIENT MESSAGE (OUTPATIENT)
Dept: INTERNAL MEDICINE | Facility: CLINIC | Age: 63
End: 2023-04-26
Payer: COMMERCIAL

## 2023-04-26 DIAGNOSIS — Z12.11 ENCOUNTER FOR COLORECTAL CANCER SCREENING: ICD-10-CM

## 2023-04-26 DIAGNOSIS — Z12.12 ENCOUNTER FOR COLORECTAL CANCER SCREENING: ICD-10-CM

## 2023-04-26 DIAGNOSIS — Z12.11 COLON CANCER SCREENING: Primary | ICD-10-CM

## 2023-04-27 ENCOUNTER — PATIENT MESSAGE (OUTPATIENT)
Dept: INTERNAL MEDICINE | Facility: CLINIC | Age: 63
End: 2023-04-27
Payer: COMMERCIAL

## 2023-05-15 ENCOUNTER — PATIENT MESSAGE (OUTPATIENT)
Dept: INTERNAL MEDICINE | Facility: CLINIC | Age: 63
End: 2023-05-15
Payer: COMMERCIAL

## 2023-05-15 DIAGNOSIS — Z12.11 COLON CANCER SCREENING: ICD-10-CM

## 2023-05-15 DIAGNOSIS — R19.5 POSITIVE COLORECTAL CANCER SCREENING USING COLOGUARD TEST: Primary | ICD-10-CM

## 2023-05-15 LAB — NONINV COLON CA DNA+OCC BLD SCRN STL QL: POSITIVE

## 2023-05-15 NOTE — TELEPHONE ENCOUNTER
Yes, needs a C-scope. I placed the order. Let's make sure he does not go in the 3-4 months call group. This needs to go in the more urgent, get a call in a week or two group .

## 2023-05-16 ENCOUNTER — TELEPHONE (OUTPATIENT)
Dept: INTERNAL MEDICINE | Facility: CLINIC | Age: 63
End: 2023-05-16
Payer: COMMERCIAL

## 2023-05-25 ENCOUNTER — TELEPHONE (OUTPATIENT)
Dept: ENDOSCOPY | Facility: HOSPITAL | Age: 63
End: 2023-05-25
Payer: COMMERCIAL

## 2023-05-25 ENCOUNTER — TELEPHONE (OUTPATIENT)
Dept: INTERNAL MEDICINE | Facility: CLINIC | Age: 63
End: 2023-05-25
Payer: COMMERCIAL

## 2023-05-25 VITALS — WEIGHT: 225 LBS | BODY MASS INDEX: 29.82 KG/M2 | HEIGHT: 73 IN

## 2023-05-25 DIAGNOSIS — Z12.11 COLON CANCER SCREENING: ICD-10-CM

## 2023-05-25 DIAGNOSIS — Z12.11 COLON CANCER SCREENING: Primary | ICD-10-CM

## 2023-05-25 DIAGNOSIS — R19.5 POSITIVE COLORECTAL CANCER SCREENING USING COLOGUARD TEST: Primary | ICD-10-CM

## 2023-05-25 NOTE — TELEPHONE ENCOUNTER
----- Message from Linsey Rojas sent at 5/22/2023  8:43 AM CDT -----  Regarding: Scheduling Endoscopy Call  Still trying to get in touch with the department to get him in early.  Left a message again this am.

## 2023-05-25 NOTE — TELEPHONE ENCOUNTER
Ma spoke with pt Colonoscopy scheduled for 6/9/2023 @245pm  Monique prep sent to pt pharmacy   Prep instructions sent to pt via portal

## 2023-05-26 ENCOUNTER — TELEPHONE (OUTPATIENT)
Dept: ENDOSCOPY | Facility: HOSPITAL | Age: 63
End: 2023-05-26
Payer: COMMERCIAL

## 2023-05-26 ENCOUNTER — PATIENT MESSAGE (OUTPATIENT)
Dept: ENDOSCOPY | Facility: HOSPITAL | Age: 63
End: 2023-05-26
Payer: COMMERCIAL

## 2023-06-16 VITALS — HEIGHT: 73 IN | WEIGHT: 220 LBS | BODY MASS INDEX: 29.16 KG/M2

## 2023-06-16 DIAGNOSIS — R19.5 POSITIVE COLORECTAL CANCER SCREENING USING COLOGUARD TEST: Primary | ICD-10-CM

## 2023-06-19 ENCOUNTER — OFFICE VISIT (OUTPATIENT)
Dept: NEUROLOGY | Facility: CLINIC | Age: 63
End: 2023-06-19
Payer: COMMERCIAL

## 2023-06-19 VITALS
BODY MASS INDEX: 30.13 KG/M2 | HEART RATE: 75 BPM | SYSTOLIC BLOOD PRESSURE: 122 MMHG | HEIGHT: 73 IN | WEIGHT: 227.31 LBS | DIASTOLIC BLOOD PRESSURE: 75 MMHG

## 2023-06-19 DIAGNOSIS — G47.33 OBSTRUCTIVE SLEEP APNEA: ICD-10-CM

## 2023-06-19 DIAGNOSIS — F06.70 MILD NEUROCOGNITIVE DISORDER DUE TO MULTIPLE ETIOLOGIES: ICD-10-CM

## 2023-06-19 DIAGNOSIS — G20.A1 PARKINSON DISEASE: Primary | ICD-10-CM

## 2023-06-19 DIAGNOSIS — Z71.89 COUNSELING REGARDING GOALS OF CARE: ICD-10-CM

## 2023-06-19 PROCEDURE — 3008F BODY MASS INDEX DOCD: CPT | Mod: CPTII,S$GLB,, | Performed by: PSYCHIATRY & NEUROLOGY

## 2023-06-19 PROCEDURE — 3078F DIAST BP <80 MM HG: CPT | Mod: CPTII,S$GLB,, | Performed by: PSYCHIATRY & NEUROLOGY

## 2023-06-19 PROCEDURE — 99215 OFFICE O/P EST HI 40 MIN: CPT | Mod: S$GLB,,, | Performed by: PSYCHIATRY & NEUROLOGY

## 2023-06-19 PROCEDURE — 3074F SYST BP LT 130 MM HG: CPT | Mod: CPTII,S$GLB,, | Performed by: PSYCHIATRY & NEUROLOGY

## 2023-06-19 PROCEDURE — 99999 PR PBB SHADOW E&M-EST. PATIENT-LVL IV: CPT | Mod: PBBFAC,,, | Performed by: PSYCHIATRY & NEUROLOGY

## 2023-06-19 NOTE — PROGRESS NOTES
"Name: Adam Dalal  MRN: [id number]   CSN: [id number]      Date: 06/19/2023    Chief Complaint / Interval History:   - increased rytary to 4 caps TID last visit   - overall he has improved   - accompanied by spouse   - shuffles more in the morning   - 5:30 am, 11 am and 5 pm   - no falls    - sleeping is okay, MM helps him fall asleep but not stay asleep    - saw Dr. Meyer, rec'd sleep study  - a few months back strained his back at work, went to back doctor   - did PT, this was helpful   - does exercises at home   - happy with current regimen   - wants to hear more about DBS           From Jan 2022  - doing pretty well   - a little more depression   - on rytary now, doing well   - some toe curling, worse when stressed   - still taking rasagiline   - one day he forgot to take rasagiline and it really felt it   - no falls  - denies constipation or hallucinations   - still working   - appetite is good, sleep is okay -- some nocturia   - not exercising, wants to get into it with the new year        From Sept 2021  - last seen April 2020   - taking cd/ld qid, takes 1 at 6, 1/2 at 8, 1 noon and 1 at 5p.  Also taking   - taking welcana at night  - interested in taking longer acting  - had some off dystonia while driving to [place] and "needing more dopamine" - but also describing more foot dystonia, not clear if on or off  - looking for more research options down the line  - still planning on working for 5-6 years, wonders how he will do  - sleeping ok, appetite, ok, some weight gain        From April 2020  - had great trip to [place]  - had a stumble/slip while on vacation  - still taking 1 cd/ld tid now  -  Misses periodically daytime dosing  - some more exercising at home, going to the park  - missing a little bit of the big and loud  - mood and memory are pretty good  - balance is holding well  - more cautious, appropriate          From 2019  - last seen 4 months ago  - good vacation in [place]  - no " "falls!  - still taking Azilect and cd/ld - sometimes an extra dose at night - almost never more than total of three tabs per day  - has not tried taking more  - feeling more normal  - would consider higher dose medication  - would consdier MM  - interested in research trials      History of Present Illness (HPI):  57 yo here for evaluation for his presumed PD.  Progressed with slowness and stiffness about 4 years ago.  Tried to blame on aging at the time.  Generally was slower getting out of couches.  Was shuffling and unable to keep up.  Would drag the R foot.  BEhind and above the R knee tends to get stiffer.  Now more progressive slowness and trouble with dressing.  Still driving a car, more issues with backing up and trouble seeing around while driving.      Wife Berenice is here.  She notes that he "feels like he is failing    Works at the [employer] on [employer].    Nonmotor/Premotor ROS:  Hyposmia (HENT)?Yes - a little diminished  RBD/sleep issues (Constitutional)?No, used to snore more   Depression/anxiety (Psychiatric)?Yes - especially at night  Fatigue (Constitutional)?Yes  Constipation (GI)?No  Urinary issues ()?Yes - urgency  Sexual dysfunction ()?Yes - mild ED  Orthostasis (Cardiovascular)?No  Leg swelling (Cardiovascular)? No  Falls (Musculoskeletal)?No but feels imbalanced  Cognitive impairment (Neurologic)?No  Psychoses (Psychiatric)?No  Pain/Paresthesia (Neurologic)?No  Visual changes (Eyes)?No  Moles / skin changes (Skin)?No  Stridor / SOB (Pulm)?N/A  Bruising (Heme)?No    Past Medical History: The patient  has a past medical history of Anxiety, Esotropia of right eye (05/02/2013), Hearing deficit, bilateral, High cholesterol, Hyperlipidemia, Malignant melanoma of skin, unspecified (06/29/2022), Melanoma (06/29/2022), Melanoma in situ of cheek, Parkinson's disease, and Ringing in ear, bilateral.    Social History: The patient  reports that he quit smoking about 17 years ago. His smoking use " "included cigarettes. He has a 1.00 pack-year smoking history. He has never used smokeless tobacco. He reports current alcohol use of about 20.0 standard drinks per week. He reports that he does not use drugs.    Family History: Their family history includes Arthritis in his brother and mother; Atrial fibrillation in his sister; Cataracts in his maternal grandmother and paternal grandmother; Hearing loss in his mother; Heart attack in his father; Sleep apnea in his brother.    Allergies: Patient has no known allergies.     Meds:   Current Outpatient Medications on File Prior to Visit   Medication Sig Dispense Refill    ALPRAZolam (XANAX) 0.5 MG tablet TAKE 1 TABLET(0.5 MG) BY MOUTH EVERY NIGHT FOR INSOMNIA OR ANXIETY 30 tablet 5    carbidopa-levodopa (RYTARY) 23.75-95 mg CpSR Take 4 capsules by mouth 3 (three) times daily. 360 capsule 11    diclofenac (VOLTAREN) 75 MG EC tablet Take 1 tablet (75 mg total) by mouth 2 (two) times daily as needed (pain). 60 tablet 0    fluorouraciL (EFUDEX) 5 % cream Mix with calcipotriene 0.005% cream  and Apply thin film to upper forehead  2 times per day for 7 days; d/c if area bleeding or ulcerated; avoid eyes or mouth 40 g 1    polyethylene glycol (COLYTE) 240-22.72-6.72 -5.84 gram SolR Take 4,000 mLs by mouth once. Take as directed by provider office for 1 dose 4000 mL 0    pravastatin (PRAVACHOL) 80 MG tablet TAKE 1 TABLET(80 MG) BY MOUTH EVERY MORNING 90 tablet 3    rasagiline (AZILECT) 1 mg Tab Take 1 tablet (1 mg total) by mouth once daily. 30 tablet 11    [DISCONTINUED] carbidopa-levodopa (RYTARY) 23.75-95 mg CpSR Take 3 capsules by mouth 3 (three) times daily. 270 capsule 5     No current facility-administered medications on file prior to visit.       Exam:  /75 (BP Location: Left arm, Patient Position: Sitting, BP Method: Medium (Automatic))   Pulse 75   Ht 6' 1" (1.854 m)   Wt 103.1 kg (227 lb 4.7 oz)   BMI 29.99 kg/m²     Constitutional  Well-developed, " well-nourished, appears stated age   Ophthalmoscopic  No papilledema with no hemorrhages or exudates bilaterally   Cardiovascular  Radial pulses 2+ and symmetric, no LE edema bilaterally   Neurological    * Mental status  MOCA =      - Orientation  Oriented to person, place, time, and situation     - Memory   Intact recent and remote     - Attention/concentration  Attentive, vigilant during exam     - Language  Naming & repetition intact, +2-step commands     - Fund of knowledge  Aware of current events     - Executive  Well-organized thoughts     - Other     * Cranial nerves       - CN II  PERRL, visual fields full to confrontation     - CN III, IV, VI  Extraocular movements full but some drift of the R eye out (history of strabismus eye surgery), normal pursuits and saccades     - CN V  Sensation V1 - V3 intact     - CN VII  Face strong and symmetric bilaterally     - CN VIII  Hearing intact bilaterally     - CN IX, X  Palate raises midline and symmetric     - CN XI  SCM and trapezius 5/5 bilaterally     - CN XII  Tongue midline   * Motor  Muscle bulk normal, strength 5/5 throughout   * Sensory   Intact to temperature and vibration throughout   * Coordination  No dysmetria with finger-to-nose or heel-to-shin   * Gait  See below.   * Deep tendon reflexes  2+ and symmetric throughout   Babinski downgoing bilaterally   * Specialized movement exam    R>L mild-mod symptoms, akinetic rigid     Medical Record Review:  - Lab Results:  Lab Visit on 04/04/2023   Component Date Value Ref Range Status    Specimen UA 04/04/2023 Urine, Unspecified   Final    Color, UA 04/04/2023 Yellow  Yellow, Straw, Bria Final    Appearance, UA 04/04/2023 Clear  Clear Final    pH, UA 04/04/2023 7.0  5.0 - 8.0 Final    Specific Gravity, UA 04/04/2023 1.025  1.005 - 1.030 Final    Protein, UA 04/04/2023 Negative  Negative Final    Glucose, UA 04/04/2023 Negative  Negative Final    Ketones, UA 04/04/2023 Trace (A)  Negative Final    Bilirubin  (UA) 2023 Negative  Negative Final    Occult Blood UA 2023 Negative  Negative Final    Nitrite, UA 2023 Negative  Negative Final    Leukocytes, UA 2023 Negative  Negative Final   Lab Visit on 2023   Component Date Value Ref Range Status    Cholesterol 2023 183  120 - 199 mg/dL Final    Triglycerides 2023 100  30 - 150 mg/dL Final    HDL 2023 52  40 - 75 mg/dL Final    LDL Cholesterol 2023 111.0  63.0 - 159.0 mg/dL Final    HDL/Cholesterol Ratio 2023 28.4  20.0 - 50.0 % Final    Total Cholesterol/HDL Ratio 2023 3.5  2.0 - 5.0 Final    Non-HDL Cholesterol 2023 131  mg/dL Final    PSA, Screen 2023 0.27  0.00 - 4.00 ng/mL Final    Hemoglobin A1C 2023 5.8 (H)  4.0 - 5.6 % Final    Estimated Avg Glucose 2023 120  68 - 131 mg/dL Final    Sodium 2023 139  136 - 145 mmol/L Final    Potassium 2023 4.4  3.5 - 5.1 mmol/L Final    Chloride 2023 102  95 - 110 mmol/L Final    CO2 2023 29  23 - 29 mmol/L Final    Glucose 2023 109  70 - 110 mg/dL Final    BUN 2023 17  6 - 20 mg/dL Final    Creatinine 2023 1.0  0.5 - 1.4 mg/dL Final    Calcium 2023 9.2  8.7 - 10.5 mg/dL Final    Anion Gap 2023 8  8 - 16 mmol/L Final    eGFR 2023 >60.0  >60 mL/min/1.73 m^2 Final   Office Visit on 2023   Component Date Value Ref Range Status    Cologuard Result 2023 Sample Could Not Be Processed 7  N/A Final    Cologuard Result 2023 Positive (A)  Negative Final          Diagnoses:                                                                                    PD, akinetic-rigid.  Eye movements are abnml from NM perspective - old strabismus - but watching closely.  NO clear slowed pursuits or saccades, but some intrusive jerks - might all still be from longstanding changes/surgeries.     Medical Decision Makin) continue rytary to 4 caps TID   2) continue azilect 1 mg  3) MM is  helping with sleep   4) PT/OT   5) MEdi diet  6) exercise as able -- for both mood and movement, hold off on addition of SSRI for now   7) discussed DBS   8) referral to sleep disorders for sleep study           Tyree Babcock MD, MPH  Division of Movement and Memory Disorders  [facility]

## 2023-06-22 ENCOUNTER — PATIENT MESSAGE (OUTPATIENT)
Dept: NEUROLOGY | Facility: CLINIC | Age: 63
End: 2023-06-22
Payer: COMMERCIAL

## 2023-06-26 ENCOUNTER — PATIENT MESSAGE (OUTPATIENT)
Dept: ENDOSCOPY | Facility: HOSPITAL | Age: 63
End: 2023-06-26
Payer: COMMERCIAL

## 2023-07-03 ENCOUNTER — PATIENT MESSAGE (OUTPATIENT)
Dept: NEUROLOGY | Facility: CLINIC | Age: 63
End: 2023-07-03
Payer: COMMERCIAL

## 2023-07-06 ENCOUNTER — ANESTHESIA EVENT (OUTPATIENT)
Dept: ENDOSCOPY | Facility: HOSPITAL | Age: 63
End: 2023-07-06
Payer: COMMERCIAL

## 2023-07-06 NOTE — ANESTHESIA PREPROCEDURE EVALUATION
07/06/2023  Adam Dalal is a 62 y.o., male.      Pre-op Assessment    I have reviewed the Patient Summary Reports.    I have reviewed the NPO Status.   I have reviewed the Medications.     Review of Systems  Anesthesia Hx:  No problems with previous Anesthesia  Denies Family Hx of Anesthesia complications.   Denies Personal Hx of Anesthesia complications.   Social:  Former Smoker    Hematology/Oncology:  Hematology Normal   Oncology Normal     EENT/Dental:EENT/Dental Normal   Cardiovascular:   hyperlipidemia    Pulmonary:   Sleep Apnea    Renal/:  Renal/ Normal     Hepatic/GI:  Hepatic/GI Normal    Musculoskeletal:  Musculoskeletal Normal    Neurological:  Neurology Normal Parkinson's disease   Endocrine:  Endocrine Normal    Dermatological:   Moh's defect right cheek   Psych:   anxiety             Anesthesia Plan  Type of Anesthesia, risks & benefits discussed:    Anesthesia Type: Gen Natural Airway  Intra-op Monitoring Plan: Standard ASA Monitors  Induction:  IV  Informed Consent: Informed consent signed with the Patient and all parties understand the risks and agree with anesthesia plan.  All questions answered.   ASA Score: 2  Day of Surgery Review of History & Physical: H&P Update referred to the surgeon/provider.    Ready For Surgery From Anesthesia Perspective.     .  Patient Active Problem List   Diagnosis    Hyperlipidemia    Obstructive sleep apnea    Esotropia of right eye    Ocular hypertension - Both Eyes    Nuclear sclerosis    Nuclear sclerotic cataract of left eye    Parkinson disease    Hypophonia    Voice and resonance disorder    Dysarthria    Impaired functional mobility, balance, gait, and endurance    Impaired motor control    Alcohol use disorder, mild, abuse    Oral lesion    Mohs defect of right cheek    History of melanoma in situ    Mild neurocognitive  disorder due to multiple etiologies        Patient Active Problem List   Diagnosis    Hyperlipidemia    Obstructive sleep apnea    Esotropia of right eye    Ocular hypertension - Both Eyes    Nuclear sclerosis    Nuclear sclerotic cataract of left eye    Parkinson disease    Hypophonia    Voice and resonance disorder    Dysarthria    Impaired functional mobility, balance, gait, and endurance    Impaired motor control    Alcohol use disorder, mild, abuse    Oral lesion    Mohs defect of right cheek    History of melanoma in situ    Mild neurocognitive disorder due to multiple etiologies       Past Medical History:   Diagnosis Date    Anxiety     Esotropia of right eye 05/02/2013    Hearing deficit, bilateral     High cholesterol     Hyperlipidemia     Malignant melanoma of skin, unspecified 06/29/2022    in situ right lateral infraorbit    Melanoma 06/29/2022    in situ - right lateral infraorbital    Melanoma in situ of cheek     Parkinson's disease     Ringing in ear, bilateral        ECHO: No results found for this or any previous visit.      Body mass index is 29.03 kg/m².    Tobacco Use: Medium Risk    Smoking Tobacco Use: Former    Smokeless Tobacco Use: Never    Passive Exposure: Not on file       Social History     Substance and Sexual Activity   Drug Use No        Alcohol Use: Not on file       Review of patient's allergies indicates:  No Known Allergies      Airway:  No value filed.   Past Medical History:   Diagnosis Date    Anxiety     Esotropia of right eye 05/02/2013    Hearing deficit, bilateral     High cholesterol     Hyperlipidemia     Malignant melanoma of skin, unspecified 06/29/2022    in situ right lateral infraorbit    Melanoma 06/29/2022    in situ - right lateral infraorbital    Melanoma in situ of cheek     Parkinson's disease     Ringing in ear, bilateral      Past Surgical History:   Procedure Laterality Date    CATARACT EXTRACTION W/  INTRAOCULAR  LENS IMPLANT Right 01/09/2017    Dr marin     CATARACT EXTRACTION W/  INTRAOCULAR LENS IMPLANT Left 01/30/2017    Dr. Marin    HERNIA REPAIR      RECONSTRUCTION OF FACE Right 8/4/2022    Procedure: RECONSTRUCTION, FACE;  Surgeon: Yaneli Hickman MD;  Location: [facility] OR [address] [place] [address];  Service: ENT;  Laterality: Right;    STRABISMUS SURGERY  4yrs    right eye

## 2023-07-07 ENCOUNTER — ANESTHESIA (OUTPATIENT)
Dept: ENDOSCOPY | Facility: HOSPITAL | Age: 63
End: 2023-07-07
Payer: COMMERCIAL

## 2023-07-07 ENCOUNTER — HOSPITAL ENCOUNTER (OUTPATIENT)
Facility: HOSPITAL | Age: 63
Discharge: HOME | End: 2023-07-07
Attending: INTERNAL MEDICINE | Admitting: INTERNAL MEDICINE
Payer: COMMERCIAL

## 2023-07-07 VITALS
WEIGHT: 225 LBS | SYSTOLIC BLOOD PRESSURE: 122 MMHG | RESPIRATION RATE: 16 BRPM | TEMPERATURE: 98 F | DIASTOLIC BLOOD PRESSURE: 73 MMHG | BODY MASS INDEX: 29.82 KG/M2 | OXYGEN SATURATION: 97 % | HEART RATE: 64 BPM | HEIGHT: 73 IN

## 2023-07-07 DIAGNOSIS — Z12.11 COLON CANCER SCREENING: Primary | ICD-10-CM

## 2023-07-07 DIAGNOSIS — Z12.11 SCREENING FOR COLON CANCER: ICD-10-CM

## 2023-07-07 PROCEDURE — 45385 PR COLONOSCOPY,REMV LESN,SNARE: ICD-10-PCS | Mod: 33,,, | Performed by: INTERNAL MEDICINE

## 2023-07-07 PROCEDURE — 37000009 HC ANESTHESIA EA ADD 15 MINS: Performed by: INTERNAL MEDICINE

## 2023-07-07 PROCEDURE — 88305 TISSUE EXAM BY PATHOLOGIST: CPT | Mod: 26,,, | Performed by: PATHOLOGY

## 2023-07-07 PROCEDURE — 88305 TISSUE EXAM BY PATHOLOGIST: CPT | Performed by: PATHOLOGY

## 2023-07-07 PROCEDURE — D9220A PRA ANESTHESIA: ICD-10-PCS | Mod: 33,,, | Performed by: NURSE ANESTHETIST, CERTIFIED REGISTERED

## 2023-07-07 PROCEDURE — 45385 COLONOSCOPY W/LESION REMOVAL: CPT | Mod: 33,,, | Performed by: INTERNAL MEDICINE

## 2023-07-07 PROCEDURE — 27202298: Performed by: INTERNAL MEDICINE

## 2023-07-07 PROCEDURE — 25000003 PHARM REV CODE 250: Performed by: ANESTHESIOLOGY

## 2023-07-07 PROCEDURE — 88305 TISSUE EXAM BY PATHOLOGIST: ICD-10-PCS | Mod: 26,,, | Performed by: PATHOLOGY

## 2023-07-07 PROCEDURE — 45385 COLONOSCOPY W/LESION REMOVAL: CPT | Mod: PT | Performed by: INTERNAL MEDICINE

## 2023-07-07 PROCEDURE — D9220A PRA ANESTHESIA: Mod: 33,,, | Performed by: NURSE ANESTHETIST, CERTIFIED REGISTERED

## 2023-07-07 PROCEDURE — 99900035 HC TECH TIME PER 15 MIN (STAT)

## 2023-07-07 PROCEDURE — 27201089 HC SNARE, DISP (ANY): Performed by: INTERNAL MEDICINE

## 2023-07-07 PROCEDURE — 37000008 HC ANESTHESIA 1ST 15 MINUTES: Performed by: INTERNAL MEDICINE

## 2023-07-07 RX ORDER — SODIUM CHLORIDE 9 MG/ML
INJECTION, SOLUTION INTRAVENOUS CONTINUOUS
Status: DISCONTINUED | OUTPATIENT
Start: 2023-07-07 | End: 2023-07-07 | Stop reason: HOSPADM

## 2023-07-07 RX ORDER — LIDOCAINE HYDROCHLORIDE 20 MG/ML
INJECTION, SOLUTION EPIDURAL; INFILTRATION; INTRACAUDAL; PERINEURAL
Status: DISCONTINUED | OUTPATIENT
Start: 2023-07-07 | End: 2023-07-07

## 2023-07-07 RX ORDER — PROPOFOL 10 MG/ML
VIAL (ML) INTRAVENOUS
Status: DISCONTINUED | OUTPATIENT
Start: 2023-07-07 | End: 2023-07-07

## 2023-07-07 RX ADMIN — Medication 80 MG: at 10:07

## 2023-07-07 RX ADMIN — Medication 175 MCG/KG/MIN: at 10:07

## 2023-07-07 RX ADMIN — GLYCOPYRROLATE 0.2 MG: 0.2 INJECTION, SOLUTION INTRAMUSCULAR; INTRAVENOUS at 10:07

## 2023-07-07 RX ADMIN — LIDOCAINE HYDROCHLORIDE 100 MG: 20 INJECTION, SOLUTION EPIDURAL; INFILTRATION; INTRACAUDAL; PERINEURAL at 10:07

## 2023-07-07 RX ADMIN — SODIUM CHLORIDE: 9 INJECTION, SOLUTION INTRAVENOUS at 10:07

## 2023-07-07 NOTE — H&P
Short Stay Endoscopy History and Physical    PCP - Rashi Gee MD    Procedure - Colonoscopy  Sedation: GA  ASA - per anesthesia  Mallampati - per anesthesia  History of Anesthesia problems - no  Family history Anesthesia problems -  no     HPI:  This is a 62 y.o. male here for evaluation of : Positive Cologuard test    Reflux - no  Dysphagia - no  Abdominal pain - no  Diarrhea - no    ROS:  Constitutional: No fevers, chills, No weight loss  ENT: No allergies  CV: No chest pain  Pulm: No cough, No shortness of breath  Ophtho: No vision changes  GI: see HPI  Medical History:  has a past medical history of Anxiety, Esotropia of right eye (05/02/2013), Hearing deficit, bilateral, High cholesterol, Hyperlipidemia, Malignant melanoma of skin, unspecified (06/29/2022), Melanoma (06/29/2022), Melanoma in situ of cheek, Parkinson's disease, and Ringing in ear, bilateral.    Surgical History:  has a past surgical history that includes Hernia repair; Strabismus surgery (4yrs); Cataract extraction w/  intraocular lens implant (Right, 01/09/2017); Cataract extraction w/  intraocular lens implant (Left, 01/30/2017); and Reconstruction of face (Right, 8/4/2022).    Family History: family history includes Arthritis in his brother and mother; Atrial fibrillation in his sister; Cataracts in his maternal grandmother and paternal grandmother; Hearing loss in his mother; Heart attack in his father; Sleep apnea in his brother.. Otherwise no colon cancer, inflammatory bowel disease, or GI malignancies.    Social History:  reports that he quit smoking about 17 years ago. His smoking use included cigarettes. He has a 1.00 pack-year smoking history. He has never used smokeless tobacco. He reports current alcohol use of about 20.0 standard drinks per week. He reports that he does not use drugs.    Review of patient's allergies indicates:  No Known Allergies    Medications:   Medications Prior to Admission   Medication Sig Dispense  Refill Last Dose    ALPRAZolam (XANAX) 0.5 MG tablet TAKE 1 TABLET(0.5 MG) BY MOUTH EVERY NIGHT FOR INSOMNIA OR ANXIETY 30 tablet 5     carbidopa-levodopa (RYTARY) 23.75-95 mg CpSR Take 4 capsules by mouth 3 (three) times daily. 360 capsule 11     diclofenac (VOLTAREN) 75 MG EC tablet Take 1 tablet (75 mg total) by mouth 2 (two) times daily as needed (pain). 60 tablet 0     fluorouraciL (EFUDEX) 5 % cream Mix with calcipotriene 0.005% cream  and Apply thin film to upper forehead  2 times per day for 7 days; d/c if area bleeding or ulcerated; avoid eyes or mouth 40 g 1     pravastatin (PRAVACHOL) 80 MG tablet TAKE 1 TABLET(80 MG) BY MOUTH EVERY MORNING 90 tablet 3     rasagiline (AZILECT) 1 mg Tab Take 1 tablet (1 mg total) by mouth once daily. 30 tablet 11        Objective Findings:    Vital Signs: Per nursing notes.    Physical Exam:  General Appearance: Well appearing in no acute distress  Head:   Normocephalic, without obvious abnormality  Eyes:    No scleral icterus  Airway: Open  Neck: No restriction in mobility  Lungs: CTA bilaterally in anterior and posterior fields, no wheezes, no crackles.  Heart:  Regular rate and rhythm, S1, S2 normal, no murmurs heard  Abdomen: Soft, non tender, non distended      Labs:  Lab Results   Component Value Date    WBC 6.06 10/02/2020    HGB 15.0 10/02/2020    HCT 45.4 10/02/2020     10/02/2020    CHOL 183 04/04/2023    TRIG 100 04/04/2023    HDL 52 04/04/2023    ALT 8 (L) 12/29/2021    AST 30 12/29/2021     04/04/2023    K 4.4 04/04/2023     04/04/2023    CREATININE 1.0 04/04/2023    BUN 17 04/04/2023    CO2 29 04/04/2023    TSH 1.204 10/02/2020    PSA 0.27 04/04/2023    HGBA1C 5.8 (H) 04/04/2023         I have explained the risks and benefits of endoscopy procedures to the patient including but not limited to bleeding, perforation, infection, and death.    Thank you so much for allowing me to participate in the care of Adam PHELAN  MD Chin

## 2023-07-07 NOTE — PROVATION PATIENT INSTRUCTIONS
Discharge Summary/Instructions after an Endoscopic Procedure  Patient Name: Adam Dalal  Patient MRN: [id number]  Patient YOB: 1960  Friday, July 7, 2023  Arnaldo Neely MD  Dear patient,  As a result of recent federal legislation (The Federal Cures Act), you may   receive lab or pathology results from your procedure in your [facility]   account before your physician is able to contact you. Your physician or   their representative will relay the results to you with their   recommendations at their soonest availability.  Thank you,  RESTRICTIONS:  During your procedure today, you received medications for sedation.  These   medications may affect your judgment, balance and coordination.  Therefore,   for 24 hours, you have the following restrictions:   - DO NOT drive a car, operate machinery, make legal/financial decisions,   sign important papers or drink alcohol.    ACTIVITY:  Today: no heavy lifting, straining or running due to procedural   sedation/anesthesia.  The following day: return to full activity including work.  DIET:  Eat and drink normally unless instructed otherwise.     TREATMENT FOR COMMON SIDE EFFECTS:  - Mild abdominal pain, nausea, belching, bloating or excessive gas:  rest,   eat lightly and use a heating pad.  - Sore Throat: treat with throat lozenges and/or gargle with warm salt   water.  - Because air was used during the procedure, expelling large amounts of air   from your rectum or belching is normal.  - If a bowel prep was taken, you may not have a bowel movement for 1-3 days.    This is normal.  SYMPTOMS TO WATCH FOR AND REPORT TO YOUR PHYSICIAN:  1. Abdominal pain or bloating, other than gas cramps.  2. Chest pain.  3. Back pain.  4. Signs of infection such as: chills or fever occurring within 24 hours   after the procedure.  5. Rectal bleeding, which would show as bright red, maroon, or black stools.   (A tablespoon of blood from the rectum is not serious, especially if    hemorrhoids are present.)  6. Vomiting.  7. Weakness or dizziness.  GO DIRECTLY TO THE NEAREST EMERGENCY ROOM IF YOU HAVE ANY OF THE FOLLOWING:      Difficulty breathing              Chills and/or fever over 101 F   Persistent vomiting and/or vomiting blood   Severe abdominal pain   Severe chest pain   Black, tarry stools   Bleeding- more than one tablespoon   Any other symptom or condition that you feel may need urgent attention  Your doctor recommends these additional instructions:  If any biopsies were taken, your doctors clinic will contact you in 1 to 2   weeks with any results.  - Patient has a contact number available for emergencies.  The signs and   symptoms of potential delayed complications were discussed with the   patient.  Return to normal activities tomorrow.  Written discharge   instructions were provided to the patient.   - Discharge patient to home.   - Resume previous diet.   - Continue present medications.   - Await pathology results.   - Repeat colonoscopy in 5 years for surveillance.   For questions, problems or results please call your physician - Arnaldo Neely MD at Work:  [phone].  [facility] [place], EMERGENCY ROOM PHONE NUMBER: [phone]  IF A COMPLICATION OR EMERGENCY SITUATION ARISES AND YOU ARE UNABLE TO REACH   YOUR PHYSICIAN - GO DIRECTLY TO THE EMERGENCY ROOM.  Arnaldo Neely MD  7/7/2023 11:05:57 AM  This report has been verified and signed electronically.  Dear patient,  As a result of recent federal legislation (The Federal Cures Act), you may   receive lab or pathology results from your procedure in your [facility]   account before your physician is able to contact you. Your physician or   their representative will relay the results to you with their   recommendations at their soonest availability.  Thank you,  PROVATION

## 2023-07-07 NOTE — TRANSFER OF CARE
"Anesthesia Transfer of Care Note    Patient: Adam Dalal    Procedure(s) Performed: Procedure(s) (LRB):  COLONOSCOPY (N/A)    Patient location: GI    Anesthesia Type: general    Transport from OR: Transported from OR on room air with adequate spontaneous ventilation    Post pain: adequate analgesia    Post assessment: no apparent anesthetic complications    Post vital signs: stable    Level of consciousness: awake, alert and oriented    Nausea/Vomiting: no nausea/vomiting    Complications: none    Transfer of care protocol was followed      Last vitals:   Visit Vitals  /74   Pulse 78   Temp 36.6 °C (97.9 °F)   Resp 14   Ht 6' 1" (1.854 m)   Wt 102.1 kg (225 lb)   SpO2 99%   BMI 29.69 kg/m²     "

## 2023-07-07 NOTE — PLAN OF CARE
Pt arrived to recovery dosc via stretcher per ENDO team. Bedside report received. Pt attached to bedside monitor. VSS. Pt sedated post procedure. Pt on room air; oxygen sats 96%. Pt IV access saline locked. Warm blanket applied.Will continue to monitor.

## 2023-07-07 NOTE — ANESTHESIA POSTPROCEDURE EVALUATION
Anesthesia Post Evaluation    Patient: Adam Dalal    Procedure(s) Performed: Procedure(s) (LRB):  COLONOSCOPY (N/A)    Final Anesthesia Type: general      Patient location during evaluation: GI PACU  Patient participation: Yes- Able to Participate  Level of consciousness: awake and alert  Pain management: adequate  Airway patency: patent    PONV status at discharge: No PONV  Anesthetic complications: no      Cardiovascular status: hemodynamically stable  Respiratory status: spontaneous ventilation  Hydration status: euvolemic  Follow-up not needed.          Vitals Value Taken Time   /73 07/07/23 1120   Temp 36.8 °C (98.2 °F) 07/07/23 1058   Pulse 64 07/07/23 1120   Resp 16 07/07/23 1120   SpO2 97 % 07/07/23 1120         Event Time   Out of Recovery 11:13:00         Pain/Bárbara Score: Bárbara Score: 9 (7/7/2023 11:13 AM)

## 2023-07-13 ENCOUNTER — PATIENT MESSAGE (OUTPATIENT)
Dept: NEUROLOGY | Facility: CLINIC | Age: 63
End: 2023-07-13
Payer: COMMERCIAL

## 2023-07-14 LAB
FINAL PATHOLOGIC DIAGNOSIS: NORMAL
GROSS: NORMAL
Lab: NORMAL

## 2023-07-18 ENCOUNTER — TELEPHONE (OUTPATIENT)
Dept: GASTROENTEROLOGY | Facility: CLINIC | Age: 63
End: 2023-07-18
Payer: COMMERCIAL

## 2023-07-18 NOTE — TELEPHONE ENCOUNTER
----- Message from Arnaldo Neely MD sent at 7/17/2023  7:03 PM CDT -----  Please call and notify patient, the colon polyp was benign.

## 2023-08-02 ENCOUNTER — TELEPHONE (OUTPATIENT)
Dept: NEUROLOGY | Facility: CLINIC | Age: 63
End: 2023-08-02
Payer: COMMERCIAL

## 2023-08-02 NOTE — TELEPHONE ENCOUNTER
----- Message from Julian Gomez sent at 8/2/2023 10:56 AM CDT -----  Name of Who is Calling: MINDY NAIR [[id number]]           What is the request in detail: Patient is requesting a call back.  Patient is requesting a recommendation for a sleep provider. Please assist.           Can the clinic reply by [facility]: No           What Number to Call Back if not [facility]: [phone]

## 2023-08-05 ENCOUNTER — PATIENT MESSAGE (OUTPATIENT)
Dept: NEUROLOGY | Facility: CLINIC | Age: 63
End: 2023-08-05
Payer: COMMERCIAL

## 2023-08-07 ENCOUNTER — TELEPHONE (OUTPATIENT)
Dept: NEUROLOGY | Facility: CLINIC | Age: 63
End: 2023-08-07
Payer: COMMERCIAL

## 2023-08-07 NOTE — TELEPHONE ENCOUNTER
Spoke with pt to confirm vv today with Dr. Vargas. Pt [facility] so we rescheduled for 08/14/23 at 4 PM. Waiting for manager override

## 2023-08-09 ENCOUNTER — OFFICE VISIT (OUTPATIENT)
Dept: OTOLARYNGOLOGY | Facility: CLINIC | Age: 63
End: 2023-08-09
Payer: COMMERCIAL

## 2023-08-09 ENCOUNTER — TELEPHONE (OUTPATIENT)
Dept: OTOLARYNGOLOGY | Facility: CLINIC | Age: 63
End: 2023-08-09

## 2023-08-09 VITALS
HEIGHT: 73 IN | DIASTOLIC BLOOD PRESSURE: 74 MMHG | HEART RATE: 71 BPM | SYSTOLIC BLOOD PRESSURE: 119 MMHG | TEMPERATURE: 98 F | WEIGHT: 224.31 LBS | BODY MASS INDEX: 29.73 KG/M2

## 2023-08-09 DIAGNOSIS — G20.A1 PARKINSON DISEASE: ICD-10-CM

## 2023-08-09 DIAGNOSIS — H91.93 DIFFICULTY HEARING, BILATERAL: ICD-10-CM

## 2023-08-09 DIAGNOSIS — H93.13 TINNITUS, BILATERAL: ICD-10-CM

## 2023-08-09 DIAGNOSIS — H61.23 IMPACTED CERUMEN, BILATERAL: ICD-10-CM

## 2023-08-09 PROCEDURE — 3044F HG A1C LEVEL LT 7.0%: CPT | Mod: CPTII,S$GLB,, | Performed by: OTOLARYNGOLOGY

## 2023-08-09 PROCEDURE — 1160F PR REVIEW ALL MEDS BY PRESCRIBER/CLIN PHARMACIST DOCUMENTED: ICD-10-PCS | Mod: CPTII,S$GLB,, | Performed by: OTOLARYNGOLOGY

## 2023-08-09 PROCEDURE — 3074F PR MOST RECENT SYSTOLIC BLOOD PRESSURE < 130 MM HG: ICD-10-PCS | Mod: CPTII,S$GLB,, | Performed by: OTOLARYNGOLOGY

## 2023-08-09 PROCEDURE — 3044F PR MOST RECENT HEMOGLOBIN A1C LEVEL <7.0%: ICD-10-PCS | Mod: CPTII,S$GLB,, | Performed by: OTOLARYNGOLOGY

## 2023-08-09 PROCEDURE — 3074F SYST BP LT 130 MM HG: CPT | Mod: CPTII,S$GLB,, | Performed by: OTOLARYNGOLOGY

## 2023-08-09 PROCEDURE — 1160F RVW MEDS BY RX/DR IN RCRD: CPT | Mod: CPTII,S$GLB,, | Performed by: OTOLARYNGOLOGY

## 2023-08-09 PROCEDURE — 99214 OFFICE O/P EST MOD 30 MIN: CPT | Mod: 25,S$GLB,, | Performed by: OTOLARYNGOLOGY

## 2023-08-09 PROCEDURE — 1159F MED LIST DOCD IN RCRD: CPT | Mod: CPTII,S$GLB,, | Performed by: OTOLARYNGOLOGY

## 2023-08-09 PROCEDURE — 69210 REMOVE IMPACTED EAR WAX UNI: CPT | Mod: S$GLB,,, | Performed by: OTOLARYNGOLOGY

## 2023-08-09 PROCEDURE — 3078F DIAST BP <80 MM HG: CPT | Mod: CPTII,S$GLB,, | Performed by: OTOLARYNGOLOGY

## 2023-08-09 PROCEDURE — 3008F PR BODY MASS INDEX (BMI) DOCUMENTED: ICD-10-PCS | Mod: CPTII,S$GLB,, | Performed by: OTOLARYNGOLOGY

## 2023-08-09 PROCEDURE — 3008F BODY MASS INDEX DOCD: CPT | Mod: CPTII,S$GLB,, | Performed by: OTOLARYNGOLOGY

## 2023-08-09 PROCEDURE — 1159F PR MEDICATION LIST DOCUMENTED IN MEDICAL RECORD: ICD-10-PCS | Mod: CPTII,S$GLB,, | Performed by: OTOLARYNGOLOGY

## 2023-08-09 PROCEDURE — 99214 PR OFFICE/OUTPT VISIT, EST, LEVL IV, 30-39 MIN: ICD-10-PCS | Mod: 25,S$GLB,, | Performed by: OTOLARYNGOLOGY

## 2023-08-09 PROCEDURE — 69210 EAR CERUMEN REMOVAL: ICD-10-PCS | Mod: S$GLB,,, | Performed by: OTOLARYNGOLOGY

## 2023-08-09 PROCEDURE — 3078F PR MOST RECENT DIASTOLIC BLOOD PRESSURE < 80 MM HG: ICD-10-PCS | Mod: CPTII,S$GLB,, | Performed by: OTOLARYNGOLOGY

## 2023-08-09 RX ORDER — OFLOXACIN 3 MG/ML
5 SOLUTION AURICULAR (OTIC) 2 TIMES DAILY
Qty: 5 ML | Refills: 1 | Status: SHIPPED | OUTPATIENT
Start: 2023-08-09 | End: 2023-08-19

## 2023-08-09 NOTE — PATIENT INSTRUCTIONS
Use prescription ear drops in each ear twice a day for 10 days.    Use OTC Debrox ear wax drops every evening.    Follow up next few weeks for recheck and audiogram.

## 2023-08-14 ENCOUNTER — OFFICE VISIT (OUTPATIENT)
Dept: NEUROLOGY | Facility: CLINIC | Age: 63
End: 2023-08-14
Payer: COMMERCIAL

## 2023-08-14 ENCOUNTER — PATIENT MESSAGE (OUTPATIENT)
Dept: NEUROLOGY | Facility: CLINIC | Age: 63
End: 2023-08-14

## 2023-08-14 DIAGNOSIS — G20.A1 PARKINSON DISEASE: ICD-10-CM

## 2023-08-14 PROCEDURE — 3044F HG A1C LEVEL LT 7.0%: CPT | Mod: CPTII,95,, | Performed by: PSYCHIATRY & NEUROLOGY

## 2023-08-14 PROCEDURE — 99215 PR OFFICE/OUTPT VISIT, EST, LEVL V, 40-54 MIN: ICD-10-PCS | Mod: 95,,, | Performed by: PSYCHIATRY & NEUROLOGY

## 2023-08-14 PROCEDURE — 3044F PR MOST RECENT HEMOGLOBIN A1C LEVEL <7.0%: ICD-10-PCS | Mod: CPTII,95,, | Performed by: PSYCHIATRY & NEUROLOGY

## 2023-08-14 PROCEDURE — 99215 OFFICE O/P EST HI 40 MIN: CPT | Mod: 95,,, | Performed by: PSYCHIATRY & NEUROLOGY

## 2023-08-14 RX ORDER — CARBIDOPA AND LEVODOPA 25; 100 MG/1; MG/1
1 TABLET ORAL NIGHTLY
Qty: 30 TABLET | Refills: 11 | Status: SHIPPED | OUTPATIENT
Start: 2023-08-14 | End: 2024-08-13

## 2023-08-14 NOTE — PROGRESS NOTES
"The patient location is: HOME  The chief complaint leading to visit is: DBS  1. Parkinson disease          Visit type: Virtual visit with synchronous audio and video  Total time spent with patient: 60  Each patient to whom he or she provides medical services by telemedicine is:  (1) informed of the relationship between the physician and patient and the respective role of any other health care provider with respect to management of the patient; and (2) notified that he or she may decline to receive medical services by telemedicine and may withdraw from such care at any time.      Name: Adam Dalal  MRN: [id number]   CSN: [id number]      Date: 08/14/2023    Chief Complaint / Interval History:   63 y.o. R handed man with with sleep apnea, ETOH use, coming for eval for iPD since age 58. This began with R foot dragging. Stayed rigid-predominate throughout. No tremor noted. In terms of ambulation, no falls noted.   Saw Dr. Meyer 2018 any memory general wnl. No major decline.  Seeing sleep medicine for CHU.    No fam hx PD or dementia    Med hx  Started carbidopa/levodopa 25/100mg 1 tab PO TID 5 years ago  Now on Rytary 95mg 4 tabs TID - Ontime is 6H  Continues rasagiline    He reports his goals for DBS are as follows  Wants DBS to help R arm and leg rigidity  He does not have much tremor  He has not side effects of medications  Wants dyskinesia control    PD Review of Symptoms:  Anosmia: yes  Dysarthria/Hypophonia: none  Dysphagia/Sialorrhea: none  Depression: none  Cognitive slowing: none  Hallucinations: none  Impulsivity: none  Constipation: none  Orthostasis: none  Dyskinesia: mild foot  Falls: none  Freezing: none  Micrographia: yes  Sleep issues:  -CHU: yes  -RBD: none      "Prior"    History of Present Illness (HPI):  57 yo here for evaluation for his presumed PD.  Progressed with slowness and stiffness about 4 years ago.  Tried to blame on aging at the time.  Generally was slower getting out of couches.  Was " "shuffling and unable to keep up.  Would drag the R foot.  BEhind and above the R knee tends to get stiffer.  Now more progressive slowness and trouble with dressing.  Still driving a car, more issues with backing up and trouble seeing around while driving.      Wife Berenice is here.  She notes that he "feels like he is failing    Works at [employer] on [employer].    Nonmotor/Premotor ROS:  Hyposmia (HENT)?Yes - a little diminished  RBD/sleep issues (Constitutional)?No, used to snore more   Depression/anxiety (Psychiatric)?Yes - especially at night  Fatigue (Constitutional)?Yes  Constipation (GI)?No  Urinary issues ()?Yes - urgency  Sexual dysfunction ()?Yes - mild ED  Orthostasis (Cardiovascular)?No  Leg swelling (Cardiovascular)? No  Falls (Musculoskeletal)?No but feels imbalanced  Cognitive impairment (Neurologic)?No  Psychoses (Psychiatric)?No  Pain/Paresthesia (Neurologic)?No  Visual changes (Eyes)?No  Moles / skin changes (Skin)?No  Stridor / SOB (Pulm)?N/A  Bruising (Heme)?No    Past Medical History: The patient  has a past medical history of Anxiety, Esotropia of right eye (05/02/2013), Hearing deficit, bilateral, High cholesterol, Hyperlipidemia, Malignant melanoma of skin, unspecified (06/29/2022), Melanoma (06/29/2022), Melanoma in situ of cheek, Parkinson's disease, and Ringing in ear, bilateral.    Social History: The patient  reports that he quit smoking about 17 years ago. His smoking use included cigarettes. He started smoking about 18 years ago. He has a 1.0 pack-year smoking history. He has never used smokeless tobacco. He reports current alcohol use of about 20.0 standard drinks of alcohol per week. He reports that he does not use drugs.    Family History: Their family history includes Arthritis in his brother and mother; Atrial fibrillation in his sister; Cataracts in his maternal grandmother and paternal grandmother; Hearing loss in his mother; Heart attack in his father; Sleep apnea " in his brother.    Allergies: Patient has no known allergies.     Meds:   Current Outpatient Medications on File Prior to Visit   Medication Sig Dispense Refill    ALPRAZolam (XANAX) 0.5 MG tablet TAKE 1 TABLET(0.5 MG) BY MOUTH EVERY NIGHT FOR INSOMNIA OR ANXIETY 30 tablet 5    carbidopa-levodopa (RYTARY) 23.75-95 mg CpSR Take 4 capsules by mouth 3 (three) times daily. 360 capsule 11    diclofenac (VOLTAREN) 75 MG EC tablet Take 1 tablet (75 mg total) by mouth 2 (two) times daily as needed (pain). 60 tablet 0    fluorouraciL (EFUDEX) 5 % cream Mix with calcipotriene 0.005% cream  and Apply thin film to upper forehead  2 times per day for 7 days; d/c if area bleeding or ulcerated; avoid eyes or mouth 40 g 1    ofloxacin (FLOXIN) 0.3 % otic solution Place 5 drops into both ears 2 (two) times daily. for 10 days 5 mL 1    pravastatin (PRAVACHOL) 80 MG tablet TAKE 1 TABLET(80 MG) BY MOUTH EVERY MORNING 90 tablet 3    rasagiline (AZILECT) 1 mg Tab Take 1 tablet (1 mg total) by mouth once daily. 30 tablet 11     No current facility-administered medications on file prior to visit.       Exam:  There were no vitals taken for this visit.      Physical Exam  Constitutional: Well-developed, well-nourished, appears stated age  Eyes: No scleral icterus  ENT: Moist oral mucosa  Cardiovascular: No lower extremity edema   Respiratory: No labored breathing   Skin: No rash   Hematologic: No bruising    Other: GI/ deferred   Mental status: Alert and oriented to person, place, time, and situation;   follows commands  Speech: normal (not dysarthric), no aphasia  Cranial nerves:            CN II: Pupils mid-position and equal, not tested light or accommodation  CN III, IV, VI: Extraocular movements full, no nystagmus visualized  CN V: Not tested   CN VII: Face strong and symmetric bilaterally   CN VIII: Hearing intact to voice and conversation   CN IX, X: Palate raises midline and symmetric   CN XI: Strong shoulder shrug B/L  CN XII:  Tongue appears midline   Motor: Normal bulk by appearance, no drift   Sensory: Not tested    Gait: Not tested  Deep tendon reflexes: Not tested  Movement/Coordination                    No hypophonic speech.                     No facial masking.  No bradykinesia.                   No tremor with rest, posture, kinesis, or intention.                     No other dystonia, chorea, athetosis, myoclonus, or tics visualized.    Bradykinesia  ? Finger taps Finger flicks KAYLA Heel taps   Left 0 0 - -   Right 2+ 0 0 0       Tremor Exam   Arms extended Arms in wing position, fingers almost touching Re-emergent Arms extended wrists extended Intention Resting Kinetic   Left ?neg ? ? ? ? ? ?   Right ?neg ? ? ? ? ? ?   Head tremor: neg    Medical Record Review:  - Lab Results:  Admission on 07/07/2023, Discharged on 07/07/2023   Component Date Value Ref Range Status    Final Pathologic Diagnosis 07/07/2023    Final                    Value:Rectum, 5 mm polyp, biopsy:   - Hyperplastic polyp with prolapse changes, see comment.      COMMENT: Multiple deeper levels are examined.  Case is reviewed by Dr. JOVANNY Paris who agrees with the above diagnosis.      Gross 07/07/2023    Final                    Value:Pathology ID:  [id number]  Patient ID:  [id number]  The specimen is received in formalin labeled &quot;rectal polyp 5mm&quot;.  The specimen consists of 1 tan fragment of soft tissue measuring 0.8 x 0.8 x 0.4 cm.  The specimen is submitted entirely in cassette [facility]      Disclaimer 07/07/2023 Unless the case is a 'gross only' or additional testing only, the final diagnosis for each specimen is based on a microscopic examination of appropriate tissue sections.   Final          Diagnoses:                                                                                    PD, akinetic-rigid.  Eye movements are abnml from NM perspective - old strabismus - but watching closely.  NO clear slowed pursuits or saccades,  but some intrusive jerks - might all still be from longstanding changes/surgeries.     Problem List Items Addressed This Visit          Neuro    Parkinson disease    Current Assessment & Plan     Rigid-predominate PD  Good ONtime currently but he's frustrated with dyskinesias and rigidity.  Suggested ON/OFF eval  We discussed in depth merits of different targets GPI and STN.  Likely GPi preferred for dyskinesia/dystonia.    Take carbidopa/levodopa 25/100mg 1 tab QHS PRN for Offtime dystonia              Delicia Vargas MD, MS  [facility]  Department of Neurology  Movement Disorders

## 2023-08-14 NOTE — ASSESSMENT & PLAN NOTE
Rigid-predominate PD  Good ONtime currently but he's frustrated with dyskinesias and rigidity.  Suggested ON/OFF eval  We discussed in depth merits of different targets GPI and STN.  Likely GPi preferred for dyskinesia/dystonia.    Take carbidopa/levodopa 25/100mg 1 tab QHS PRN for Offtime dystonia  Suggested come in for ON/OFF eval  Neuropsych complete  Suggested see NSGY after ON/OFF eval

## 2023-08-24 ENCOUNTER — PATIENT MESSAGE (OUTPATIENT)
Dept: NEUROSURGERY | Facility: CLINIC | Age: 63
End: 2023-08-24
Payer: COMMERCIAL

## 2023-09-13 ENCOUNTER — TELEPHONE (OUTPATIENT)
Dept: NEUROLOGY | Facility: CLINIC | Age: 63
End: 2023-09-13
Payer: COMMERCIAL

## 2023-09-13 NOTE — TELEPHONE ENCOUNTER
Called pt to schedule DBS on/off evaluation per Dr. Vargas. Scheduled for 10/13/23 at 10:20 AM. Relayed DBS On/Off Eval instructions and will send through the portal.

## 2023-09-19 ENCOUNTER — OFFICE VISIT (OUTPATIENT)
Dept: OPTOMETRY | Facility: CLINIC | Age: 63
End: 2023-09-19
Payer: COMMERCIAL

## 2023-09-19 DIAGNOSIS — H50.00 ESOTROPIA: ICD-10-CM

## 2023-09-19 DIAGNOSIS — H52.13 MYOPIA OF BOTH EYES: Primary | ICD-10-CM

## 2023-09-19 PROCEDURE — 1159F PR MEDICATION LIST DOCUMENTED IN MEDICAL RECORD: ICD-10-PCS | Mod: CPTII,S$GLB,, | Performed by: OPTOMETRIST

## 2023-09-19 PROCEDURE — 92014 PR EYE EXAM, EST PATIENT,COMPREHESV: ICD-10-PCS | Mod: S$GLB,,, | Performed by: OPTOMETRIST

## 2023-09-19 PROCEDURE — 92014 COMPRE OPH EXAM EST PT 1/>: CPT | Mod: S$GLB,,, | Performed by: OPTOMETRIST

## 2023-09-19 PROCEDURE — 92015 PR REFRACTION: ICD-10-PCS | Mod: S$GLB,,, | Performed by: OPTOMETRIST

## 2023-09-19 PROCEDURE — 3044F PR MOST RECENT HEMOGLOBIN A1C LEVEL <7.0%: ICD-10-PCS | Mod: CPTII,S$GLB,, | Performed by: OPTOMETRIST

## 2023-09-19 PROCEDURE — 99999 PR PBB SHADOW E&M-EST. PATIENT-LVL III: CPT | Mod: PBBFAC,,, | Performed by: OPTOMETRIST

## 2023-09-19 PROCEDURE — 3044F HG A1C LEVEL LT 7.0%: CPT | Mod: CPTII,S$GLB,, | Performed by: OPTOMETRIST

## 2023-09-19 PROCEDURE — 1159F MED LIST DOCD IN RCRD: CPT | Mod: CPTII,S$GLB,, | Performed by: OPTOMETRIST

## 2023-09-19 PROCEDURE — 1160F RVW MEDS BY RX/DR IN RCRD: CPT | Mod: CPTII,S$GLB,, | Performed by: OPTOMETRIST

## 2023-09-19 PROCEDURE — 92015 DETERMINE REFRACTIVE STATE: CPT | Mod: S$GLB,,, | Performed by: OPTOMETRIST

## 2023-09-19 PROCEDURE — 99999 PR PBB SHADOW E&M-EST. PATIENT-LVL III: ICD-10-PCS | Mod: PBBFAC,,, | Performed by: OPTOMETRIST

## 2023-09-19 PROCEDURE — 1160F PR REVIEW ALL MEDS BY PRESCRIBER/CLIN PHARMACIST DOCUMENTED: ICD-10-PCS | Mod: CPTII,S$GLB,, | Performed by: OPTOMETRIST

## 2023-09-19 NOTE — PROCEDURES
Ear Cerumen Removal    Date/Time: 8/9/2023 11:00 AM    Performed by: Saima Diego MD  Authorized by: Saima Diego MD    Consent Done?:  Yes (Verbal)  Location details:  Both ears  Cerumen  Removal Results:  Cerumen partially removed  Patient tolerance:  Patient tolerated the procedure well with no immediate complications     Dense cerumen impactions bilaterally were addressed with multiple rounds of a combination of micro instrumentation with residual AU.

## 2023-09-19 NOTE — PROGRESS NOTES
HPI    64 Y/o male is here for routine eye exam with C/o pt states he feels like   something is in OS states he feels on and off for about 6 weeks pt state   he used an used an eye wash a few times pt states his OS feel like   something is in it just a little bit.  Pt denies pain and discomfort   No f/f    Eye med: Systane OU bid   Last edited by Mahesh Huerta MA on 9/19/2023  2:48 PM.            Assessment /Plan     For exam results, see Encounter Report.    Myopia of both eyes    Esotropia      1. AET (OD>>OS) sp EOM surgery  2. OHT Hx.   iop flux 20-25 without meds prior to cat surgery.  Today iop wnl without meds.  CD wnl--see OCT.  Last VF  wnl.  -fam hx. pach: 579/601. Doubt glauc now--will monitor   3. Sp MFIOL/YAG OU.  Pt happy without spex    PLAN:    rtc 1 yr

## 2023-09-19 NOTE — PROGRESS NOTES
Subjective:       Patient ID: Adam Dalal is a 63 y.o. male.    Chief Complaint: Difficulty hearing    Here today complaining of difficulty hearing, right worse than left over the past few weeks.  Last seen 2021 with dense cerumen impactions cleared and subsequent audiogram demonstrating bilateral sensorineural hearing loss.  Had MRI then for some asymmetry which was negative for IAC/CPA abnormality.  Missed follow-up since then.  Has had baseline tinnitus past few years, worse past few weeks.  Some acoustical trauma in the form of loud music at times.  No otalgia, otorrhea, or other otologic complaints.  Tried over-the-counter earwax drops without relief.  No ear inserts.  No nasal or throat or other otolaryngologic complaints.  Medical history includes Parkinson's disease.          Review of Systems     Constitutional: Negative for appetite change, chills, fatigue, fever and unexpected weight loss.      HENT: Positive for hearing loss.      Eyes:  Negative for change in eyesight, eye drainage, eye itching and photophobia.     Respiratory:  Positive for sleep apnea.      Cardiovascular:  Negative for chest pain, foot swelling, irregular heartbeat and swollen veins.     Gastrointestinal:  Negative for abdominal pain, acid reflux, constipation, diarrhea, heartburn and vomiting.     Genitourinary: Negative for difficulty urinating, sexual problems and frequent urination.     Musc: Negative for aching joints, aching muscles, back pain and neck pain.     Skin: Negative for rash.     Allergy: Negative for food allergies and seasonal allergies.     Endocrine: Negative for cold intolerance and heat intolerance.      Neurological: Negative for dizziness, headaches, light-headedness, seizures and tremors.      Hematologic: Negative for bruises/bleeds easily and swollen glands.      Psychiatric: Negative for decreased concentration, depression, nervous/anxious and sleep disturbance.        Patient's self populated  ROS above noted.            Objective:        Vitals:    08/09/23 1108   BP: 119/74   Pulse: 71   Temp: 97.7 °F (36.5 °C)     Body mass index is 29.59 kg/m².  Physical Exam  Constitutional:       General: He is not in acute distress.     Appearance: He is well-developed.   HENT:      Head: Normocephalic and atraumatic.      Right Ear: External ear normal. There is impacted cerumen.      Left Ear: External ear normal. There is impacted cerumen.      Nose: No nasal deformity, mucosal edema or rhinorrhea.      Mouth/Throat:      Mouth: Mucous membranes are moist.      Pharynx: No pharyngeal swelling, oropharyngeal exudate or posterior oropharyngeal erythema.      Comments:   Tiny smooth surfaced nodule posterior-medial to right RMT, unchanged.    Neck:      Trachea: Phonation normal.   Pulmonary:      Effort: Pulmonary effort is normal. No respiratory distress.   Lymphadenopathy:      Cervical: No cervical adenopathy.   Skin:     General: Skin is warm and dry.   Neurological:      Mental Status: He is alert and oriented to person, place, and time.   Psychiatric:         Speech: Speech normal.         Behavior: Behavior normal.         Tests / Results:    Prior audiologic testing and MRI of IAC's/temporal bones reports reviewed.  Previously declined hearing aid trial.        Assessment:       1. Difficulty hearing, bilateral    2. Tinnitus, bilateral    3. Impacted cerumen, bilateral    4. Parkinson disease        Plan:       Ears cleaned as per procedure note.    See procedure note.      Prescribed generic Floxin drops and reviewed use twice daily for 10 days.    Over-the-counter Debrox ear drops every evening.    Follow-up next few weeks for recheck and audiogram.

## 2023-09-20 ENCOUNTER — OFFICE VISIT (OUTPATIENT)
Dept: OTOLARYNGOLOGY | Facility: CLINIC | Age: 63
End: 2023-09-20
Payer: COMMERCIAL

## 2023-09-20 ENCOUNTER — CLINICAL SUPPORT (OUTPATIENT)
Dept: OTOLARYNGOLOGY | Facility: CLINIC | Age: 63
End: 2023-09-20
Payer: COMMERCIAL

## 2023-09-20 VITALS
DIASTOLIC BLOOD PRESSURE: 76 MMHG | BODY MASS INDEX: 29.37 KG/M2 | HEIGHT: 73 IN | WEIGHT: 221.63 LBS | HEART RATE: 61 BPM | TEMPERATURE: 97 F | SYSTOLIC BLOOD PRESSURE: 142 MMHG

## 2023-09-20 DIAGNOSIS — Z77.122 EXPOSURE TO NOISE: ICD-10-CM

## 2023-09-20 DIAGNOSIS — H93.13 TINNITUS, BILATERAL: ICD-10-CM

## 2023-09-20 DIAGNOSIS — H93.13 BILATERAL TINNITUS: ICD-10-CM

## 2023-09-20 DIAGNOSIS — H91.90 HEARING DISORDER, UNSPECIFIED LATERALITY: Primary | ICD-10-CM

## 2023-09-20 DIAGNOSIS — H90.3 ASYMMETRICAL SENSORINEURAL HEARING LOSS: Primary | ICD-10-CM

## 2023-09-20 DIAGNOSIS — G20.A1 PARKINSON'S DISEASE, UNSPECIFIED WHETHER DYSKINESIA PRESENT, UNSPECIFIED WHETHER MANIFESTATIONS FLUCTUATE: ICD-10-CM

## 2023-09-20 DIAGNOSIS — Z82.2 FAMILY HISTORY OF HEARING LOSS: ICD-10-CM

## 2023-09-20 DIAGNOSIS — Z86.69 HISTORY OF IMPACTED CERUMEN: ICD-10-CM

## 2023-09-20 DIAGNOSIS — H61.23 CERUMEN DEBRIS ON TYMPANIC MEMBRANE, BILATERAL: ICD-10-CM

## 2023-09-20 DIAGNOSIS — Z77.122 HISTORY OF EXPOSURE TO NOISE: ICD-10-CM

## 2023-09-20 DIAGNOSIS — R29.2 ABNORMAL ACOUSTIC REFLEX: ICD-10-CM

## 2023-09-20 DIAGNOSIS — H90.3 SENSORINEURAL HEARING LOSS, BILATERAL: ICD-10-CM

## 2023-09-20 PROCEDURE — 92557 COMPREHENSIVE HEARING TEST: CPT | Mod: S$GLB,,, | Performed by: AUDIOLOGIST-HEARING AID FITTER

## 2023-09-20 PROCEDURE — 92550 PR TYMPANOMETRY AND REFLEX THRESHOLD MEASUREMENTS: ICD-10-PCS | Mod: S$GLB,,, | Performed by: AUDIOLOGIST-HEARING AID FITTER

## 2023-09-20 PROCEDURE — 3008F BODY MASS INDEX DOCD: CPT | Mod: CPTII,S$GLB,, | Performed by: OTOLARYNGOLOGY

## 2023-09-20 PROCEDURE — 92550 TYMPANOMETRY & REFLEX THRESH: CPT | Mod: S$GLB,,, | Performed by: AUDIOLOGIST-HEARING AID FITTER

## 2023-09-20 PROCEDURE — 99214 OFFICE O/P EST MOD 30 MIN: CPT | Mod: S$GLB,,, | Performed by: OTOLARYNGOLOGY

## 2023-09-20 PROCEDURE — 3078F DIAST BP <80 MM HG: CPT | Mod: CPTII,S$GLB,, | Performed by: OTOLARYNGOLOGY

## 2023-09-20 PROCEDURE — 1159F MED LIST DOCD IN RCRD: CPT | Mod: CPTII,S$GLB,, | Performed by: OTOLARYNGOLOGY

## 2023-09-20 PROCEDURE — 1160F RVW MEDS BY RX/DR IN RCRD: CPT | Mod: CPTII,S$GLB,, | Performed by: OTOLARYNGOLOGY

## 2023-09-20 PROCEDURE — 3077F SYST BP >= 140 MM HG: CPT | Mod: CPTII,S$GLB,, | Performed by: OTOLARYNGOLOGY

## 2023-09-20 PROCEDURE — 92557 PR COMPREHENSIVE HEARING TEST: ICD-10-PCS | Mod: S$GLB,,, | Performed by: AUDIOLOGIST-HEARING AID FITTER

## 2023-09-20 NOTE — Clinical Note
Your patient, Adam Dalal, was recently seen for an audiogram.  My assessment and recommendations are enclosed.  If you should have any questions or concerns, please contact me at [phone].   Sincerely, Marcos Mckeon, CCC-A Audiologist [facility]

## 2023-09-20 NOTE — PROGRESS NOTES
Marcos Mckeon, CCC-A  Audiologist - [facility] [address] [place] [address]  [email or web address]  [phone]    Patient: Adam Dalal   MRN: [id number]  [address] [facility]   Home Phone [phone]   Work Phone [phone]   Mobile [phone]   : 1960  GUZMAN: 2023      AUDIOLOGICAL EVALUATION      RECOMMENDATIONS:   It is recommended that Adam Dalal:  Follow up medically with Dr. Diego.    Receive binaural hearing aids to improve speech understanding, pending medical clearance.  Continue to receive audiological monitoring annually.  Use precaution and/or hearing protection in noisy environments.    If you should have any questions or concerns regarding the above information, please do not hesitate to contact me at [phone].      _______________________________  Marcos Mckeon, GLORIA-A  Audiologist

## 2023-09-28 ENCOUNTER — TELEPHONE (OUTPATIENT)
Dept: NEUROLOGY | Facility: CLINIC | Age: 63
End: 2023-09-28
Payer: COMMERCIAL

## 2023-09-28 NOTE — TELEPHONE ENCOUNTER
Pt has on/off currently on hold for Friday 10/13/23 at 10:20 AM. Waiting for manager override to fully schedule in from Patrick HERNADEZ.

## 2023-09-28 NOTE — TELEPHONE ENCOUNTER
----- Message from Delicia Vargas MD sent at 9/27/2023  7:35 PM CDT -----  Regarding: ONOFF  Needs next avail ON>OFF with me  Virtual or other

## 2023-10-10 ENCOUNTER — CLINICAL SUPPORT (OUTPATIENT)
Dept: OTOLARYNGOLOGY | Facility: CLINIC | Age: 63
End: 2023-10-10
Payer: COMMERCIAL

## 2023-10-10 DIAGNOSIS — Z71.89 ENCOUNTER FOR HEARING AID CONSULTATION: Primary | ICD-10-CM

## 2023-10-10 PROCEDURE — 99499 UNLISTED E&M SERVICE: CPT | Mod: ,,, | Performed by: AUDIOLOGIST-HEARING AID FITTER

## 2023-10-10 PROCEDURE — 99499 NO LOS: ICD-10-PCS | Mod: ,,, | Performed by: AUDIOLOGIST-HEARING AID FITTER

## 2023-10-10 NOTE — PROGRESS NOTES
Marcos Mckeon, CCC-A  Audiologist - [facility] [address] [place] [address]  [email or web address]  [phone]    Patient: Adam Dalal   MRN: [id number]  [address] [facility]   Home Phone [phone]   Work Phone [phone]   Mobile [phone]   : 1960  GUZMAN: 10/10/2023      HEARING AID CONSULT    Adam Dalal was seen today for a Hearing Aid Consultation.  He has not tried hearing aids before.  We discussed his hearing loss and its effects on his daily life.  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  Binaural amplification was recommended, and hearing aid options were presented.  The pros and cons of various styles and technology levels were discussed with the patient at length, as well as, pricing, lifestyle with regard to background noise, budget, battery options, Bluetooth connectivity,  options, life-expectancy, manufacturers' warranty coverages (3 years for repair & L/D - w/$300 deductible), service plans, and moisture-resistance.  Adam Dalal is most interested in the Advanced technology level devices in the BAILEE-style, but would like to consider his financial options with his insurance before proceeding.  He acknowledged understanding of the fact that we do not file insurance on behalf of the patient for hearing aid purchases.  He was provided a copy of his hearing test and it was recommended that he call his insurance company ([employer]) regarding his benefits and in-network providers.  Adam Dalal was encouraged to continue his appointments with Dr. Diego for regular ear exams, annual audiograms, and to send messages in the patient portal with any questions he may have regarding today's visit.  He verbalized understanding and satisfaction & will call the clinic if he decides to place an order.      _______________________________  Marcos Mckeon, GLORIA-A  Audiologist

## 2023-10-13 ENCOUNTER — PATIENT MESSAGE (OUTPATIENT)
Dept: NEUROLOGY | Facility: CLINIC | Age: 63
End: 2023-10-13

## 2023-10-13 ENCOUNTER — OFFICE VISIT (OUTPATIENT)
Dept: NEUROLOGY | Facility: CLINIC | Age: 63
End: 2023-10-13
Payer: COMMERCIAL

## 2023-10-13 VITALS
HEART RATE: 64 BPM | DIASTOLIC BLOOD PRESSURE: 85 MMHG | BODY MASS INDEX: 29.02 KG/M2 | SYSTOLIC BLOOD PRESSURE: 144 MMHG | HEIGHT: 73 IN | WEIGHT: 218.94 LBS

## 2023-10-13 DIAGNOSIS — G20.B2 PARKINSON'S DISEASE WITH DYSKINESIA AND FLUCTUATING MANIFESTATIONS: ICD-10-CM

## 2023-10-13 DIAGNOSIS — G20.A1 PARKINSON'S DISEASE: ICD-10-CM

## 2023-10-13 PROCEDURE — 3044F HG A1C LEVEL LT 7.0%: CPT | Mod: CPTII,S$GLB,, | Performed by: PSYCHIATRY & NEUROLOGY

## 2023-10-13 PROCEDURE — 1159F MED LIST DOCD IN RCRD: CPT | Mod: CPTII,S$GLB,, | Performed by: PSYCHIATRY & NEUROLOGY

## 2023-10-13 PROCEDURE — 1159F PR MEDICATION LIST DOCUMENTED IN MEDICAL RECORD: ICD-10-PCS | Mod: CPTII,S$GLB,, | Performed by: PSYCHIATRY & NEUROLOGY

## 2023-10-13 PROCEDURE — 99215 PR OFFICE/OUTPT VISIT, EST, LEVL V, 40-54 MIN: ICD-10-PCS | Mod: S$GLB,,, | Performed by: PSYCHIATRY & NEUROLOGY

## 2023-10-13 PROCEDURE — 3044F PR MOST RECENT HEMOGLOBIN A1C LEVEL <7.0%: ICD-10-PCS | Mod: CPTII,S$GLB,, | Performed by: PSYCHIATRY & NEUROLOGY

## 2023-10-13 PROCEDURE — 3077F SYST BP >= 140 MM HG: CPT | Mod: CPTII,S$GLB,, | Performed by: PSYCHIATRY & NEUROLOGY

## 2023-10-13 PROCEDURE — 3079F DIAST BP 80-89 MM HG: CPT | Mod: CPTII,S$GLB,, | Performed by: PSYCHIATRY & NEUROLOGY

## 2023-10-13 PROCEDURE — 99999 PR PBB SHADOW E&M-EST. PATIENT-LVL III: CPT | Mod: PBBFAC,,, | Performed by: PSYCHIATRY & NEUROLOGY

## 2023-10-13 PROCEDURE — 3079F PR MOST RECENT DIASTOLIC BLOOD PRESSURE 80-89 MM HG: ICD-10-PCS | Mod: CPTII,S$GLB,, | Performed by: PSYCHIATRY & NEUROLOGY

## 2023-10-13 PROCEDURE — 99999 PR PBB SHADOW E&M-EST. PATIENT-LVL III: ICD-10-PCS | Mod: PBBFAC,,, | Performed by: PSYCHIATRY & NEUROLOGY

## 2023-10-13 PROCEDURE — 3008F BODY MASS INDEX DOCD: CPT | Mod: CPTII,S$GLB,, | Performed by: PSYCHIATRY & NEUROLOGY

## 2023-10-13 PROCEDURE — 3008F PR BODY MASS INDEX (BMI) DOCUMENTED: ICD-10-PCS | Mod: CPTII,S$GLB,, | Performed by: PSYCHIATRY & NEUROLOGY

## 2023-10-13 PROCEDURE — 3077F PR MOST RECENT SYSTOLIC BLOOD PRESSURE >= 140 MM HG: ICD-10-PCS | Mod: CPTII,S$GLB,, | Performed by: PSYCHIATRY & NEUROLOGY

## 2023-10-13 PROCEDURE — 99215 OFFICE O/P EST HI 40 MIN: CPT | Mod: S$GLB,,, | Performed by: PSYCHIATRY & NEUROLOGY

## 2023-10-13 RX ORDER — RASAGILINE 1 MG/1
1 TABLET ORAL DAILY
Qty: 90 TABLET | Refills: 0 | Status: SHIPPED | OUTPATIENT
Start: 2023-10-13 | End: 2024-01-30 | Stop reason: SDUPTHER

## 2023-10-13 NOTE — ASSESSMENT & PLAN NOTE
Rigid-predominate PD  Good ONtime currently but he's frustrated with dyskinesias and rigidity.    Given rigidity predominance, with dystonia and dyskinesias, suggested GPI  Likely L brain for R hand and leg  UPDRS had a great % reduction 20->6 after Ldopa      Take carbidopa/levodopa 25/100mg 1 tab QHS PRN for Offtime dystonia  Neuropsych complete  Suggested see NSGY after ON/OFF eval

## 2023-10-15 DIAGNOSIS — G20.A1 PARKINSON DISEASE: ICD-10-CM

## 2023-10-16 ENCOUNTER — PATIENT MESSAGE (OUTPATIENT)
Dept: NEUROLOGY | Facility: CLINIC | Age: 63
End: 2023-10-16
Payer: COMMERCIAL

## 2023-10-16 RX ORDER — LEVODOPA AND CARBIDOPA 95; 23.75 MG/1; MG/1
4 CAPSULE, EXTENDED RELEASE ORAL 3 TIMES DAILY
Qty: 360 CAPSULE | Refills: 11 | Status: SHIPPED | OUTPATIENT
Start: 2023-10-16

## 2023-10-19 ENCOUNTER — OFFICE VISIT (OUTPATIENT)
Dept: NEUROSURGERY | Facility: CLINIC | Age: 63
End: 2023-10-19
Payer: COMMERCIAL

## 2023-10-19 DIAGNOSIS — Z74.09 IMPAIRED FUNCTIONAL MOBILITY, BALANCE, GAIT, AND ENDURANCE: ICD-10-CM

## 2023-10-19 DIAGNOSIS — G20.B2 PARKINSON'S DISEASE WITH DYSKINESIA AND FLUCTUATING MANIFESTATIONS: Primary | ICD-10-CM

## 2023-10-19 PROCEDURE — 3044F PR MOST RECENT HEMOGLOBIN A1C LEVEL <7.0%: ICD-10-PCS | Mod: CPTII,95,, | Performed by: NEUROLOGICAL SURGERY

## 2023-10-19 PROCEDURE — 3044F HG A1C LEVEL LT 7.0%: CPT | Mod: CPTII,95,, | Performed by: NEUROLOGICAL SURGERY

## 2023-10-19 PROCEDURE — 99205 PR OFFICE/OUTPT VISIT, NEW, LEVL V, 60-74 MIN: ICD-10-PCS | Mod: 95,,, | Performed by: NEUROLOGICAL SURGERY

## 2023-10-19 PROCEDURE — 99205 OFFICE O/P NEW HI 60 MIN: CPT | Mod: 95,,, | Performed by: NEUROLOGICAL SURGERY

## 2023-10-19 NOTE — PROGRESS NOTES
"Neurosurgery  History & Physical    SUBJECTIVE:     Chief Complaint: Parkinson disease     History of Present Illness:  Adam Dalal is a 63 y.o. right-handed male who presents as a referral from Dr. Vargas for consideration of DBS therapy for Parkinson disease. The patient reports that he has been symptomatic since he was 58 years old; he first began with dragging of the right foot. He has a rigid-predominant subtype.     He is asymmetric, with the right side affected moreso than the left. Walking is a severe problem. He frequently "feels uncomfortable" and "wiggles a little bit." He has some mild balance difficulties. He has toes curl under on both feet, but mostly the right.     He is medication-responsive. The medication is wearing off faster than it did previously.     Goals for surgery: slow progression of disease     He enjoys live music ([employer], [employer]). 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 He has a Master's in Fine Arts.     He lives with his wife, Berenice. No RBD behavior, but he sometimes speaks. They are going to [place] [facility] in a few weeks.     He saw Dr. Meyer in December 2022. The patient denies any bleeding, infectious, or anesthetic complications with any previous surgery. He occasionally takes ibuprofen.     Review of patient's allergies indicates:  No Known Allergies    Current Outpatient Medications   Medication Sig Dispense Refill    ALPRAZolam (XANAX) 0.5 MG tablet TAKE 1 TABLET(0.5 MG) BY MOUTH EVERY NIGHT FOR INSOMNIA OR ANXIETY 30 tablet 5    carbidopa-levodopa (RYTARY) 23.75-95 mg CpSR Take 4 capsules by mouth 3 (three) times daily. 360 capsule 11    carbidopa-levodopa  mg (SINEMET)  mg per tablet Take 1 tablet by mouth every evening. 30 tablet 11    diclofenac (VOLTAREN) 75 MG EC tablet Take 1 tablet (75 mg total) by mouth 2 (two) times daily as needed (pain). 60 tablet 0    fluorouraciL " (EFUDEX) 5 % cream Mix with calcipotriene 0.005% cream  and Apply thin film to upper forehead  2 times per day for 7 days; d/c if area bleeding or ulcerated; avoid eyes or mouth (Patient not taking: Reported on 10/13/2023) 40 g 1    pravastatin (PRAVACHOL) 80 MG tablet TAKE 1 TABLET(80 MG) BY MOUTH EVERY MORNING 90 tablet 3    rasagiline (AZILECT) 1 mg Tab Take 1 tablet (1 mg total) by mouth once daily. 90 tablet 0     No current facility-administered medications for this visit.       Past Medical History:   Diagnosis Date    Anxiety     Esotropia of right eye 05/02/2013    Hearing deficit, bilateral     High cholesterol     Hyperlipidemia     Malignant melanoma of skin, unspecified 06/29/2022    in situ right lateral infraorbit    Melanoma 06/29/2022    in situ - right lateral infraorbital    Melanoma in situ of cheek     Parkinson's disease     Ringing in ear, bilateral      Past Surgical History:   Procedure Laterality Date    CATARACT EXTRACTION W/  INTRAOCULAR LENS IMPLANT Right 01/09/2017    Dr marin     CATARACT EXTRACTION W/  INTRAOCULAR LENS IMPLANT Left 01/30/2017    Dr. Marin    COLONOSCOPY N/A 7/7/2023    Procedure: COLONOSCOPY;  Surgeon: Arnaldo Neely MD;  Location: [facility] ENDOSCOPY;  Service: Endoscopy;  Laterality: N/A;  prep instructions sent to pt via portal -  [facility] prep  pre call complete, stated he would have a ride -CE    HERNIA REPAIR      RECONSTRUCTION OF FACE Right 8/4/2022    Procedure: RECONSTRUCTION, FACE;  Surgeon: Yaneli Hickman MD;  Location: [facility] OR [address] [place] [address];  Service: ENT;  Laterality: Right;    STRABISMUS SURGERY  4yrs    right eye     Family History       Problem Relation (Age of Onset)    Arthritis Mother, Brother    Atrial fibrillation Sister    Cataracts Maternal Grandmother, Paternal Grandmother    Hearing loss Mother    Heart attack Father    Sleep apnea Brother          Social History     Socioeconomic History    Marital status:    Tobacco Use    Smoking status:  Former     Current packs/day: 0.00     Average packs/day: 1 pack/day for 1 year (1.0 ttl pk-yrs)     Types: Cigarettes     Start date: 2005     Quit date: 2006     Years since quittin.8    Smokeless tobacco: Never   Substance and Sexual Activity    Alcohol use: Yes     Alcohol/week: 20.0 standard drinks of alcohol     Types: 20 Glasses of wine per week    Drug use: No    Sexual activity: Yes     Partners: Female     Social Determinants of Health     Financial Resource Strain: Low Risk  (10/10/2023)    Overall Financial Resource Strain (CARDIA)     Difficulty of Paying Living Expenses: Not very hard   Food Insecurity: No Food Insecurity (10/10/2023)    Hunger Vital Sign     Worried About Running Out of Food in the Last Year: Never true     Ran Out of Food in the Last Year: Never true   Transportation Needs: No Transportation Needs (10/10/2023)    PRAPARE - Transportation     Lack of Transportation (Medical): No     Lack of Transportation (Non-Medical): No   Physical Activity: Insufficiently Active (10/10/2023)    Exercise Vital Sign     Days of Exercise per Week: 2 days     Minutes of Exercise per Session: 20 min   Stress: No Stress Concern Present (10/10/2023)    [employer] [employer] - Occupational Stress Questionnaire     Feeling of Stress : Only a little   Recent Concern: Stress - Stress Concern Present (2023)    [employer] [employer] - Occupational Stress Questionnaire     Feeling of Stress : To some extent   Social Connections: Unknown (10/10/2023)    Social Connection and Isolation Panel [NHANES]     Frequency of Communication with Friends and Family: Three times a week     Frequency of Social Gatherings with Friends and Family: Once a week     Active Member of Clubs or Organizations: No     Attends Club or Organization Meetings: Never     Marital Status:    Housing Stability: Low Risk  (10/10/2023)    Housing Stability Vital Sign     Unable to Pay  for Housing in the Last Year: No     Number of Places Lived in the Last Year: 1     Unstable Housing in the Last Year: No       Review of Systems   Constitutional:  Negative for fever.   HENT:          Anosmia  Mild difficulty swallowing dry foods (e.g. cracker)   Eyes:  Negative for visual disturbance.   Respiratory:  Negative for shortness of breath.    Cardiovascular:  Negative for chest pain.   Gastrointestinal:  Negative for constipation.   Endocrine: Negative for cold intolerance.   Genitourinary:  Positive for frequency. Negative for difficulty urinating.   Musculoskeletal:  Negative for neck pain.   Skin:  Negative for color change.   Neurological:  Negative for tremors.   Hematological:  Does not bruise/bleed easily.   Psychiatric/Behavioral:  Negative for dysphoric mood and hallucinations. The patient is nervous/anxious.        OBJECTIVE:     Vital Signs     There is no height or weight on file to calculate BMI.      Physical Exam:    Constitutional: He appears well-developed and well-nourished. No distress.     Eyes: EOM are normal.     Skin: Skin displays no rash on extremities. Skin displays no lesions on extremities.     Psych/Behavior: He is alert. He is oriented to person, place, and time.     Musculoskeletal:      Comments: Mildly guarded gait   No focal weakness on video visit      Neurological:        Coordination: He has normal finger to nose coordination.   No tremor noted     Pulmonary: nonlabored respirations     Hematologic: no bruising noted     Diagnostic Results:  MRI 2017 reviewed   Unremarkable     ASSESSMENT/PLAN:     Adam Dalal is a 63 y.o. male who presents as a referral from Dr. Vargas to discuss possible DBS therapy for Parkinson disease. He will need to be discussed further in interdisciplinary conference, and he will need updated MRI and discussion with neuropsychology (he saw Dr. Meyer in December 2022). If he is deemed to be a candidate, I expect he would best  benefit from left brain for right body GPi (we discussed there's a good chance that he may want contralateral therapy in the future). He is interested in undergoing surgery in December.      We had a lengthy discussion about the risks, benefits, and alternatives to deep brain stimulation.  Risks include, but are not limited to, bleeding, pain, infection, scarring, need for further/repeat procedure, failure to slow the progression of neurodegenerative disease symptoms, speech difficulty, balance difficulty, cognitive changes, loss of inhibition, intracranial hemorrhage, venous infarct, seizure, stroke, paralysis, loss of the ability to swim, and death. Hardware-related complications may also occur. This is an implanted device, meaning that any infection elsewhere in the body must be treated immediately to minimize the risk of blood stream infection seeding the device. Should this happen, it is possible that the entire system would require removal.       We also reviewed the work flow employed at [facility] for placement of deep brain stimulation devices.  We discussed that the patient would be admitted Tuesday or Friday morning for placement of 5 fiducial screws.  We clarified this would require shaving the entire head.  After the fiducial screws are placed, the patient would be transferred to CT scan to obtain a fiducial registration CT scan.  This would be merged with the already obtained MRI.  The patient would be brought back to the operating room for definitive placement of the DBS lead.  This is to be done while awake. The patient expressed understanding thereof. The following week, the patient would be readmitted for asleep implantation of extension cable and pulse generator on an outpatient basis.  The device would subsequently be programmed by movement disorders neurology.      I have encouraged the patient to contact the clinic in interim with any questions, concerns, or adverse clinical change.     I spent  over an hour on this encounter, more than half in direct consultation.     The patient location is: at home   The chief complaint leading to consultation is: Parkinson disease    Visit type: audiovisual    Face to Face time with patient: 54  77 minutes of total time spent on the encounter, which includes face to face time and non-face to face time preparing to see the patient (eg, review of tests), Obtaining and/or reviewing separately obtained history, Documenting clinical information in the electronic or other health record, Independently interpreting results (not separately reported) and communicating results to the patient/family/caregiver, or Care coordination (not separately reported).         Each patient to whom he or she provides medical services by telemedicine is:  (1) informed of the relationship between the physician and patient and the respective role of any other health care provider with respect to management of the patient; and (2) notified that he or she may decline to receive medical services by telemedicine and may withdraw from such care at any time.    Notes: Note generated with voice recognition software; please excuse any typographical errors.

## 2023-11-13 ENCOUNTER — PATIENT MESSAGE (OUTPATIENT)
Dept: NEUROLOGY | Facility: CLINIC | Age: 63
End: 2023-11-13
Payer: COMMERCIAL

## 2023-11-14 ENCOUNTER — TELEPHONE (OUTPATIENT)
Dept: NEUROSURGERY | Facility: CLINIC | Age: 63
End: 2023-11-14
Payer: COMMERCIAL

## 2023-11-14 DIAGNOSIS — Z74.09 IMPAIRED FUNCTIONAL MOBILITY, BALANCE, GAIT, AND ENDURANCE: ICD-10-CM

## 2023-11-14 DIAGNOSIS — G20.B2 PARKINSON'S DISEASE WITH DYSKINESIA AND FLUCTUATING MANIFESTATIONS: Primary | ICD-10-CM

## 2023-11-21 ENCOUNTER — OFFICE VISIT (OUTPATIENT)
Dept: INTERNAL MEDICINE | Facility: CLINIC | Age: 63
End: 2023-11-21
Payer: COMMERCIAL

## 2023-11-21 ENCOUNTER — HOSPITAL ENCOUNTER (OUTPATIENT)
Dept: RADIOLOGY | Facility: HOSPITAL | Age: 63
Discharge: HOME | End: 2023-11-21
Attending: INTERNAL MEDICINE
Payer: COMMERCIAL

## 2023-11-21 VITALS
DIASTOLIC BLOOD PRESSURE: 78 MMHG | SYSTOLIC BLOOD PRESSURE: 112 MMHG | BODY MASS INDEX: 28.96 KG/M2 | OXYGEN SATURATION: 98 % | WEIGHT: 218.5 LBS | HEART RATE: 76 BPM | HEIGHT: 73 IN

## 2023-11-21 DIAGNOSIS — Z01.818 PREOPERATIVE EXAMINATION: Primary | ICD-10-CM

## 2023-11-21 DIAGNOSIS — E78.5 HYPERLIPIDEMIA, UNSPECIFIED HYPERLIPIDEMIA TYPE: ICD-10-CM

## 2023-11-21 DIAGNOSIS — G47.33 OSA (OBSTRUCTIVE SLEEP APNEA): ICD-10-CM

## 2023-11-21 DIAGNOSIS — Z01.818 PREOPERATIVE EXAMINATION: ICD-10-CM

## 2023-11-21 DIAGNOSIS — G20.B2 PARKINSON'S DISEASE WITH DYSKINESIA AND FLUCTUATING MANIFESTATIONS: ICD-10-CM

## 2023-11-21 DIAGNOSIS — R73.09 ELEVATED HEMOGLOBIN A1C: ICD-10-CM

## 2023-11-21 DIAGNOSIS — F41.9 ANXIETY: ICD-10-CM

## 2023-11-21 PROCEDURE — 99214 OFFICE O/P EST MOD 30 MIN: CPT | Mod: S$GLB,,, | Performed by: INTERNAL MEDICINE

## 2023-11-21 PROCEDURE — 3078F PR MOST RECENT DIASTOLIC BLOOD PRESSURE < 80 MM HG: ICD-10-PCS | Mod: CPTII,S$GLB,, | Performed by: INTERNAL MEDICINE

## 2023-11-21 PROCEDURE — 71046 X-RAY EXAM CHEST 2 VIEWS: CPT | Mod: 26,,, | Performed by: INTERNAL MEDICINE

## 2023-11-21 PROCEDURE — 3074F PR MOST RECENT SYSTOLIC BLOOD PRESSURE < 130 MM HG: ICD-10-PCS | Mod: CPTII,S$GLB,, | Performed by: INTERNAL MEDICINE

## 2023-11-21 PROCEDURE — 3044F HG A1C LEVEL LT 7.0%: CPT | Mod: CPTII,S$GLB,, | Performed by: INTERNAL MEDICINE

## 2023-11-21 PROCEDURE — 93010 EKG 12-LEAD: ICD-10-PCS | Mod: S$GLB,,, | Performed by: INTERNAL MEDICINE

## 2023-11-21 PROCEDURE — 99999 PR PBB SHADOW E&M-EST. PATIENT-LVL IV: CPT | Mod: PBBFAC,,, | Performed by: INTERNAL MEDICINE

## 2023-11-21 PROCEDURE — 99214 PR OFFICE/OUTPT VISIT, EST, LEVL IV, 30-39 MIN: ICD-10-PCS | Mod: S$GLB,,, | Performed by: INTERNAL MEDICINE

## 2023-11-21 PROCEDURE — 3044F PR MOST RECENT HEMOGLOBIN A1C LEVEL <7.0%: ICD-10-PCS | Mod: CPTII,S$GLB,, | Performed by: INTERNAL MEDICINE

## 2023-11-21 PROCEDURE — 3074F SYST BP LT 130 MM HG: CPT | Mod: CPTII,S$GLB,, | Performed by: INTERNAL MEDICINE

## 2023-11-21 PROCEDURE — 93005 ELECTROCARDIOGRAM TRACING: CPT | Mod: S$GLB,,, | Performed by: INTERNAL MEDICINE

## 2023-11-21 PROCEDURE — 71046 XR CHEST PA AND LATERAL: ICD-10-PCS | Mod: 26,,, | Performed by: INTERNAL MEDICINE

## 2023-11-21 PROCEDURE — 3008F PR BODY MASS INDEX (BMI) DOCUMENTED: ICD-10-PCS | Mod: CPTII,S$GLB,, | Performed by: INTERNAL MEDICINE

## 2023-11-21 PROCEDURE — 71046 X-RAY EXAM CHEST 2 VIEWS: CPT | Mod: TC

## 2023-11-21 PROCEDURE — 3078F DIAST BP <80 MM HG: CPT | Mod: CPTII,S$GLB,, | Performed by: INTERNAL MEDICINE

## 2023-11-21 PROCEDURE — 99999 PR PBB SHADOW E&M-EST. PATIENT-LVL IV: ICD-10-PCS | Mod: PBBFAC,,, | Performed by: INTERNAL MEDICINE

## 2023-11-21 PROCEDURE — 93005 EKG 12-LEAD: ICD-10-PCS | Mod: S$GLB,,, | Performed by: INTERNAL MEDICINE

## 2023-11-21 PROCEDURE — 3008F BODY MASS INDEX DOCD: CPT | Mod: CPTII,S$GLB,, | Performed by: INTERNAL MEDICINE

## 2023-11-21 PROCEDURE — 93010 ELECTROCARDIOGRAM REPORT: CPT | Mod: S$GLB,,, | Performed by: INTERNAL MEDICINE

## 2023-11-21 NOTE — PROGRESS NOTES
Subjective:       Patient ID: Adam Dalal is a 63 y.o. male.    Chief Complaint: Pre-op Exam      HPI  Adam Dalal is a 63 y.o. year old male with Parkinson disorder, HLD, anxiety presents for preoperative examination. Patient is established with Dr. Gee.     Able to climb 5 sets of stairs without issue (museum elevator went out recently, had to walk the stairs multiple times a day). No chest pain or shortness of breath with this activity. Able to complete >4 METs.     Review of Systems   Constitutional:  Negative for activity change, appetite change, chills, fatigue, fever and unexpected weight change.   HENT:  Negative for congestion, rhinorrhea and sore throat.    Eyes:  Negative for visual disturbance.   Respiratory:  Negative for shortness of breath.    Cardiovascular:  Negative for chest pain.   Gastrointestinal:  Negative for abdominal pain, diarrhea, nausea and vomiting.   Genitourinary:  Negative for difficulty urinating and dysuria.   Musculoskeletal:  Negative for arthralgias, back pain and myalgias.   Skin:  Negative for color change and rash.   Neurological:  Negative for dizziness, weakness and headaches.         Past Medical History:   Diagnosis Date    Anxiety     Esotropia of right eye 05/02/2013    Hearing deficit, bilateral     High cholesterol     Hyperlipidemia     Malignant melanoma of skin, unspecified 06/29/2022    in situ right lateral infraorbit    Melanoma 06/29/2022    in situ - right lateral infraorbital    Melanoma in situ of cheek     Parkinson's disease     Ringing in ear, bilateral         Prior to Admission medications    Medication Sig Start Date End Date Taking? Authorizing Provider   ALPRAZolam (XANAX) 0.5 MG tablet TAKE 1 TABLET(0.5 MG) BY MOUTH EVERY NIGHT FOR INSOMNIA OR ANXIETY 4/4/23  Yes Rashi Gee MD   carbidopa-levodopa (RYTARY) 23.75-95 mg CpSR Take 4 capsules by mouth 3 (three) times daily. 10/16/23  Yes Jes Paniagua, CHERELLE  "  carbidopa-levodopa  mg (SINEMET)  mg per tablet Take 1 tablet by mouth every evening. 8/14/23 8/13/24 Yes Delicia Vargas MD   diclofenac (VOLTAREN) 75 MG EC tablet Take 1 tablet (75 mg total) by mouth 2 (two) times daily as needed (pain). 3/9/23  Yes RICKY Caballero NP   pravastatin (PRAVACHOL) 80 MG tablet TAKE 1 TABLET(80 MG) BY MOUTH EVERY MORNING 4/4/23  Yes Rashi Gee MD   rasagiline (AZILECT) 1 mg Tab Take 1 tablet (1 mg total) by mouth once daily. 10/13/23 1/11/24 Yes Delicia Vargas MD   fluorouraciL (EFUDEX) 5 % cream Mix with calcipotriene 0.005% cream  and Apply thin film to upper forehead  2 times per day for 7 days; d/c if area bleeding or ulcerated; avoid eyes or mouth  Patient not taking: Reported on 10/13/2023 10/24/22   Rachell Sunshine MD        Past medical history, surgical history, and family medical history reviewed and updated as appropriate.    Medications and allergies reviewed.     Objective:          Vitals:    11/21/23 1121   BP: 112/78   BP Location: Right arm   Patient Position: Sitting   Pulse: 76   SpO2: 98%   Weight: 99.1 kg (218 lb 7.6 oz)   Height: 6' 1" (1.854 m)     Body mass index is 28.82 kg/m².  Physical Exam  Constitutional:       General: He is not in acute distress.     Appearance: He is well-developed.   HENT:      Head: Normocephalic and atraumatic.      Nose: Nose normal.   Eyes:      General: No scleral icterus.     Extraocular Movements: Extraocular movements intact.   Neck:      Thyroid: No thyromegaly.      Vascular: No JVD.      Trachea: No tracheal deviation.   Cardiovascular:      Rate and Rhythm: Normal rate and regular rhythm.      Heart sounds: Normal heart sounds. No murmur heard.     No friction rub. No gallop.   Pulmonary:      Effort: Pulmonary effort is normal. No respiratory distress.      Breath sounds: Normal breath sounds. No wheezing or rales.   Abdominal:      General: Bowel sounds are normal. There is no distension. "      Palpations: Abdomen is soft. There is no mass.      Tenderness: There is no abdominal tenderness.   Musculoskeletal:         General: No tenderness. Normal range of motion.      Cervical back: Normal range of motion and neck supple.   Lymphadenopathy:      Cervical: No cervical adenopathy.   Skin:     General: Skin is warm and dry.      Findings: No rash.   Neurological:      Mental Status: He is alert and oriented to person, place, and time.      Cranial Nerves: No cranial nerve deficit.      Deep Tendon Reflexes: Reflexes normal.   Psychiatric:         Behavior: Behavior normal.         Lab Results   Component Value Date    WBC 6.06 10/02/2020    HGB 15.0 10/02/2020    HCT 45.4 10/02/2020     10/02/2020    CHOL 183 04/04/2023    TRIG 100 04/04/2023    HDL 52 04/04/2023    ALT 8 (L) 12/29/2021    AST 30 12/29/2021     04/04/2023    K 4.4 04/04/2023     04/04/2023    CREATININE 1.0 04/04/2023    BUN 17 04/04/2023    CO2 29 04/04/2023    TSH 1.204 10/02/2020    PSA 0.27 04/04/2023    HGBA1C 5.8 (H) 04/04/2023       Assessment:       1. Preoperative examination    2. Parkinson's disease with dyskinesia and fluctuating manifestations    3. Hyperlipidemia, unspecified hyperlipidemia type    4. Anxiety    5. Elevated hemoglobin A1c    6. CHU (obstructive sleep apnea)          Plan:     Adam was seen today for pre-op exam.    Diagnoses and all orders for this visit:    Preoperative examination  Comments:  planned for DBS 1/2/2024 with subsequent follow up 1/19/2024  Orders:  -     CBC W/ AUTO DIFFERENTIAL; Future  -     COMPREHENSIVE METABOLIC PANEL; Future  -     IN OFFICE EKG 12-LEAD (to [facility])  -     X-Ray Chest PA And Lateral; Future  -     Urinalysis; Future  -     PROTIME-INR; Future    Parkinson's disease with dyskinesia and fluctuating manifestations  Comments:  follows with neurology, Dr. Vargas; planned for DBS procedure and generator implantation    Hyperlipidemia, unspecified  hyperlipidemia type  Comments:  controlled on pravastatin 80 daily, no changes to current management    Anxiety  Comments:  situational anxiety, takes alprazolam PRN    Elevated hemoglobin A1c  Comments:  last hemoglobin a1c 5.8, lifestyle controlled.  Orders:  -     HEMOGLOBIN A1C; Future    CHU (obstructive sleep apnea)  Comments:  was told he had sleep apnea previously, had episode of hypnagogic hallucination possibly when first diagnosed.      The patient was evaluated in accordance with the 2014 ACC/AHA guidelines for rajan-operative assessment.      1. This is not considered an emergent procedure.     2. There is no evidence of acute coronary syndrome or other unstable cardiovascular process that necessitates expedited evaluation and treatment.   3. The estimated per-operative risk of a major cardiovascular adverse event based on the combined surgical/patient risk is >1% (increased risk).      4. The patient is able to complete >4 mets. Per the available literature, no further testing is required pre-operatively and the patient should proceed to the OR as planned.       Revised Cardiac Risk Index for Pre-Operative Risk     INPUTS:  Elevated-risk surgery --> 0 = No  History of ischemic heart disease --> 0 = No  History of congestive heart failure --> 0 = No  History of cerebrovascular disease --> 0 = No  Pre-operative treatment with insulin --> 0 = No  Pre-operative creatinine >2 mg/dL / 176.8 µmol/L --> 0 = No  0 points  Class I Risk    3.9 %  30-day risk of death, MI, or cardiac arrest    Will obtain bloodwork, urine, EKG and chest X-ray prior to procedure.     Health maintenance reviewed with patient.     Follow up if symptoms worsen or fail to improve.    Jose Gomez MD  Internal Medicine / Primary Care  [facility]  11/21/2023

## 2023-11-22 ENCOUNTER — OFFICE VISIT (OUTPATIENT)
Dept: SLEEP MEDICINE | Facility: CLINIC | Age: 63
End: 2023-11-22
Payer: COMMERCIAL

## 2023-11-22 VITALS
HEART RATE: 75 BPM | BODY MASS INDEX: 28.89 KG/M2 | DIASTOLIC BLOOD PRESSURE: 85 MMHG | SYSTOLIC BLOOD PRESSURE: 145 MMHG | HEIGHT: 73 IN | WEIGHT: 218 LBS

## 2023-11-22 DIAGNOSIS — G47.33 OBSTRUCTIVE SLEEP APNEA: Primary | ICD-10-CM

## 2023-11-22 DIAGNOSIS — G47.10 HYPERSOMNOLENCE: ICD-10-CM

## 2023-11-22 DIAGNOSIS — R35.1 NOCTURIA: ICD-10-CM

## 2023-11-22 DIAGNOSIS — F51.09 OTHER INSOMNIA NOT DUE TO A SUBSTANCE OR KNOWN PHYSIOLOGICAL CONDITION: ICD-10-CM

## 2023-11-22 DIAGNOSIS — G20.A1 PARKINSON'S DISEASE, UNSPECIFIED WHETHER DYSKINESIA PRESENT, UNSPECIFIED WHETHER MANIFESTATIONS FLUCTUATE: ICD-10-CM

## 2023-11-22 PROCEDURE — 3044F PR MOST RECENT HEMOGLOBIN A1C LEVEL <7.0%: ICD-10-PCS | Mod: CPTII,S$GLB,, | Performed by: PHYSICIAN ASSISTANT

## 2023-11-22 PROCEDURE — 1159F MED LIST DOCD IN RCRD: CPT | Mod: CPTII,S$GLB,, | Performed by: PHYSICIAN ASSISTANT

## 2023-11-22 PROCEDURE — 99999 PR PBB SHADOW E&M-EST. PATIENT-LVL IV: ICD-10-PCS | Mod: PBBFAC,,, | Performed by: PHYSICIAN ASSISTANT

## 2023-11-22 PROCEDURE — 99999 PR PBB SHADOW E&M-EST. PATIENT-LVL IV: CPT | Mod: PBBFAC,,, | Performed by: PHYSICIAN ASSISTANT

## 2023-11-22 PROCEDURE — 99204 PR OFFICE/OUTPT VISIT, NEW, LEVL IV, 45-59 MIN: ICD-10-PCS | Mod: S$GLB,,, | Performed by: PHYSICIAN ASSISTANT

## 2023-11-22 PROCEDURE — 1160F RVW MEDS BY RX/DR IN RCRD: CPT | Mod: CPTII,S$GLB,, | Performed by: PHYSICIAN ASSISTANT

## 2023-11-22 PROCEDURE — 99204 OFFICE O/P NEW MOD 45 MIN: CPT | Mod: S$GLB,,, | Performed by: PHYSICIAN ASSISTANT

## 2023-11-22 PROCEDURE — 1160F PR REVIEW ALL MEDS BY PRESCRIBER/CLIN PHARMACIST DOCUMENTED: ICD-10-PCS | Mod: CPTII,S$GLB,, | Performed by: PHYSICIAN ASSISTANT

## 2023-11-22 PROCEDURE — 3079F PR MOST RECENT DIASTOLIC BLOOD PRESSURE 80-89 MM HG: ICD-10-PCS | Mod: CPTII,S$GLB,, | Performed by: PHYSICIAN ASSISTANT

## 2023-11-22 PROCEDURE — 1159F PR MEDICATION LIST DOCUMENTED IN MEDICAL RECORD: ICD-10-PCS | Mod: CPTII,S$GLB,, | Performed by: PHYSICIAN ASSISTANT

## 2023-11-22 PROCEDURE — 3008F BODY MASS INDEX DOCD: CPT | Mod: CPTII,S$GLB,, | Performed by: PHYSICIAN ASSISTANT

## 2023-11-22 PROCEDURE — 3079F DIAST BP 80-89 MM HG: CPT | Mod: CPTII,S$GLB,, | Performed by: PHYSICIAN ASSISTANT

## 2023-11-22 PROCEDURE — 3077F SYST BP >= 140 MM HG: CPT | Mod: CPTII,S$GLB,, | Performed by: PHYSICIAN ASSISTANT

## 2023-11-22 PROCEDURE — 3077F PR MOST RECENT SYSTOLIC BLOOD PRESSURE >= 140 MM HG: ICD-10-PCS | Mod: CPTII,S$GLB,, | Performed by: PHYSICIAN ASSISTANT

## 2023-11-22 PROCEDURE — 3008F PR BODY MASS INDEX (BMI) DOCUMENTED: ICD-10-PCS | Mod: CPTII,S$GLB,, | Performed by: PHYSICIAN ASSISTANT

## 2023-11-22 PROCEDURE — 3044F HG A1C LEVEL LT 7.0%: CPT | Mod: CPTII,S$GLB,, | Performed by: PHYSICIAN ASSISTANT

## 2023-11-22 NOTE — PROGRESS NOTES
Referred by Jes Paniagua P*     NEW PATIENT VISIT  Previously followed in Sleep Medicine. Last visit 3/24/15 Dr Lauryn Dalal  is a pleasant 63 y.o. male  with PMH significant for HLD, Parkinson's, hx melanoma, former smoker, BMI 28+, CHU who presents for sleep evaluation =      Dx CHU 2012 (AHI 41). Tried CPAP at the time, but could not tolerate due to claustrophobia. Dx with Parkinson's 5 years ago. Denies dream enactment, kicking, punching, screaming during slep. Denies falls from bed or injuries to self or others. States his Neurologist recommended re-evaluation for CHU, which is why he presents today        Past Medical History:   Diagnosis Date    Anxiety     Esotropia of right eye 05/02/2013    Hearing deficit, bilateral     High cholesterol     Hyperlipidemia     Malignant melanoma of skin, unspecified 06/29/2022    in situ right lateral infraorbit    Melanoma 06/29/2022    in situ - right lateral infraorbital    Melanoma in situ of cheek     Parkinson's disease     Ringing in ear, bilateral      Patient Active Problem List   Diagnosis    Hyperlipidemia    Obstructive sleep apnea    Esotropia of right eye    Ocular hypertension - Both Eyes    Nuclear sclerosis    Nuclear sclerotic cataract of left eye    Parkinson disease    Hypophonia    Voice and resonance disorder    Dysarthria    Impaired functional mobility, balance, gait, and endurance    Impaired motor control    Alcohol use disorder, mild, abuse    Oral lesion    Mohs defect of right cheek    History of melanoma in situ    Mild neurocognitive disorder due to multiple etiologies       Current Outpatient Medications:     ALPRAZolam (XANAX) 0.5 MG tablet, TAKE 1 TABLET(0.5 MG) BY MOUTH EVERY NIGHT FOR INSOMNIA OR ANXIETY, Disp: 30 tablet, Rfl: 5    carbidopa-levodopa (RYTARY) 23.75-95 mg CpSR, Take 4 capsules by mouth 3 (three) times daily., Disp: 360 capsule, Rfl: 11    carbidopa-levodopa  mg (SINEMET)  mg per  "tablet, Take 1 tablet by mouth every evening., Disp: 30 tablet, Rfl: 11    diclofenac (VOLTAREN) 75 MG EC tablet, Take 1 tablet (75 mg total) by mouth 2 (two) times daily as needed (pain)., Disp: 60 tablet, Rfl: 0    fluorouraciL (EFUDEX) 5 % cream, Mix with calcipotriene 0.005% cream  and Apply thin film to upper forehead  2 times per day for 7 days; d/c if area bleeding or ulcerated; avoid eyes or mouth (Patient not taking: Reported on 10/13/2023), Disp: 40 g, Rfl: 1    pravastatin (PRAVACHOL) 80 MG tablet, TAKE 1 TABLET(80 MG) BY MOUTH EVERY MORNING, Disp: 90 tablet, Rfl: 3    rasagiline (AZILECT) 1 mg Tab, Take 1 tablet (1 mg total) by mouth once daily., Disp: 90 tablet, Rfl: 0       Vitals:    11/22/23 1030   Weight: 98.9 kg (218 lb)   Height: 6' 1" (1.854 m)     Physical Exam:    GEN:   Well-appearing  Psych:  Appropriate affect, demonstrates insight  SKIN:  No rash on the face or bridge of the nose      LABS:   Lab Results   Component Value Date    HGB 15.0 11/21/2023    CO2 30 (H) 11/21/2023       RECORDS REVIEWED PREVIOUSLY:    9/19/12 PSG: AHI 41.5, efra 79%  1/12/15 PAP titration: CPAP 5-17cwp explored, +14cwp sREM; CPAP 14cwp recommended     ASSESSMENT    ESS Today: 12/24    PROBLEM DESCRIPTION/ Sx on Presentation  STATUS   SEVERE CHU   Dx 2014 AHI 41.5  Could not tolerate CPAP due to claustrophobia     Father snores very loudly and brother has CHU  New   Daytime Sx   + sleepiness when inactive   ESS 11/24 on intake (reviewed from 11/9/12)  New   Insomnia   Trouble falling asleep:   Maintenance:         disrupted sleep  Prior hypnotics:        Current hypnotics: xanax 0.5mg PRN    New   Nocturia 3-4 x nightly     Other issues:     PLAN     -recommend sleep testing   -discussed PSG (with ability to assess for RBD) vs HST  -patient wishes to proceed with HST  -HST ordered  -discussed trial therapy if CHU present and the patient is open to a trial of CPAP therapy  -alternative therapies such as inspire and " MAD  -discussed CHU and CPAP with patient in detail, including possible complications of untreated CHU like heart attack/stroke  -advised on strict driving precautions; advised never to drive drowsy    Advised on plan of care. Answered all patient questions. Patient verbalized understanding and voiced agreement with plan of care.       RTC if dx of CHU made and CPAP ordered, will need follow up 31-90 days after receiving machine for compliance        The patient was given open opportunity to ask questions and/or express concerns about treatment plan.   All questions/concerns were discussed.     Two patient identifiers used prior to evaluation.

## 2023-11-24 ENCOUNTER — PATIENT MESSAGE (OUTPATIENT)
Dept: NEUROSURGERY | Facility: CLINIC | Age: 63
End: 2023-11-24
Payer: COMMERCIAL

## 2023-11-27 ENCOUNTER — TELEPHONE (OUTPATIENT)
Dept: SLEEP MEDICINE | Facility: OTHER | Age: 63
End: 2023-11-27
Payer: COMMERCIAL

## 2023-11-28 ENCOUNTER — TELEPHONE (OUTPATIENT)
Dept: SLEEP MEDICINE | Facility: OTHER | Age: 63
End: 2023-11-28
Payer: COMMERCIAL

## 2023-12-04 ENCOUNTER — TELEPHONE (OUTPATIENT)
Dept: SLEEP MEDICINE | Facility: OTHER | Age: 63
End: 2023-12-04
Payer: COMMERCIAL

## 2023-12-05 ENCOUNTER — HOSPITAL ENCOUNTER (OUTPATIENT)
Dept: SLEEP MEDICINE | Facility: OTHER | Age: 63
Discharge: HOME | End: 2023-12-05
Attending: PHYSICIAN ASSISTANT
Payer: COMMERCIAL

## 2023-12-05 DIAGNOSIS — G20.A1 PARKINSON'S DISEASE, UNSPECIFIED WHETHER DYSKINESIA PRESENT, UNSPECIFIED WHETHER MANIFESTATIONS FLUCTUATE: ICD-10-CM

## 2023-12-05 DIAGNOSIS — R35.1 NOCTURIA: ICD-10-CM

## 2023-12-05 DIAGNOSIS — F51.09 OTHER INSOMNIA NOT DUE TO A SUBSTANCE OR KNOWN PHYSIOLOGICAL CONDITION: ICD-10-CM

## 2023-12-05 DIAGNOSIS — G47.33 OBSTRUCTIVE SLEEP APNEA: ICD-10-CM

## 2023-12-05 DIAGNOSIS — G47.10 HYPERSOMNOLENCE: ICD-10-CM

## 2023-12-05 PROCEDURE — 95800 SLP STDY UNATTENDED: CPT

## 2023-12-06 ENCOUNTER — PATIENT MESSAGE (OUTPATIENT)
Dept: SLEEP MEDICINE | Facility: CLINIC | Age: 63
End: 2023-12-06
Payer: COMMERCIAL

## 2023-12-06 DIAGNOSIS — G47.33 OSA (OBSTRUCTIVE SLEEP APNEA): Primary | ICD-10-CM

## 2023-12-06 PROCEDURE — 95806 PR SLEEP STUDY, UNATTENDED, SIMUL RECORD HR/O2 SAT/RESP FLOW/RESP EFFT: ICD-10-PCS | Mod: 26,,, | Performed by: INTERNAL MEDICINE

## 2023-12-06 PROCEDURE — 95806 SLEEP STUDY UNATT&RESP EFFT: CPT | Mod: 26,,, | Performed by: INTERNAL MEDICINE

## 2023-12-14 ENCOUNTER — HOSPITAL ENCOUNTER (OUTPATIENT)
Dept: RADIOLOGY | Facility: HOSPITAL | Age: 63
Discharge: HOME | End: 2023-12-14
Attending: NEUROLOGICAL SURGERY
Payer: COMMERCIAL

## 2023-12-14 ENCOUNTER — OFFICE VISIT (OUTPATIENT)
Dept: NEUROSURGERY | Facility: CLINIC | Age: 63
End: 2023-12-14
Payer: COMMERCIAL

## 2023-12-14 VITALS
BODY MASS INDEX: 28.89 KG/M2 | HEIGHT: 73 IN | DIASTOLIC BLOOD PRESSURE: 78 MMHG | SYSTOLIC BLOOD PRESSURE: 120 MMHG | HEART RATE: 69 BPM | WEIGHT: 218 LBS

## 2023-12-14 DIAGNOSIS — G20.B2 PARKINSON'S DISEASE WITH DYSKINESIA AND FLUCTUATING MANIFESTATIONS: Primary | ICD-10-CM

## 2023-12-14 DIAGNOSIS — Z74.09 IMPAIRED FUNCTIONAL MOBILITY, BALANCE, GAIT, AND ENDURANCE: ICD-10-CM

## 2023-12-14 DIAGNOSIS — G20.B2 PARKINSON'S DISEASE WITH DYSKINESIA AND FLUCTUATING MANIFESTATIONS: ICD-10-CM

## 2023-12-14 PROCEDURE — 99999 PR PBB SHADOW E&M-EST. PATIENT-LVL IV: CPT | Mod: PBBFAC,,, | Performed by: NEUROLOGICAL SURGERY

## 2023-12-14 PROCEDURE — 3008F PR BODY MASS INDEX (BMI) DOCUMENTED: ICD-10-PCS | Mod: CPTII,S$GLB,, | Performed by: NEUROLOGICAL SURGERY

## 2023-12-14 PROCEDURE — 70552 MRI BRAIN STEM W/DYE: CPT | Mod: 26,59,, | Performed by: STUDENT IN AN ORGANIZED HEALTH CARE EDUCATION/TRAINING PROGRAM

## 2023-12-14 PROCEDURE — 70552 MRI BRAIN STEM W/DYE: CPT | Mod: TC

## 2023-12-14 PROCEDURE — 3074F PR MOST RECENT SYSTOLIC BLOOD PRESSURE < 130 MM HG: ICD-10-PCS | Mod: CPTII,S$GLB,, | Performed by: NEUROLOGICAL SURGERY

## 2023-12-14 PROCEDURE — 3044F HG A1C LEVEL LT 7.0%: CPT | Mod: CPTII,S$GLB,, | Performed by: NEUROLOGICAL SURGERY

## 2023-12-14 PROCEDURE — 25500020 PHARM REV CODE 255: Performed by: NEUROLOGICAL SURGERY

## 2023-12-14 PROCEDURE — 3074F SYST BP LT 130 MM HG: CPT | Mod: CPTII,S$GLB,, | Performed by: NEUROLOGICAL SURGERY

## 2023-12-14 PROCEDURE — 1160F PR REVIEW ALL MEDS BY PRESCRIBER/CLIN PHARMACIST DOCUMENTED: ICD-10-PCS | Mod: CPTII,S$GLB,, | Performed by: NEUROLOGICAL SURGERY

## 2023-12-14 PROCEDURE — 70553 MRI BRAIN STEM W/O & W/DYE: CPT | Mod: TC

## 2023-12-14 PROCEDURE — 3044F PR MOST RECENT HEMOGLOBIN A1C LEVEL <7.0%: ICD-10-PCS | Mod: CPTII,S$GLB,, | Performed by: NEUROLOGICAL SURGERY

## 2023-12-14 PROCEDURE — 99214 PR OFFICE/OUTPT VISIT, EST, LEVL IV, 30-39 MIN: ICD-10-PCS | Mod: S$GLB,,, | Performed by: NEUROLOGICAL SURGERY

## 2023-12-14 PROCEDURE — 3078F PR MOST RECENT DIASTOLIC BLOOD PRESSURE < 80 MM HG: ICD-10-PCS | Mod: CPTII,S$GLB,, | Performed by: NEUROLOGICAL SURGERY

## 2023-12-14 PROCEDURE — 1159F MED LIST DOCD IN RCRD: CPT | Mod: CPTII,S$GLB,, | Performed by: NEUROLOGICAL SURGERY

## 2023-12-14 PROCEDURE — 1159F PR MEDICATION LIST DOCUMENTED IN MEDICAL RECORD: ICD-10-PCS | Mod: CPTII,S$GLB,, | Performed by: NEUROLOGICAL SURGERY

## 2023-12-14 PROCEDURE — 70552 MRI BRAIN WITH CONTRAST: ICD-10-PCS | Mod: 26,59,, | Performed by: STUDENT IN AN ORGANIZED HEALTH CARE EDUCATION/TRAINING PROGRAM

## 2023-12-14 PROCEDURE — 1160F RVW MEDS BY RX/DR IN RCRD: CPT | Mod: CPTII,S$GLB,, | Performed by: NEUROLOGICAL SURGERY

## 2023-12-14 PROCEDURE — 3008F BODY MASS INDEX DOCD: CPT | Mod: CPTII,S$GLB,, | Performed by: NEUROLOGICAL SURGERY

## 2023-12-14 PROCEDURE — 70553 MRI BRAIN W WO CONTRAST: ICD-10-PCS | Mod: 26,,, | Performed by: STUDENT IN AN ORGANIZED HEALTH CARE EDUCATION/TRAINING PROGRAM

## 2023-12-14 PROCEDURE — 99999 PR PBB SHADOW E&M-EST. PATIENT-LVL IV: ICD-10-PCS | Mod: PBBFAC,,, | Performed by: NEUROLOGICAL SURGERY

## 2023-12-14 PROCEDURE — A9585 GADOBUTROL INJECTION: HCPCS | Performed by: NEUROLOGICAL SURGERY

## 2023-12-14 PROCEDURE — 99214 OFFICE O/P EST MOD 30 MIN: CPT | Mod: S$GLB,,, | Performed by: NEUROLOGICAL SURGERY

## 2023-12-14 PROCEDURE — 70553 MRI BRAIN STEM W/O & W/DYE: CPT | Mod: 26,,, | Performed by: STUDENT IN AN ORGANIZED HEALTH CARE EDUCATION/TRAINING PROGRAM

## 2023-12-14 PROCEDURE — 3078F DIAST BP <80 MM HG: CPT | Mod: CPTII,S$GLB,, | Performed by: NEUROLOGICAL SURGERY

## 2023-12-14 RX ORDER — GADOBUTROL 604.72 MG/ML
10 INJECTION INTRAVENOUS
Status: COMPLETED | OUTPATIENT
Start: 2023-12-14 | End: 2023-12-14

## 2023-12-14 RX ADMIN — GADOBUTROL 10 ML: 604.72 INJECTION INTRAVENOUS at 02:12

## 2023-12-14 NOTE — H&P (VIEW-ONLY)
Adam Dalal was seen in neurosurgical consultation at the office this morning.  He is a 63-year-old man who about 6 or 7 years ago began to experience dragging of his right leg.  This progressed to involve some stiffness and difficulty initiating movement in the right upper extremity.  He was seen in neurology evaluation by Dr. Jerome here and diagnosed with Parkinson's disease.  [facility].  Tremor is not a feature of his disease.  He has minimal left-sided symptoms but does have some mild difficulty with gait and balance.  He is very responsive to dopamine replacement and is currently taking Rytary, Sinemet, and Azilect.  He does have some uncontrolled dyskinetic movements. j Past medical history includes sleep apnea.  This is currently being evaluated.  He has had bilateral cataract surgery and had a melanoma removed from the right cheek.  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  His wife is with him today.    On physical examination he is a well-developed, well-nourished white male who is alert and cooperative.  Examination of the head shows no tenderness over the skull.  Eyes show full extraocular movements although the right eye is a little medial at rest.  Pupils are equal reactive to light and fundi show clear disc margins.  Hearing is reduced on the right side compared to the left with finger rubbing and midline tuning fork is referred to the left.  He is moving neck freely.  On neurological examination he has a little speech hesitation.  He is not hypophonic.  Memory seems good.  There is no tremor of the upper extremities at rest or on movement and finger-to-nose is done well.  There was a little jerky stiffness and the right upper extremity.  Gait was done fairly well today.  The rest of cranial nerve examination is unremarkable, he has normal facial sensation and movement and the tongue protrudes in the midline.  He shows good strength extremities,  normal sensation, somewhat hypoactive but symmetrical deep tendon reflexes.    MRI of the brain was done at [facility] on 11/17/2017 and again on 07/01/2021 and both of these studies are normal.    Impression: Parkinson's disease.    Recommendation:  We are planning deep brain stimulation for 1/02/2024.  I went over the history of neurosurgical intervention for Parkinson's disease.  Deep brain stimulation was approved by the FDA in 1997 and has been beneficial in controlling Parkinson's symptoms.  Stereotaxy is used to place the appropriate position for the electrode.  Navigation MRI is done to find the desired location of the electrode and CT scan with skull fiducial screws merged with MRI to create a navigation program.  The patient is awake during the procedure and micro electrode recordings and micro stimulation are carried out prior to implantation of the DBS electrode.  The DBS electrode is capped into place and he will return the following week for insertion of the pulse generator under general anesthesia.

## 2023-12-14 NOTE — H&P (VIEW-ONLY)
Adam Dalal was seen in neurosurgical consultation at the office this morning.  He is a 63-year-old man who about 6 or 7 years ago began to experience dragging of his right leg.  This progressed to involve some stiffness and difficulty initiating movement in the right upper extremity.  He was seen in neurology evaluation by Dr. Jerome here and diagnosed with Parkinson's disease.  [facility].  Tremor is not a feature of his disease.  He has minimal left-sided symptoms but does have some mild difficulty with gait and balance.  He is very responsive to dopamine replacement and is currently taking Rytary, Sinemet, and Azilect.  He does have some uncontrolled dyskinetic movements. j Past medical history includes sleep apnea.  This is currently being evaluated.  He has had bilateral cataract surgery and had a melanoma removed from the right cheek.  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    On physical examination he is a well-developed, well-nourished white male who is alert and cooperative.  Examination of the head shows no tenderness over the skull.  Eyes show full extraocular movements although the right eye is a little medial at rest.  Pupils are equal reactive to light and fundi show clear disc margins.  Hearing is reduced on the right side compared to the left with finger rubbing and midline tuning fork is referred to the left.  He is moving neck freely.  On neurological examination he has a little speech hesitation.  He is not hypophonic.  Memory seems good.  There is no tremor of the upper extremities at rest or on movement and finger-to-nose is done well.  There was a little jerky stiffness and the right upper extremity.  Gait was done fairly well today.  The rest of cranial nerve examination is unremarkable, he has normal facial sensation and movement and the tongue protrudes in the midline.  He shows good strength extremities,  normal sensation, somewhat hypoactive but symmetrical deep tendon reflexes.    MRI of the brain was done at [facility] on 11/17/2017 and again on 07/01/2021 and both of these studies are normal.    Impression: Parkinson's disease.    Recommendation:  We are planning deep brain stimulation for 1/02/2024.  I went over the history of neurosurgical intervention for Parkinson's disease.  Deep brain stimulation was approved by the FDA in 1997 and has been beneficial in controlling Parkinson's symptoms.  Stereotaxy is used to place the appropriate position for the electrode.  Navigation MRI is done to find the desired location of the electrode and CT scan with skull fiducial screws merged with MRI to create a navigation program.  The patient is awake during the procedure and micro electrode recordings and micro stimulation are carried out prior to implantation of the DBS electrode.  The DBS electrode is capped into place and he will return the following week for insertion of the pulse generator under general anesthesia.

## 2023-12-14 NOTE — PROGRESS NOTES
Adam Dalal was seen in neurosurgical consultation at the office this morning.  He is a 63-year-old man who about 6 or 7 years ago began to experience dragging of his right leg.  This progressed to involve some stiffness and difficulty initiating movement in the right upper extremity.  He was seen in neurology evaluation by Dr. Jerome here and diagnosed with Parkinson's disease.  [facility].  Tremor is not a feature of his disease.  He has minimal left-sided symptoms but does have some mild difficulty with gait and balance.  He is very responsive to dopamine replacement and is currently taking Rytary, Sinemet, and Azilect.  He does have some uncontrolled dyskinetic movements. j Past medical history includes sleep apnea.  This is currently being evaluated.  He has had bilateral cataract surgery and had a melanoma removed from the right cheek.  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    On physical examination he is a well-developed, well-nourished white male who is alert and cooperative.  Examination of the head shows no tenderness over the skull.  Eyes show full extraocular movements although the right eye is a little medial at rest.  Pupils are equal reactive to light and fundi show clear disc margins.  Hearing is reduced on the right side compared to the left with finger rubbing and midline tuning fork is referred to the left.  He is moving neck freely.  On neurological examination he has a little speech hesitation.  He is not hypophonic.  Memory seems good.  There is no tremor of the upper extremities at rest or on movement and finger-to-nose is done well.  There was a little jerky stiffness and the right upper extremity.  Gait was done fairly well today.  The rest of cranial nerve examination is unremarkable, he has normal facial sensation and movement and the tongue protrudes in the midline.  He shows good strength extremities,  normal sensation, somewhat hypoactive but symmetrical deep tendon reflexes.    MRI of the brain was done at [facility] on 11/17/2017 and again on 07/01/2021 and both of these studies are normal.    Impression: Parkinson's disease.    Recommendation:  We are planning deep brain stimulation for 1/02/2024.  I went over the history of neurosurgical intervention for Parkinson's disease.  Deep brain stimulation was approved by the FDA in 1997 and has been beneficial in controlling Parkinson's symptoms.  Stereotaxy is used to place the appropriate position for the electrode.  Navigation MRI is done to find the desired location of the electrode and CT scan with skull fiducial screws merged with MRI to create a navigation program.  The patient is awake during the procedure and micro electrode recordings and micro stimulation are carried out prior to implantation of the DBS electrode.  The DBS electrode is capped into place and he will return the following week for insertion of the pulse generator under general anesthesia.

## 2023-12-15 ENCOUNTER — PATIENT MESSAGE (OUTPATIENT)
Dept: SLEEP MEDICINE | Facility: CLINIC | Age: 63
End: 2023-12-15
Payer: COMMERCIAL

## 2023-12-28 NOTE — PRE-PROCEDURE INSTRUCTIONS
PreOp Instructions given:   - Verbal medication information (what to hold and what to take)   - NPO guidelines 2300  - Arrival place directions given; time to be given the day before procedure by the   Surgeon's Office 0500  - Bathing with antibacterial soap   - Don't wear any jewelry or bring any valuables AM of surgery   - No makeup or moisturizer to face   - No perfume/cologne, powder, lotions or aftershave   Pt. verbalized understanding.   Pt denies any h/o Anesthesia/Sedation complications or side effects.

## 2024-01-01 ENCOUNTER — ANESTHESIA EVENT (OUTPATIENT)
Dept: SURGERY | Facility: HOSPITAL | Age: 64
DRG: 027 | End: 2024-01-01
Payer: COMMERCIAL

## 2024-01-01 NOTE — ANESTHESIA PREPROCEDURE EVALUATION
[facility]  Anesthesia Pre-Operative Evaluation         Patient Name: Adam Dalal  YOB: 1960  MRN: [id number]    SUBJECTIVE:     Pre-operative evaluation for Procedure(s) (LRB):  INSERTION, FIDUCIAL SCREW, SKULL (N/A)  INSERTION, DEEP BRAIN STIMULATOR ELECTRODE (Left)     01/01/2024    Adam Dalal is a 63 y.o. male w/ a significant PMHx of CHU and Parkinson's .    Patient now presents for the above procedure(s).      LDA: None documented.    Prev airway:     Intubation     Date/Time: 8/4/2022 1:16 PM  Performed by: Tresa Proctor CRNA  Authorized by: Amol Wick MD      Intubation:     Induction:  Intravenous    Intubated:  Postinduction    Mask Ventilation:  Moderately difficult with oral airway    Attempts:  1    Attempted By:  Student    Method of Intubation:  Direct    Blade:  Calderon 2    Laryngeal View Grade: Grade I - full view of cords      Difficult Airway Encountered?: No      Complications:  None    Airway Device:  Oral endotracheal tube    Airway Device Size:  7.5    Style/Cuff Inflation:  Cuffed    Tube secured:  23    Secured at:  The lips    Placement Verified By:  Colorimetric ETCO2 device    Complicating Factors:  None    Findings Post-Intubation:  BS equal bilateral          Drips: None documented.    Patient Active Problem List   Diagnosis    Hyperlipidemia    Obstructive sleep apnea    Esotropia of right eye    Ocular hypertension - Both Eyes    Nuclear sclerosis    Nuclear sclerotic cataract of left eye    Parkinson disease    Hypophonia    Voice and resonance disorder    Dysarthria    Impaired functional mobility, balance, gait, and endurance    Impaired motor control    Alcohol use disorder, mild, abuse    Oral lesion    Mohs defect of right cheek    History of melanoma in situ    Mild neurocognitive disorder due to multiple etiologies       Review of patient's allergies indicates:  No Known Allergies    Current Outpatient  Medications:  No current facility-administered medications for this encounter.    Current Outpatient Medications:     pravastatin (PRAVACHOL) 80 MG tablet, TAKE 1 TABLET(80 MG) BY MOUTH EVERY MORNING (Patient taking differently: Take 80 mg by mouth every evening. TAKE 1 TABLET(80 MG) BY MOUTH EVERY MORNING), Disp: 90 tablet, Rfl: 3    ALPRAZolam (XANAX) 0.5 MG tablet, TAKE 1 TABLET(0.5 MG) BY MOUTH EVERY NIGHT FOR INSOMNIA OR ANXIETY, Disp: 30 tablet, Rfl: 5    carbidopa-levodopa (RYTARY) 23.75-95 mg CpSR, Take 4 capsules by mouth 3 (three) times daily., Disp: 360 capsule, Rfl: 11    carbidopa-levodopa  mg (SINEMET)  mg per tablet, Take 1 tablet by mouth every evening., Disp: 30 tablet, Rfl: 11    diclofenac (VOLTAREN) 75 MG EC tablet, Take 1 tablet (75 mg total) by mouth 2 (two) times daily as needed (pain). (Patient not taking: Reported on 12/14/2023), Disp: 60 tablet, Rfl: 0    fluorouraciL (EFUDEX) 5 % cream, Mix with calcipotriene 0.005% cream  and Apply thin film to upper forehead  2 times per day for 7 days; d/c if area bleeding or ulcerated; avoid eyes or mouth (Patient not taking: Reported on 10/13/2023), Disp: 40 g, Rfl: 1    rasagiline (AZILECT) 1 mg Tab, Take 1 tablet (1 mg total) by mouth once daily., Disp: 90 tablet, Rfl: 0    Past Surgical History:   Procedure Laterality Date    CATARACT EXTRACTION W/  INTRAOCULAR LENS IMPLANT Right 01/09/2017    Dr marin     CATARACT EXTRACTION W/  INTRAOCULAR LENS IMPLANT Left 01/30/2017    Dr. Marin    COLONOSCOPY N/A 7/7/2023    Procedure: COLONOSCOPY;  Surgeon: Arnaldo Neely MD;  Location: [facility] ENDOSCOPY;  Service: Endoscopy;  Laterality: N/A;  prep instructions sent to pt via portal -  Monique prep  pre call complete, stated he would have a ride -CE    HERNIA REPAIR      RECONSTRUCTION OF FACE Right 8/4/2022    Procedure: RECONSTRUCTION, FACE;  Surgeon: Yaneli Hickman MD;  Location: [facility] OR [address] [place] [address];  Service: ENT;  Laterality: Right;     STRABISMUS SURGERY  4yrs    right eye       Social History     Socioeconomic History    Marital status:    Tobacco Use    Smoking status: Former     Current packs/day: 0.00     Average packs/day: 1 pack/day for 1 year (1.0 ttl pk-yrs)     Types: Cigarettes     Start date: 2005     Quit date: 2006     Years since quittin.0    Smokeless tobacco: Never   Substance and Sexual Activity    Alcohol use: Yes     Alcohol/week: 20.0 standard drinks of alcohol     Types: 20 Glasses of wine per week    Drug use: No    Sexual activity: Yes     Partners: Female     Social Determinants of Health     Financial Resource Strain: Low Risk  (10/10/2023)    Overall Financial Resource Strain (CARDIA)     Difficulty of Paying Living Expenses: Not very hard   Food Insecurity: No Food Insecurity (10/10/2023)    Hunger Vital Sign     Worried About Running Out of Food in the Last Year: Never true     Ran Out of Food in the Last Year: Never true   Transportation Needs: No Transportation Needs (10/10/2023)    PRAPARE - Transportation     Lack of Transportation (Medical): No     Lack of Transportation (Non-Medical): No   Physical Activity: Insufficiently Active (10/10/2023)    Exercise Vital Sign     Days of Exercise per Week: 2 days     Minutes of Exercise per Session: 20 min   Stress: No Stress Concern Present (10/10/2023)    [employer] [place] [employer] - Occupational Stress Questionnaire     Feeling of Stress : Only a little   Recent Concern: Stress - Stress Concern Present (2023)    [employer] [place] [employer] - Occupational Stress Questionnaire     Feeling of Stress : To some extent   Social Connections: Unknown (10/10/2023)    Social Connection and Isolation Panel [NHANES]     Frequency of Communication with Friends and Family: Three times a week     Frequency of Social Gatherings with Friends and Family: Once a week     Active Member of Clubs or Organizations: No     Attends Club or  Organization Meetings: Never     Marital Status:    Housing Stability: Low Risk  (10/10/2023)    Housing Stability Vital Sign     Unable to Pay for Housing in the Last Year: No     Number of Places Lived in the Last Year: 1     Unstable Housing in the Last Year: No       OBJECTIVE:     Vital Signs Range (Last 24H):         Significant Labs:  Lab Results   Component Value Date    WBC 6.84 11/21/2023    HGB 15.0 11/21/2023    HCT 45.8 11/21/2023     11/21/2023    CHOL 183 04/04/2023    TRIG 100 04/04/2023    HDL 52 04/04/2023    ALT <5 (L) 11/21/2023    AST 23 11/21/2023     11/21/2023    K 4.6 11/21/2023     11/21/2023    CREATININE 1.1 11/21/2023    BUN 12 11/21/2023    CO2 30 (H) 11/21/2023    TSH 1.204 10/02/2020    PSA 0.27 04/04/2023    INR 1.0 11/21/2023    HGBA1C 5.5 11/21/2023       Diagnostic Studies: No relevant studies.    EKG:   Results for orders placed or performed in visit on 11/21/23   IN OFFICE EKG 12-LEAD (to [place])    Collection Time: 11/21/23 11:29 AM    Narrative    Test Reason : Z01.818,    Vent. Rate : 073 BPM     Atrial Rate : 073 BPM     P-R Int : 150 ms          QRS Dur : 084 ms      QT Int : 390 ms       P-R-T Axes : 047 -32 042 degrees     QTc Int : 429 ms    Normal sinus rhythm  Left axis deviation  Low voltage QRS  Abnormal ECG  When compared with ECG of 06-FEB-2007 10:52,  No significant change was found  Confirmed by Leandro Albert MD ([id number]) on 11/21/2023 2:14:38 PM    Referred By: [email or web address]   SELF           Confirmed By:Leandro Albert MD       2D ECHO:  TTE:  No results found for this or any previous visit.    FABIENNE:  No results found for this or any previous visit.    ASSESSMENT/PLAN:           Pre-op Assessment    I have reviewed the Patient Summary Reports.     I have reviewed the Nursing Notes. I have reviewed the NPO Status.   I have reviewed the Medications.     Review of Systems  Anesthesia Hx:  No problems with previous Anesthesia   History of prior  surgery of interest to airway management or planning: facial plastic/reconstructive. Previous anesthesia: General Face reconstruction 2022 with general anesthesia.  Procedure performed at an [facility].      Airway issues documented on chart review include mask, difficult, easy direct laryngoscopy     Denies Family Hx of Anesthesia complications.    Denies Personal Hx of Anesthesia complications.                    Social:  Alcohol Use, Former Smoker       Hematology/Oncology:  Hematology Normal                       --  Cancer in past history:       Other (see Oncology comments)       surgery   Oncology Comments: Melanoma of cheek     EENT/Dental:  EENT/Dental Normal           Cardiovascular:  Exercise tolerance: good              hyperlipidemia                             Pulmonary:  Pulmonary Normal                       Renal/:  Renal/ Normal                 Hepatic/GI:  Hepatic/GI Normal                 Musculoskeletal:  Musculoskeletal Normal                Neurological:           Parkinson's disease                            Endocrine:  Endocrine Normal            Dermatological:  Skin Normal    Psych:   anxiety                 Physical Exam  General: Well nourished, Cooperative, Alert and Oriented        Anesthesia Plan  Type of Anesthesia, risks & benefits discussed:    Anesthesia Type: Gen ETT, Gen Natural Airway  Intra-op Monitoring Plan: Standard ASA Monitors  Post Op Pain Control Plan: multimodal analgesia and IV/PO Opioids PRN  Induction:  IV  Airway Plan: Direct, Post-Induction  Informed Consent: Informed consent signed with the Patient and all parties understand the risks and agree with anesthesia plan.  All questions answered.   ASA Score: 2  Day of Surgery Review of History & Physical: H&P Update referred to the surgeon/provider.    Ready For Surgery From Anesthesia Perspective.     .

## 2024-01-02 ENCOUNTER — ANESTHESIA (OUTPATIENT)
Dept: SURGERY | Facility: HOSPITAL | Age: 64
DRG: 027 | End: 2024-01-02
Payer: COMMERCIAL

## 2024-01-02 ENCOUNTER — HOSPITAL ENCOUNTER (INPATIENT)
Facility: HOSPITAL | Age: 64
LOS: 1 days | Discharge: HOME | DRG: 027 | End: 2024-01-03
Attending: NEUROLOGICAL SURGERY | Admitting: NEUROLOGICAL SURGERY
Payer: COMMERCIAL

## 2024-01-02 ENCOUNTER — DOCUMENTATION ONLY (OUTPATIENT)
Dept: NEUROLOGY | Facility: CLINIC | Age: 64
End: 2024-01-02
Payer: COMMERCIAL

## 2024-01-02 DIAGNOSIS — G20.A1 PARKINSON'S DISEASE (TREMOR, STIFFNESS, SLOW MOTION, UNSTABLE POSTURE): ICD-10-CM

## 2024-01-02 DIAGNOSIS — G40.909 EPILEPSY: ICD-10-CM

## 2024-01-02 LAB
ABO + RH BLD: NORMAL
ANION GAP SERPL CALC-SCNC: 11 MMOL/L (ref 8–16)
APTT PPP: 26 SEC (ref 21–32)
BASOPHILS # BLD AUTO: 0.09 K/UL (ref 0–0.2)
BASOPHILS NFR BLD: 1.4 % (ref 0–1.9)
BLD GP AB SCN CELLS X3 SERPL QL: NORMAL
BUN SERPL-MCNC: 15 MG/DL (ref 8–23)
CALCIUM SERPL-MCNC: 9 MG/DL (ref 8.7–10.5)
CHLORIDE SERPL-SCNC: 105 MMOL/L (ref 95–110)
CO2 SERPL-SCNC: 24 MMOL/L (ref 23–29)
CREAT SERPL-MCNC: 1 MG/DL (ref 0.5–1.4)
DIFFERENTIAL METHOD BLD: ABNORMAL
EOSINOPHIL # BLD AUTO: 0.3 K/UL (ref 0–0.5)
EOSINOPHIL NFR BLD: 4.3 % (ref 0–8)
ERYTHROCYTE [DISTWIDTH] IN BLOOD BY AUTOMATED COUNT: 13 % (ref 11.5–14.5)
EST. GFR  (NO RACE VARIABLE): >60 ML/MIN/1.73 M^2
GLUCOSE SERPL-MCNC: 105 MG/DL (ref 70–110)
HCT VFR BLD AUTO: 43.4 % (ref 40–54)
HGB BLD-MCNC: 15.3 G/DL (ref 14–18)
IMM GRANULOCYTES # BLD AUTO: 0.03 K/UL (ref 0–0.04)
IMM GRANULOCYTES NFR BLD AUTO: 0.5 % (ref 0–0.5)
INR PPP: 1 (ref 0.8–1.2)
LYMPHOCYTES # BLD AUTO: 2.2 K/UL (ref 1–4.8)
LYMPHOCYTES NFR BLD: 33.6 % (ref 18–48)
MCH RBC QN AUTO: 32 PG (ref 27–31)
MCHC RBC AUTO-ENTMCNC: 35.3 G/DL (ref 32–36)
MCV RBC AUTO: 91 FL (ref 82–98)
MONOCYTES # BLD AUTO: 0.8 K/UL (ref 0.3–1)
MONOCYTES NFR BLD: 11.9 % (ref 4–15)
NEUTROPHILS # BLD AUTO: 3.2 K/UL (ref 1.8–7.7)
NEUTROPHILS NFR BLD: 48.3 % (ref 38–73)
NRBC BLD-RTO: 0 /100 WBC
PLATELET # BLD AUTO: 187 K/UL (ref 150–450)
PMV BLD AUTO: 11.6 FL (ref 9.2–12.9)
POTASSIUM SERPL-SCNC: 3.9 MMOL/L (ref 3.5–5.1)
PROTHROMBIN TIME: 10.9 SEC (ref 9–12.5)
RBC # BLD AUTO: 4.78 M/UL (ref 4.6–6.2)
SODIUM SERPL-SCNC: 140 MMOL/L (ref 136–145)
SPECIMEN OUTDATE: NORMAL
WBC # BLD AUTO: 6.57 K/UL (ref 3.9–12.7)

## 2024-01-02 PROCEDURE — C1883 ADAPT/EXT, PACING/NEURO LEAD: HCPCS | Performed by: NEUROLOGICAL SURGERY

## 2024-01-02 PROCEDURE — 71000015 HC POSTOP RECOV 1ST HR: Performed by: NEUROLOGICAL SURGERY

## 2024-01-02 PROCEDURE — 63600175 PHARM REV CODE 636 W HCPCS: Mod: JG | Performed by: NEUROLOGICAL SURGERY

## 2024-01-02 PROCEDURE — 61867 IMPLANT NEUROELECTRODE: CPT | Mod: LT,,, | Performed by: NEUROLOGICAL SURGERY

## 2024-01-02 PROCEDURE — 71000016 HC POSTOP RECOV ADDL HR: Performed by: NEUROLOGICAL SURGERY

## 2024-01-02 PROCEDURE — 85730 THROMBOPLASTIN TIME PARTIAL: CPT

## 2024-01-02 PROCEDURE — A4648 IMPLANTABLE TISSUE MARKER: HCPCS | Performed by: NEUROLOGICAL SURGERY

## 2024-01-02 PROCEDURE — 99900035 HC TECH TIME PER 15 MIN (STAT)

## 2024-01-02 PROCEDURE — 36000710: Performed by: NEUROLOGICAL SURGERY

## 2024-01-02 PROCEDURE — 11000001 HC ACUTE MED/SURG PRIVATE ROOM

## 2024-01-02 PROCEDURE — 80048 BASIC METABOLIC PNL TOTAL CA: CPT

## 2024-01-02 PROCEDURE — 27201423 OPTIME MED/SURG SUP & DEVICES STERILE SUPPLY: Performed by: NEUROLOGICAL SURGERY

## 2024-01-02 PROCEDURE — 95962 ELECTRODE STIM BRAIN ADD-ON: CPT | Mod: 26,,, | Performed by: PSYCHIATRY & NEUROLOGY

## 2024-01-02 PROCEDURE — 95961 ELECTRODE STIMULATION BRAIN: CPT | Mod: 26,,, | Performed by: PSYCHIATRY & NEUROLOGY

## 2024-01-02 PROCEDURE — 63600175 PHARM REV CODE 636 W HCPCS

## 2024-01-02 PROCEDURE — 25000003 PHARM REV CODE 250

## 2024-01-02 PROCEDURE — 71000033 HC RECOVERY, INTIAL HOUR: Performed by: NEUROLOGICAL SURGERY

## 2024-01-02 PROCEDURE — 86901 BLOOD TYPING SEROLOGIC RH(D): CPT

## 2024-01-02 PROCEDURE — 37000009 HC ANESTHESIA EA ADD 15 MINS: Performed by: NEUROLOGICAL SURGERY

## 2024-01-02 PROCEDURE — 85610 PROTHROMBIN TIME: CPT

## 2024-01-02 PROCEDURE — 37000008 HC ANESTHESIA 1ST 15 MINUTES: Performed by: NEUROLOGICAL SURGERY

## 2024-01-02 PROCEDURE — 85025 COMPLETE CBC W/AUTO DIFF WBC: CPT

## 2024-01-02 PROCEDURE — D9220A PRA ANESTHESIA: Mod: ,,, | Performed by: ANESTHESIOLOGY

## 2024-01-02 PROCEDURE — 25000003 PHARM REV CODE 250: Performed by: NEUROLOGICAL SURGERY

## 2024-01-02 PROCEDURE — 36415 COLL VENOUS BLD VENIPUNCTURE: CPT | Performed by: NEUROLOGICAL SURGERY

## 2024-01-02 PROCEDURE — 00H03MZ INSERTION OF NEUROSTIMULATOR LEAD INTO BRAIN, PERCUTANEOUS APPROACH: ICD-10-PCS | Performed by: NEUROLOGICAL SURGERY

## 2024-01-02 PROCEDURE — 36000711: Performed by: NEUROLOGICAL SURGERY

## 2024-01-02 DEVICE — IMPLANTABLE DEVICE: Type: IMPLANTABLE DEVICE | Site: CRANIAL | Status: FUNCTIONAL

## 2024-01-02 RX ORDER — PRAVASTATIN SODIUM 40 MG/1
80 TABLET ORAL NIGHTLY
Status: DISCONTINUED | OUTPATIENT
Start: 2024-01-02 | End: 2024-01-03 | Stop reason: HOSPADM

## 2024-01-02 RX ORDER — MUPIROCIN 20 MG/G
OINTMENT TOPICAL
Status: DISPENSED | OUTPATIENT
Start: 2024-01-02

## 2024-01-02 RX ORDER — OXYCODONE AND ACETAMINOPHEN 10; 325 MG/1; MG/1
1 TABLET ORAL EVERY 4 HOURS PRN
Status: DISCONTINUED | OUTPATIENT
Start: 2024-01-02 | End: 2024-01-03 | Stop reason: HOSPADM

## 2024-01-02 RX ORDER — ONDANSETRON 2 MG/ML
4 INJECTION INTRAMUSCULAR; INTRAVENOUS DAILY PRN
Status: CANCELLED | OUTPATIENT
Start: 2024-01-02

## 2024-01-02 RX ORDER — CEFTRIAXONE 1 G/1
INJECTION, POWDER, FOR SOLUTION INTRAMUSCULAR; INTRAVENOUS
Status: DISCONTINUED | OUTPATIENT
Start: 2024-01-02 | End: 2024-01-02

## 2024-01-02 RX ORDER — CARBIDOPA AND LEVODOPA 25; 100 MG/1; MG/1
1 TABLET ORAL NIGHTLY
Status: DISCONTINUED | OUTPATIENT
Start: 2024-01-02 | End: 2024-01-03 | Stop reason: HOSPADM

## 2024-01-02 RX ORDER — ONDANSETRON 4 MG/1
4 TABLET, ORALLY DISINTEGRATING ORAL EVERY 8 HOURS PRN
Status: DISCONTINUED | OUTPATIENT
Start: 2024-01-02 | End: 2024-01-03 | Stop reason: HOSPADM

## 2024-01-02 RX ORDER — RASAGILINE 1 MG/1
1 TABLET ORAL DAILY
Status: DISCONTINUED | OUTPATIENT
Start: 2024-01-02 | End: 2024-01-03 | Stop reason: HOSPADM

## 2024-01-02 RX ORDER — CEFTRIAXONE 1 G/1
INJECTION, POWDER, FOR SOLUTION INTRAMUSCULAR; INTRAVENOUS
Status: DISCONTINUED | OUTPATIENT
Start: 2024-01-02 | End: 2024-01-02 | Stop reason: HOSPADM

## 2024-01-02 RX ORDER — BUPIVACAINE HYDROCHLORIDE 5 MG/ML
INJECTION, SOLUTION EPIDURAL; INTRACAUDAL
Status: DISCONTINUED | OUTPATIENT
Start: 2024-01-02 | End: 2024-01-02 | Stop reason: HOSPADM

## 2024-01-02 RX ORDER — DEXMEDETOMIDINE HYDROCHLORIDE 100 UG/ML
INJECTION, SOLUTION INTRAVENOUS
Status: DISCONTINUED | OUTPATIENT
Start: 2024-01-02 | End: 2024-01-02

## 2024-01-02 RX ORDER — SODIUM CHLORIDE 9 MG/ML
INJECTION, SOLUTION INTRAVENOUS CONTINUOUS
Status: ACTIVE | OUTPATIENT
Start: 2024-01-02

## 2024-01-02 RX ORDER — ONDANSETRON 2 MG/ML
INJECTION INTRAMUSCULAR; INTRAVENOUS
Status: DISCONTINUED | OUTPATIENT
Start: 2024-01-02 | End: 2024-01-02

## 2024-01-02 RX ORDER — HYDROMORPHONE HYDROCHLORIDE 1 MG/ML
0.2 INJECTION, SOLUTION INTRAMUSCULAR; INTRAVENOUS; SUBCUTANEOUS EVERY 5 MIN PRN
Status: DISCONTINUED | OUTPATIENT
Start: 2024-01-02 | End: 2024-01-02 | Stop reason: HOSPADM

## 2024-01-02 RX ORDER — SODIUM CHLORIDE 0.9 % (FLUSH) 0.9 %
10 SYRINGE (ML) INJECTION
Status: DISCONTINUED | OUTPATIENT
Start: 2024-01-02 | End: 2024-01-02 | Stop reason: HOSPADM

## 2024-01-02 RX ORDER — MUPIROCIN 20 MG/G
1 OINTMENT TOPICAL 2 TIMES DAILY
Status: DISPENSED | OUTPATIENT
Start: 2024-01-02 | End: 2024-01-03

## 2024-01-02 RX ORDER — PROPOFOL 10 MG/ML
VIAL (ML) INTRAVENOUS
Status: DISCONTINUED | OUTPATIENT
Start: 2024-01-02 | End: 2024-01-02

## 2024-01-02 RX ORDER — ALPRAZOLAM 0.25 MG/1
0.5 TABLET ORAL NIGHTLY PRN
Status: DISCONTINUED | OUTPATIENT
Start: 2024-01-02 | End: 2024-01-03 | Stop reason: HOSPADM

## 2024-01-02 RX ORDER — BACITRACIN ZINC 500 UNIT/G
OINTMENT (GRAM) TOPICAL
Status: DISCONTINUED | OUTPATIENT
Start: 2024-01-02 | End: 2024-01-02 | Stop reason: HOSPADM

## 2024-01-02 RX ORDER — LIDOCAINE HYDROCHLORIDE AND EPINEPHRINE 10; 10 MG/ML; UG/ML
INJECTION, SOLUTION INFILTRATION; PERINEURAL
Status: DISCONTINUED | OUTPATIENT
Start: 2024-01-02 | End: 2024-01-02 | Stop reason: HOSPADM

## 2024-01-02 RX ADMIN — PROPOFOL 20 MG: 10 INJECTION, EMULSION INTRAVENOUS at 07:01

## 2024-01-02 RX ADMIN — OXYCODONE AND ACETAMINOPHEN 1 TABLET: 10; 325 TABLET ORAL at 09:01

## 2024-01-02 RX ADMIN — SODIUM CHLORIDE: 9 INJECTION, SOLUTION INTRAVENOUS at 05:01

## 2024-01-02 RX ADMIN — HYDROMORPHONE HYDROCHLORIDE 0.2 MG: 1 INJECTION, SOLUTION INTRAMUSCULAR; INTRAVENOUS; SUBCUTANEOUS at 03:01

## 2024-01-02 RX ADMIN — PROPOFOL 30 MG: 10 INJECTION, EMULSION INTRAVENOUS at 11:01

## 2024-01-02 RX ADMIN — CEFTRIAXONE SODIUM 2 G: 1 INJECTION, POWDER, FOR SOLUTION INTRAMUSCULAR; INTRAVENOUS at 07:01

## 2024-01-02 RX ADMIN — MUPIROCIN: 20 OINTMENT TOPICAL at 06:01

## 2024-01-02 RX ADMIN — PROPOFOL 40 MG: 10 INJECTION, EMULSION INTRAVENOUS at 07:01

## 2024-01-02 RX ADMIN — DEXMEDETOMIDINE 8 MCG: 200 INJECTION, SOLUTION INTRAVENOUS at 07:01

## 2024-01-02 RX ADMIN — HYDROMORPHONE HYDROCHLORIDE 0.2 MG: 1 INJECTION, SOLUTION INTRAMUSCULAR; INTRAVENOUS; SUBCUTANEOUS at 02:01

## 2024-01-02 RX ADMIN — OXYCODONE AND ACETAMINOPHEN 1 TABLET: 10; 325 TABLET ORAL at 02:01

## 2024-01-02 RX ADMIN — PRAVASTATIN SODIUM 80 MG: 40 TABLET ORAL at 09:01

## 2024-01-02 RX ADMIN — HYDROMORPHONE HYDROCHLORIDE 0.2 MG: 1 INJECTION, SOLUTION INTRAMUSCULAR; INTRAVENOUS; SUBCUTANEOUS at 04:01

## 2024-01-02 RX ADMIN — MUPIROCIN 1 G: 20 OINTMENT TOPICAL at 09:01

## 2024-01-02 RX ADMIN — CEFTRIAXONE 2 G: 2 INJECTION, POWDER, FOR SOLUTION INTRAMUSCULAR; INTRAVENOUS at 09:01

## 2024-01-02 RX ADMIN — ONDANSETRON 4 MG: 2 INJECTION INTRAMUSCULAR; INTRAVENOUS at 09:01

## 2024-01-02 RX ADMIN — SODIUM CHLORIDE: 0.9 INJECTION, SOLUTION INTRAVENOUS at 07:01

## 2024-01-02 RX ADMIN — RASAGILINE 1 MG: 1 TABLET ORAL at 04:01

## 2024-01-02 RX ADMIN — HYDROMORPHONE HYDROCHLORIDE 0.2 MG: 1 INJECTION, SOLUTION INTRAMUSCULAR; INTRAVENOUS; SUBCUTANEOUS at 01:01

## 2024-01-02 RX ADMIN — CARBIDOPA AND LEVODOPA 1 TABLET: 25; 100 TABLET ORAL at 09:01

## 2024-01-02 RX ADMIN — PROPOFOL 50 MG: 10 INJECTION, EMULSION INTRAVENOUS at 11:01

## 2024-01-02 NOTE — BRIEF OP NOTE
[facility] - Surgery ([facility])  Brief Operative Note    SUMMARY     Surgery Date: 1/2/2024     Surgeon(s) and Role:     * Roger Flores MD - Primary     * Thony Graves MD - Resident - Assisting        Pre-op Diagnosis:  Parkinson's disease with dyskinesia and fluctuating manifestations [G20.B2]    Post-op Diagnosis:  Post-Op Diagnosis Codes:     * Parkinson's disease with dyskinesia and fluctuating manifestations [G20.B2]    Procedure(s) (LRB):  INSERTION, FIDUCIAL SCREW, SKULL (N/A)  INSERTION, DEEP BRAIN STIMULATOR ELECTRODE (Left)    Anesthesia: Local MAC    Implants:  Implant Name Type Inv. Item Serial No.  Lot No. LRB No. Used Action   SENSIGHT MEME HOLE DEVICE KIT    MEDTRONIC [id number] N/A 1 Implanted   SENSIGHT DIRECTIONAL LEAD KIT    MEDTRONIC [id number] N/A 1 Implanted   SENSIGHT MEME HOLE DEVICE    MEDTRONIC [id number] N/A 1 Implanted       Operative Findings: 5 fiducial screws placed for intraoperatie navigation. L Gpi DBS lead placed, intraoperatively tested to confirm placement    Estimated Blood Loss: * No values recorded between 1/2/2024  7:44 AM and 1/2/2024 12:22 PM *    Estimated Blood Loss has been documented. Minimal         Specimens:   Specimen (24h ago, onward)      None            [id number]

## 2024-01-02 NOTE — OP NOTE
Date of Operation:  1/02/2024.    Preoperative Diagnosis:  Parkinson's disease.    Postoperative Diagnosis:  Parkinson's disease.    Procedure:  Placement of skull fiducial screws for intraoperative neuro navigation.  Implantation of deep brain stimulating electrode (SenSight [id number], Medtronic) in left globus pallidus interna with microelectrode recording (Jocelyn Nunes and Sam).    Surgeon:  Rgoer lFores MD    Assistant:  Thony Graves MD (Resident in General Surgery)    Anesthesia:  Local/ MAC    Estimated Blood Loss:  Less than 75 ml.    Condition at End of  Procedure:  Satisfactory.    Brief History:  This 63-year-old man began dragging his right leg about 7 years ago and then noted increasing stiffness of the right side of his body and was diagnosed with Parkinson's disease.  He has been taking Rytary, Sinemet and Azilect that have helped with symptoms although he does have some ongoing dyskinesia.  It was decided to proceed with deep brain stimulation beginning with the left side.    Procedure in Detail:  The patient was brought to the operating room and in the supine position on his gurney given mild IV sedation.  A Kc catheter was inserted, sequential compression devices applied to the legs and various intravenous lines started.  The head of the rWelcome was elevated and the scalp shaved, prepped with Betadine and draped in a sterile fashion.  Five fiducial marks were made, 2 frontotemporal, 2 parietal, and 1 just to the right of midline and these were injected with 1% xylocaine and epinephrine.  At each location a stab wound was made with a 15 blade and the pericranium scraped from the skull.  A 10 mm Medtronic skull fiducial screw was inserted with the power screwdriver and tightened by hand.  All screws fit snugly.  The sites were reinjected with Marcaine.  Bacitracin ointment was applied to the screws and the protective caps placed over them.  The head was wrapped with roller gauze.  He was  then brought to CT scan where CT scan was obtained to merge with MRI for intraoperative neuro navigation.  The navigation program was worked out and the fiducial screws registered to the system.  The patient was then returned to the operating room.    He was transferred to the operating table and placed in a partial beach chair position with the head allowed to rest on a Medtronic headrest which had been padded.  The roller gauze was removed and the protective caps taken off of the screws.  The nonsterile reference frame was applied to the scalp and the screws registered to the system with good fidelity.  The navigation program was then used to find the proposed entry site in the left posterior frontal area and this was marked with a crayon.  The nonsterile reference frame was removed, the scalp prepped with Betadine, the skull marked with an 18 gauge needle and bone awl bone awl and the scalp covered with the Ioban shower curtain drape.  Small horseshoe incision was outlined around the entry site and this was injected with 1% xylocaine and epinephrine.  The supraorbital nerve branches were injected with 1% xylocaine and epinephrine also.  The incision was carried through the skin, galea and pericranium and the small skin flap elevated and retracted anteriorly with a 3-0 silk suture and rubber band.  Controlled with bipolar coagulation.  A 14 mm fareed hole was made at the entry site with the  and widened internally with the M8 attachment and Kerrison rongeur.  The bur hole cover was affixed to the skull tightening the 2 screws and the NexFrame ring placed over this and these 3 screws tightened to the skull also.  The sterile navigation arc was attached to the ring base and the fiducial screws registered to the system again with good tolerance.  The socket assembly was then placed on to the ring base and the navigation program used to find the trajectory and depth to target and these were marked on the Alpha  Omega drive head stage with the necessary adjustments made.  The socket assembly was exchanged for the multi lumen channel adapter and the head stage with its assorted cables affixed to the ring base. The cannula was brought down through the center hole to the dura.  The dura was coagulated, opened with an 11 blade and the cannula advanced to 25 mm above target.  The stylet was removed and the microelectrode collet attached to the head stage. The microelectrode was brought down through the cannula to 15 mm above target.  With Dr. Nunes and Sam doing microelectrode recording and micro stimulation testing the electrode was advanced through the globus pallidus.  High-voltage recordings were obtained at about 11 to12 mm above target probably JOHNNY and then more sustained high voltages targets from about 9 mm down to 2 or 3 mm.  Testing with flashing lights showed no clear optic nerve stimulation.  With stimulation the patient complained of some nausea which seemed to go off without stimulation and come back on with stimulation.  There was no clear pulling of the muscles sensory loss or other objective finding.  Stimulation did seemed to provide some benefit and loosening his right extremities.  As the nausea seem persistent a second pass was made 2 mm anterior to the first pass.  At this location typical GPI signal began around 9 mm above target and down to about 3 mm to 2 mm above target.  Again no optic nerve stimulation was seen.  At this location the patient did not seem to have the nausea and there was no other pulling sensation to suggest cortical spinal activation.  He seemed to get reasonably good relaxation was stimulation.  It was decided to place the DBS electrode at this location with the tip 1.5 mm below target.  The 7.5 mm [id number] electrode was used.  Stimulation seems satisfactory without untoward effects and the electrode was capped into place.  The micro drive assembly was then removed and the  locking cap placed over the electrode.  The lead kit was attached to the electrode.  The scalp was reinjected with Marcaine, he was given additional anesthesia and the DBS electrode with its lead kit buried above his left ear.  The wounds were thoroughly irrigated, the galea closed with inverted interrupted 4-0 Vicryl sutures and the skin closed with skin staples.  The fiducial screws removed the small incisions closed with staples also.  The scalp was washed, a Telfa bacitracin dressing placed over the wound and bacitracin ointment placed on the screw sites and the head wrapped with roller gauze.  He was transferred back to his [facility] and returned to the recovery area in stable condition.  He received 2 g of Rocephin at the beginning of the procedure and Rocephin containing antibiotic irrigating solutions were used throughout.

## 2024-01-02 NOTE — TRANSFER OF CARE
"Anesthesia Transfer of Care Note    Patient: Adam Dalal    Procedure(s) Performed: Procedure(s) (LRB):  INSERTION, FIDUCIAL SCREW, SKULL (N/A)  INSERTION, DEEP BRAIN STIMULATOR ELECTRODE (Left)    Patient location: PACU    Anesthesia Type: general    Transport from OR: Transported from OR on 6-10 L/min O2 by face mask with adequate spontaneous ventilation    Post pain: adequate analgesia    Post assessment: no apparent anesthetic complications    Post vital signs: stable    Level of consciousness: awake and alert    Nausea/Vomiting: no nausea/vomiting    Complications: none    Transfer of care protocol was followed      Last vitals: Visit Vitals  /60 (BP Location: Right arm, Patient Position: Lying)   Pulse (!) 51   Temp 36.5 °C (97.7 °F) (Temporal)   Resp 18   Ht 6' 1" (1.854 m)   Wt 98.9 kg (218 lb 0.6 oz)   SpO2 99%   BMI 28.77 kg/m²     "

## 2024-01-02 NOTE — PROCEDURES
Procedures    DBS IntraOP Brain Mapping    HPI: 63 y.o. man with iPD coming for L GPI DBS for rigidity and dyskinesia control        Target  GPI  Laterality L  Lead Model Medtronic [id number]  Lead Passes  2    Patient was stimulated over 120 minutes.     Pass1  Good BLAYNE short GPI RUN  Persistwnt nausea with stim    Pass2 - CHOSEN  Good BLAYNE long GPI RUN  Fleeting nausea with stim - minimal nausea at PQ 60 and rigidity control  Stimulated at 60PWand 90PW      Final Position  Tip at 1.5 mm        Delicia Vargas MD, MS [facility]  Department of Neurology  Movement Disorders

## 2024-01-02 NOTE — PROGRESS NOTES
Pre-op complete, patient stable with wife at bedside. Anesthesia resident at bedside obtaining consent for procedure.

## 2024-01-02 NOTE — NURSING TRANSFER
Nursing Transfer Note      1/2/2024   1711 PM    Nurse giving handoff:LISSA Carcamo  Nurse receiving handoff:Maryjo    Reason patient is being transferred: Out of PACU    Transfer To: 954    Transfer via bed    Transported by CAMMY Cabello & CAMMY Finley    Transfer Vital Signs:  Blood Pressure:125/68  Heart Rate:57  O2:95%  Temperature:97.7  Respirations:20    Order for Tele Monitor? No    4eyes on Skin: yes    Medicines sent: Carbidopa-levodopa    Any special needs or follow-up needed: no    Patient belongings transferred with patient: Yes    Chart send with patient: Yes    Notified: spouse    Patient reassessed at: 1711

## 2024-01-03 VITALS
SYSTOLIC BLOOD PRESSURE: 118 MMHG | HEIGHT: 73 IN | BODY MASS INDEX: 28.9 KG/M2 | DIASTOLIC BLOOD PRESSURE: 64 MMHG | OXYGEN SATURATION: 97 % | RESPIRATION RATE: 18 BRPM | TEMPERATURE: 99 F | WEIGHT: 218.06 LBS | HEART RATE: 65 BPM

## 2024-01-03 PROBLEM — G20.A1 PARKINSON'S DISEASE (TREMOR, STIFFNESS, SLOW MOTION, UNSTABLE POSTURE): Status: ACTIVE | Noted: 2024-01-03

## 2024-01-03 LAB
ANION GAP SERPL CALC-SCNC: 9 MMOL/L (ref 8–16)
BASOPHILS # BLD AUTO: 0.08 K/UL (ref 0–0.2)
BASOPHILS NFR BLD: 1 % (ref 0–1.9)
BUN SERPL-MCNC: 14 MG/DL (ref 8–23)
CALCIUM SERPL-MCNC: 8.2 MG/DL (ref 8.7–10.5)
CHLORIDE SERPL-SCNC: 107 MMOL/L (ref 95–110)
CO2 SERPL-SCNC: 23 MMOL/L (ref 23–29)
CREAT SERPL-MCNC: 0.9 MG/DL (ref 0.5–1.4)
DIFFERENTIAL METHOD BLD: ABNORMAL
EOSINOPHIL # BLD AUTO: 0.1 K/UL (ref 0–0.5)
EOSINOPHIL NFR BLD: 1.8 % (ref 0–8)
ERYTHROCYTE [DISTWIDTH] IN BLOOD BY AUTOMATED COUNT: 12.9 % (ref 11.5–14.5)
EST. GFR  (NO RACE VARIABLE): >60 ML/MIN/1.73 M^2
GLUCOSE SERPL-MCNC: 98 MG/DL (ref 70–110)
HCT VFR BLD AUTO: 40.8 % (ref 40–54)
HGB BLD-MCNC: 14 G/DL (ref 14–18)
IMM GRANULOCYTES # BLD AUTO: 0.02 K/UL (ref 0–0.04)
IMM GRANULOCYTES NFR BLD AUTO: 0.3 % (ref 0–0.5)
LYMPHOCYTES # BLD AUTO: 1.6 K/UL (ref 1–4.8)
LYMPHOCYTES NFR BLD: 20.3 % (ref 18–48)
MAGNESIUM SERPL-MCNC: 2.2 MG/DL (ref 1.6–2.6)
MCH RBC QN AUTO: 32.8 PG (ref 27–31)
MCHC RBC AUTO-ENTMCNC: 34.3 G/DL (ref 32–36)
MCV RBC AUTO: 96 FL (ref 82–98)
MONOCYTES # BLD AUTO: 0.7 K/UL (ref 0.3–1)
MONOCYTES NFR BLD: 9.5 % (ref 4–15)
NEUTROPHILS # BLD AUTO: 5.2 K/UL (ref 1.8–7.7)
NEUTROPHILS NFR BLD: 67.1 % (ref 38–73)
NRBC BLD-RTO: 0 /100 WBC
PHOSPHATE SERPL-MCNC: 3.5 MG/DL (ref 2.7–4.5)
PLATELET # BLD AUTO: 166 K/UL (ref 150–450)
PMV BLD AUTO: 11.9 FL (ref 9.2–12.9)
POTASSIUM SERPL-SCNC: 3.7 MMOL/L (ref 3.5–5.1)
RBC # BLD AUTO: 4.27 M/UL (ref 4.6–6.2)
SODIUM SERPL-SCNC: 139 MMOL/L (ref 136–145)
WBC # BLD AUTO: 7.72 K/UL (ref 3.9–12.7)

## 2024-01-03 PROCEDURE — 85025 COMPLETE CBC W/AUTO DIFF WBC: CPT

## 2024-01-03 PROCEDURE — 83735 ASSAY OF MAGNESIUM: CPT

## 2024-01-03 PROCEDURE — 25000003 PHARM REV CODE 250

## 2024-01-03 PROCEDURE — 36415 COLL VENOUS BLD VENIPUNCTURE: CPT

## 2024-01-03 PROCEDURE — 63600175 PHARM REV CODE 636 W HCPCS

## 2024-01-03 PROCEDURE — 80048 BASIC METABOLIC PNL TOTAL CA: CPT

## 2024-01-03 PROCEDURE — 84100 ASSAY OF PHOSPHORUS: CPT

## 2024-01-03 RX ORDER — OXYCODONE AND ACETAMINOPHEN 10; 325 MG/1; MG/1
1 TABLET ORAL EVERY 6 HOURS PRN
Qty: 20 TABLET | Refills: 0 | Status: SHIPPED | OUTPATIENT
Start: 2024-01-03

## 2024-01-03 RX ORDER — ONDANSETRON 4 MG/1
8 TABLET, ORALLY DISINTEGRATING ORAL EVERY 6 HOURS PRN
Qty: 30 TABLET | Refills: 0 | Status: SHIPPED | OUTPATIENT
Start: 2024-01-03

## 2024-01-03 RX ADMIN — RASAGILINE 1 MG: 1 TABLET ORAL at 08:01

## 2024-01-03 RX ADMIN — SODIUM CHLORIDE: 9 INJECTION, SOLUTION INTRAVENOUS at 05:01

## 2024-01-03 RX ADMIN — CEFTRIAXONE 2 G: 2 INJECTION, POWDER, FOR SOLUTION INTRAMUSCULAR; INTRAVENOUS at 08:01

## 2024-01-03 RX ADMIN — OXYCODONE AND ACETAMINOPHEN 1 TABLET: 10; 325 TABLET ORAL at 05:01

## 2024-01-03 NOTE — DISCHARGE SUMMARY
[facility] - Neurosurgery ([facility])  Neurosurgery  Discharge Summary      Patient Name: Adam Dalal  MRN: [id number]  Admission Date: 1/2/2024  Hospital Length of Stay: 1 days  Discharge Date and Time:  01/03/2024 7:12 AM  Attending Physician: Roger Flores MD   Discharging Provider: Alcon Krause MD  Primary Care Provider: Rashi eGe MD    HPI:        Adam Dalal was seen in neurosurgical consultation at the office this morning.  He is a 63-year-old man who about 6 or 7 years ago began to experience dragging of his right leg.  This progressed to involve some stiffness and difficulty initiating movement in the right upper extremity.  He was seen in neurology evaluation by Dr. Jerome here and diagnosed with Parkinson's disease.  [facility]  Tremor is not a feature of his disease.  He has minimal left-sided symptoms but does have some mild difficulty with gait and balance.  He is very responsive to dopamine replacement and is currently taking Rytary, Sinemet, and Azilect.  He does have some uncontrolled dyskinetic movements. j Past medical history includes sleep apnea.  This is currently being evaluated.  He has had bilateral cataract surgery and had a melanoma removed from the right cheek.  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  His wife is with him today.          Procedure(s) (LRB):  INSERTION, FIDUCIAL SCREW, SKULL (N/A)  INSERTION, DEEP BRAIN STIMULATOR ELECTRODE (Left)     Hospital Course: Patient was admitted for DBS on 1/3/2024 and underwent Lead placement without perioperative complications. The patient remained on abx per surgical protocol until discharge. At the time of discharge, the patient was tolerating PO intake without N/V, dysphagia, denied bowel or bladder dysfunction, denied new neurological symptoms, and reported pain controlled with current regimen. The patient  woke from anesthesia at baseline neuro-status. The surgical  site was without evidence of drainage or dehisence and all staples were intact. The patient will follow up in clinic as indicated in discharge instructions. All questions were answered and continued treatment/woundcare instructions were discussed in detail prior to discharge.     Goals of Care Treatment Preferences:  Code Status: Full Code      Consults:   Consults (From admission, onward)          Status Ordering Provider     Inpatient consult to Social Work/Case Management  Once        Provider:  (Not yet assigned)    Acknowledged RAZ DAVILA            Significant Diagnostic Studies: N/A    Pending Diagnostic Studies:       None          Final Active Diagnoses:    Diagnosis Date Noted POA    PRINCIPAL PROBLEM:  Parkinson's disease (tremor, stiffness, slow motion, unstable posture) [G20.A1] 01/03/2024 Yes      Problems Resolved During this Admission:      Discharged Condition: good     Disposition: Home or Self Care    Follow Up:    Patient Instructions:      Diet Adult Regular     Leave dressing on - Keep it clean, dry, and intact until clinic visit     Activity as tolerated     Medications:  Reconciled Home Medications:      Medication List        START taking these medications      ondansetron 4 MG Tbdl  Commonly known as: ZOFRAN-ODT  Dissolve 2 tablets (8 mg total) by mouth every 6 (six) hours as needed.     oxyCODONE-acetaminophen  mg per tablet  Commonly known as: PERCOCET  Take 1 tablet by mouth every 6 (six) hours as needed.            CHANGE how you take these medications      pravastatin 80 MG tablet  Commonly known as: PRAVACHOL  TAKE 1 TABLET(80 MG) BY MOUTH EVERY MORNING  What changed:   how much to take  how to take this  when to take this            CONTINUE taking these medications      ALPRAZolam 0.5 MG tablet  Commonly known as: XANAX  TAKE 1 TABLET(0.5 MG) BY MOUTH EVERY NIGHT FOR INSOMNIA OR ANXIETY     * carbidopa-levodopa  mg  mg per tablet  Commonly known as:  SINEMET  Take 1 tablet by mouth every evening.     * RYTARY 23.75-95 mg Cpsr  Generic drug: carbidopa-levodopa  Take 4 capsules by mouth 3 (three) times daily.     rasagiline 1 mg Tab  Commonly known as: AZILECT  Take 1 tablet (1 mg total) by mouth once daily.           * This list has 2 medication(s) that are the same as other medications prescribed for you. Read the directions carefully, and ask your doctor or other care provider to review them with you.                ASK your doctor about these medications      diclofenac 75 MG EC tablet  Commonly known as: VOLTAREN  Take 1 tablet (75 mg total) by mouth 2 (two) times daily as needed (pain).     fluorouraciL 5 % cream  Commonly known as: EFUDEX  Mix with calcipotriene 0.005% cream  and Apply thin film to upper forehead  2 times per day for 7 days; d/c if area bleeding or ulcerated; avoid eyes or mouth              Alcon Krause MD  Neurosurgery  [facility] - Neurosurgery [facility])

## 2024-01-03 NOTE — NURSING
END OF SHIFT    Pt AAOx4 and follows commands. Pt transferred from PACU @1720. No PRNs given. Kc in place & to be removed tomorrow AM. Gauze wrapped around head incision & to not be removed. Pt resting in bed with call light in reach. KAMAR Hunt

## 2024-01-03 NOTE — PLAN OF CARE
Problem: Adult Inpatient Plan of Care  Goal: Optimal Comfort and Wellbeing  1/3/2024 0537 by Val Rios RN  Outcome: Ongoing, Progressing  1/3/2024 0537 by Val Rios RN  Outcome: Ongoing, Progressing     Problem: Adult Inpatient Plan of Care  Goal: Readiness for Transition of Care  1/3/2024 0537 by Val Rios RN  Outcome: Ongoing, Progressing  1/3/2024 0537 by Val Rios RN  Outcome: Ongoing, Progressing     Problem: Infection  Goal: Absence of Infection Signs and Symptoms  1/3/2024 0537 by Val Rios RN  Outcome: Ongoing, Progressing  1/3/2024 0537 by Val Rios RN  Outcome: Ongoing, Progressing     Problem: Fall Injury Risk  Goal: Absence of Fall and Fall-Related Injury  1/3/2024 0537 by Val Rios RN  Outcome: Ongoing, Progressing  1/3/2024 0537 by Val Rios RN  Outcome: Ongoing, Progressing     Problem: Pain Acute  Goal: Acceptable Pain Control and Functional Ability  Outcome: Ongoing, Progressing

## 2024-01-03 NOTE — DISCHARGE INSTRUCTIONS
--Patient stable for discharge to Home    --Please take prescriptions as detailed in medication list    --All questions/concerns addressed and answered    --Please followup with neurosurgery clinic in 2 weeks    --Please call immediately for any new onset nausea/vomiting/fever/chills, wound breakdown, numbness/tingling/weakness        Wound Care Instructions:  -If you have any dressings at discharge, please remove 48 hours after the surgery.  -If you have dissolvable sutures and glue over your incisions; do not pick at them; they will dissolve over a couple of weeks.  -If you have steri strips, do not remove, as they will fall off.  -If you have staples, do not remove, as they will be removed at clinic follow up.  -You may shower daily but do not soak or submerge wound in water.  -Scalp/head incisions, wash hair daily with baby shampoo and do not use hair products. Pat incision dry, do not rub.  -For head incisions, do not wear scarfs or hats.  -Keep all wounds clean, dry, and open to air.  -Do not apply creams or ointments to the wound.  -No driving while on narcotic pain medications  -Call Neurosurgery if the wound opens, drains, or becomes red

## 2024-01-03 NOTE — ANESTHESIA POSTPROCEDURE EVALUATION
Anesthesia Post Evaluation    Patient: Adam Dalal    Procedure(s) Performed: Procedure(s) (LRB):  INSERTION, FIDUCIAL SCREW, SKULL (N/A)  INSERTION, DEEP BRAIN STIMULATOR ELECTRODE (Left)    Final Anesthesia Type: general      Patient location during evaluation: PACU  Patient participation: Yes- Able to Participate  Level of consciousness: awake and alert  Post-procedure vital signs: reviewed and stable  Pain management: adequate  Airway patency: patent    PONV status at discharge: No PONV  Anesthetic complications: no      Cardiovascular status: blood pressure returned to baseline  Respiratory status: unassisted  Hydration status: euvolemic  Follow-up not needed.              Vitals Value Taken Time   /64 01/03/24 0737   Temp 37.1 °C (98.7 °F) 01/03/24 0737   Pulse 65 01/03/24 0737   Resp 18 01/03/24 0737   SpO2 97 % 01/03/24 0737         Event Time   Out of Recovery 13:15:00         Pain/Bárbara Score: Pain Rating Prior to Med Admin: 4 (1/3/2024  7:45 AM)  Pain Rating Post Med Admin: 2 (1/3/2024  7:45 AM)  Bárbara Score: 9 (1/2/2024  1:15 PM)

## 2024-01-03 NOTE — PLAN OF CARE
[facility] - Neurosurgery (Hospital)  Discharge Final Note    Primary Care Provider: Rashi Gee MD    Expected Discharge Date: 1/3/2024    Final Discharge Note (most recent)       Final Note - 01/03/24 1116          Final Note    Assessment Type Final Discharge Note (P)      Anticipated Discharge Disposition Home or Self Care (P)         Post-Acute Status    Post-Acute Authorization Other (P)      Other Status No Post-Acute Service Needs (P)                    Future Appointments   Date Time Provider Department Center   1/18/2024 12:00 PM Roger Flores MD [facility]   2/15/2024 11:00 AM Delicia Vargas MD [facility]   3/20/2024  1:30 PM Saima Diego MD [facility]   4/22/2024  8:20 AM Rashi Gee MD [facility] PCW     Patient discharging home with family.     No post acute needs.      Discharge Plan A and Plan B have been determined by review of patient's clinical status, future medical and therapeutic needs, and coverage/benefits for post-acute care in coordination with multidisciplinary team members.    Lucy Brandon, AYANNA  [facility]  [phone]

## 2024-01-03 NOTE — HOSPITAL COURSE
Patient was admitted for DBS on 1/3/2024 and underwent Lead placement without perioperative complications. The patient remained on abx per surgical protocol until discharge. At the time of discharge, the patient was tolerating PO intake without N/V, dysphagia, denied bowel or bladder dysfunction, denied new neurological symptoms, and reported pain controlled with current regimen. The patient  woke from anesthesia at baseline neuro-status. The surgical site was without evidence of drainage or dehisence and all staples were intact. The patient will follow up in clinic as indicated in discharge instructions. All questions were answered and continued treatment/woundcare instructions were discussed in detail prior to discharge.

## 2024-01-05 ENCOUNTER — PATIENT OUTREACH (OUTPATIENT)
Dept: ADMINISTRATIVE | Facility: CLINIC | Age: 64
End: 2024-01-05
Payer: COMMERCIAL

## 2024-01-05 NOTE — PROGRESS NOTES
C3 nurse spoke with Adam Dalal for a TCC post hospital discharge follow up call. Nurse offered to scheduled TCC hospital follow-up appointment. The patient declined appointment at this time. Pt states he does not wish to schedule follow up appt with PCP at this time as he would like to proceed with neuro issues/appts first.

## 2024-01-06 ENCOUNTER — PATIENT MESSAGE (OUTPATIENT)
Dept: NEUROSURGERY | Facility: CLINIC | Age: 64
End: 2024-01-06
Payer: COMMERCIAL

## 2024-01-08 ENCOUNTER — ANESTHESIA EVENT (OUTPATIENT)
Dept: SURGERY | Facility: HOSPITAL | Age: 64
End: 2024-01-08
Payer: COMMERCIAL

## 2024-01-09 ENCOUNTER — ANESTHESIA (OUTPATIENT)
Dept: SURGERY | Facility: HOSPITAL | Age: 64
End: 2024-01-09
Payer: COMMERCIAL

## 2024-01-09 ENCOUNTER — HOSPITAL ENCOUNTER (OUTPATIENT)
Facility: HOSPITAL | Age: 64
Discharge: HOME | End: 2024-01-09
Attending: NEUROLOGICAL SURGERY | Admitting: NEUROLOGICAL SURGERY
Payer: COMMERCIAL

## 2024-01-09 VITALS
RESPIRATION RATE: 19 BRPM | OXYGEN SATURATION: 98 % | SYSTOLIC BLOOD PRESSURE: 123 MMHG | DIASTOLIC BLOOD PRESSURE: 69 MMHG | TEMPERATURE: 98 F | HEART RATE: 71 BPM

## 2024-01-09 DIAGNOSIS — G20.A1 PARKINSON'S DISEASE, UNSPECIFIED WHETHER DYSKINESIA PRESENT, UNSPECIFIED WHETHER MANIFESTATIONS FLUCTUATE: Primary | ICD-10-CM

## 2024-01-09 DIAGNOSIS — G20.A1 PARKINSONS: ICD-10-CM

## 2024-01-09 LAB
ANION GAP SERPL CALC-SCNC: 7 MMOL/L (ref 8–16)
APTT PPP: 25.9 SEC (ref 21–32)
BASOPHILS # BLD AUTO: 0.1 K/UL (ref 0–0.2)
BASOPHILS NFR BLD: 1.4 % (ref 0–1.9)
BUN SERPL-MCNC: 14 MG/DL (ref 8–23)
CALCIUM SERPL-MCNC: 8.8 MG/DL (ref 8.7–10.5)
CHLORIDE SERPL-SCNC: 104 MMOL/L (ref 95–110)
CO2 SERPL-SCNC: 27 MMOL/L (ref 23–29)
CREAT SERPL-MCNC: 0.9 MG/DL (ref 0.5–1.4)
DIFFERENTIAL METHOD BLD: ABNORMAL
EOSINOPHIL # BLD AUTO: 0.1 K/UL (ref 0–0.5)
EOSINOPHIL NFR BLD: 2 % (ref 0–8)
ERYTHROCYTE [DISTWIDTH] IN BLOOD BY AUTOMATED COUNT: 13.1 % (ref 11.5–14.5)
EST. GFR  (NO RACE VARIABLE): >60 ML/MIN/1.73 M^2
GLUCOSE SERPL-MCNC: 100 MG/DL (ref 70–110)
HCT VFR BLD AUTO: 39.3 % (ref 40–54)
HGB BLD-MCNC: 13.7 G/DL (ref 14–18)
IMM GRANULOCYTES # BLD AUTO: 0.04 K/UL (ref 0–0.04)
IMM GRANULOCYTES NFR BLD AUTO: 0.6 % (ref 0–0.5)
INR PPP: 1 (ref 0.8–1.2)
LYMPHOCYTES # BLD AUTO: 1.8 K/UL (ref 1–4.8)
LYMPHOCYTES NFR BLD: 24.9 % (ref 18–48)
MCH RBC QN AUTO: 32.6 PG (ref 27–31)
MCHC RBC AUTO-ENTMCNC: 34.9 G/DL (ref 32–36)
MCV RBC AUTO: 94 FL (ref 82–98)
MONOCYTES # BLD AUTO: 0.7 K/UL (ref 0.3–1)
MONOCYTES NFR BLD: 9.5 % (ref 4–15)
NEUTROPHILS # BLD AUTO: 4.3 K/UL (ref 1.8–7.7)
NEUTROPHILS NFR BLD: 61.6 % (ref 38–73)
NRBC BLD-RTO: 0 /100 WBC
PLATELET # BLD AUTO: 197 K/UL (ref 150–450)
PMV BLD AUTO: 12.2 FL (ref 9.2–12.9)
POTASSIUM SERPL-SCNC: 4.1 MMOL/L (ref 3.5–5.1)
PROTHROMBIN TIME: 11.2 SEC (ref 9–12.5)
RBC # BLD AUTO: 4.2 M/UL (ref 4.6–6.2)
SODIUM SERPL-SCNC: 138 MMOL/L (ref 136–145)
WBC # BLD AUTO: 7.02 K/UL (ref 3.9–12.7)

## 2024-01-09 PROCEDURE — 80048 BASIC METABOLIC PNL TOTAL CA: CPT

## 2024-01-09 PROCEDURE — 85610 PROTHROMBIN TIME: CPT

## 2024-01-09 PROCEDURE — 61885 INSRT/REDO NEUROSTIM 1 ARRAY: CPT | Mod: 58,,, | Performed by: NEUROLOGICAL SURGERY

## 2024-01-09 PROCEDURE — C1883 ADAPT/EXT, PACING/NEURO LEAD: HCPCS | Performed by: NEUROLOGICAL SURGERY

## 2024-01-09 PROCEDURE — 71000045 HC DOSC ROUTINE RECOVERY EA ADD'L HR: Performed by: NEUROLOGICAL SURGERY

## 2024-01-09 PROCEDURE — D9220A PRA ANESTHESIA: Mod: CRNA,,, | Performed by: NURSE ANESTHETIST, CERTIFIED REGISTERED

## 2024-01-09 PROCEDURE — 71000044 HC DOSC ROUTINE RECOVERY FIRST HOUR: Performed by: NEUROLOGICAL SURGERY

## 2024-01-09 PROCEDURE — 25000003 PHARM REV CODE 250: Performed by: NEUROLOGICAL SURGERY

## 2024-01-09 PROCEDURE — C1767 GENERATOR, NEURO NON-RECHARG: HCPCS | Performed by: NEUROLOGICAL SURGERY

## 2024-01-09 PROCEDURE — 27201423 OPTIME MED/SURG SUP & DEVICES STERILE SUPPLY: Performed by: NEUROLOGICAL SURGERY

## 2024-01-09 PROCEDURE — 63600175 PHARM REV CODE 636 W HCPCS: Performed by: NURSE ANESTHETIST, CERTIFIED REGISTERED

## 2024-01-09 PROCEDURE — 71000016 HC POSTOP RECOV ADDL HR: Performed by: NEUROLOGICAL SURGERY

## 2024-01-09 PROCEDURE — 63600175 PHARM REV CODE 636 W HCPCS: Performed by: ANESTHESIOLOGY

## 2024-01-09 PROCEDURE — 85730 THROMBOPLASTIN TIME PARTIAL: CPT

## 2024-01-09 PROCEDURE — 71000015 HC POSTOP RECOV 1ST HR: Performed by: NEUROLOGICAL SURGERY

## 2024-01-09 PROCEDURE — 63600175 PHARM REV CODE 636 W HCPCS

## 2024-01-09 PROCEDURE — D9220A PRA ANESTHESIA: Mod: ANES,,, | Performed by: ANESTHESIOLOGY

## 2024-01-09 PROCEDURE — 37000009 HC ANESTHESIA EA ADD 15 MINS: Performed by: NEUROLOGICAL SURGERY

## 2024-01-09 PROCEDURE — 37000008 HC ANESTHESIA 1ST 15 MINUTES: Performed by: NEUROLOGICAL SURGERY

## 2024-01-09 PROCEDURE — 36000710: Performed by: NEUROLOGICAL SURGERY

## 2024-01-09 PROCEDURE — 25000003 PHARM REV CODE 250: Performed by: NURSE ANESTHETIST, CERTIFIED REGISTERED

## 2024-01-09 PROCEDURE — 25000003 PHARM REV CODE 250

## 2024-01-09 PROCEDURE — 36000711: Performed by: NEUROLOGICAL SURGERY

## 2024-01-09 PROCEDURE — 63600175 PHARM REV CODE 636 W HCPCS: Performed by: NEUROLOGICAL SURGERY

## 2024-01-09 PROCEDURE — 85025 COMPLETE CBC W/AUTO DIFF WBC: CPT

## 2024-01-09 PROCEDURE — C1787 PATIENT PROGR, NEUROSTIM: HCPCS | Performed by: NEUROLOGICAL SURGERY

## 2024-01-09 DEVICE — NEUROSTIMULATR PERCEPT 58X51MM: Type: IMPLANTABLE DEVICE | Site: CHEST | Status: FUNCTIONAL

## 2024-01-09 DEVICE — IMPLANTABLE DEVICE: Type: IMPLANTABLE DEVICE | Site: SCALP | Status: FUNCTIONAL

## 2024-01-09 RX ORDER — ONDANSETRON 2 MG/ML
4 INJECTION INTRAMUSCULAR; INTRAVENOUS ONCE AS NEEDED
Status: DISCONTINUED | OUTPATIENT
Start: 2024-01-09 | End: 2024-01-09 | Stop reason: HOSPADM

## 2024-01-09 RX ORDER — DEXMEDETOMIDINE HYDROCHLORIDE 100 UG/ML
INJECTION, SOLUTION INTRAVENOUS
Status: DISCONTINUED | OUTPATIENT
Start: 2024-01-09 | End: 2024-01-09

## 2024-01-09 RX ORDER — HYDROMORPHONE HYDROCHLORIDE 2 MG/ML
INJECTION, SOLUTION INTRAMUSCULAR; INTRAVENOUS; SUBCUTANEOUS
Status: DISCONTINUED | OUTPATIENT
Start: 2024-01-09 | End: 2024-01-09

## 2024-01-09 RX ORDER — MUPIROCIN 20 MG/G
1 OINTMENT TOPICAL 2 TIMES DAILY
Status: DISCONTINUED | OUTPATIENT
Start: 2024-01-09 | End: 2024-01-09 | Stop reason: HOSPADM

## 2024-01-09 RX ORDER — OXYCODONE HYDROCHLORIDE 5 MG/1
5 TABLET ORAL EVERY 6 HOURS PRN
Status: DISCONTINUED | OUTPATIENT
Start: 2024-01-09 | End: 2024-01-09 | Stop reason: HOSPADM

## 2024-01-09 RX ORDER — DEXAMETHASONE SODIUM PHOSPHATE 100 MG/10ML
INJECTION INTRAMUSCULAR; INTRAVENOUS
Status: DISCONTINUED | OUTPATIENT
Start: 2024-01-09 | End: 2024-01-09

## 2024-01-09 RX ORDER — LIDOCAINE HYDROCHLORIDE AND EPINEPHRINE 10; 10 MG/ML; UG/ML
INJECTION, SOLUTION INFILTRATION; PERINEURAL
Status: DISCONTINUED | OUTPATIENT
Start: 2024-01-09 | End: 2024-01-09 | Stop reason: HOSPADM

## 2024-01-09 RX ORDER — PROPOFOL 10 MG/ML
VIAL (ML) INTRAVENOUS
Status: DISCONTINUED | OUTPATIENT
Start: 2024-01-09 | End: 2024-01-09

## 2024-01-09 RX ORDER — ONDANSETRON 2 MG/ML
INJECTION INTRAMUSCULAR; INTRAVENOUS
Status: DISCONTINUED | OUTPATIENT
Start: 2024-01-09 | End: 2024-01-09

## 2024-01-09 RX ORDER — HYDROMORPHONE HYDROCHLORIDE 1 MG/ML
0.2 INJECTION, SOLUTION INTRAMUSCULAR; INTRAVENOUS; SUBCUTANEOUS EVERY 5 MIN PRN
Status: COMPLETED | OUTPATIENT
Start: 2024-01-09 | End: 2024-01-09

## 2024-01-09 RX ORDER — DROPERIDOL 2.5 MG/ML
0.62 INJECTION, SOLUTION INTRAMUSCULAR; INTRAVENOUS ONCE AS NEEDED
Status: DISCONTINUED | OUTPATIENT
Start: 2024-01-09 | End: 2024-01-09 | Stop reason: HOSPADM

## 2024-01-09 RX ORDER — HYDROMORPHONE HYDROCHLORIDE 1 MG/ML
0.2 INJECTION, SOLUTION INTRAMUSCULAR; INTRAVENOUS; SUBCUTANEOUS EVERY 5 MIN PRN
Status: DISCONTINUED | OUTPATIENT
Start: 2024-01-09 | End: 2024-01-09 | Stop reason: HOSPADM

## 2024-01-09 RX ORDER — FENTANYL CITRATE 50 UG/ML
INJECTION, SOLUTION INTRAMUSCULAR; INTRAVENOUS
Status: DISCONTINUED | OUTPATIENT
Start: 2024-01-09 | End: 2024-01-09

## 2024-01-09 RX ORDER — LIDOCAINE HYDROCHLORIDE 20 MG/ML
INJECTION, SOLUTION EPIDURAL; INFILTRATION; INTRACAUDAL; PERINEURAL
Status: DISCONTINUED | OUTPATIENT
Start: 2024-01-09 | End: 2024-01-09

## 2024-01-09 RX ORDER — CEFTRIAXONE 1 G/1
INJECTION, POWDER, FOR SOLUTION INTRAMUSCULAR; INTRAVENOUS
Status: DISCONTINUED | OUTPATIENT
Start: 2024-01-09 | End: 2024-01-09 | Stop reason: HOSPADM

## 2024-01-09 RX ORDER — ACETAMINOPHEN 10 MG/ML
INJECTION, SOLUTION INTRAVENOUS
Status: DISCONTINUED | OUTPATIENT
Start: 2024-01-09 | End: 2024-01-09

## 2024-01-09 RX ORDER — ROCURONIUM BROMIDE 10 MG/ML
INJECTION, SOLUTION INTRAVENOUS
Status: DISCONTINUED | OUTPATIENT
Start: 2024-01-09 | End: 2024-01-09

## 2024-01-09 RX ADMIN — FENTANYL CITRATE 50 MCG: 50 INJECTION INTRAMUSCULAR; INTRAVENOUS at 01:01

## 2024-01-09 RX ADMIN — HYDROMORPHONE HYDROCHLORIDE 0.2 MG: 1 INJECTION, SOLUTION INTRAMUSCULAR; INTRAVENOUS; SUBCUTANEOUS at 03:01

## 2024-01-09 RX ADMIN — HYDROMORPHONE HYDROCHLORIDE 0.2 MG: 1 INJECTION, SOLUTION INTRAMUSCULAR; INTRAVENOUS; SUBCUTANEOUS at 04:01

## 2024-01-09 RX ADMIN — DEXAMETHASONE SODIUM PHOSPHATE 4 MG: 10 INJECTION INTRAMUSCULAR; INTRAVENOUS at 01:01

## 2024-01-09 RX ADMIN — DEXMEDETOMIDINE 20 MCG: 200 INJECTION, SOLUTION INTRAVENOUS at 12:01

## 2024-01-09 RX ADMIN — SODIUM CHLORIDE, SODIUM ACETATE ANHYDROUS, SODIUM GLUCONATE, POTASSIUM CHLORIDE, AND MAGNESIUM CHLORIDE: 526; 222; 502; 37; 30 INJECTION, SOLUTION INTRAVENOUS at 02:01

## 2024-01-09 RX ADMIN — LIDOCAINE HYDROCHLORIDE 100 MG: 20 INJECTION, SOLUTION EPIDURAL; INFILTRATION; INTRACAUDAL at 12:01

## 2024-01-09 RX ADMIN — FENTANYL CITRATE 100 MCG: 50 INJECTION INTRAMUSCULAR; INTRAVENOUS at 12:01

## 2024-01-09 RX ADMIN — ACETAMINOPHEN 1000 MG: 10 INJECTION, SOLUTION INTRAVENOUS at 01:01

## 2024-01-09 RX ADMIN — PROPOFOL 130 MG: 10 INJECTION, EMULSION INTRAVENOUS at 12:01

## 2024-01-09 RX ADMIN — CEFOXITIN 2 G: 1 INJECTION, POWDER, FOR SOLUTION INTRAVENOUS at 01:01

## 2024-01-09 RX ADMIN — ONDANSETRON 4 MG: 2 INJECTION INTRAMUSCULAR; INTRAVENOUS at 01:01

## 2024-01-09 RX ADMIN — ROCURONIUM BROMIDE 50 MG: 10 INJECTION INTRAVENOUS at 12:01

## 2024-01-09 RX ADMIN — MIDAZOLAM HYDROCHLORIDE 2 MG: 1 INJECTION, SOLUTION INTRAMUSCULAR; INTRAVENOUS at 12:01

## 2024-01-09 RX ADMIN — OXYCODONE HYDROCHLORIDE 5 MG: 5 TABLET ORAL at 03:01

## 2024-01-09 RX ADMIN — HYDROMORPHONE HYDROCHLORIDE 0.6 MG: 2 INJECTION INTRAMUSCULAR; INTRAVENOUS; SUBCUTANEOUS at 02:01

## 2024-01-09 RX ADMIN — CEFTRIAXONE 2 G: 2 INJECTION, POWDER, FOR SOLUTION INTRAMUSCULAR; INTRAVENOUS at 05:01

## 2024-01-09 RX ADMIN — SODIUM CHLORIDE, SODIUM ACETATE ANHYDROUS, SODIUM GLUCONATE, POTASSIUM CHLORIDE, AND MAGNESIUM CHLORIDE: 526; 222; 502; 37; 30 INJECTION, SOLUTION INTRAVENOUS at 12:01

## 2024-01-09 RX ADMIN — SUGAMMADEX 200 MG: 100 INJECTION, SOLUTION INTRAVENOUS at 02:01

## 2024-01-09 RX ADMIN — HYDROMORPHONE HYDROCHLORIDE 0.4 MG: 2 INJECTION INTRAMUSCULAR; INTRAVENOUS; SUBCUTANEOUS at 02:01

## 2024-01-09 NOTE — PROGRESS NOTES
Pt c/o 7/10 pain to his incision behind the L ear. Notified  pt still having severe pain after 2mg dialuded and 5mg oxycodone. Per  OK to place order for 2 more MG of dilauded for pain control.

## 2024-01-09 NOTE — BRIEF OP NOTE
[facility] - Surgery ([facility])  Brief Operative Note    SUMMARY     Surgery Date: 1/9/2024     Surgeon(s) and Role:     * Roger Flores MD - Primary     * Alcon Krause MD - Resident - Assisting        Pre-op Diagnosis:  Parkinson's disease with dyskinesia and fluctuating manifestations [G20.B2]    Post-op Diagnosis:  Post-Op Diagnosis Codes:     * Parkinson's disease with dyskinesia and fluctuating manifestations [G20.B2]    Procedure(s) (LRB):  INSERTION, PULSE GENERATOR, DEEP BRAIN STIMULATOR (Left)    Anesthesia: General    Implants:  Implant Name Type Inv. Item Serial No.  Lot No. LRB No. Used Action   SENSIGHT EXTENSION KIT   [id number] [employer]  Right 1 Implanted   SENSIGHT EXTENSION KIT   [id number] [employer]  Left 1 Implanted   SENSIGHT CONNECTOR PLUG     [id number] Right 1 Implanted   NEUROSTIMULATR PERCEPT [id number] - [id number]  NEUROSTIMULATR PERCEPT [id number] [employer] [facility]  Left 1 Implanted       Operative Findings: Left chest pocket created and wires tunneled to generator below oleft clavicle    Estimated Blood Loss: * No values recorded between 1/9/2024  1:27 PM and 1/9/2024  2:27 PM *    Estimated Blood Loss has not been documented. EBL = minimal.         Specimens:   Specimen (24h ago, onward)      None            [id number]

## 2024-01-09 NOTE — ANESTHESIA PREPROCEDURE EVALUATION
01/09/2024  Adam Dalal is a 63 y.o., male.      Pre-op Assessment          Review of Systems  Anesthesia Hx:  No problems with previous Anesthesia                Hematology/Oncology:                        --  Cancer in past history:                     Cardiovascular:      Denies Hypertension.    Denies CAD.                                        Pulmonary:     Denies Asthma.     Denies Sleep Apnea.                Renal/:   Denies Chronic Renal Disease.                Hepatic/GI:   Denies PUD.   Denies GERD. Denies Liver Disease.            Neurological:    Denies CVA.    Denies Seizures.                                Endocrine:  Denies Diabetes. Denies Hypothyroidism.              Physical Exam  General: Alert    Airway:  Mallampati: I   Mouth Opening: Normal  TM Distance: Normal  Tongue: Normal  Neck ROM: Normal ROM    Dental:  Intact        Anesthesia Plan  Type of Anesthesia, risks & benefits discussed:    Anesthesia Type: Gen ETT  Intra-op Monitoring Plan: Standard ASA Monitors  Post Op Pain Control Plan: multimodal analgesia and IV/PO Opioids PRN  Induction:  IV  Airway Plan: Direct  Informed Consent: Informed consent signed with the Patient and all parties understand the risks and agree with anesthesia plan.  All questions answered.   ASA Score: 2    Ready For Surgery From Anesthesia Perspective.     .

## 2024-01-09 NOTE — ANESTHESIA PROCEDURE NOTES
Intubation    Date/Time: 1/9/2024 12:59 PM    Performed by: Jo Messer CRNA  Authorized by: Leandro Scott MD    Intubation:     Induction:  Intravenous    Intubated:  Postinduction    Mask Ventilation:  Easy mask    Attempts:  1    Attempted By:  Student    Method of Intubation:  Direct    Blade:  Calderon 2    Laryngeal View Grade: Grade I - full view of cords      Difficult Airway Encountered?: No      Complications:  None    Airway Device:  Oral endotracheal tube    Airway Device Size:  7.5    Style/Cuff Inflation:  Cuffed (inflated to minimal occlusive pressure)    Tube secured:  22    Secured at:  The teeth    Placement Verified By:  Capnometry    Complicating Factors:  None    Findings Post-Intubation:  BS equal bilateral and atraumatic/condition of teeth unchanged

## 2024-01-09 NOTE — BRIEF OP NOTE
[facility] - Surgery ([facility])  Brief Operative Note    Surgery Date: 1/9/2024     Surgeon(s) and Role:     * Roger Flores MD - Primary     * Alcon Krause MD - Resident - Assisting        Pre-op Diagnosis:  Parkinson's disease with dyskinesia and fluctuating manifestations [G20.B2]    Post-op Diagnosis:  Post-Op Diagnosis Codes:     * Parkinson's disease with dyskinesia and fluctuating manifestations [G20.B2]    Procedure(s) (LRB):  INSERTION, PULSE GENERATOR, DEEP BRAIN STIMULATOR (Left)    Anesthesia: General    Operative Findings: see brief op note    Estimated Blood Loss: * No values recorded between 1/9/2024  1:27 PM and 1/9/2024  2:28 PM *         Specimens:   Specimen (24h ago, onward)      None              Discharge Note    OUTCOME: Patient tolerated treatment/procedure well without complication and is now ready for discharge.    DISPOSITION: Home or Self Care    FINAL DIAGNOSIS:  <principal problem not specified>    FOLLOWUP: In clinic    DISCHARGE INSTRUCTIONS:  No discharge procedures on file.

## 2024-01-09 NOTE — TRANSFER OF CARE
Anesthesia Transfer of Care Note    Patient: Adam Sawyer Dalal    Procedure(s) Performed: Procedure(s) (LRB):  INSERTION, PULSE GENERATOR, DEEP BRAIN STIMULATOR (Left)    Patient location: [facility]    Anesthesia Type: general    Transport from OR: Transported from OR on 6-10 L/min O2 by face mask with adequate spontaneous ventilation    Post pain: adequate analgesia    Post assessment: no apparent anesthetic complications    Post vital signs: stable    Level of consciousness: awake, alert and oriented    Nausea/Vomiting: no nausea/vomiting    Complications: none    Transfer of care protocol was followed      Last vitals: Visit Vitals  /82   Pulse 62   Temp 36.7 °C (98.1 °F) (Oral)   Resp 14   SpO2 97%

## 2024-01-10 RX ORDER — MIDAZOLAM HYDROCHLORIDE 1 MG/ML
INJECTION, SOLUTION INTRAMUSCULAR; INTRAVENOUS
Status: DISCONTINUED | OUTPATIENT
Start: 2024-01-09 | End: 2024-01-10

## 2024-01-10 RX ORDER — ALPRAZOLAM 0.5 MG/1
TABLET ORAL
Qty: 30 TABLET | Refills: 5 | Status: SHIPPED | OUTPATIENT
Start: 2024-01-10

## 2024-01-10 NOTE — TELEPHONE ENCOUNTER
Care Due:                  Date            Visit Type   Department     Provider  --------------------------------------------------------------------------------                                             [facility] INTERNAL  Last Visit: 11-      PRE-OP       MEDICINE       [facility] INTERNAL  Next Visit: 01-      FOLLOW UP    MEDICINE       Rashi Gee                                                            Last  Test          Frequency    Reason                     Performed    Due Date  --------------------------------------------------------------------------------    Lipid Panel.  12 months..  pravastatin..............  04- 03-    [facility] Embedded Care Due Messages. Reference number: [id number].   1/10/2024 10:27:54 AM CST

## 2024-01-10 NOTE — PLAN OF CARE
Pt is AAOX4, VSS, No S/S of acute distress. Rates pain as tolerable and 3/10 pain. Pt denies N/V and is tolerating a regular diet. Pt ambulates without assistance and voids without difficulty. MD Jimi came to bedside to speak with pt and eval his incisions before Dcing- no issues and OK to DC home.

## 2024-01-10 NOTE — ANESTHESIA POSTPROCEDURE EVALUATION
Anesthesia Post Evaluation    Patient: Adam Dalal    Procedure(s) Performed: Procedure(s) (LRB):  INSERTION, PULSE GENERATOR, DEEP BRAIN STIMULATOR (Left)    Final Anesthesia Type: general      Patient location during evaluation: PACU  Patient participation: Yes- Able to Participate  Level of consciousness: awake  Post-procedure vital signs: reviewed and stable  Pain management: adequate  Airway patency: patent    PONV status at discharge: No PONV  Anesthetic complications: no      Cardiovascular status: blood pressure returned to baseline  Respiratory status: unassisted  Hydration status: euvolemic  Follow-up not needed.              Vitals Value Taken Time   /69 01/09/24 1735   Temp 36.5 °C (97.7 °F) 01/09/24 1750   Pulse 71 01/09/24 1750   Resp 19 01/09/24 1750   SpO2 98 % 01/09/24 1750         No case tracking events are documented in the log.      Pain/Bárbara Score: Pain Rating Prior to Med Admin: 7 (1/9/2024  4:11 PM)  Bárbara Score: 10 (1/9/2024  5:50 PM)

## 2024-01-10 NOTE — OP NOTE
Date of Operation:  1/09/2024.    Preoperative Diagnosis:  Parkinson's disease.    Postoperative Diagnosis:  Parkinson's disease.    Procedure:  Implantation of pulse generator for deep brain stimulation (Percept, Pixelle).    Surgeon:  Roger Flores MD    Assistant:  Alcon Krause MD (Resident in Neurosurgery)    Anesthesia:  General Endotracheal.    Estimated Blood Loss:  Less than 50 ml.    Condition at End of Procedure:  Satisfactory.    Brief History:   This 63-year-old man with Parkinson's disease underwent implantation of a deep brain stimulating electrode in the left globus pallidus interna last week.  He now returns for insertion of the pulse generator.    Procedure in Detail:  The patient was brought to the operating room and in the supine position on the operating table under premedication intubated and induced with general anesthesia.  Sequential compression devices were applied to the legs and various intravenous lines started.  The head was turned to the right and allowed to rest on a doughnut and the bed placed in reverse Trendelenburg position to take pressure off of the jugular veins.  The scalp behind his left ear, neck and left upper chest were shaved, prepped with Betadine and draped in a sterile fashion.  A small coronal incision above his left ear and a transverse incision 3 fingerbreadths below the clavicle were outlined and these were injected with 1% xylocaine and epinephrine.  Operation was begun at the scalp.  The incision was carried through the skin and galea with the plasma blade and bleeding controlled with the plasma blade.  The scalp was dissected somewhat posteriorly to allow for placement of 2 connecting leads.  The scalp was then dissected with a hemostat and the buried DBS electrode with its lead kit delivered out of the wound and covered with an antibiotic-soaked sponge.  Attention was then turned to the chest.  The incision is again carried through the skin and  subcutaneous tissue with the plasma blade and a subcutaneous pocket created with the plasma blade and blunt dissection.  The deltopectoral and retromastoid fascias were opened with a hemostat.  The shunt passer was brought from the scalp down to the chest and 2 connecting leads brought up into the scalp wound.  The 1 for the left side is marked with a dot, the future right-sided connecting lead has no dot.  The plug was placed into the right-sided connecting lead and tightened with a torque wrench and this was buried posteriorly under the scalp.  The lead kit was removed from the left-sided DBS electrode and it was inserted into the connecting lead tightening the screws with a torque wrench.  The proximal end of the connecting leads were inserted into a Percept dual channel pulse generator with the left side superior and the right-sided inferior and these screws tightened with a torque wrench also.  The generator was placed into the subcutaneous pocket on the chest and tested with the  and found to have normal impedance in all channels on the left.  The generator was then affixed to the chest wall with 3-0 silk sutures.  The wounds were thoroughly irrigated.  The chest wound was closed in 2 subcutaneous layers a deeper simple and more superficial inverted interrupted 3-0 Vicryl sutures and the skin closed with a 4-0 Monocryl suture half-inch Steri-Strips and Mastisol and covered with Telfa and Tegaderm.  The scalp incision was closed with inverted interrupted 3-0 Vicryl sutures in the galea and skin staples and covered with a Telfa bacitracin dressing held in place with Tegaderm also.  The patient was returned to the full supine position, allowed to awaken from anesthesia, extubated, transferred to his [facility] and brought to the outpatient recovery area in stable condition.  He received 2 g of Rocephin at the beginning of the procedure and Rocephin containing antibiotic irrigating solutions were used  throughout.

## 2024-01-10 NOTE — ADDENDUM NOTE
Addendum  created 01/10/24 1313 by Jo Messer CRNA    Intraprocedure Meds edited, Orders acknowledged in Narrator

## 2024-01-17 ENCOUNTER — OFFICE VISIT (OUTPATIENT)
Dept: INTERNAL MEDICINE | Facility: CLINIC | Age: 64
End: 2024-01-17
Payer: COMMERCIAL

## 2024-01-17 VITALS
DIASTOLIC BLOOD PRESSURE: 80 MMHG | WEIGHT: 225.31 LBS | SYSTOLIC BLOOD PRESSURE: 124 MMHG | BODY MASS INDEX: 29.86 KG/M2 | OXYGEN SATURATION: 98 % | HEART RATE: 69 BPM | HEIGHT: 73 IN

## 2024-01-17 DIAGNOSIS — R73.09 ELEVATED GLUCOSE LEVEL: ICD-10-CM

## 2024-01-17 DIAGNOSIS — K59.00 CONSTIPATION, UNSPECIFIED CONSTIPATION TYPE: ICD-10-CM

## 2024-01-17 DIAGNOSIS — G20.B2 PARKINSON'S DISEASE WITH DYSKINESIA AND FLUCTUATING MANIFESTATIONS: Primary | ICD-10-CM

## 2024-01-17 DIAGNOSIS — Z78.9 IMPAIRED MOTOR CONTROL: ICD-10-CM

## 2024-01-17 DIAGNOSIS — Z12.5 SCREENING FOR PROSTATE CANCER: ICD-10-CM

## 2024-01-17 DIAGNOSIS — E78.5 HYPERLIPIDEMIA, UNSPECIFIED HYPERLIPIDEMIA TYPE: ICD-10-CM

## 2024-01-17 PROCEDURE — 3074F SYST BP LT 130 MM HG: CPT | Mod: CPTII,S$GLB,, | Performed by: INTERNAL MEDICINE

## 2024-01-17 PROCEDURE — 3079F DIAST BP 80-89 MM HG: CPT | Mod: CPTII,S$GLB,, | Performed by: INTERNAL MEDICINE

## 2024-01-17 PROCEDURE — 1160F RVW MEDS BY RX/DR IN RCRD: CPT | Mod: CPTII,S$GLB,, | Performed by: INTERNAL MEDICINE

## 2024-01-17 PROCEDURE — 1159F MED LIST DOCD IN RCRD: CPT | Mod: CPTII,S$GLB,, | Performed by: INTERNAL MEDICINE

## 2024-01-17 PROCEDURE — 99999 PR PBB SHADOW E&M-EST. PATIENT-LVL III: CPT | Mod: PBBFAC,,, | Performed by: INTERNAL MEDICINE

## 2024-01-17 PROCEDURE — 1111F DSCHRG MED/CURRENT MED MERGE: CPT | Mod: CPTII,S$GLB,, | Performed by: INTERNAL MEDICINE

## 2024-01-17 PROCEDURE — 3008F BODY MASS INDEX DOCD: CPT | Mod: CPTII,S$GLB,, | Performed by: INTERNAL MEDICINE

## 2024-01-17 PROCEDURE — 99214 OFFICE O/P EST MOD 30 MIN: CPT | Mod: S$GLB,,, | Performed by: INTERNAL MEDICINE

## 2024-01-17 NOTE — PROGRESS NOTES
Subjective:       Patient ID: Adam Dalal is a 63 y.o. male.    Chief Complaint: Hospital Follow Up    Patient seen for hospital follow-up for deep brain stimulator implantation.  This was done over 2 surgeries with the generator being implanted this past week.  He has a neuro surgery follow-up tomorrow.  He says the device will be turned on for another few weeks.  He has staples in the head and those look stable.  No redness tenderness or drainage noted.    He says he is doing well.  He had minimal constipation from the pain med but that has resolved.    No other chest pain shortness for breath or urinary symptoms.  He has a physical with me in April and we will update labs before the visit.      Review of Systems   Musculoskeletal:  Positive for gait problem.   Integumentary:  Positive for wound (Surgical wound left side of head and left side of chest). Negative for rash.   Neurological:  Positive for tremors and coordination difficulties.           Past Medical History:   Diagnosis Date    Anxiety     Esotropia of right eye 05/02/2013    Hearing deficit, bilateral     High cholesterol     Hyperlipidemia     Malignant melanoma of skin, unspecified 06/29/2022    in situ right lateral infraorbit    Melanoma 06/29/2022    in situ - right lateral infraorbital    Melanoma in situ of cheek     Parkinson's disease     Ringing in ear, bilateral      Past Surgical History:   Procedure Laterality Date    CATARACT EXTRACTION W/  INTRAOCULAR LENS IMPLANT Right 01/09/2017    Dr marin     CATARACT EXTRACTION W/  INTRAOCULAR LENS IMPLANT Left 01/30/2017    Dr. Marin    COLONOSCOPY N/A 7/7/2023    Procedure: COLONOSCOPY;  Surgeon: Arnaldo Neely MD;  Location: [facility] ENDOSCOPY;  Service: Endoscopy;  Laterality: N/A;  prep instructions sent to pt via portal -jose armando Amaya prep  pre call complete, stated he would have a ride -CE    DEEP BRAIN STIMULATOR PLACEMENT Left 1/2/2024    Procedure: INSERTION, DEEP BRAIN STIMULATOR  ELECTRODE;  Surgeon: Roger Flores MD;  Location: [facility] OR [facility] FLR;  Service: Neurosurgery;  Laterality: Left;  INSERT ELECTRODE LEFT SIDE FOR DEEP BRAIN STIMULATION/ GLOBUS PALLIDUS INTERNUS/ TEDS & SCD/[employer]TRON[employer], SHRAVAN DARTEZ.    HERNIA REPAIR      INSERTION OF DEEP BRAIN STIMULATOR GENERATOR Left 1/9/2024    Procedure: INSERTION, PULSE GENERATOR, DEEP BRAIN STIMULATOR;  Surgeon: Roger Flores MD;  [facility];  Service: Neurosurgery;  Laterality: Left;  PLACEMENT OF PULSE GENERATOR FOR DEEP BRAIN STIMULATION/ TEDS & SCD/[employer]TRONICS, SHRAVAN DARTEZ    PLACEMENT OF FIDUCIAL SCREW INTO SPINE N/A 1/2/2024    Procedure: INSERTION, FIDUCIAL SCREW, SKULL;  Surgeon: Roger Flores MD;  [facility];  Service: Neurosurgery;  Laterality: N/A;  APPLICATION OF FIDUCIALS FOR DEEP BRAIN STIMULATION/ TEDS & SCD/[employer]TRON[employer]/SHRAVAN DARTEZ    RECONSTRUCTION OF FACE Right 8/4/2022    Procedure: RECONSTRUCTION, FACE;  Surgeon: Yaneli Hickman MD;  [facility];  Service: ENT;  Laterality: Right;    STRABISMUS SURGERY  4yrs    right eye      Patient Active Problem List   Diagnosis    Hyperlipidemia    Obstructive sleep apnea    Esotropia of right eye    Ocular hypertension - Both Eyes    Nuclear sclerosis    Nuclear sclerotic cataract of left eye    Parkinson disease    Hypophonia    Voice and resonance disorder    Dysarthria    Impaired functional mobility, balance, gait, and endurance    Impaired motor control    Alcohol use disorder, mild, abuse    Oral lesion    Mohs defect of right cheek    History of melanoma in situ    Mild neurocognitive disorder due to multiple etiologies    Parkinson's disease (tremor, stiffness, slow motion, unstable posture)        Objective:      Physical Exam  Constitutional:       General: He is not in acute distress.     Appearance: He is well-developed.   HENT:      Head: Normocephalic and atraumatic.      Right Ear: Tympanic membrane, ear canal and external  ear normal.      Left Ear: Tympanic membrane, ear canal and external ear normal.      Mouth/Throat:      Pharynx: No oropharyngeal exudate or posterior oropharyngeal erythema.   Eyes:      General: No scleral icterus.     Conjunctiva/sclera: Conjunctivae normal.      Pupils: Pupils are equal, round, and reactive to light.   Neck:      Thyroid: No thyromegaly.      Comments: No supraclavicular nodes palpated  Cardiovascular:      Rate and Rhythm: Normal rate and regular rhythm.      Pulses: Normal pulses.      Heart sounds: Normal heart sounds. No murmur heard.  Pulmonary:      Effort: Pulmonary effort is normal.      Breath sounds: Normal breath sounds. No wheezing.   Musculoskeletal:         General: No tenderness.      Cervical back: Normal range of motion and neck supple.      Right lower leg: No edema.      Left lower leg: No edema.   Lymphadenopathy:      Cervical: No cervical adenopathy.   Skin:     Coloration: Skin is not jaundiced or pale.      Comments: Occlusive bandage over the left chest generator implantation site.  I did not remove that.    Well healing stapled incisions on the left side of the scalp   Neurological:      General: No focal deficit present.      Mental Status: He is alert and oriented to person, place, and time.   Psychiatric:         Mood and Affect: Mood normal.         Behavior: Behavior normal.         Assessment:       Problem List Items Addressed This Visit          Neuro    Parkinson disease - Primary    Relevant Orders    Hemoglobin A1C    Basic Metabolic Panel    Lipid Panel    Impaired motor control    Relevant Orders    Basic Metabolic Panel    Lipid Panel       Cardiac/Vascular    Hyperlipidemia    Relevant Orders    Lipid Panel     Other Visit Diagnoses       Constipation, unspecified constipation type        Mild postop but improving.    Elevated glucose level        Relevant Orders    Hemoglobin A1C    Lipid Panel    Screening for prostate cancer        Relevant Orders     "PSA, Screening            Plan:         Adam was seen today for hospital follow up.    Diagnoses and all orders for this visit:    Parkinson's disease with dyskinesia and fluctuating manifestations  -     Hemoglobin A1C; Future  -     Basic Metabolic Panel; Future  -     Lipid Panel; Future    Impaired motor control  -     Basic Metabolic Panel; Future  -     Lipid Panel; Future    Constipation, unspecified constipation type  Comments:  Mild postop but improving.    Elevated glucose level  -     Hemoglobin A1C; Future  -     Lipid Panel; Future    Screening for prostate cancer  -     PSA, Screening; Future    Hyperlipidemia, unspecified hyperlipidemia type  -     Lipid Panel; Future           Continue with follow-up as planned.  Continue with meds.  See me in April as planned          Portions of this note may have been created with voice recognition software. Occasional "wrong-word" or "sound-a-like" substitutions may have occurred due to the inherent limitations of voice recognition software. Please, read the note carefully and recognize, using context, where substitutions have occurred.  "

## 2024-01-18 ENCOUNTER — OFFICE VISIT (OUTPATIENT)
Dept: NEUROSURGERY | Facility: CLINIC | Age: 64
End: 2024-01-18
Payer: COMMERCIAL

## 2024-01-18 VITALS
HEIGHT: 73 IN | DIASTOLIC BLOOD PRESSURE: 76 MMHG | HEART RATE: 81 BPM | WEIGHT: 225 LBS | BODY MASS INDEX: 29.82 KG/M2 | SYSTOLIC BLOOD PRESSURE: 122 MMHG

## 2024-01-18 DIAGNOSIS — G20.A1 PARKINSON'S DISEASE WITHOUT DYSKINESIA OR FLUCTUATING MANIFESTATIONS: Primary | ICD-10-CM

## 2024-01-18 PROCEDURE — 1160F RVW MEDS BY RX/DR IN RCRD: CPT | Mod: CPTII,S$GLB,, | Performed by: NEUROLOGICAL SURGERY

## 2024-01-18 PROCEDURE — 3074F SYST BP LT 130 MM HG: CPT | Mod: CPTII,S$GLB,, | Performed by: NEUROLOGICAL SURGERY

## 2024-01-18 PROCEDURE — 3078F DIAST BP <80 MM HG: CPT | Mod: CPTII,S$GLB,, | Performed by: NEUROLOGICAL SURGERY

## 2024-01-18 PROCEDURE — 99999 PR PBB SHADOW E&M-EST. PATIENT-LVL III: CPT | Mod: PBBFAC,,, | Performed by: NEUROLOGICAL SURGERY

## 2024-01-18 PROCEDURE — 1159F MED LIST DOCD IN RCRD: CPT | Mod: CPTII,S$GLB,, | Performed by: NEUROLOGICAL SURGERY

## 2024-01-18 PROCEDURE — 99024 POSTOP FOLLOW-UP VISIT: CPT | Mod: S$GLB,,, | Performed by: NEUROLOGICAL SURGERY

## 2024-01-18 NOTE — PROGRESS NOTES
Adam Dalal returned in neurosurgical follow-up to the office today.  He was admitted to [facility] and underwent implantation of a deep brain stimulating electrode in the left globus pallidus interna on 01/02/2024 and then returned on 01/09/2024 for insertion of the pulse generator.  He has done very well with some improvement in symptoms just from the placement of the DBS electrode.  He returns today for staple removal.    On brief examination he is alert and in good spirits.  His incisions are nicely healed and the staples were removed.  The Steri-Strips are still in place on his chest.  These can be washed off in the shower.    I am happy to see him doing well.  [facility] and I will follow him through the clinic.

## 2024-01-30 DIAGNOSIS — G20.A1 PARKINSON'S DISEASE: ICD-10-CM

## 2024-01-30 RX ORDER — RASAGILINE 1 MG/1
1 TABLET ORAL DAILY
Qty: 90 TABLET | Refills: 2 | Status: SHIPPED | OUTPATIENT
Start: 2024-01-30

## 2024-02-01 ENCOUNTER — PATIENT MESSAGE (OUTPATIENT)
Dept: NEUROLOGY | Facility: CLINIC | Age: 64
End: 2024-02-01
Payer: COMMERCIAL

## 2024-02-01 NOTE — TELEPHONE ENCOUNTER
"Pt sent message to state he is doing well.  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  Informed pt that I'm unaware of any recording of his brain waves, but will direct the question to Dr. Vargas.  "

## 2024-02-07 ENCOUNTER — PATIENT MESSAGE (OUTPATIENT)
Dept: SLEEP MEDICINE | Facility: CLINIC | Age: 64
End: 2024-02-07
Payer: COMMERCIAL

## 2024-02-07 ENCOUNTER — PATIENT MESSAGE (OUTPATIENT)
Dept: RESEARCH | Facility: HOSPITAL | Age: 64
End: 2024-02-07
Payer: COMMERCIAL

## 2024-02-12 ENCOUNTER — PATIENT MESSAGE (OUTPATIENT)
Dept: NEUROLOGY | Facility: CLINIC | Age: 64
End: 2024-02-12
Payer: COMMERCIAL

## 2024-02-15 ENCOUNTER — OFFICE VISIT (OUTPATIENT)
Dept: NEUROLOGY | Facility: CLINIC | Age: 64
End: 2024-02-15
Payer: COMMERCIAL

## 2024-02-15 VITALS
SYSTOLIC BLOOD PRESSURE: 148 MMHG | WEIGHT: 223.31 LBS | HEART RATE: 74 BPM | BODY MASS INDEX: 29.46 KG/M2 | DIASTOLIC BLOOD PRESSURE: 87 MMHG

## 2024-02-15 DIAGNOSIS — G20.B2 PARKINSON'S DISEASE WITH DYSKINESIA AND FLUCTUATING MANIFESTATIONS: Primary | ICD-10-CM

## 2024-02-15 PROCEDURE — 3079F DIAST BP 80-89 MM HG: CPT | Mod: CPTII,S$GLB,, | Performed by: PSYCHIATRY & NEUROLOGY

## 2024-02-15 PROCEDURE — 3077F SYST BP >= 140 MM HG: CPT | Mod: CPTII,S$GLB,, | Performed by: PSYCHIATRY & NEUROLOGY

## 2024-02-15 PROCEDURE — 99215 OFFICE O/P EST HI 40 MIN: CPT | Mod: 25,S$GLB,, | Performed by: PSYCHIATRY & NEUROLOGY

## 2024-02-15 PROCEDURE — 99999 PR PBB SHADOW E&M-EST. PATIENT-LVL III: CPT | Mod: PBBFAC,,, | Performed by: PSYCHIATRY & NEUROLOGY

## 2024-02-15 PROCEDURE — 95984 ALYS BRN NPGT PRGRMG ADDL 15: CPT | Mod: S$GLB,,, | Performed by: PSYCHIATRY & NEUROLOGY

## 2024-02-15 PROCEDURE — 1159F MED LIST DOCD IN RCRD: CPT | Mod: CPTII,S$GLB,, | Performed by: PSYCHIATRY & NEUROLOGY

## 2024-02-15 PROCEDURE — 95983 ALYS BRN NPGT PRGRMG 15 MIN: CPT | Mod: S$GLB,,, | Performed by: PSYCHIATRY & NEUROLOGY

## 2024-02-15 PROCEDURE — 3008F BODY MASS INDEX DOCD: CPT | Mod: CPTII,S$GLB,, | Performed by: PSYCHIATRY & NEUROLOGY

## 2024-02-15 NOTE — PROGRESS NOTES
"Name: Adam Dalal  MRN: [id number]   CSN: [id number]      Date: 02/15/2024      Interval Hx  Since last visit,   Had L Gpi DBS  Here for 1st time programming  Off med today  Honeymoon period since surgery improved gait - walked 5 miles dayne gras day and walking better than before surgery    Rytary 95mg 4 tabs TID - Ontime is 5H  Continues rasagiline  Dyskinesias at times in feet which bother him      PD Review of Symptoms:  Anosmia: yes  Dysarthria/Hypophonia: none  Dysphagia/Sialorrhea: none  Depression: none  Cognitive slowing: none  Hallucinations: none  Impulsivity: none  Constipation: none  Orthostasis: none  Dyskinesia: mild foot  Falls: none  Freezing: none  Micrographia: yes  Sleep issues:  -CHU: yes  -RBD: mild    Chief Complaint / Interval History:   63 y.o. R handed man with with sleep apnea, ETOH use, coming for eval for iPD since age 58. This began with R foot dragging. Stayed rigid-predominate throughout. No tremor noted. In terms of ambulation, no falls noted.   Saw Dr. Meyer 2018 any memory general wnl. No major decline.  Seeing sleep medicine for CHU.    No fam hx PD or dementia    Med hx  Started carbidopa/levodopa 25/100mg 1 tab PO TID 5 years ago  Now on Rytary 95mg 4 tabs TID - Ontime is 6H  Continues rasagiline    He reports his goals for DBS are as follows  Wants DBS to help R arm and leg rigidity  He does not have much tremor  He has not side effects of medications  Wants dyskinesia control    PD Review of Symptoms:  Anosmia: yes  Dysarthria/Hypophonia: none  Dysphagia/Sialorrhea: none  Depression: none  Cognitive slowing: none  Hallucinations: none  Impulsivity: none  Constipation: none  Orthostasis: none  Dyskinesia: mild foot  Falls: none  Freezing: none  Micrographia: yes  Sleep issues:  -CHU: yes  -RBD: none    "Prior"    History of Present Illness (HPI):  59 yo here for evaluation for his presumed PD.  Progressed with slowness and stiffness about 4 years ago.  Tried to blame on " "aging at the time.  Generally was slower getting out of couches.  Was shuffling and unable to keep up.  Would drag the R foot.  BEhind and above the R knee tends to get stiffer.  Now more progressive slowness and trouble with dressing.  Still driving a car, more issues with backing up and trouble seeing around while driving.      Wife Berenice is here.  She notes that he "feels like he is failing    Works at the [employer] on [employer].    Nonmotor/Premotor ROS:  Hyposmia (HENT)?Yes - a little diminished  RBD/sleep issues (Constitutional)?No, used to snore more   Depression/anxiety (Psychiatric)?Yes - especially at night  Fatigue (Constitutional)?Yes  Constipation (GI)?No  Urinary issues ()?Yes - urgency  Sexual dysfunction ()?Yes - mild ED  Orthostasis (Cardiovascular)?No  Leg swelling (Cardiovascular)? No  Falls (Musculoskeletal)?No but feels imbalanced  Cognitive impairment (Neurologic)?No  Psychoses (Psychiatric)?No  Pain/Paresthesia (Neurologic)?No  Visual changes (Eyes)?No  Moles / skin changes (Skin)?No  Stridor / SOB (Pulm)?N/A  Bruising (Heme)?No    Past Medical History: The patient  has a past medical history of Anxiety, Esotropia of right eye (05/02/2013), Hearing deficit, bilateral, High cholesterol, Hyperlipidemia, Malignant melanoma of skin, unspecified (06/29/2022), Melanoma (06/29/2022), Melanoma in situ of cheek, Parkinson's disease, and Ringing in ear, bilateral.    Social History: The patient  reports that he quit smoking about 18 years ago. His smoking use included cigarettes. He started smoking about 19 years ago. He has a 1.0 pack-year smoking history. He has never used smokeless tobacco. He reports current alcohol use of about 20.0 standard drinks of alcohol per week. He reports that he does not use drugs.    Family History: Their family history includes Arthritis in his brother and mother; Atrial fibrillation in his sister; Cataracts in his maternal grandmother and paternal grandmother; " Hearing loss in his mother; Heart attack in his father; Sleep apnea in his brother.    Allergies: Patient has no known allergies.     Meds:   Current Outpatient Medications on File Prior to Visit   Medication Sig Dispense Refill    ALPRAZolam (XANAX) 0.5 MG tablet TAKE 1 TABLET(0.5 MG) BY MOUTH EVERY NIGHT FOR INSOMNIA OR ANXIETY 30 tablet 5    carbidopa-levodopa (RYTARY) 23.75-95 mg CpSR Take 4 capsules by mouth 3 (three) times daily. 360 capsule 11    carbidopa-levodopa  mg (SINEMET)  mg per tablet Take 1 tablet by mouth every evening. 30 tablet 11    pravastatin (PRAVACHOL) 80 MG tablet TAKE 1 TABLET(80 MG) BY MOUTH EVERY MORNING (Patient taking differently: Take 80 mg by mouth every evening. TAKE 1 TABLET(80 MG) BY MOUTH EVERY MORNING) 90 tablet 3    rasagiline (AZILECT) 1 mg Tab Take 1 tablet (1 mg total) by mouth once daily. 90 tablet 2    fluorouraciL (EFUDEX) 5 % cream Mix with calcipotriene 0.005% cream  and Apply thin film to upper forehead  2 times per day for 7 days; d/c if area bleeding or ulcerated; avoid eyes or mouth 40 g 1    ondansetron (ZOFRAN-ODT) 4 MG TbDL Dissolve 2 tablets (8 mg total) by mouth every 6 (six) hours as needed. 30 tablet 0    oxyCODONE-acetaminophen (PERCOCET)  mg per tablet Take 1 tablet by mouth every 6 (six) hours as needed. (Patient not taking: Reported on 1/18/2024) 20 tablet 0     Current Facility-Administered Medications on File Prior to Visit   Medication Dose Route Frequency Provider Last Rate Last Admin    0.9%  NaCl infusion   Intravenous Continuous Thony Graves  mL/hr at 01/03/24 0519 New Bag at 01/03/24 0519    mupirocin 2 % ointment   Nasal On Call Procedure Thony Graves MD   Given at 01/02/24 0623       Exam:  BP (!) 148/87 (BP Location: Right arm, Patient Position: Sitting, BP Method: Large (Automatic))   Pulse 74   Wt 101.3 kg (223 lb 5.2 oz)   BMI 29.46 kg/m²       Physical Exam  Constitutional: Well-developed, well-nourished,  appears stated age  Eyes: No scleral icterus  ENT: Moist oral mucosa  Cardiovascular: No lower extremity edema   Respiratory: No labored breathing   Skin: No rash   Hematologic: No bruising    Other: GI/ deferred   Mental status: Alert and oriented to person, place, time, and situation;   follows commands  Speech: normal (not dysarthric), no aphasia  Cranial nerves:            CN II: Pupils mid-position and equal, not tested light or accommodation  CN III, IV, VI: Extraocular movements full, no nystagmus visualized - R eye strabismus  CN V: Not tested   CN VII: Face strong and symmetric bilaterally   CN VIII: Hearing intact to voice and conversation   CN IX, X: Palate raises midline and symmetric   CN XI: Strong shoulder shrug B/L  CN XII: Tongue appears midline   Motor: Normal bulk by appearance, no drift   Sensory: Not tested    Gait: Not tested  Deep tendon reflexes: Not tested  Movement/Coordination                    Mod  hypophonic speech.                     Mod facial masking.  No bradykinesia.                   No tremor with rest, posture, kinesis, or intention.                     No other dystonia, chorea, athetosis, myoclonus, or tics visualized.    Bradykinesia  ? Finger taps Finger flicks KAYLA Heel taps   Left 0 0 - -   Right 1+ 0 0 0       Tremor Exam   Arms extended Arms in wing position, fingers almost touching Re-emergent Arms extended wrists extended Intention Resting Kinetic   Left ?neg ? ? ? ? ? ?   Right ?neg ? ? ? ? ? ?   Head tremor: neg    _______________________________________  Procedure:    DBS MRI Compatibility INFO    IPG Model(s) Y71518   Serial No. [id number]   Impedance NL   Battery OK   Pocket Adapter  None     LEFT GPI  Pulse width set at 90; Frequency set at 130  Monopolar review  C&3 @2.3ma better gait  C&2 @2.0mA better movement R arm movements  C&1 @2.0mA tightness R foot  C&0 @1mA, phosphenes    Final settings:        Medical Record Review:  - Lab Results:  Admission on  01/09/2024, Discharged on 01/09/2024   Component Date Value Ref Range Status    Sodium 01/09/2024 138  136 - 145 mmol/L Final    Potassium 01/09/2024 4.1  3.5 - 5.1 mmol/L Final    Chloride 01/09/2024 104  95 - 110 mmol/L Final    CO2 01/09/2024 27  23 - 29 mmol/L Final    Glucose 01/09/2024 100  70 - 110 mg/dL Final    BUN 01/09/2024 14  8 - 23 mg/dL Final    Creatinine 01/09/2024 0.9  0.5 - 1.4 mg/dL Final    Calcium 01/09/2024 8.8  8.7 - 10.5 mg/dL Final    Anion Gap 01/09/2024 7 (L)  8 - 16 mmol/L Final    eGFR 01/09/2024 >60.0  >60 mL/min/1.73 m^2 Final    WBC 01/09/2024 7.02  3.90 - 12.70 K/uL Final    RBC 01/09/2024 4.20 (L)  4.60 - 6.20 M/uL Final    Hemoglobin 01/09/2024 13.7 (L)  14.0 - 18.0 g/dL Final    Hematocrit 01/09/2024 39.3 (L)  40.0 - 54.0 % Final    MCV 01/09/2024 94  82 - 98 fL Final    MCH 01/09/2024 32.6 (H)  27.0 - 31.0 pg Final    MCHC 01/09/2024 34.9  32.0 - 36.0 g/dL Final    RDW 01/09/2024 13.1  11.5 - 14.5 % Final    Platelets 01/09/2024 197  150 - 450 K/uL Final    MPV 01/09/2024 12.2  9.2 - 12.9 fL Final    Immature Granulocytes 01/09/2024 0.6 (H)  0.0 - 0.5 % Final    Gran # (ANC) 01/09/2024 4.3  1.8 - 7.7 K/uL Final    Immature Grans (Abs) 01/09/2024 0.04  0.00 - 0.04 K/uL Final    Lymph # 01/09/2024 1.8  1.0 - 4.8 K/uL Final    Mono # 01/09/2024 0.7  0.3 - 1.0 K/uL Final    Eos # 01/09/2024 0.1  0.0 - 0.5 K/uL Final    Baso # 01/09/2024 0.10  0.00 - 0.20 K/uL Final    nRBC 01/09/2024 0  0 /100 WBC Final    Gran % 01/09/2024 61.6  38.0 - 73.0 % Final    Lymph % 01/09/2024 24.9  18.0 - 48.0 % Final    Mono % 01/09/2024 9.5  4.0 - 15.0 % Final    Eosinophil % 01/09/2024 2.0  0.0 - 8.0 % Final    Basophil % 01/09/2024 1.4  0.0 - 1.9 % Final    Differential Method 01/09/2024 Automated   Final    aPTT 01/09/2024 25.9  21.0 - 32.0 sec Final    Prothrombin Time 01/09/2024 11.2  9.0 - 12.5 sec Final    INR 01/09/2024 1.0  0.8 - 1.2 Final   Admission on 01/02/2024, Discharged on 01/03/2024    Component Date Value Ref Range Status    Sodium 01/02/2024 140  136 - 145 mmol/L Final    Potassium 01/02/2024 3.9  3.5 - 5.1 mmol/L Final    Chloride 01/02/2024 105  95 - 110 mmol/L Final    CO2 01/02/2024 24  23 - 29 mmol/L Final    Glucose 01/02/2024 105  70 - 110 mg/dL Final    BUN 01/02/2024 15  8 - 23 mg/dL Final    Creatinine 01/02/2024 1.0  0.5 - 1.4 mg/dL Final    Calcium 01/02/2024 9.0  8.7 - 10.5 mg/dL Final    Anion Gap 01/02/2024 11  8 - 16 mmol/L Final    eGFR 01/02/2024 >60.0  >60 mL/min/1.73 m^2 Final    WBC 01/02/2024 6.57  3.90 - 12.70 K/uL Final    RBC 01/02/2024 4.78  4.60 - 6.20 M/uL Final    Hemoglobin 01/02/2024 15.3  14.0 - 18.0 g/dL Final    Hematocrit 01/02/2024 43.4  40.0 - 54.0 % Final    MCV 01/02/2024 91  82 - 98 fL Final    MCH 01/02/2024 32.0 (H)  27.0 - 31.0 pg Final    MCHC 01/02/2024 35.3  32.0 - 36.0 g/dL Final    RDW 01/02/2024 13.0  11.5 - 14.5 % Final    Platelets 01/02/2024 187  150 - 450 K/uL Final    MPV 01/02/2024 11.6  9.2 - 12.9 fL Final    Immature Granulocytes 01/02/2024 0.5  0.0 - 0.5 % Final    Gran # (ANC) 01/02/2024 3.2  1.8 - 7.7 K/uL Final    Immature Grans (Abs) 01/02/2024 0.03  0.00 - 0.04 K/uL Final    Lymph # 01/02/2024 2.2  1.0 - 4.8 K/uL Final    Mono # 01/02/2024 0.8  0.3 - 1.0 K/uL Final    Eos # 01/02/2024 0.3  0.0 - 0.5 K/uL Final    Baso # 01/02/2024 0.09  0.00 - 0.20 K/uL Final    nRBC 01/02/2024 0  0 /100 WBC Final    Gran % 01/02/2024 48.3  38.0 - 73.0 % Final    Lymph % 01/02/2024 33.6  18.0 - 48.0 % Final    Mono % 01/02/2024 11.9  4.0 - 15.0 % Final    Eosinophil % 01/02/2024 4.3  0.0 - 8.0 % Final    Basophil % 01/02/2024 1.4  0.0 - 1.9 % Final    Differential Method 01/02/2024 Automated   Final    aPTT 01/02/2024 26.0  21.0 - 32.0 sec Final    Prothrombin Time 01/02/2024 10.9  9.0 - 12.5 sec Final    INR 01/02/2024 1.0  0.8 - 1.2 Final    Group & Rh 01/02/2024 A POS   Final    Indirect Jose R 01/02/2024 NEG   Final    Specimen Outdate  01/02/2024 01/05/2024 23:59   Final    WBC 01/03/2024 7.72  3.90 - 12.70 K/uL Final    RBC 01/03/2024 4.27 (L)  4.60 - 6.20 M/uL Final    Hemoglobin 01/03/2024 14.0  14.0 - 18.0 g/dL Final    Hematocrit 01/03/2024 40.8  40.0 - 54.0 % Final    MCV 01/03/2024 96  82 - 98 fL Final    MCH 01/03/2024 32.8 (H)  27.0 - 31.0 pg Final    MCHC 01/03/2024 34.3  32.0 - 36.0 g/dL Final    RDW 01/03/2024 12.9  11.5 - 14.5 % Final    Platelets 01/03/2024 166  150 - 450 K/uL Final    MPV 01/03/2024 11.9  9.2 - 12.9 fL Final    Immature Granulocytes 01/03/2024 0.3  0.0 - 0.5 % Final    Gran # (ANC) 01/03/2024 5.2  1.8 - 7.7 K/uL Final    Immature Grans (Abs) 01/03/2024 0.02  0.00 - 0.04 K/uL Final    Lymph # 01/03/2024 1.6  1.0 - 4.8 K/uL Final    Mono # 01/03/2024 0.7  0.3 - 1.0 K/uL Final    Eos # 01/03/2024 0.1  0.0 - 0.5 K/uL Final    Baso # 01/03/2024 0.08  0.00 - 0.20 K/uL Final    nRBC 01/03/2024 0  0 /100 WBC Final    Gran % 01/03/2024 67.1  38.0 - 73.0 % Final    Lymph % 01/03/2024 20.3  18.0 - 48.0 % Final    Mono % 01/03/2024 9.5  4.0 - 15.0 % Final    Eosinophil % 01/03/2024 1.8  0.0 - 8.0 % Final    Basophil % 01/03/2024 1.0  0.0 - 1.9 % Final    Differential Method 01/03/2024 Automated   Final    Sodium 01/03/2024 139  136 - 145 mmol/L Final    Potassium 01/03/2024 3.7  3.5 - 5.1 mmol/L Final    Chloride 01/03/2024 107  95 - 110 mmol/L Final    CO2 01/03/2024 23  23 - 29 mmol/L Final    Glucose 01/03/2024 98  70 - 110 mg/dL Final    BUN 01/03/2024 14  8 - 23 mg/dL Final    Creatinine 01/03/2024 0.9  0.5 - 1.4 mg/dL Final    Calcium 01/03/2024 8.2 (L)  8.7 - 10.5 mg/dL Final    Anion Gap 01/03/2024 9  8 - 16 mmol/L Final    eGFR 01/03/2024 >60.0  >60 mL/min/1.73 m^2 Final    Magnesium 01/03/2024 2.2  1.6 - 2.6 mg/dL Final    Phosphorus 01/03/2024 3.5  2.7 - 4.5 mg/dL Final   Lab Visit on 11/21/2023   Component Date Value Ref Range Status    Specimen UA 11/21/2023 Urine, Clean Catch   Final    Color, UA 11/21/2023 Yellow   Yellow, Straw, Bria Final    Appearance, UA 11/21/2023 Clear  Clear Final    pH, UA 11/21/2023 8.0  5.0 - 8.0 Final    Specific Gravity, UA 11/21/2023 1.015  1.005 - 1.030 Final    Protein, UA 11/21/2023 Negative  Negative Final    Glucose, UA 11/21/2023 Negative  Negative Final    Ketones, UA 11/21/2023 Negative  Negative Final    Bilirubin (UA) 11/21/2023 Negative  Negative Final    Occult Blood UA 11/21/2023 Negative  Negative Final    Nitrite, UA 11/21/2023 Negative  Negative Final    Leukocytes, UA 11/21/2023 Negative  Negative Final   Lab Visit on 11/21/2023   Component Date Value Ref Range Status    WBC 11/21/2023 6.84  3.90 - 12.70 K/uL Final    RBC 11/21/2023 4.76  4.60 - 6.20 M/uL Final    Hemoglobin 11/21/2023 15.0  14.0 - 18.0 g/dL Final    Hematocrit 11/21/2023 45.8  40.0 - 54.0 % Final    MCV 11/21/2023 96  82 - 98 fL Final    MCH 11/21/2023 31.5 (H)  27.0 - 31.0 pg Final    MCHC 11/21/2023 32.8  32.0 - 36.0 g/dL Final    RDW 11/21/2023 13.2  11.5 - 14.5 % Final    Platelets 11/21/2023 215  150 - 450 K/uL Final    MPV 11/21/2023 11.8  9.2 - 12.9 fL Final    Immature Granulocytes 11/21/2023 0.4  0.0 - 0.5 % Final    Gran # (ANC) 11/21/2023 4.2  1.8 - 7.7 K/uL Final    Immature Grans (Abs) 11/21/2023 0.03  0.00 - 0.04 K/uL Final    Lymph # 11/21/2023 1.8  1.0 - 4.8 K/uL Final    Mono # 11/21/2023 0.6  0.3 - 1.0 K/uL Final    Eos # 11/21/2023 0.2  0.0 - 0.5 K/uL Final    Baso # 11/21/2023 0.09  0.00 - 0.20 K/uL Final    nRBC 11/21/2023 0  0 /100 WBC Final    Gran % 11/21/2023 60.7  38.0 - 73.0 % Final    Lymph % 11/21/2023 25.7  18.0 - 48.0 % Final    Mono % 11/21/2023 9.1  4.0 - 15.0 % Final    Eosinophil % 11/21/2023 2.8  0.0 - 8.0 % Final    Basophil % 11/21/2023 1.3  0.0 - 1.9 % Final    Differential Method 11/21/2023 Automated   Final    Sodium 11/21/2023 139  136 - 145 mmol/L Final    Potassium 11/21/2023 4.6  3.5 - 5.1 mmol/L Final    Chloride 11/21/2023 101  95 - 110 mmol/L Final    CO2  11/21/2023 30 (H)  23 - 29 mmol/L Final    Glucose 11/21/2023 106  70 - 110 mg/dL Final    BUN 11/21/2023 12  8 - 23 mg/dL Final    Creatinine 11/21/2023 1.1  0.5 - 1.4 mg/dL Final    Calcium 11/21/2023 9.2  8.7 - 10.5 mg/dL Final    Total Protein 11/21/2023 7.5  6.0 - 8.4 g/dL Final    Albumin 11/21/2023 4.2  3.5 - 5.2 g/dL Final    Total Bilirubin 11/21/2023 0.9  0.1 - 1.0 mg/dL Final    Alkaline Phosphatase 11/21/2023 65  55 - 135 U/L Final    AST 11/21/2023 23  10 - 40 U/L Final    ALT 11/21/2023 <5 (L)  10 - 44 U/L Final    eGFR 11/21/2023 >60.0  >60 mL/min/1.73 m^2 Final    Anion Gap 11/21/2023 8  8 - 16 mmol/L Final    Prothrombin Time 11/21/2023 10.8  9.0 - 12.5 sec Final    INR 11/21/2023 1.0  0.8 - 1.2 Final    Hemoglobin A1C 11/21/2023 5.5  4.0 - 5.6 % Final    Estimated Avg Glucose 11/21/2023 111  68 - 131 mg/dL Final          Diagnoses:                                                                                    PD, akinetic-rigid.  Eye movements are abnml from NM perspective - old strabismus - but watching closely.  NO clear slowed pursuits or saccades, but some intrusive jerks - might all still be from longstanding changes/surgeries.     Problem List Items Addressed This Visit          Neuro    Parkinson disease - Primary    Current Assessment & Plan     Rigid-predominate PD  S/p L GPI DBS  Robust honeymoon effect - less dyskinesias and better gait    Continue rytary as before  A - ON  B - other option  C - other option              I spent 60 minutes discussing DBS and programming        Delicia Vargas MD, MS  [facility]  Department of Neurology  Movement Disorders

## 2024-02-15 NOTE — ASSESSMENT & PLAN NOTE
Rigid-predominate PD  S/p L GPI DBS  Robust honeymoon effect - less dyskinesias and better gait    Continue rytary as before  A - ON  B - other option  C - other option

## 2024-02-18 NOTE — PROGRESS NOTES
Subjective:       Patient ID: Adam Dalal is a 63 y.o. male.    Chief Complaint: Follow-up (Much better. )    Returns for follow-up, last seen 8/9/23 notes reviewed.  After the last visit was to use generic Floxin drops twice daily for 10 days, and over-the-counter Debrox every evening.  However, states used the Floxin regularly for one week and then on and off since then, and has not used Debrox.  Nevertheless, ears are feeling much better.  Hearing much better and ringing is much better as well.  States doing well with no other otologic or otolaryngologic complaints.          Review of Systems     Constitutional: Negative for fever.      HENT: Positive for hearing loss and ringing in the ears.  Negative for ear discharge, ear pain, sore throat, stuffy nose, trouble swallowing and voice change.      Respiratory:  Negative for cough and shortness of breath.      Cardiovascular:  Negative for chest pain.     Gastrointestinal:  Negative for abdominal pain.     Neurological: Negative for dizziness.      Hematologic: Negative for swollen glands.                Objective:        Vitals:    09/20/23 1107   BP: (!) 142/76   Pulse: 61   Temp: 97.3 °F (36.3 °C)     Body mass index is 29.24 kg/m².  Physical Exam  Constitutional:       General: He is not in acute distress.  HENT:      Head: Normocephalic and atraumatic.      Right Ear: Tympanic membrane, ear canal and external ear normal.      Left Ear: Tympanic membrane, ear canal and external ear normal.      Ears:      Comments:   Residual medial cerumen gently cleared with a combination micro instrumentation and otherwise canals and TMs within normal limits AU.  Audiologic testing subsequently performed.     Nose: No nasal deformity, mucosal edema or rhinorrhea.      Mouth/Throat:      Mouth: Mucous membranes are moist.      Pharynx: No pharyngeal swelling, oropharyngeal exudate or posterior oropharyngeal erythema.      Comments:   Tiny smooth surfaced nodule  posterior-medial to right RMT, unchanged.    Neck:      Trachea: Phonation normal.   Pulmonary:      Effort: Pulmonary effort is normal. No respiratory distress.   Lymphadenopathy:      Cervical: No cervical adenopathy.   Skin:     General: Skin is warm and dry.   Neurological:      Mental Status: He is alert and oriented to person, place, and time.   Psychiatric:         Speech: Speech normal.         Behavior: Behavior normal.         Tests / Results:                      Assessment:       1. Cerumen debris on tympanic membrane, bilateral    2. History of impacted cerumen    3. Tinnitus, bilateral    4. Sensorineural hearing loss, bilateral    5. Family history of hearing loss    6. Exposure to noise    7. Parkinson's disease        Plan:       Ears cleaned as above and audiologic testing subsequently performed and discussed/reviewed and compared to prior.      Amplification trial again discussed and medically cleared.  Previously declined but now considering moving forward with hearing aid consultation and will facilitate appointment.      Hearing protection again reviewed.  Some loud music in the past but only occasionally now, not much.      Ear care again reviewed.  Recommend/consider over-the-counter Debrox every Monday, Wednesday, and Friday evening.      Follow-up 6 months unless change or problems prior.

## 2024-02-26 ENCOUNTER — PATIENT MESSAGE (OUTPATIENT)
Dept: NEUROLOGY | Facility: CLINIC | Age: 64
End: 2024-02-26
Payer: COMMERCIAL

## 2024-03-05 ENCOUNTER — PATIENT MESSAGE (OUTPATIENT)
Dept: OTOLARYNGOLOGY | Facility: CLINIC | Age: 64
End: 2024-03-05
Payer: COMMERCIAL

## 2024-03-18 ENCOUNTER — PATIENT MESSAGE (OUTPATIENT)
Dept: OTOLARYNGOLOGY | Facility: CLINIC | Age: 64
End: 2024-03-18
Payer: COMMERCIAL

## 2024-03-20 ENCOUNTER — TELEPHONE (OUTPATIENT)
Dept: PHARMACY | Facility: CLINIC | Age: 64
End: 2024-03-20
Payer: COMMERCIAL

## 2024-03-20 ENCOUNTER — CLINICAL SUPPORT (OUTPATIENT)
Dept: OTOLARYNGOLOGY | Facility: CLINIC | Age: 64
End: 2024-03-20
Payer: COMMERCIAL

## 2024-03-20 DIAGNOSIS — Z71.89 ENCOUNTER FOR HEARING AID CONSULTATION: Primary | ICD-10-CM

## 2024-03-20 PROCEDURE — 99499 UNLISTED E&M SERVICE: CPT | Mod: ,,, | Performed by: AUDIOLOGIST-HEARING AID FITTER

## 2024-03-20 NOTE — PROGRESS NOTES
Marcos Mckeon, CCC-A  Audiologist - [facility] [address] [place] [address]  [email or web address]  [phone]    Patient: Adam Dalal   MRN: [id number]  [address] [facility]   Home Phone [phone]   Work Phone [phone]   Mobile [phone]   : 1960  GUZMAN: 3/20/2024      HEARING AID CONSULT    Adam Dalal is here today for a follow-up Hearing Aid Consultation.  Based off our last conversation, he did follow through on the recommendation to contact the in-network provider for his [facility] insurance ([facility]), but he states that he does not want to travel that far for hearing aid service.  Counseled patient that the majority of the information we discussed has not changed since we last spoke in October, but I did introduce the new Itemized pricing that [facility] now offers.  We discussed at length the differences in Bundled vs. Itemized service packages, what is included, different technology levels, earmold options, L/D possibilities, adjustments available via the phone apps, background noise, wind noise, life-expectancy (5-7 years), manufacturers' warranty coverages (3 years for repair & L/D - w/$300 deductible), and the 30-day trial period.  He will consider our discussion and send messages in the patient portal with any questions he may have regarding today's visit and/or notify the clinic when he is ready to pay a deposit to order a hearing aid.    _______________________________  Marcos Mckeon, GLORIA-A  Audiologist

## 2024-03-25 ENCOUNTER — PATIENT MESSAGE (OUTPATIENT)
Dept: NEUROLOGY | Facility: CLINIC | Age: 64
End: 2024-03-25
Payer: COMMERCIAL

## 2024-04-04 ENCOUNTER — OFFICE VISIT (OUTPATIENT)
Dept: DERMATOLOGY | Facility: CLINIC | Age: 64
End: 2024-04-04
Payer: COMMERCIAL

## 2024-04-04 DIAGNOSIS — D48.5 NEOPLASM OF UNCERTAIN BEHAVIOR OF SKIN: Primary | ICD-10-CM

## 2024-04-04 DIAGNOSIS — L82.1 SEBORRHEIC KERATOSES: ICD-10-CM

## 2024-04-04 PROCEDURE — 88305 TISSUE EXAM BY PATHOLOGIST: CPT | Performed by: DERMATOLOGY

## 2024-04-04 PROCEDURE — 11102 TANGNTL BX SKIN SINGLE LES: CPT | Mod: S$GLB,,, | Performed by: PHYSICIAN ASSISTANT

## 2024-04-04 PROCEDURE — 88305 TISSUE EXAM BY PATHOLOGIST: CPT | Mod: 26,,, | Performed by: DERMATOLOGY

## 2024-04-04 PROCEDURE — 1159F MED LIST DOCD IN RCRD: CPT | Mod: CPTII,S$GLB,, | Performed by: PHYSICIAN ASSISTANT

## 2024-04-04 PROCEDURE — 1160F RVW MEDS BY RX/DR IN RCRD: CPT | Mod: CPTII,S$GLB,, | Performed by: PHYSICIAN ASSISTANT

## 2024-04-04 PROCEDURE — 99212 OFFICE O/P EST SF 10 MIN: CPT | Mod: 25,S$GLB,, | Performed by: PHYSICIAN ASSISTANT

## 2024-04-04 PROCEDURE — 99999 PR PBB SHADOW E&M-EST. PATIENT-LVL III: CPT | Mod: PBBFAC,,, | Performed by: PHYSICIAN ASSISTANT

## 2024-04-04 NOTE — PATIENT INSTRUCTIONS
Shave Biopsy Wound Care    Your doctor has performed a shave biopsy today.  A band aid and vaseline ointment has been placed over the site.  This should remain in place for NO LONGER THAN 48 hours.  It is fine to remove the bandaid after 24 hours, if the area is no longer bleeding. It is recommended that you keep the area dry (do not wet)) for the first 24 hours.  After 24 hours, wash the area with warm soap and water and apply Vaseline jelly.  Many patients prefer to use Neosporin or Bacitracin ointment.  This is acceptable; however, know that you can develop an allergy to this medication even if you have used it safely for years.  It is important to keep the area moist.  Letting it dry out and get air slows healing time, and will worsen the scar.        If you notice increasing redness, tenderness, pain, or yellow drainage at the biopsy site, please notify your doctor.  These are signs of an infection.    If your biopsy site is bleeding, apply firm pressure for 15 minutes straight.  Repeat for another 15 minutes, if it is still bleeding.   If the surgical site continues to bleed, then please contact your doctor.      For [facility] users:   You will receive your biopsy results in [facility] as soon as they are available. Please be assured that your physician/provider will review your results and will then determine what further treatment, evaluation, or planning is required. You should be contacted by your physician's/provider's office within 5 business days of receiving your results; If not, please reach out to directly. This is one more way [facility] is putting you first.     [facility] [place] [address]/ [phone] FAX/ [email or web address]

## 2024-04-04 NOTE — PROGRESS NOTES
Subjective:      Patient ID:  Adam Dalal is a 63 y.o. male who presents for   Chief Complaint   Patient presents with    Lesion     Neck, face, and right lower leg     Patient with new complaint of lesion(s)  Location: right and left neck, face  Duration: 3 months  Symptoms: none   Relieving factors/Previous treatments: none     Patient with new complaint of lesion(s)  Location: right lower leg  Duration: 6 months  Symptoms: previous bug bite, bump appeared in the same spot and won't go away  Relieving factors/Previous treatments: none         Review of Systems   Skin:  Positive for daily sunscreen use, activity-related sunscreen use and wears hat. Negative for itching, rash, dry skin and recent sunburn.   Hematologic/Lymphatic: Does not bruise/bleed easily.       Objective:   Physical Exam   Constitutional: He appears well-developed and well-nourished. No distress.   Neurological: He is alert and oriented to person, place, and time. He is not disoriented.   Psychiatric: He has a normal mood and affect.   Skin:   Areas Examined (abnormalities noted in diagram):   Head / Face Inspection Performed  Neck Inspection Performed  RLE Inspected                       Diagram Legend     Erythematous scaling macule/papule c/w actinic keratosis       Vascular papule c/w angioma      Pigmented verrucoid papule/plaque c/w seborrheic keratosis      Yellow umbilicated papule c/w sebaceous hyperplasia      Irregularly shaped tan macule c/w lentigo     1-2 mm smooth white papules consistent with Milia      Movable subcutaneous cyst with punctum c/w epidermal inclusion cyst      Subcutaneous movable cyst c/w pilar cyst      Firm pink to brown papule c/w dermatofibroma      Pedunculated fleshy papule(s) c/w skin tag(s)      Evenly pigmented macule c/w junctional nevus     Mildly variegated pigmented, slightly irregular-bordered macule c/w mildly atypical nevus      Flesh colored to evenly pigmented papule c/w intradermal  nevus       Pink pearly papule/plaque c/w basal cell carcinoma      Erythematous hyperkeratotic cursted plaque c/w SCC      Surgical scar with no sign of skin cancer recurrence      Open and closed comedones      Inflammatory papules and pustules      Verrucoid papule consistent consistent with wart     Erythematous eczematous patches and plaques     Dystrophic onycholytic nail with subungual debris c/w onychomycosis     Umbilicated papule    Erythematous-base heme-crusted tan verrucoid plaque consistent with inflamed seborrheic keratosis     Erythematous Silvery Scaling Plaque c/w Psoriasis     See annotation      Assessment / Plan:      Pathology Orders:       Normal Orders This Visit    Specimen to Pathology, Dermatology     Questions:    Procedure Type: Dermatology and skin neoplasms    Number of Specimens: 1    ------------------------: -------------------------    Spec 1 Procedure: Biopsy    Spec 1 Clinical Impression: R/o pyogenic granuloma vs bcc vs other    Spec 1 Source: Right lower leg    Release to patient:           Neoplasm of uncertain behavior of skin  -     Specimen to Pathology, Dermatology  Shave biopsy procedure note:    Shave biopsy performed after verbal consent including risk of infection, scar, recurrence, need for additional treatment of site. Area prepped with alcohol, anesthetized with approximately 1.0cc of 1% lidocaine with epinephrine. Lesional tissue shaved with razor blade. Hemostasis achieved with application of aluminum chloride followed by hyfrecation. No complications. Dressing applied. Wound care explained.      Seborrheic keratoses  These are benign inherited growths without a malignant potential. Reassurance given to patient. No treatment is necessary.              Follow up in about 1 month (around 5/4/2024) for TBSE with JENNIFER.

## 2024-04-08 ENCOUNTER — PATIENT OUTREACH (OUTPATIENT)
Dept: ADMINISTRATIVE | Facility: HOSPITAL | Age: 64
End: 2024-04-08
Payer: COMMERCIAL

## 2024-04-08 LAB
FINAL PATHOLOGIC DIAGNOSIS: NORMAL
GROSS: NORMAL
Lab: NORMAL
MICROSCOPIC EXAM: NORMAL

## 2024-04-08 NOTE — PROGRESS NOTES
1. Skin, right lower leg, shave biopsy:   - PYOGENIC GRANULOMA    This is a benign lesion. No further treatment is necessary.

## 2024-04-19 ENCOUNTER — LAB VISIT (OUTPATIENT)
Dept: LAB | Facility: OTHER | Age: 64
End: 2024-04-19
Attending: INTERNAL MEDICINE
Payer: COMMERCIAL

## 2024-04-19 DIAGNOSIS — Z78.9 IMPAIRED MOTOR CONTROL: ICD-10-CM

## 2024-04-19 DIAGNOSIS — E78.5 HYPERLIPIDEMIA, UNSPECIFIED HYPERLIPIDEMIA TYPE: ICD-10-CM

## 2024-04-19 DIAGNOSIS — G20.B2 PARKINSON'S DISEASE WITH DYSKINESIA AND FLUCTUATING MANIFESTATIONS: ICD-10-CM

## 2024-04-19 DIAGNOSIS — R73.09 ELEVATED GLUCOSE LEVEL: ICD-10-CM

## 2024-04-19 DIAGNOSIS — Z12.5 SCREENING FOR PROSTATE CANCER: ICD-10-CM

## 2024-04-19 LAB
ANION GAP SERPL CALC-SCNC: 9 MMOL/L (ref 8–16)
BUN SERPL-MCNC: 17 MG/DL (ref 8–23)
CALCIUM SERPL-MCNC: 9.2 MG/DL (ref 8.7–10.5)
CHLORIDE SERPL-SCNC: 104 MMOL/L (ref 95–110)
CHOLEST SERPL-MCNC: 194 MG/DL (ref 120–199)
CHOLEST/HDLC SERPL: 4.1 {RATIO} (ref 2–5)
CO2 SERPL-SCNC: 27 MMOL/L (ref 23–29)
COMPLEXED PSA SERPL-MCNC: 0.27 NG/ML (ref 0–4)
CREAT SERPL-MCNC: 1.1 MG/DL (ref 0.5–1.4)
EST. GFR  (NO RACE VARIABLE): >60 ML/MIN/1.73 M^2
ESTIMATED AVG GLUCOSE: 117 MG/DL (ref 68–131)
GLUCOSE SERPL-MCNC: 112 MG/DL (ref 70–110)
HBA1C MFR BLD: 5.7 % (ref 4–5.6)
HDLC SERPL-MCNC: 47 MG/DL (ref 40–75)
HDLC SERPL: 24.2 % (ref 20–50)
LDLC SERPL CALC-MCNC: 129.8 MG/DL (ref 63–159)
NONHDLC SERPL-MCNC: 147 MG/DL
POTASSIUM SERPL-SCNC: 4.6 MMOL/L (ref 3.5–5.1)
SODIUM SERPL-SCNC: 140 MMOL/L (ref 136–145)
TRIGL SERPL-MCNC: 86 MG/DL (ref 30–150)

## 2024-04-19 PROCEDURE — 36415 COLL VENOUS BLD VENIPUNCTURE: CPT | Performed by: INTERNAL MEDICINE

## 2024-04-19 PROCEDURE — 80048 BASIC METABOLIC PNL TOTAL CA: CPT | Performed by: INTERNAL MEDICINE

## 2024-04-19 PROCEDURE — 83036 HEMOGLOBIN GLYCOSYLATED A1C: CPT | Performed by: INTERNAL MEDICINE

## 2024-04-19 PROCEDURE — 84153 ASSAY OF PSA TOTAL: CPT | Performed by: INTERNAL MEDICINE

## 2024-04-19 PROCEDURE — 80061 LIPID PANEL: CPT | Performed by: INTERNAL MEDICINE

## 2024-04-22 ENCOUNTER — OFFICE VISIT (OUTPATIENT)
Dept: INTERNAL MEDICINE | Facility: CLINIC | Age: 64
End: 2024-04-22
Payer: COMMERCIAL

## 2024-04-22 VITALS
OXYGEN SATURATION: 98 % | HEIGHT: 73 IN | SYSTOLIC BLOOD PRESSURE: 130 MMHG | HEART RATE: 70 BPM | BODY MASS INDEX: 30.68 KG/M2 | DIASTOLIC BLOOD PRESSURE: 88 MMHG | WEIGHT: 231.5 LBS

## 2024-04-22 DIAGNOSIS — G47.33 OSA (OBSTRUCTIVE SLEEP APNEA): ICD-10-CM

## 2024-04-22 DIAGNOSIS — Z12.5 SCREENING FOR PROSTATE CANCER: ICD-10-CM

## 2024-04-22 DIAGNOSIS — Z78.9 IMPAIRED MOTOR CONTROL: ICD-10-CM

## 2024-04-22 DIAGNOSIS — E78.5 HYPERLIPIDEMIA, UNSPECIFIED HYPERLIPIDEMIA TYPE: ICD-10-CM

## 2024-04-22 DIAGNOSIS — G20.B2 PARKINSON'S DISEASE WITH DYSKINESIA AND FLUCTUATING MANIFESTATIONS: ICD-10-CM

## 2024-04-22 DIAGNOSIS — Z00.00 ROUTINE PHYSICAL EXAMINATION: Primary | ICD-10-CM

## 2024-04-22 DIAGNOSIS — R73.09 ELEVATED GLUCOSE LEVEL: ICD-10-CM

## 2024-04-22 PROCEDURE — 99396 PREV VISIT EST AGE 40-64: CPT | Mod: S$GLB,,, | Performed by: INTERNAL MEDICINE

## 2024-04-22 PROCEDURE — 3079F DIAST BP 80-89 MM HG: CPT | Mod: CPTII,S$GLB,, | Performed by: INTERNAL MEDICINE

## 2024-04-22 PROCEDURE — 1160F RVW MEDS BY RX/DR IN RCRD: CPT | Mod: CPTII,S$GLB,, | Performed by: INTERNAL MEDICINE

## 2024-04-22 PROCEDURE — 99999 PR PBB SHADOW E&M-EST. PATIENT-LVL III: CPT | Mod: PBBFAC,,, | Performed by: INTERNAL MEDICINE

## 2024-04-22 PROCEDURE — 3044F HG A1C LEVEL LT 7.0%: CPT | Mod: CPTII,S$GLB,, | Performed by: INTERNAL MEDICINE

## 2024-04-22 PROCEDURE — 1159F MED LIST DOCD IN RCRD: CPT | Mod: CPTII,S$GLB,, | Performed by: INTERNAL MEDICINE

## 2024-04-22 PROCEDURE — 3075F SYST BP GE 130 - 139MM HG: CPT | Mod: CPTII,S$GLB,, | Performed by: INTERNAL MEDICINE

## 2024-04-22 PROCEDURE — 3008F BODY MASS INDEX DOCD: CPT | Mod: CPTII,S$GLB,, | Performed by: INTERNAL MEDICINE

## 2024-04-22 RX ORDER — PRAVASTATIN SODIUM 80 MG/1
80 TABLET ORAL NIGHTLY
Qty: 90 TABLET | Refills: 3 | Status: SHIPPED | OUTPATIENT
Start: 2024-04-22

## 2024-04-22 RX ORDER — ALPRAZOLAM 0.5 MG/1
TABLET ORAL
Qty: 30 TABLET | Refills: 5 | Status: SHIPPED | OUTPATIENT
Start: 2024-04-22

## 2024-04-22 NOTE — PROGRESS NOTES
Subjective:        I hereby acknowledge that I am relying upon documentation authored by a medical student, Devonte Hugo,  working under my supervision and further I hereby attest that I have verified the student documentation or findings by personally re-performing the physical exam and medical decision making activities of the Evaluation and Management service to be billed.    Rashi Gee MD [facility]      Patient ID: Adam Dalal is a 63 y.o. male.   Chief Complaint: Annual Exam    HPI  Mr Dalal is a 63 year old male with a PMHx of Parkinsons disease, CHU, and HLD. Patient received Deep brain stimulator on 1/9/24. Reports that he is doing better since it's placement. He reports decreased rigidity and bradykinesia. He has started to adjust his DBS to increase it's stimulation from 2.5 to 2.8 but has questions regarding how much to turn it up and when he should. Reports that neurology instructed him to make changes as needed, but maintain that new change for at least a month for stabilization and getting used to the changes. In terms of his CHU, he reports difficulty working with the nasal mask and would like to try out the full face mask. Prescription for this to be swapped out was written and will follow-up as needed for his CHU. Discussed patients elevated A1c at 5.7, and need to continue to watch his sugar and starch intake. Patients screening colonoscopy (7/7/23) was completed with removal of one benign hyperplastic polyp, with follow up in 5 years.     I reviewed  for purposes of reviewing and refilling controlled medications. We will assist with CPAP supplies. Labs reviewed.     Review of Systems   Constitutional: Negative.  Negative for activity change, appetite change, chills, fatigue, fever and unexpected weight change.   HENT: Negative.     Eyes: Negative.    Respiratory: Negative.  Negative for chest tightness and shortness of breath.    Cardiovascular: Negative.  Negative for chest pain  and palpitations.   Gastrointestinal: Negative.  Negative for abdominal pain, blood in stool, constipation, diarrhea, nausea and vomiting.   Endocrine: Negative.    Genitourinary: Negative.    Musculoskeletal: Negative.  Negative for arthralgias and back pain.   Neurological: Negative.  Negative for tremors, numbness and memory loss.   Psychiatric/Behavioral: Negative.         Past Medical History:   Diagnosis Date    Anxiety     Esotropia of right eye 05/02/2013    Hearing deficit, bilateral     High cholesterol     Hyperlipidemia     Malignant melanoma of skin, unspecified 06/29/2022    in situ right lateral infraorbit    Melanoma 06/29/2022    in situ - right lateral infraorbital    Melanoma in situ of cheek     Parkinson's disease     Ringing in ear, bilateral      Past Surgical History:   Procedure Laterality Date    CATARACT EXTRACTION W/  INTRAOCULAR LENS IMPLANT Right 01/09/2017    Dr marin     CATARACT EXTRACTION W/  INTRAOCULAR LENS IMPLANT Left 01/30/2017    Dr. Marin    COLONOSCOPY N/A 7/7/2023    Procedure: COLONOSCOPY;  Surgeon: Arnaldo Neely MD;  [facility];  Service: Endoscopy;  Laterality: N/A;  prep instructions sent to pt via portal -  [employer] prep  pre call complete, stated he would have a ride -CE    DEEP BRAIN STIMULATOR PLACEMENT Left 1/2/2024    Procedure: INSERTION, DEEP BRAIN STIMULATOR ELECTRODE;  Surgeon: Roger Flores MD;  Location: [facility] OR [address] [place] [address];  Service: Neurosurgery;  Laterality: Left;  INSERT ELECTRODE LEFT SIDE FOR DEEP BRAIN STIMULATION/ GLOBUS PALLIDUS INTERNUS/ TEDS & SCD/[employer], SHRAVAN MONISHATEZ.    HERNIA REPAIR      INSERTION OF DEEP BRAIN STIMULATOR GENERATOR Left 1/9/2024    Procedure: INSERTION, PULSE GENERATOR, DEEP BRAIN STIMULATOR;  Surgeon: Roger Flores MD;  Location: [facility] OR [address] [place] [address];  Service: Neurosurgery;  Laterality: Left;  PLACEMENT OF PULSE GENERATOR FOR DEEP BRAIN STIMULATION/ TEDS & SCD/[employer], SHRAVAN DARTEZ    PLACEMENT OF  FIDUCIAL SCREW INTO SPINE N/A 1/2/2024    Procedure: INSERTION, FIDUCIAL SCREW, SKULL;  Surgeon: Roger Flores MD;  Location: [facility] OR [address] [place] [address];  Service: Neurosurgery;  Laterality: N/A;  APPLICATION OF FIDUCIALS FOR DEEP BRAIN STIMULATION/ TEDS & SCD/[employer]/SHRAVAN MARTINES    RECONSTRUCTION OF FACE Right 8/4/2022    Procedure: RECONSTRUCTION, FACE;  Surgeon: Yaneli Hickman MD;  Location: [facility] OR [address] [place] [address];  Service: ENT;  Laterality: Right;    STRABISMUS SURGERY  4yrs    right eye     Current Outpatient Medications   Medication Instructions    ALPRAZolam (XANAX) 0.5 MG tablet TAKE 1 TABLET(0.5 MG) BY MOUTH EVERY NIGHT FOR INSOMNIA OR ANXIETY    carbidopa-levodopa (RYTARY) 23.75-95 mg CpSR 4 capsules, Oral, 3 times daily    carbidopa-levodopa  mg (SINEMET)  mg per tablet 1 tablet, Oral, Nightly    pravastatin (PRAVACHOL) 80 mg, Oral, Nightly    rasagiline (AZILECT) 1 mg, Oral, Daily            Objective:      Physical Exam  Constitutional:       Appearance: Normal appearance.   Cardiovascular:      Rate and Rhythm: Normal rate and regular rhythm.      Pulses: Normal pulses.      Heart sounds: Normal heart sounds.   Pulmonary:      Effort: Pulmonary effort is normal.      Breath sounds: Normal breath sounds.   Abdominal:      General: Bowel sounds are normal.      Palpations: There is no mass.      Tenderness: There is no abdominal tenderness.   Neurological:      Mental Status: He is alert and oriented to person, place, and time.      Sensory: No sensory deficit.      Motor: No weakness.   Psychiatric:         Mood and Affect: Mood normal.         Behavior: Behavior normal.         Thought Content: Thought content normal.         Assessment:       Problem List Items Addressed This Visit          Neuro    Parkinson disease    Relevant Orders    Hemoglobin A1C    Lipid Panel    Comprehensive Metabolic Panel    CBC Auto Differential    TSH    Impaired motor control    Relevant Orders    Hemoglobin A1C     Lipid Panel    Comprehensive Metabolic Panel    CBC Auto Differential    TSH       Cardiac/Vascular    Hyperlipidemia    Relevant Orders    Hemoglobin A1C    Lipid Panel    Comprehensive Metabolic Panel    CBC Auto Differential    TSH     Other Visit Diagnoses       Routine physical examination    -  Primary    Relevant Orders    Hemoglobin A1C    Lipid Panel    Comprehensive Metabolic Panel    CBC Auto Differential    TSH    Elevated glucose level        Relevant Orders    Hemoglobin A1C    Lipid Panel    Comprehensive Metabolic Panel    CBC Auto Differential    TSH    Screening for prostate cancer        Relevant Orders    PSA, Screening    CHU (obstructive sleep apnea)        Relevant Orders    CPAP/BIPAP SUPPLIES    Hemoglobin A1C    Lipid Panel    Comprehensive Metabolic Panel    CBC Auto Differential    TSH            Plan:       Adam was seen today for annual exam.    Diagnoses and all orders for this visit:    Routine physical examination  -     Hemoglobin A1C; Future  -     Lipid Panel; Future  -     Comprehensive Metabolic Panel; Future  -     CBC Auto Differential; Future  -     TSH; Future    Parkinson's disease with dyskinesia and fluctuating manifestations  -     Hemoglobin A1C; Future  -     Lipid Panel; Future  -     Comprehensive Metabolic Panel; Future  -     CBC Auto Differential; Future  -     TSH; Future    Impaired motor control  -     Hemoglobin A1C; Future  -     Lipid Panel; Future  -     Comprehensive Metabolic Panel; Future  -     CBC Auto Differential; Future  -     TSH; Future    Elevated glucose level  -     Hemoglobin A1C; Future  -     Lipid Panel; Future  -     Comprehensive Metabolic Panel; Future  -     CBC Auto Differential; Future  -     TSH; Future    Screening for prostate cancer  -     PSA, Screening; Future    Hyperlipidemia, unspecified hyperlipidemia type  -     Hemoglobin A1C; Future  -     Lipid Panel; Future  -     Comprehensive Metabolic Panel; Future  -     CBC Auto  Differential; Future  -     TSH; Future    CHU (obstructive sleep apnea)  -     CPAP/BIPAP SUPPLIES  -     Hemoglobin A1C; Future  -     Lipid Panel; Future  -     Comprehensive Metabolic Panel; Future  -     CBC Auto Differential; Future  -     TSH; Future    Other orders  -     ALPRAZolam (XANAX) 0.5 MG tablet; TAKE 1 TABLET(0.5 MG) BY MOUTH EVERY NIGHT FOR INSOMNIA OR ANXIETY  -     pravastatin (PRAVACHOL) 80 MG tablet; Take 1 tablet (80 mg total) by mouth every evening.       Follow-up one year or sooner for as needed care.    Devonte Hugo, MS4     reviewed. Monitor surgery and starch intake.   CPAP supplies reviewed and renewed.   F/U 6 [facility] for Controlled Med review.   Continue to see Neurology.

## 2024-04-23 ENCOUNTER — PATIENT MESSAGE (OUTPATIENT)
Dept: SLEEP MEDICINE | Facility: CLINIC | Age: 64
End: 2024-04-23
Payer: COMMERCIAL

## 2024-04-23 DIAGNOSIS — G47.33 OSA (OBSTRUCTIVE SLEEP APNEA): Primary | ICD-10-CM

## 2024-05-01 ENCOUNTER — PATIENT MESSAGE (OUTPATIENT)
Dept: NEUROSURGERY | Facility: CLINIC | Age: 64
End: 2024-05-01
Payer: COMMERCIAL

## 2024-05-13 ENCOUNTER — PATIENT MESSAGE (OUTPATIENT)
Dept: NEUROLOGY | Facility: CLINIC | Age: 64
End: 2024-05-13
Payer: COMMERCIAL

## 2024-05-16 ENCOUNTER — TELEPHONE (OUTPATIENT)
Dept: NEUROLOGY | Facility: CLINIC | Age: 64
End: 2024-05-16
Payer: COMMERCIAL

## 2024-05-16 NOTE — TELEPHONE ENCOUNTER
"Returned call to pt because he was requesting earlier appointment.  Currently scheduled in June. He reports having some issues with the programming and getting things adjusted.  "It was rough for a couple of days. I'm trying to get used to when to program and when to wait."    "My mobility and gait were affected. No arm swing and labored steps."   It's a little better now.  No freezing noted.     "I still don't really understand how it all works.   Using Group A.  From 2.5 to 3.0.  when I come home tired and worn out, how much is Parkinson's and how much is just life."    Advised basic self care, as in getting enough fluids, healthy diet, sleep.    Informed that he can always call his [employer] rep if he feels the DBS is an issue if he hasn't heard from the clinic.    Informed pt that I would send this update to his doctor.        " - - -

## 2024-05-22 ENCOUNTER — PATIENT MESSAGE (OUTPATIENT)
Dept: NEUROLOGY | Facility: CLINIC | Age: 64
End: 2024-05-22
Payer: COMMERCIAL

## 2024-06-06 ENCOUNTER — OFFICE VISIT (OUTPATIENT)
Dept: DERMATOLOGY | Facility: CLINIC | Age: 64
End: 2024-06-06
Payer: COMMERCIAL

## 2024-06-06 ENCOUNTER — OFFICE VISIT (OUTPATIENT)
Dept: NEUROLOGY | Facility: CLINIC | Age: 64
End: 2024-06-06
Payer: COMMERCIAL

## 2024-06-06 ENCOUNTER — PATIENT MESSAGE (OUTPATIENT)
Dept: NEUROLOGY | Facility: CLINIC | Age: 64
End: 2024-06-06

## 2024-06-06 VITALS
DIASTOLIC BLOOD PRESSURE: 90 MMHG | SYSTOLIC BLOOD PRESSURE: 135 MMHG | WEIGHT: 225.31 LBS | HEART RATE: 140 BPM | HEIGHT: 73 IN | BODY MASS INDEX: 29.86 KG/M2

## 2024-06-06 DIAGNOSIS — G20.B2 PARKINSON'S DISEASE WITH DYSKINESIA AND FLUCTUATING MANIFESTATIONS: Primary | ICD-10-CM

## 2024-06-06 DIAGNOSIS — L57.0 AK (ACTINIC KERATOSIS): ICD-10-CM

## 2024-06-06 DIAGNOSIS — L82.1 SK (SEBORRHEIC KERATOSIS): ICD-10-CM

## 2024-06-06 DIAGNOSIS — D18.01 CHERRY ANGIOMA: Primary | ICD-10-CM

## 2024-06-06 DIAGNOSIS — D22.9 NEVUS: ICD-10-CM

## 2024-06-06 DIAGNOSIS — L81.4 LENTIGO: ICD-10-CM

## 2024-06-06 DIAGNOSIS — Z86.006 HISTORY OF MELANOMA IN SITU: ICD-10-CM

## 2024-06-06 DIAGNOSIS — G20.A1 PARKINSON DISEASE: ICD-10-CM

## 2024-06-06 PROCEDURE — 99999 PR PBB SHADOW E&M-EST. PATIENT-LVL III: CPT | Mod: PBBFAC,,, | Performed by: PSYCHIATRY & NEUROLOGY

## 2024-06-06 PROCEDURE — 99215 OFFICE O/P EST HI 40 MIN: CPT | Mod: 25,S$GLB,, | Performed by: PSYCHIATRY & NEUROLOGY

## 2024-06-06 PROCEDURE — 17000 DESTRUCT PREMALG LESION: CPT | Mod: S$GLB,,, | Performed by: DERMATOLOGY

## 2024-06-06 PROCEDURE — 1159F MED LIST DOCD IN RCRD: CPT | Mod: CPTII,S$GLB,, | Performed by: DERMATOLOGY

## 2024-06-06 PROCEDURE — 99213 OFFICE O/P EST LOW 20 MIN: CPT | Mod: 25,S$GLB,, | Performed by: DERMATOLOGY

## 2024-06-06 PROCEDURE — 3075F SYST BP GE 130 - 139MM HG: CPT | Mod: CPTII,S$GLB,, | Performed by: PSYCHIATRY & NEUROLOGY

## 2024-06-06 PROCEDURE — 17003 DESTRUCT PREMALG LES 2-14: CPT | Mod: S$GLB,,, | Performed by: DERMATOLOGY

## 2024-06-06 PROCEDURE — 3080F DIAST BP >= 90 MM HG: CPT | Mod: CPTII,S$GLB,, | Performed by: PSYCHIATRY & NEUROLOGY

## 2024-06-06 PROCEDURE — 95983 ALYS BRN NPGT PRGRMG 15 MIN: CPT | Mod: S$GLB,,, | Performed by: PSYCHIATRY & NEUROLOGY

## 2024-06-06 PROCEDURE — 3008F BODY MASS INDEX DOCD: CPT | Mod: CPTII,S$GLB,, | Performed by: PSYCHIATRY & NEUROLOGY

## 2024-06-06 PROCEDURE — 99999 PR PBB SHADOW E&M-EST. PATIENT-LVL III: CPT | Mod: PBBFAC,,, | Performed by: DERMATOLOGY

## 2024-06-06 PROCEDURE — 95984 ALYS BRN NPGT PRGRMG ADDL 15: CPT | Mod: S$GLB,,, | Performed by: PSYCHIATRY & NEUROLOGY

## 2024-06-06 PROCEDURE — 1160F RVW MEDS BY RX/DR IN RCRD: CPT | Mod: CPTII,S$GLB,, | Performed by: DERMATOLOGY

## 2024-06-06 PROCEDURE — 3044F HG A1C LEVEL LT 7.0%: CPT | Mod: CPTII,S$GLB,, | Performed by: PSYCHIATRY & NEUROLOGY

## 2024-06-06 PROCEDURE — 3044F HG A1C LEVEL LT 7.0%: CPT | Mod: CPTII,S$GLB,, | Performed by: DERMATOLOGY

## 2024-06-06 RX ORDER — LEVODOPA AND CARBIDOPA 95; 23.75 MG/1; MG/1
4 CAPSULE, EXTENDED RELEASE ORAL 3 TIMES DAILY
Qty: 360 CAPSULE | Refills: 11 | Status: SHIPPED | OUTPATIENT
Start: 2024-06-06

## 2024-06-06 NOTE — PROGRESS NOTES
Subjective:      Patient ID:  Adam Dalal is a 63 y.o. male who presents for   Chief Complaint   Patient presents with    Skin Check     UBSE      Pt here for UBSE Patient is here today for a skin check.   Pt has a history of  moderate  sun exposure in the past.   Pt recalls several blistering sunburns in the past- no   Pt has history of tanning bed use- no   Pt has  had moles removed in the past- MIS R infraorbit 2022  Pt has history of melanoma in first degree relatives-  no     Pt states a spot on his arm, pt states its a raised area on arm , pt states it been there for about a week , pt states no tx or sx    This is a high risk patient here to check for the development of new lesions.            Review of Systems   Skin:  Positive for daily sunscreen use, activity-related sunscreen use and wears hat.   Hematologic/Lymphatic: Does not bruise/bleed easily.       Objective:   Physical Exam   Constitutional: He appears well-developed and well-nourished. No distress.   HENT:   Mouth/Throat: Gums normal.  Lips normal.  Normal dentition.   Eyes: Lids are normal.  No conjunctival no injection.   Musculoskeletal: Lymphadenopathy:      Cervical: No cervical adenopathy.      Upper Body:   No axillary adenopathy present.No supraclavicular adenopathy is present.      Lower Body:   No inguinal adenopathy.    Lymphadenopathy: No supraclavicular adenopathy is present.     He has no cervical adenopathy.     He has no axillary adenopathy.     He has no inguinal adenopathy.   Neurological: He is alert and oriented to person, place, and time. He is not disoriented.   Psychiatric: He has a normal mood and affect.   Skin:   Areas Examined (abnormalities noted in diagram):   Scalp / Hair Palpated and Inspected  Head / Face Inspection Performed  Neck Inspection Performed  Chest / Axilla Inspection Performed  Abdomen Inspection Performed  Back Inspection Performed  RUE Inspected  LUE Inspection Performed  Nails and Digits  Inspection Performed                 Diagram Legend     Erythematous scaling macule/papule c/w actinic keratosis       Vascular papule c/w angioma      Pigmented verrucoid papule/plaque c/w seborrheic keratosis      Yellow umbilicated papule c/w sebaceous hyperplasia      Irregularly shaped tan macule c/w lentigo     1-2 mm smooth white papules consistent with Milia      Movable subcutaneous cyst with punctum c/w epidermal inclusion cyst      Subcutaneous movable cyst c/w pilar cyst      Firm pink to brown papule c/w dermatofibroma      Pedunculated fleshy papule(s) c/w skin tag(s)      Evenly pigmented macule c/w junctional nevus     Mildly variegated pigmented, slightly irregular-bordered macule c/w mildly atypical nevus      Flesh colored to evenly pigmented papule c/w intradermal nevus       Pink pearly papule/plaque c/w basal cell carcinoma      Erythematous hyperkeratotic cursted plaque c/w SCC      Surgical scar with no sign of skin cancer recurrence      Open and closed comedones      Inflammatory papules and pustules      Verrucoid papule consistent consistent with wart     Erythematous eczematous patches and plaques     Dystrophic onycholytic nail with subungual debris c/w onychomycosis     Umbilicated papule    Erythematous-base heme-crusted tan verrucoid plaque consistent with inflamed seborrheic keratosis     Erythematous Silvery Scaling Plaque c/w Psoriasis     See annotation      Assessment / Plan:        Cherry angioma  These are benign vascular lesions that are inherited.  Treatment is not necessary.    Nevus  Discussed ABCDE's of nevi.  Monitor for new mole or moles that are becoming bigger, darker, irritated, or developing irregular borders. Brochure provided. Instructed patient to observe lesion(s) for changes and follow up in clinic if changes are noted. Patient to monitor skin at home for new or changing lesions.     SK (seborrheic keratosis)  These are benign inherited growths without a  malignant potential. Reassurance given to patient. No treatment is necessary.     Lentigo  This is a benign hyperpigmented sun induced lesion. Recommend daily sun protection/avoidance and use of at least SPF 30, broad spectrum sunscreen (OTC drug) will reduce the number of new lesions. Treatment of these benign lesions are considered cosmetic.  The nature of sun-induced photo-aging and skin cancers is discussed.  Sun avoidance, protective clothing, and the use of 30-SPF sunscreens is advised. Observe closely for skin damage/changes, and call if such occurs.    AK (actinic keratosis)  Cryosurgery Procedure Note    Verbal consent from the patient is obtained including, but not limited to, risk of hypopigmentation/hyperpigmentation, scar, recurrence of lesion. The patient is aware of the precancerous quality and need for treatment of these lesions. Liquid nitrogen cryosurgery is applied to the 3 actinic keratoses, as detailed in the physical exam, to produce a freeze injury. The patient is aware that blisters may form and is instructed on wound care with gentle cleansing and use of vaseline ointment to keep moist until healed. The patient is supplied a handout on cryosurgery and is instructed to call if lesions do not completely resolve.    History of melanoma in situ  Area(s) of previous MIS on right cheek evaluated with no signs of recurrence.    Upper body skin examination performed today including at least 6 points as noted in physical examination. No lesions suspicious for malignancy noted.    Recommend daily sun protection/avoidance and use of at least SPF 30, broad spectrum sunscreen (OTC drug).            Follow up in about 6 months (around 12/6/2024) for TBSE.

## 2024-06-06 NOTE — ASSESSMENT & PLAN NOTE
Rigid-predominate PD  S/p L GPI DBS  Robust honeymoon effect - less dyskinesias and better gait    Continue rytary as before  A - OLD  B - ON  C - other option  D - other option directional

## 2024-06-06 NOTE — PROGRESS NOTES
"Name: dAam Dalal  MRN: [id number]   CSN: [id number]      Date: 06/06/2024    Interval Hx  Since last visit,   L Gpi DBS  Here for 2nd time programming    Rytary 95mg 4 tabs TID - Ontime is 5H  Continues rasagiline  Dyskinesias at times in feet which bother him    Tried setting changes on A    PD Review of Symptoms:  Anosmia: yes  Dysarthria/Hypophonia: none  Dysphagia/Sialorrhea: none  Depression: none  Cognitive slowing: none  Hallucinations: none  Impulsivity: none  Constipation: none  Orthostasis: none  Dyskinesia: mild foot  Falls: none  Freezing: none  Micrographia: yes  Sleep issues:  -CHU: yes  -RBD: mild    Chief Complaint / Interval History:   63 y.o. R handed man with with sleep apnea, ETOH use, coming for eval for iPD since age 58. This began with R foot dragging. Stayed rigid-predominate throughout. No tremor noted. In terms of ambulation, no falls noted.   Saw Dr. Meyer 2018 any memory general wnl. No major decline.  Seeing sleep medicine for CHU.    No fam hx PD or dementia    Med hx  Started carbidopa/levodopa 25/100mg 1 tab PO TID 5 years ago  Now on Rytary 95mg 4 tabs TID - Ontime is 6H  Continues rasagiline    He reports his goals for DBS are as follows  Wants DBS to help R arm and leg rigidity  He does not have much tremor  He has not side effects of medications  Wants dyskinesia control    PD Review of Symptoms:  Anosmia: yes  Dysarthria/Hypophonia: none  Dysphagia/Sialorrhea: none  Depression: none  Cognitive slowing: none  Hallucinations: none  Impulsivity: none  Constipation: none  Orthostasis: none  Dyskinesia: mild foot  Falls: none  Freezing: none  Micrographia: yes  Sleep issues:  -CHU: yes  -RBD: none    "Prior"    History of Present Illness (HPI):  59 yo here for evaluation for his presumed PD.  Progressed with slowness and stiffness about 4 years ago.  Tried to blame on aging at the time.  Generally was slower getting out of couches.  Was shuffling and unable to keep up.  Would " "drag the R foot.  BEhind and above the R knee tends to get stiffer.  Now more progressive slowness and trouble with dressing.  Still driving a car, more issues with backing up and trouble seeing around while driving.      Wife Berenice is here.  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    Nonmotor/Premotor ROS:  Hyposmia (HENT)?Yes - a little diminished  RBD/sleep issues (Constitutional)?No, used to snore more   Depression/anxiety (Psychiatric)?Yes - especially at night  Fatigue (Constitutional)?Yes  Constipation (GI)?No  Urinary issues ()?Yes - urgency  Sexual dysfunction ()?Yes - mild ED  Orthostasis (Cardiovascular)?No  Leg swelling (Cardiovascular)? No  Falls (Musculoskeletal)?No but feels imbalanced  Cognitive impairment (Neurologic)?No  Psychoses (Psychiatric)?No  Pain/Paresthesia (Neurologic)?No  Visual changes (Eyes)?No  Moles / skin changes (Skin)?No  Stridor / SOB (Pulm)?N/A  Bruising (Heme)?No    Past Medical History: The patient  has a past medical history of Anxiety, Esotropia of right eye (05/02/2013), Hearing deficit, bilateral, High cholesterol, Hyperlipidemia, Malignant melanoma of skin, unspecified (06/29/2022), Melanoma (06/29/2022), Melanoma in situ of cheek, Parkinson's disease, and Ringing in ear, bilateral.    Social History: The patient  reports that he quit smoking about 18 years ago. His smoking use included cigarettes. He started smoking about 19 years ago. He has a 1 pack-year smoking history. He has never used smokeless tobacco. He reports current alcohol use of about 20.0 standard drinks of alcohol per week. He reports that he does not use drugs.    Family History: Their family history includes Arthritis in his brother and mother; Atrial fibrillation in his sister; Cataracts in his maternal grandmother and paternal grandmother; Hearing loss in his mother; Heart attack in his father; Sleep apnea in his brother.    Allergies: Patient has " "no known allergies.     Meds:   Current Outpatient Medications on File Prior to Visit   Medication Sig Dispense Refill    ALPRAZolam (XANAX) 0.5 MG tablet TAKE 1 TABLET(0.5 MG) BY MOUTH EVERY NIGHT FOR INSOMNIA OR ANXIETY 30 tablet 5    carbidopa-levodopa (RYTARY) 23.75-95 mg CpSR Take 4 capsules by mouth 3 (three) times daily. 360 capsule 11    carbidopa-levodopa  mg (SINEMET)  mg per tablet Take 1 tablet by mouth every evening. 30 tablet 11    pravastatin (PRAVACHOL) 80 MG tablet Take 1 tablet (80 mg total) by mouth every evening. 90 tablet 3    rasagiline (AZILECT) 1 mg Tab Take 1 tablet (1 mg total) by mouth once daily. 90 tablet 2     Current Facility-Administered Medications on File Prior to Visit   Medication Dose Route Frequency Provider Last Rate Last Admin    0.9%  NaCl infusion   Intravenous Continuous Thony Graves  mL/hr at 01/03/24 0519 New Bag at 01/03/24 0519    mupirocin 2 % ointment   Nasal On Call Procedure Thony Graves MD   Given at 01/02/24 0623       Exam:  BP (!) 135/90 (BP Location: Right arm, Patient Position: Sitting, BP Method: Medium (Automatic))   Pulse (!) 140   Ht 6' 1" (1.854 m)   Wt 102.2 kg (225 lb 5 oz)   BMI 29.73 kg/m²       Physical Exam  Constitutional: Well-developed, well-nourished, appears stated age  Eyes: No scleral icterus  ENT: Moist oral mucosa  Cardiovascular: No lower extremity edema   Respiratory: No labored breathing   Skin: No rash   Hematologic: No bruising    Other: GI/ deferred   Mental status: Alert and oriented to person, place, time, and situation;   follows commands  Speech: normal (not dysarthric), no aphasia  Cranial nerves:            CN II: Pupils mid-position and equal, not tested light or accommodation  CN III, IV, VI: Extraocular movements full, no nystagmus visualized - R eye strabismus  CN V: Not tested   CN VII: Face strong and symmetric bilaterally   CN VIII: Hearing intact to voice and conversation   CN IX, X: " Palate raises midline and symmetric   CN XI: Strong shoulder shrug B/L  CN XII: Tongue appears midline   Motor: Normal bulk by appearance, no drift   Sensory: Not tested    Gait: Not tested  Deep tendon reflexes: Not tested  Movement/Coordination                    Mod  hypophonic speech.                     Mod facial masking.                      No other dystonia, chorea, athetosis, myoclonus, or tics visualized.    Bradykinesia  ? Finger taps Finger flicks KAYLA Heel taps   Left 0 0 - -   Right 1+ 0 0 0       Tremor Exam   Arms extended Arms in wing position, fingers almost touching Re-emergent Arms extended wrists extended Intention Resting Kinetic   Left ?neg ? ? ? ? ? ?   Right ?neg ? ? ? ? ? ?   Head tremor: neg    _______________________________________  Procedure:    DBS MRI Compatibility INFO  06/06/2024    IPG Model(s) X61660   Serial No. [id number]   Impedance NL   Battery OK   Pocket Adapter  None     Initial        Programming  Turned up A- dyskinesias began                                                                                                                                                                                                                                                                                                                                                                                                                                                                                                                                                                                                                                                                                                                                                                                                                                                                                                                                                                                                                                                                                                                                                                                                                                                                                                                                                                                                                                                                                                                                                                                                                                                                                                                                                                                                                                                                                                                                                                                                                                                                                                                                                                                                                                                            Changed to B- much better  C and D are variations of B  D used brainsense predictions    FINAL          ----------------------  LEFT GPI  Pulse width set at 90; Frequency set at 130  Monopolar review  C&3 @2.3ma better gait  C&2 @2.0mA better movement R arm movements  C&1 @2.0mA tightness R foot  C&0 @1mA, phosphenes    Final settings:        Medical Record Review:  - Lab Results:  Lab Visit on 04/19/2024   Component Date Value Ref Range Status    Hemoglobin A1C 04/19/2024 5.7 (H)  4.0 - 5.6 % Final    Estimated Avg Glucose 04/19/2024 117  68 - 131 mg/dL Final    Sodium 04/19/2024 140  136 - 145 mmol/L Final    Potassium 04/19/2024 4.6  3.5 - 5.1 mmol/L Final    Chloride 04/19/2024 104  95 - 110 mmol/L Final    CO2 04/19/2024 27  23  - 29 mmol/L Final    Glucose 04/19/2024 112 (H)  70 - 110 mg/dL Final    BUN 04/19/2024 17  8 - 23 mg/dL Final    Creatinine 04/19/2024 1.1  0.5 - 1.4 mg/dL Final    Calcium 04/19/2024 9.2  8.7 - 10.5 mg/dL Final    Anion Gap 04/19/2024 9  8 - 16 mmol/L Final    eGFR 04/19/2024 >60  >60 mL/min/1.73 m^2 Final    PSA, Screen 04/19/2024 0.27  0.00 - 4.00 ng/mL Final    Cholesterol 04/19/2024 194  120 - 199 mg/dL Final    Triglycerides 04/19/2024 86  30 - 150 mg/dL Final    HDL 04/19/2024 47  40 - 75 mg/dL Final    LDL Cholesterol 04/19/2024 129.8  63.0 - 159.0 mg/dL Final    HDL/Cholesterol Ratio 04/19/2024 24.2  20.0 - 50.0 % Final    Total Cholesterol/HDL Ratio 04/19/2024 4.1  2.0 - 5.0 Final    Non-HDL Cholesterol 04/19/2024 147  mg/dL Final   Office Visit on 04/04/2024   Component Date Value Ref Range Status    Final Pathologic Diagnosis 04/04/2024    Final                    Value:1. Skin, right lower leg, shave biopsy:    - PYOGENIC GRANULOMA    This lesion is benign.      Gross 04/04/2024    Final                    Value:Hospital/clinic label MRN:  [id number]  Pathology label MRN:  [id number]     The specimen is received in formalin labeled &quot;R lower leg&quot;.  The specimen is a shave of tan-white skin measuring 0.8 x 0.8 x 0.3 cm. The specimen is inked black at the resection margin, bisected, and submitted entirely in cassette   [id number] Fuentes  Gross Technologist      Microscopic Exam 04/04/2024 1. Sections show a circumscribed dermal proliferation of small capillaries in a lobular configuration.  There is no evidence of cytologic atypia.   Final    Disclaimer 04/04/2024 Unless the case is a 'gross only' or additional testing only, the final diagnosis for each specimen is based on a microscopic examination of appropriate tissue sections.   Final          Diagnoses:                                                                                    PD, akinetic-rigid.  Eye movements are  abnml from NM perspective - old strabismus - but watching closely.  NO clear slowed pursuits or saccades, but some intrusive jerks - might all still be from longstanding changes/surgeries.     Problem List Items Addressed This Visit          Neuro    Parkinson disease - Primary    Current Assessment & Plan     Rigid-predominate PD  S/p L GPI DBS  Robust honeymoon effect - less dyskinesias and better gait    Continue rytary as before  A - OLD  B - ON  C - other option  D - other option directional              I spent 45 minutes discussing DBS and programming        Delicia Vargas MD, MS  [facility]  Department of Neurology  Movement Disorders

## 2024-06-07 ENCOUNTER — PATIENT MESSAGE (OUTPATIENT)
Dept: NEUROLOGY | Facility: CLINIC | Age: 64
End: 2024-06-07
Payer: COMMERCIAL

## 2024-06-07 DIAGNOSIS — G20.B2 PARKINSON'S DISEASE WITH DYSKINESIA AND FLUCTUATING MANIFESTATIONS: Primary | ICD-10-CM

## 2024-06-07 NOTE — TELEPHONE ENCOUNTER
Received request for PT at [place]  Order completed and faxed to [facility] [address] [address]  [phone]  F:[phone]    Sent [employer] message to patient to inform

## 2024-06-10 ENCOUNTER — PATIENT MESSAGE (OUTPATIENT)
Dept: INTERNAL MEDICINE | Facility: CLINIC | Age: 64
End: 2024-06-10
Payer: COMMERCIAL

## 2024-06-26 ENCOUNTER — PATIENT MESSAGE (OUTPATIENT)
Dept: NEUROLOGY | Facility: CLINIC | Age: 64
End: 2024-06-26
Payer: COMMERCIAL

## 2024-08-15 RX ORDER — CARBIDOPA AND LEVODOPA 25; 100 MG/1; MG/1
1 TABLET ORAL NIGHTLY
Qty: 30 TABLET | Refills: 11 | Status: SHIPPED | OUTPATIENT
Start: 2024-08-15 | End: 2025-08-15

## 2024-08-26 ENCOUNTER — PATIENT MESSAGE (OUTPATIENT)
Dept: NEUROLOGY | Facility: CLINIC | Age: 64
End: 2024-08-26
Payer: COMMERCIAL

## 2024-09-04 RX ORDER — CARBIDOPA AND LEVODOPA 25; 100 MG/1; MG/1
1 TABLET ORAL NIGHTLY
Qty: 30 TABLET | Refills: 11 | Status: SHIPPED | OUTPATIENT
Start: 2024-09-04 | End: 2025-09-04

## 2024-09-16 ENCOUNTER — TELEPHONE (OUTPATIENT)
Dept: INTERNAL MEDICINE | Facility: CLINIC | Age: 64
End: 2024-09-16
Payer: COMMERCIAL

## 2024-09-16 NOTE — TELEPHONE ENCOUNTER
----- Message from Alisa Andrews sent at 9/16/2024  2:11 PM CDT -----  Regarding: Patient Advice-Tested Positive for Covid  9/16/2024      Hello,     I received a call from MINDY NAIR [[id number]] regarding testing positive for Covid yesterday, using an at home Covid Test. Mr. Nair want to know if he needs medications. Please give him a call. He can be reached at [phone]      Thank you,   Alisa GANT   Access Navigator

## 2024-09-17 ENCOUNTER — TELEPHONE (OUTPATIENT)
Dept: INTERNAL MEDICINE | Facility: CLINIC | Age: 64
End: 2024-09-17
Payer: COMMERCIAL

## 2024-09-17 ENCOUNTER — E-VISIT (OUTPATIENT)
Dept: FAMILY MEDICINE | Facility: CLINIC | Age: 64
End: 2024-09-17
Payer: COMMERCIAL

## 2024-09-17 DIAGNOSIS — U07.1 COVID: Primary | ICD-10-CM

## 2024-09-17 NOTE — PROGRESS NOTES
Patient ID: Adam Dalal is a 64 y.o. male.    Chief Complaint: General Illness (Entered automatically based on patient selection in [facility].)             274}  The patient initiated a request through [facility] on 9/17/2024 for evaluation and management with a chief complaint of General Illness (Entered automatically based on patient selection in [facility].)     I evaluated the questionnaire submission on 09/17/2024 .    Total Time (in minutes): 12     [facility] Peq Evisit [facility]group-Cough And Cold    9/17/2024 10:55 AM CDT - Filed by Patient   What do you need help with? Covid 19   Do you agree to participate in an E-Visit? Yes   If you have any of the following symptoms, go to your local emergency room or call 911: I acknowledge   What is the main issue you would like addressed today? Medication ifvneeded   Do you think you might have COVID or the Flu? Yes COVID   Have you tested positive for COVID or Flu? Yes COVID   What symptoms do you have? Fatigue   When did your symptoms first appear? 9/13/2024   List what you have done or taken to help your symptoms. Musinex   Provide any additional information you feel is important.    Please attach any relevant images or files    Are you able to take your vital signs? Yes   Systolic Blood Pressure:    Diastolic Blood Pressure:    Weight: 220   Height: 73   Pulse:    Temperature: 97   Respiration rate:    Pulse Oxygen:           Active Problem List with Overview Notes    Diagnosis Date Noted    Parkinson's disease (tremor, stiffness, slow motion, unstable posture) 01/03/2024    Mild neurocognitive disorder due to multiple etiologies 12/02/2022    History of melanoma in situ 10/24/2022     R lateral infraorbit 6/2022      Mohs defect of right cheek     Oral lesion 11/05/2019    Alcohol use disorder, mild, abuse 02/21/2018    Hypophonia 11/21/2017    Voice and resonance disorder 11/21/2017    Dysarthria 11/21/2017    Impaired functional mobility, balance, gait, and endurance  11/21/2017    Impaired motor control 11/21/2017    Parkinson disease 11/15/2017    Nuclear sclerotic cataract of left eye 01/30/2017    Nuclear sclerosis 01/09/2017    Esotropia of right eye 05/02/2013    Ocular hypertension - Both Eyes 05/02/2013    Obstructive sleep apnea 11/09/2012     Has CPAP at home.       Hyperlipidemia       Recent Labs Obtained:  Lab Results   Component Value Date    WBC 7.02 01/09/2024    HGB 13.7 (L) 01/09/2024    HCT 39.3 (L) 01/09/2024    MCV 94 01/09/2024     01/09/2024     04/19/2024    K 4.6 04/19/2024     (H) 04/19/2024    CREATININE 1.1 04/19/2024    EGFRNORACEVR >60 04/19/2024    HGBA1C 5.7 (H) 04/19/2024    TSH 1.204 10/02/2020      Review of patient's allergies indicates:  No Known Allergies    Encounter Diagnosis   Name Primary?    COVID Yes        No orders of the defined types were placed in this encounter.     Medications Ordered This Encounter   Medications    nirmatrelvir-ritonavir 300 mg (150 mg x 2)-100 mg copackaged tablets (EUA)     Sig: Take 3 tablets by mouth 2 (two) times daily for 5 days. Each dose contains 2 nirmatrelvir (pink tablets) and 1 ritonavir (white tablet). Take all 3 tablets together     Dispense:  30 tablet     Refill:  0        E-Visit Time Tracking:    Day 1 Time (in minutes): 12    Total Time (in minutes): 12         274}

## 2024-09-17 NOTE — TELEPHONE ENCOUNTER
----- Message from Alisa Andrews sent at 9/16/2024  2:11 PM CDT -----  Regarding: Patient Advice-Tested Positive for Covid  9/16/2024      Hello,     I received a call from MINDY NAIR [[id number]] regarding testing positive for Covid yesterday, using an at home Covid Test. Mr. Nair want to know if he needs medications. Please give him a call. He can be reached at [phone]      Thank you,   Alisa GANT   Access Navigator

## 2024-10-23 ENCOUNTER — PATIENT MESSAGE (OUTPATIENT)
Dept: RESEARCH | Facility: HOSPITAL | Age: 64
End: 2024-10-23
Payer: COMMERCIAL

## 2024-11-16 ENCOUNTER — PATIENT MESSAGE (OUTPATIENT)
Dept: INTERNAL MEDICINE | Facility: CLINIC | Age: 64
End: 2024-11-16
Payer: COMMERCIAL

## 2024-11-16 ENCOUNTER — PATIENT MESSAGE (OUTPATIENT)
Dept: NEUROLOGY | Facility: CLINIC | Age: 64
End: 2024-11-16
Payer: COMMERCIAL

## 2024-11-16 DIAGNOSIS — F41.9 ANXIETY: Primary | ICD-10-CM

## 2024-11-17 NOTE — TELEPHONE ENCOUNTER
Care Due:                  Date            Visit Type   Department     Provider  --------------------------------------------------------------------------------                                EP -                              PRIMARY      [facility] INTERNAL  Last Visit: 04-      CARE ([facility])   MONICO Gee                               -                              PRIMARY      [facility] INTERNAL  Next Visit: 11-      CARE ([facility])   MONICO Gee                                                            Last  Test          Frequency    Reason                     Performed    Due Date  --------------------------------------------------------------------------------    CMP.........  12 months..  pravastatin..............  11-   11-    [employer] Embedded Care Due Messages. Reference number: [id number].   11/17/2024 7:08:33 AM CST

## 2024-11-18 DIAGNOSIS — G20.A1 PARKINSON'S DISEASE: ICD-10-CM

## 2024-11-18 RX ORDER — ALPRAZOLAM 0.5 MG/1
TABLET ORAL
Qty: 30 TABLET | Refills: 5 | Status: SHIPPED | OUTPATIENT
Start: 2024-11-18

## 2024-11-18 NOTE — TELEPHONE ENCOUNTER
Pt sent a [employer] message with request for renewal of rasagiline 1 mg po q evening.  LOV 6/6/2024  Rx pended

## 2024-11-19 RX ORDER — RASAGILINE 1 MG/1
1 TABLET ORAL DAILY
Qty: 90 TABLET | Refills: 2 | Status: SHIPPED | OUTPATIENT
Start: 2024-11-19

## 2024-12-18 ENCOUNTER — OFFICE VISIT (OUTPATIENT)
Dept: INTERNAL MEDICINE | Facility: CLINIC | Age: 64
End: 2024-12-18
Payer: COMMERCIAL

## 2024-12-18 ENCOUNTER — LAB VISIT (OUTPATIENT)
Dept: LAB | Facility: HOSPITAL | Age: 64
End: 2024-12-18
Attending: INTERNAL MEDICINE
Payer: COMMERCIAL

## 2024-12-18 ENCOUNTER — PATIENT MESSAGE (OUTPATIENT)
Dept: INTERNAL MEDICINE | Facility: CLINIC | Age: 64
End: 2024-12-18

## 2024-12-18 ENCOUNTER — IMMUNIZATION (OUTPATIENT)
Dept: INTERNAL MEDICINE | Facility: CLINIC | Age: 64
End: 2024-12-18
Payer: COMMERCIAL

## 2024-12-18 VITALS
OXYGEN SATURATION: 98 % | DIASTOLIC BLOOD PRESSURE: 88 MMHG | HEART RATE: 70 BPM | WEIGHT: 229.5 LBS | SYSTOLIC BLOOD PRESSURE: 125 MMHG | BODY MASS INDEX: 30.42 KG/M2 | HEIGHT: 73 IN

## 2024-12-18 DIAGNOSIS — I49.9 IRREGULAR CARDIAC RHYTHM: ICD-10-CM

## 2024-12-18 DIAGNOSIS — Z00.00 ROUTINE PHYSICAL EXAMINATION: ICD-10-CM

## 2024-12-18 DIAGNOSIS — G20.B2 PARKINSON'S DISEASE WITH DYSKINESIA AND FLUCTUATING MANIFESTATIONS: ICD-10-CM

## 2024-12-18 DIAGNOSIS — I48.91 ATRIAL FIBRILLATION, UNSPECIFIED TYPE: ICD-10-CM

## 2024-12-18 DIAGNOSIS — E78.5 HYPERLIPIDEMIA, UNSPECIFIED HYPERLIPIDEMIA TYPE: ICD-10-CM

## 2024-12-18 DIAGNOSIS — Z23 NEED FOR VACCINATION: Primary | ICD-10-CM

## 2024-12-18 DIAGNOSIS — I48.91 ATRIAL FIBRILLATION, UNSPECIFIED TYPE: Primary | ICD-10-CM

## 2024-12-18 DIAGNOSIS — Z12.5 SCREENING FOR PROSTATE CANCER: ICD-10-CM

## 2024-12-18 DIAGNOSIS — F06.70 MILD NEUROCOGNITIVE DISORDER DUE TO MULTIPLE ETIOLOGIES: ICD-10-CM

## 2024-12-18 LAB
OHS QRS DURATION: 72 MS
OHS QTC CALCULATION: 410 MS
TSH SERPL DL<=0.005 MIU/L-ACNC: 0.8 UIU/ML (ref 0.4–4)

## 2024-12-18 PROCEDURE — 90471 IMMUNIZATION ADMIN: CPT | Mod: S$GLB,,, | Performed by: INTERNAL MEDICINE

## 2024-12-18 PROCEDURE — 1159F MED LIST DOCD IN RCRD: CPT | Mod: CPTII,S$GLB,, | Performed by: INTERNAL MEDICINE

## 2024-12-18 PROCEDURE — 90656 IIV3 VACC NO PRSV 0.5 ML IM: CPT | Mod: S$GLB,,, | Performed by: INTERNAL MEDICINE

## 2024-12-18 PROCEDURE — 99215 OFFICE O/P EST HI 40 MIN: CPT | Mod: S$GLB,,, | Performed by: INTERNAL MEDICINE

## 2024-12-18 PROCEDURE — 93010 ELECTROCARDIOGRAM REPORT: CPT | Mod: S$GLB,,, | Performed by: INTERNAL MEDICINE

## 2024-12-18 PROCEDURE — 3074F SYST BP LT 130 MM HG: CPT | Mod: CPTII,S$GLB,, | Performed by: INTERNAL MEDICINE

## 2024-12-18 PROCEDURE — 93005 ELECTROCARDIOGRAM TRACING: CPT | Mod: S$GLB,,, | Performed by: INTERNAL MEDICINE

## 2024-12-18 PROCEDURE — 84443 ASSAY THYROID STIM HORMONE: CPT | Performed by: INTERNAL MEDICINE

## 2024-12-18 PROCEDURE — 90480 ADMN SARSCOV2 VAC 1/ONLY CMP: CPT | Mod: S$GLB,,, | Performed by: INTERNAL MEDICINE

## 2024-12-18 PROCEDURE — 36415 COLL VENOUS BLD VENIPUNCTURE: CPT | Performed by: INTERNAL MEDICINE

## 2024-12-18 PROCEDURE — 99999 PR PBB SHADOW E&M-EST. PATIENT-LVL IV: CPT | Mod: PBBFAC,,, | Performed by: INTERNAL MEDICINE

## 2024-12-18 PROCEDURE — 91320 SARSCV2 VAC 30MCG TRS-SUC IM: CPT | Mod: S$GLB,,, | Performed by: INTERNAL MEDICINE

## 2024-12-18 PROCEDURE — 3079F DIAST BP 80-89 MM HG: CPT | Mod: CPTII,S$GLB,, | Performed by: INTERNAL MEDICINE

## 2024-12-18 PROCEDURE — 3044F HG A1C LEVEL LT 7.0%: CPT | Mod: CPTII,S$GLB,, | Performed by: INTERNAL MEDICINE

## 2024-12-18 PROCEDURE — 3008F BODY MASS INDEX DOCD: CPT | Mod: CPTII,S$GLB,, | Performed by: INTERNAL MEDICINE

## 2024-12-18 RX ORDER — SODIUM FLUORIDE/POTASSIUM NIT 1.1 %-5 %
PASTE (ML) DENTAL
COMMUNITY
Start: 2024-11-14

## 2024-12-18 RX ORDER — METOPROLOL SUCCINATE 25 MG/1
25 TABLET, EXTENDED RELEASE ORAL DAILY
Qty: 90 TABLET | Refills: 3 | Status: SHIPPED | OUTPATIENT
Start: 2024-12-18 | End: 2025-12-18

## 2024-12-18 NOTE — PROGRESS NOTES
HPI  History of Present Illness    CHIEF COMPLAINT:  Adam presents for a follow-up visit to discuss medication refills and receive vaccinations.  reviewed    HPI: Pt for documentation visit for chronic alprazolam use. DOing well, no escalation.   Denies any issues. Doing well from BDS surgery earlier this year.   Adam reports recently completing 5 days of work in [place], which left him fatigued from prolonged standing. He has parkinsonism symptoms and underwent physical therapy twice weekly for several months before completing the program. Adam notes improved automatic performance of exercises such as alternating limb movements, indicating progress in his condition. It has been approximately 1 year since the onset of parkinsonism symptoms, and he feels the condition may be stabilizing. Adam has not required recent medication adjustments and plans to see his neurologist, Dr. Blair, in January for a follow-up. He describes his current state as stable. Adam mentions a history of back issues from a few years ago, which have resolved. He denies feeling that his heart is beating fast, irregular, or different. After his work in [place], the patient felt exhausted the day prior and spent the day resting.    MEDICATIONS:  Adam is on Alprazolam, which has been refilled for 6 months. He is also on daily Pravastatin, although he reports occasionally missing doses.    MEDICAL HISTORY:  Adam has a history of Parkinsonism.    FAMILY HISTORY:  Family history is significant for sister with atrial fibrillation.    TEST RESULTS:  Adam underwent a prostate blood test, blood sugar test, and cholesterol blood test in April.      ROS:  General: +fatigue  Cardiovascular: -palpitations  Musculoskeletal: -back pain             Review of Systems   Constitutional:  Negative for activity change and unexpected weight change.   HENT:  Negative for hearing loss, rhinorrhea and trouble swallowing.    Eyes:   Negative for discharge and visual disturbance.   Respiratory:  Negative for chest tightness and wheezing.    Cardiovascular:  Negative for chest pain and palpitations.   Gastrointestinal:  Negative for blood in stool, constipation, diarrhea and vomiting.   Endocrine: Negative for polydipsia.   Genitourinary:  Negative for difficulty urinating, hematuria and urgency.   Musculoskeletal:  Positive for gait problem. Negative for arthralgias, joint swelling and neck pain.   Neurological:  Positive for tremors. Negative for weakness and headaches.   Psychiatric/Behavioral:  Negative for confusion and dysphoric mood.        Past Medical History:   Diagnosis Date    Anxiety     Esotropia of right eye 05/02/2013    Hearing deficit, bilateral     High cholesterol     Hyperlipidemia     Malignant melanoma of skin, unspecified 06/29/2022    in situ right lateral infraorbit    Melanoma 06/29/2022    in situ - right lateral infraorbital    Melanoma in situ of cheek     Parkinson's disease     Ringing in ear, bilateral      Past Surgical History:   Procedure Laterality Date    CATARACT EXTRACTION W/  INTRAOCULAR LENS IMPLANT Right 01/09/2017    Dr marin     CATARACT EXTRACTION W/  INTRAOCULAR LENS IMPLANT Left 01/30/2017    Dr. Marin    COLONOSCOPY N/A 7/7/2023    Procedure: COLONOSCOPY;  Surgeon: Arnaldo Neely MD;  Location: [facility] ENDOSCOPY;  Service: Endoscopy;  Laterality: N/A;  prep instructions sent to pt via portal -  [facility] prep  pre call complete, stated he would have a ride -CE    DEEP BRAIN STIMULATOR PLACEMENT Left 1/2/2024    Procedure: INSERTION, DEEP BRAIN STIMULATOR ELECTRODE;  Surgeon: Roger Flores MD;  Location: [facility] OR [address] [place] [address];  Service: Neurosurgery;  Laterality: Left;  INSERT ELECTRODE LEFT SIDE FOR DEEP BRAIN STIMULATION/ GLOBUS PALLIDUS INTERNUS/ TEDS & SCD/MEDTRONICSSHRAVAN.    HERNIA REPAIR      INSERTION OF DEEP BRAIN STIMULATOR GENERATOR Left 1/9/2024    Procedure: INSERTION, PULSE  GENERATOR, DEEP BRAIN STIMULATOR;  Surgeon: Roger Flores MD;  Location: [facility] OR [address] [place] [address];  Service: Neurosurgery;  Laterality: Left;  PLACEMENT OF PULSE GENERATOR FOR DEEP BRAIN STIMULATION/ TEDS & SCD/MEDTRONICS, SHRAVAN DARTEZ    PLACEMENT OF FIDUCIAL SCREW INTO SPINE N/A 1/2/2024    Procedure: INSERTION, FIDUCIAL SCREW, SKULL;  Surgeon: Roger Flores MD;  Location: [facility] OR [address] [place] [address];  Service: Neurosurgery;  Laterality: N/A;  APPLICATION OF FIDUCIALS FOR DEEP BRAIN STIMULATION/ TEDS & SCD/MEDTRONIC/SHRAVAN DARTEZ    RECONSTRUCTION OF FACE Right 8/4/2022    Procedure: RECONSTRUCTION, FACE;  Surgeon: Yaneli Hickman MD;  Location: [facility] OR [address] [place] [address];  Service: ENT;  Laterality: Right;    STRABISMUS SURGERY  4yrs    right eye      Patient Active Problem List   Diagnosis    Hyperlipidemia    Obstructive sleep apnea    Esotropia of right eye    Ocular hypertension - Both Eyes    Nuclear sclerosis    Nuclear sclerotic cataract of left eye    Parkinson disease    Hypophonia    Voice and resonance disorder    Dysarthria    Impaired functional mobility, balance, gait, and endurance    Impaired motor control    Alcohol use disorder, mild, abuse    Oral lesion    Mohs defect of right cheek    History of melanoma in situ    Mild neurocognitive disorder due to multiple etiologies    Parkinson's disease (tremor, stiffness, slow motion, unstable posture)          Objective:      Physical Exam    Cardiovascular: Irregular heart rhythm.       Physical Exam  Constitutional:       General: He is not in acute distress.     Appearance: He is well-developed.   HENT:      Head: Normocephalic and atraumatic.      Right Ear: Tympanic membrane, ear canal and external ear normal.      Left Ear: Tympanic membrane, ear canal and external ear normal.      Mouth/Throat:      Pharynx: No oropharyngeal exudate or posterior oropharyngeal erythema.   Eyes:      General: No scleral icterus.     Conjunctiva/sclera: Conjunctivae normal.      Pupils:  Pupils are equal, round, and reactive to light.   Neck:      Thyroid: No thyromegaly.      Comments: No supraclavicular nodes palpated  Cardiovascular:      Rate and Rhythm: Tachycardia present. Rhythm irregular.      Pulses: Normal pulses.      Heart sounds: Normal heart sounds. No murmur heard.  Pulmonary:      Effort: Pulmonary effort is normal.      Breath sounds: Normal breath sounds. No wheezing.   Abdominal:      General: Bowel sounds are normal.      Palpations: Abdomen is soft. There is no mass.      Tenderness: There is no abdominal tenderness.   Musculoskeletal:         General: No tenderness.      Cervical back: Normal range of motion and neck supple.      Right lower leg: No edema.      Left lower leg: No edema.   Lymphadenopathy:      Cervical: No cervical adenopathy.   Skin:     Coloration: Skin is not jaundiced or pale.   Neurological:      General: No focal deficit present.      Mental Status: He is alert and oriented to person, place, and time.   Psychiatric:         Mood and Affect: Mood normal.         Behavior: Behavior normal.           Assessment:       Problem List Items Addressed This Visit          Neuro    Parkinson disease    Mild neurocognitive disorder due to multiple etiologies       Cardiac/Vascular    Hyperlipidemia     Other Visit Diagnoses       Atrial fibrillation, unspecified type    -  Primary    Relevant Medications    metoprolol succinate (TOPROL-XL) 25 MG 24 hr tablet    Other Relevant Orders    Ambulatory referral/consult to Cardiology    TSH    Routine physical examination        Screening for prostate cancer        Irregular cardiac rhythm        Relevant Orders    EKG 12-lead            Plan:       Diagnoses and all orders for this visit:    Atrial fibrillation, unspecified type  -     Ambulatory referral/consult to Cardiology; Future  -     metoprolol succinate (TOPROL-XL) 25 MG 24 hr tablet; Take 1 tablet (25 mg total) by mouth once daily.  -     TSH;  "Future    Parkinson's disease with dyskinesia and fluctuating manifestations    Mild neurocognitive disorder due to multiple etiologies    Routine physical examination    Hyperlipidemia, unspecified hyperlipidemia type    Screening for prostate cancer    Irregular cardiac rhythm  -     EKG 12-lead         Exam I noted the patient seemed to be in a somewhat fast irregular rhythm so an EKG was performed and indicated atrial fibrillation with a rate of 122.  There was a lot of baseline artifact and I am waiting on the official cardiology reading.  He is not having any chest pain or shortness a breath and felt like he was in his usual state of health.  I will review the final cardiology reading but explained that we need to take AFib seriously because of tendency toward fast heart rate and potential for blood clots.  Patient expressed understanding and would like to see Cardiology.  He will monitor for any shortness a breath chest pain or other symptoms.  I explained that I wanted him to  the metoprolol and start taking it today.  We will get him in to Cardiology.  We will assist him with vaccines at his request.  Once I review the final EKG interpretation and cardiology visit we will have a much better long-term plan.  Baseline artifact seems to be related to underlying parkinsonism.        Portions of this note may have been created with [employer] voice recognition software. Occasional "wrong-word" or "sound-a-like" substitutions may have occurred due to the inherent limitations of voice recognition software. Please, read the note carefully and recognize, using context, where substitutions have occurred.  "

## 2025-01-07 ENCOUNTER — PATIENT MESSAGE (OUTPATIENT)
Facility: CLINIC | Age: 65
End: 2025-01-07
Payer: COMMERCIAL

## 2025-01-07 ENCOUNTER — TELEPHONE (OUTPATIENT)
Facility: CLINIC | Age: 65
End: 2025-01-07
Payer: COMMERCIAL

## 2025-01-07 DIAGNOSIS — G20.A1 PARKINSON DISEASE: ICD-10-CM

## 2025-01-07 NOTE — TELEPHONE ENCOUNTER
Called PT today about rescheduling there DBS appt due to someone putting them in a 20 min slot. PT has been scheduled in the approprietly slot on Jan 24 at 3:20pm for 60 min.

## 2025-01-08 RX ORDER — LEVODOPA AND CARBIDOPA 95; 23.75 MG/1; MG/1
4 CAPSULE, EXTENDED RELEASE ORAL 3 TIMES DAILY
Qty: 360 CAPSULE | Refills: 11 | Status: SHIPPED | OUTPATIENT
Start: 2025-01-08

## 2025-01-08 NOTE — TELEPHONE ENCOUNTER
Pt is requesting Rytary refills. Bl[facility] no longer available for this. Sent to [facility]. Informed pt.

## 2025-01-10 ENCOUNTER — PATIENT MESSAGE (OUTPATIENT)
Facility: CLINIC | Age: 65
End: 2025-01-10
Payer: COMMERCIAL

## 2025-01-10 RX ORDER — CARBIDOPA AND LEVODOPA 25; 100 MG/1; MG/1
2 TABLET ORAL
Qty: 300 TABLET | Refills: 11 | Status: SHIPPED | OUTPATIENT
Start: 2025-01-10 | End: 2026-01-10

## 2025-01-17 NOTE — TELEPHONE ENCOUNTER
Called pt to see how many days worth of Rytary he wants called in. He stated he would call right back.    Eight day supply is 96 tabs called in to [facility]. Pt did not return call nor did he answer when I attempted to call before end of shift.  Sent a [employer] message informing him of action taken.

## 2025-01-24 ENCOUNTER — PATIENT MESSAGE (OUTPATIENT)
Facility: CLINIC | Age: 65
End: 2025-01-24

## 2025-01-24 ENCOUNTER — OFFICE VISIT (OUTPATIENT)
Facility: CLINIC | Age: 65
End: 2025-01-24
Payer: COMMERCIAL

## 2025-01-24 ENCOUNTER — TELEPHONE (OUTPATIENT)
Facility: CLINIC | Age: 65
End: 2025-01-24
Payer: COMMERCIAL

## 2025-01-24 VITALS — DIASTOLIC BLOOD PRESSURE: 106 MMHG | SYSTOLIC BLOOD PRESSURE: 147 MMHG | HEART RATE: 147 BPM

## 2025-01-24 DIAGNOSIS — G20.B2 PARKINSON'S DISEASE WITH DYSKINESIA AND FLUCTUATING MANIFESTATIONS: ICD-10-CM

## 2025-01-24 PROCEDURE — 95983 ALYS BRN NPGT PRGRMG 15 MIN: CPT | Mod: S$GLB,,, | Performed by: PSYCHIATRY & NEUROLOGY

## 2025-01-24 PROCEDURE — 95984 ALYS BRN NPGT PRGRMG ADDL 15: CPT | Mod: S$GLB,,, | Performed by: PSYCHIATRY & NEUROLOGY

## 2025-01-24 PROCEDURE — 99999 PR PBB SHADOW E&M-EST. PATIENT-LVL III: CPT | Mod: PBBFAC,,, | Performed by: PSYCHIATRY & NEUROLOGY

## 2025-01-24 PROCEDURE — 3080F DIAST BP >= 90 MM HG: CPT | Mod: CPTII,S$GLB,, | Performed by: PSYCHIATRY & NEUROLOGY

## 2025-01-24 PROCEDURE — 99215 OFFICE O/P EST HI 40 MIN: CPT | Mod: 25,S$GLB,, | Performed by: PSYCHIATRY & NEUROLOGY

## 2025-01-24 PROCEDURE — 1159F MED LIST DOCD IN RCRD: CPT | Mod: CPTII,S$GLB,, | Performed by: PSYCHIATRY & NEUROLOGY

## 2025-01-24 PROCEDURE — 3077F SYST BP >= 140 MM HG: CPT | Mod: CPTII,S$GLB,, | Performed by: PSYCHIATRY & NEUROLOGY

## 2025-01-24 NOTE — PROGRESS NOTES
"Name: Adam Dalal  MRN: [id number]   CSN: [id number]      Date: 01/24/2025    Interval Hx  Since last visit,   L Gpi DBS    Tried several groups but does not understand concept of being on one group    Rytary 95mg 4 tabs TID - Ontime is 5H  5:30AM, 11am, 5pm Offtime around lunch    R hand dystonia    Continues rasagiline  Dyskinesias at times in feet which bother him    Tried setting changes on A    PD Review of Symptoms:  Anosmia: yes  Dysarthria/Hypophonia: none  Dysphagia/Sialorrhea: none  Depression: none  Cognitive slowing: none  Hallucinations: none  Impulsivity: none  Constipation: none  Orthostasis: none  Dyskinesia: mild foot  Falls: none  Freezing: none  Micrographia: yes  Sleep issues:  -CHU: yes  -RBD: mild    Chief Complaint / Interval History:   64 y.o. R handed man with with sleep apnea, ETOH use, coming for eval for iPD since age 58. This began with R foot dragging. Stayed rigid-predominate throughout. No tremor noted. In terms of ambulation, no falls noted.   Saw Dr. Meyer 2018 any memory general wnl. No major decline.  Seeing sleep medicine for CHU.    No fam hx PD or dementia    Med hx  Started carbidopa/levodopa 25/100mg 1 tab PO TID 5 years ago  Now on Rytary 95mg 4 tabs TID - Ontime is 6H  Continues rasagiline    He reports his goals for DBS are as follows  Wants DBS to help R arm and leg rigidity  He does not have much tremor  He has not side effects of medications  Wants dyskinesia control    PD Review of Symptoms:  Anosmia: yes  Dysarthria/Hypophonia: none  Dysphagia/Sialorrhea: none  Depression: none  Cognitive slowing: none  Hallucinations: none  Impulsivity: none  Constipation: none  Orthostasis: none  Dyskinesia: mild foot  Falls: none  Freezing: none  Micrographia: yes  Sleep issues:  -CHU: yes  -RBD: none    "Prior"    History of Present Illness (HPI):  57 yo here for evaluation for his presumed PD.  Progressed with slowness and stiffness about 4 years ago.  Tried to blame on " "aging at the time.  Generally was slower getting out of couches.  Was shuffling and unable to keep up.  Would drag the R foot.  BEhind and above the R knee tends to get stiffer.  Now more progressive slowness and trouble with dressing.  Still driving a car, more issues with backing up and trouble seeing around while driving.      Wife Berenice is here.  She notes that he "feels like he is failing    Works at the [employer].    Nonmotor/Premotor ROS:  Hyposmia (HENT)?Yes - a little diminished  RBD/sleep issues (Constitutional)?No, used to snore more   Depression/anxiety (Psychiatric)?Yes - especially at night  Fatigue (Constitutional)?Yes  Constipation (GI)?No  Urinary issues ()?Yes - urgency  Sexual dysfunction ()?Yes - mild ED  Orthostasis (Cardiovascular)?No  Leg swelling (Cardiovascular)? No  Falls (Musculoskeletal)?No but feels imbalanced  Cognitive impairment (Neurologic)?No  Psychoses (Psychiatric)?No  Pain/Paresthesia (Neurologic)?No  Visual changes (Eyes)?No  Moles / skin changes (Skin)?No  Stridor / SOB (Pulm)?N/A  Bruising (Heme)?No    Past Medical History: The patient  has a past medical history of Anxiety, Esotropia of right eye (05/02/2013), Hearing deficit, bilateral, High cholesterol, Hyperlipidemia, Malignant melanoma of skin, unspecified (06/29/2022), Melanoma (06/29/2022), Melanoma in situ of cheek, Parkinson's disease, and Ringing in ear, bilateral.    Social History: The patient  reports that he quit smoking about 19 years ago. His smoking use included cigarettes. He started smoking about 20 years ago. He has a 1 pack-year smoking history. He has never used smokeless tobacco. He reports current alcohol use of about 20.0 standard drinks of alcohol per week. He reports that he does not use drugs.    Family History: Their family history includes Arthritis in his brother and mother; Atrial fibrillation in his sister; Cataracts in his maternal grandmother and paternal grandmother; " Hearing loss in his mother; Heart attack in his father; Sleep apnea in his brother.    Allergies: Patient has no known allergies.     Meds:   Current Outpatient Medications on File Prior to Visit   Medication Sig Dispense Refill    ALPRAZolam (XANAX) 0.5 MG tablet TAKE 1 TABLET(0.5 MG) BY MOUTH EVERY NIGHT FOR INSOMNIA OR ANXIETY 30 tablet 5    carbidopa-levodopa (RYTARY) 23.75-95 mg CpSR Take 4 capsules by mouth 3 (three) times daily. 360 capsule 11    carbidopa-levodopa  mg (SINEMET)  mg per tablet TAKE 1 TABLET BY MOUTH EVERY EVENING 30 tablet 11    carbidopa-levodopa  mg (SINEMET)  mg per tablet Take 2 tablets by mouth 5 (five) times daily. 300 tablet 11    metoprolol succinate (TOPROL-XL) 25 MG 24 hr tablet Take 1 tablet (25 mg total) by mouth once daily. 90 tablet 3    pravastatin (PRAVACHOL) 80 MG tablet Take 1 tablet (80 mg total) by mouth every evening. 90 tablet 3    PREVIDENT 5000 ENAMEL PROTECT 1.1-5 % Pste USE TO BRUSH TEETH TWICE DAILY      rasagiline (AZILECT) 1 mg Tab Take 1 tablet (1 mg total) by mouth once daily. 90 tablet 2     Current Facility-Administered Medications on File Prior to Visit   Medication Dose Route Frequency Provider Last Rate Last Admin    0.9%  NaCl infusion   Intravenous Continuous Thony rGaves  mL/hr at 01/03/24 0519 New Bag at 01/03/24 0519    mupirocin 2 % ointment   Nasal On Call Procedure Thony Graves MD   Given at 01/02/24 0623       Exam:  BP (!) 147/106   Pulse (!) 147       Physical Exam  Constitutional: Well-developed, well-nourished, appears stated age  Eyes: No scleral icterus  ENT: Moist oral mucosa  Cardiovascular: No lower extremity edema   Respiratory: No labored breathing   Skin: No rash   Hematologic: No bruising    Other: GI/ deferred   Mental status: Alert and oriented to person, place, time, and situation;   follows commands  Speech: normal (not dysarthric), no aphasia  Cranial nerves:            CN II: Pupils  "mid-position and equal, not tested light or accommodation  CN III, IV, VI: Extraocular movements full, no nystagmus visualized - R eye strabismus  CN V: Not tested   CN VII: Face strong and symmetric bilaterally   CN VIII: Hearing intact to voice and conversation   CN IX, X: Palate raises midline and symmetric   CN XI: Strong shoulder shrug B/L  CN XII: Tongue appears midline   Motor: Normal bulk by appearance, no drift   Sensory: Not tested    Gait: Not tested  Deep tendon reflexes: Not tested  Movement/Coordination                    Mod  hypophonic speech.                     Mod facial masking.                      No other dystonia, chorea, athetosis, myoclonus, or tics visualized.    Bradykinesia  ? Finger taps Finger flicks KAYLA Heel taps   Left 0 0 - -   Right 1+ 0 0 0       Tremor Exam   Arms extended Arms in wing position, fingers almost touching Re-emergent Arms extended wrists extended Intention Resting Kinetic   Left ?neg ? ? ? ? ? ?   Right ?neg ? ? ? ? ? ?   Head tremor: neg    _______________________________________  Procedure:    DBS MRI Compatibility INFO  01/24/2025    IPG Model(s) Y06405   Serial No. [id number]   Impedance NL   Battery OK 10 years 90%   Pocket Adapter  None     Initial        Programming  Brain sense contact zero "best"  Copied D to A  Increased top contact  Foot loosened up                  ---prior----------    Initial                    PRIOR---------  Initial        Programming  Turned up A- dyskinesias began                                                                                                                                                                                                                                                                                                                                                                                                                                                                                             "                                                                                                                                                                                                                                                                                                                                                                                                                                                                                                                                                                                                                                                                                                                                                                                                                                                                                                                                                                                                                                                                                                                                                                                                                                                                                                                                                                                                                                                                                                                                                                                                                                                                                                                                                               Changed to B- much better  C and D are variations of B  D used brainsense predictions    FINAL          ----------------------  LEFT GPI  Pulse width set at 90; Frequency set at 130  Monopolar review  C&3 @2.3ma better gait  C&2 @2.0mA better movement R  arm movements  C&1 @2.0mA tightness R foot  C&0 @1mA, phosphenes    Final settings:        Medical Record Review:  - Lab Results:  Lab Visit on 12/18/2024   Component Date Value Ref Range Status    TSH 12/18/2024 0.803  0.400 - 4.000 uIU/mL Final   Office Visit on 12/18/2024   Component Date Value Ref Range Status    QRS Duration 12/18/2024 72  ms Final    [facility] QTC Calculation 12/18/2024 410  ms Final          Diagnoses:                                                                                    PD, akinetic-rigid.  Eye movements are abnml from NM perspective - old strabismus - but watching closely.  NO clear slowed pursuits or saccades, but some intrusive jerks - might all still be from longstanding changes/surgeries.     Problem List Items Addressed This Visit          Neuro    Parkinson disease     Rigid-predominate PD  S/p L GPI DBS  Robust honeymoon effect - less dyskinesias and better gait  Some R foot dystonia and dyskinesias  Continue rytary as before  A - NEW  B - OLDER  C - OLDER  D - OLD setting directional - active     He will drive home then switch to A       I spent 60 minutes discussing DBS and programming        Delicia Vargas MD, MS  [facility]  Department of Neurology  Movement Disorders

## 2025-01-24 NOTE — ASSESSMENT & PLAN NOTE
Rigid-predominate PD  S/p L GPI DBS  Robust honeymoon effect - less dyskinesias and better gait  Some R foot dystonia and dyskinesias  Continue rytary as before  A - NEW  B - OLDER  C - OLDER  D - OLD setting directional - active    He will drive home then switch to A

## 2025-02-01 NOTE — OR NURSING
Patient : Gregg Gao Age: 6 year old Sex: male   MRN: [id number] Encounter Date: 1/31/2025    History     Chief Complaint   Patient presents with    Ear Pain    Fever         Ear Pain   Associated symptoms include a fever (Tactile), congestion, ear discharge and rash.   Fever   Associated symptoms include a fever (Tactile), congestion, ear discharge and rash.       Gregg Gao is a 6 year old male with autism who is presenting to the emergency department with parents for evaluation of intermittent tactile fevers for the last 2 weeks as well as some nasal congestion, facial rash, and now drainage from the left ear that started today.  His parents are Polish-speaking thus an  was used for this encounter.  Parents have noticed that tactile fevers as mentioned over the last 2 weeks that come and go every few days.  They did not of a thermometer to actually document fever.  They have noticed congestion and are giving Tylenol and ibuprofen though this has not been helping much.  They noticed a rash to his bilateral cheeks a few days ago and today he is having drainage from his left ear which is what primarily drove them to come to the emergency department today.  He has been eating and drinking okay.  Urinating and stooling appropriately.  No known sick contacts.  No vomiting or diarrhea.    Past/Family/Social History     No Known Allergies    Current Facility-Administered Medications   Medication    ibuprofen (CHILDRENS MOTRIN,ADVIL) 100 MG/5ML suspension 240 mg    ciprofloxacin-dexamethasone (CIPRODEX) otic suspension 4 drop     Current Outpatient Medications   Medication Sig    ciprofloxacin-dexamethasone (CIPRODEX) otic suspension Place 4 drops into left ear 2 times daily for 7 days.    Melatonin 1 MG Chew Tab Chew 1 tablet by mouth at bedtime.    cetirizine (ZyrTEC) 5 MG/5ML solution Take 0.63 mLs by mouth nightly for 5 days.       History reviewed. No pertinent past medical  Patient was brought to CT per stretcher, with surgeons and anesthesia for CT scan after insertion of fiducial screws, uneventful.   history.    History reviewed. No pertinent surgical history.    History reviewed. No pertinent family history.    Social History     Tobacco Use    Smoking status: Never    Smokeless tobacco: Never          Review of Systems   Review of Symptoms     Review of Systems   Constitutional:  Positive for fever (Tactile). Negative for activity change, appetite change and chills.   HENT:  Positive for congestion and ear discharge.    Respiratory: Negative.     Cardiovascular: Negative.    Gastrointestinal: Negative.    Genitourinary: Negative.    Musculoskeletal: Negative.    Skin:  Positive for rash.   Neurological: Negative.           Physical Exam   Physical Exam     ED Triage Vitals   ED Triage Vitals Group      Temp 01/31/25 1915 97.6 °F (36.4 °C)      Heart Rate 01/31/25 1914 85      Resp 01/31/25 1914 (!) 22      BP 01/31/25 1916 103/57      SpO2 01/31/25 1914 93 %      EtCO2 mmHg --       Height --       Weight 01/31/25 1914 52 lb 11 oz (23.9 kg)      Weight Scale Used 01/31/25 1914 Standing scale      BMI (Calculated) --       IBW/kg (Calculated) --        Physical Exam  Constitutional:       General: He is active. He is not in acute distress.     Appearance: He is not toxic-appearing.   HENT:      Head: Normocephalic and atraumatic.      Right Ear: Tympanic membrane, ear canal and external ear normal.      Ears:      Comments: Left ear demonstrates some drainage consistent with otitis externa.  No auricular, mastoid, tragal motion tenderness.  Right ear otherwise unremarkable.     Nose: Congestion present.      Mouth/Throat:      Mouth: Mucous membranes are moist.      Pharynx: Oropharynx is clear. No oropharyngeal exudate or posterior oropharyngeal erythema.   Eyes:      Extraocular Movements: Extraocular movements intact.      Pupils: Pupils are equal, round, and reactive to light.   Cardiovascular:      Rate and Rhythm: Normal rate and regular rhythm.      Pulses: Normal pulses.      Heart sounds: Normal heart  sounds.   Pulmonary:      Effort: Pulmonary effort is normal.      Breath sounds: Normal breath sounds.   Abdominal:      General: Abdomen is flat. There is no distension.      Palpations: Abdomen is soft.      Tenderness: There is no abdominal tenderness.   Musculoskeletal:         General: Normal range of motion.      Cervical back: Normal range of motion and neck supple.   Skin:     Capillary Refill: Capillary refill takes less than 2 seconds.      Comments: Erythematous rash to bilateral cheeks   Neurological:      General: No focal deficit present.      Mental Status: He is alert and oriented for age.            Procedures   ED Procedures     Procedures     Lab Results   ED Lab   Results for orders placed or performed during the hospital encounter of 01/31/25   COVID/Flu/RSV panel    Specimen: Nasal, Mid-turbinate; Swab   Result Value Ref Range    Rapid SARS-COV-2 by PCR Not Detected Not Detected / Detected / Presumptive Positive / Inhibitors present    Influenza A by PCR Not Detected Not Detected    Influenza B by PCR Not Detected Not Detected    RSV BY PCR Not Detected Not Detected    Isolation Guidelines      Procedural Comment            EKG     No EKG performed    Radiology Results   ED Radiology Results     Imaging Results    None              ED Medications   ED Medications     ED Medication Orders (From admission, onward)      Ordered Start     Status Ordering Provider    01/31/25 2039 01/31/25 2040  ibuprofen (CHILDRENS MOTRIN,ADVIL) 100 MG/5ML suspension 240 mg  ONCE         Last MAR action: Given LEIF PENA    01/31/25 2039 01/31/25 2040  ciprofloxacin-dexamethasone (CIPRODEX) otic suspension 4 drop  ONCE         Last MAR action: Given LEIF PENA                 ED Course     Vitals:    01/31/25 1914 01/31/25 1915 01/31/25 1916   BP:   103/57   Pulse: 85     Resp: (!) 22     Temp:  97.6 °F (36.4 °C)    TempSrc:  Axillary    SpO2: 93%     Weight: 23.9 kg (52 lb 11 oz)              No  cardiac monitor or imaging reviewed    Consults                  Medical Decision Making                6 year old autistic male who is presenting with parents for intermittent tactile fevers x 2 weeks, nasal congestion and now left ear drainage that started today. Taking tylenol and ibuprofen.     Upon presentation is afebrile to 97.6F, nontoxic no acute distress. VSS. Left ear consistent with otitis external. Erythematous rash to bilateral cheeks. ENT exam otherwise normal. Lungs clear. RRR. Abdomen soft and nontender.    COVID/Flu/RSV negative. Discussed treating with ciprodex for otitis externa and continuing tylenol and ibuprofen for viral uri. Purchase thermometer. Follow up with peds. Push fluids, rest.    Patient demonstrates adequate understanding of the plan and was agreeable.  Return precautions were discussed and included in AVS.  Discharged in stable condition.  All questions answered.               MDM done in ED Course    Does the Patient have sepsis: NO     Critical Care     No Critical Care    Disposition     Clinical Impression and Diagnosis  5:10 AM     ED Diagnoses       Diagnosis Comment Associated Orders       Final diagnoses    Acute otitis externa of left ear, unspecified type -- --    Viral URI -- --            Follow Up:  Jessika Garcia DO  [id number]  [place] [address] [phone]    Call in 2 days  As needed, If symptoms worsen          Summary of your Discharge Medications        Take these Medications        Details   ciprofloxacin-dexamethasone otic suspension  Commonly known as: CIPRODEX   Place 4 drops into left ear 2 times daily for 7 days.            Pt is discharged to home/self care in stable condition.      Discharge after Treatment 1/31/2025  8:45 PM  There is no comment            Emergency Medicine Physician was available for consultation should it have been necessary. Due to acuity of condition, patient care did not necessitate physician involvement, and  performed independently of physician care. Patient demonstrates adequate understanding of the plan and was agreeable.  Return precautions were discussed and included in AVS.  Discharged in stable condition.  All questions answered.         Carmela Middleton PA-C  02/01/25 0511

## 2025-02-27 ENCOUNTER — LAB VISIT (OUTPATIENT)
Dept: LAB | Facility: OTHER | Age: 65
End: 2025-02-27
Attending: INTERNAL MEDICINE
Payer: COMMERCIAL

## 2025-02-27 ENCOUNTER — OFFICE VISIT (OUTPATIENT)
Dept: CARDIOLOGY | Facility: CLINIC | Age: 65
End: 2025-02-27
Payer: COMMERCIAL

## 2025-02-27 VITALS
DIASTOLIC BLOOD PRESSURE: 66 MMHG | SYSTOLIC BLOOD PRESSURE: 132 MMHG | OXYGEN SATURATION: 97 % | WEIGHT: 225.5 LBS | HEART RATE: 98 BPM | BODY MASS INDEX: 29.75 KG/M2

## 2025-02-27 DIAGNOSIS — I48.19 PERSISTENT ATRIAL FIBRILLATION: ICD-10-CM

## 2025-02-27 LAB
ANION GAP SERPL CALC-SCNC: 6 MMOL/L (ref 8–16)
BASOPHILS # BLD AUTO: 0.11 K/UL (ref 0–0.2)
BASOPHILS NFR BLD: 1.7 % (ref 0–1.9)
BUN SERPL-MCNC: 16 MG/DL (ref 8–23)
CALCIUM SERPL-MCNC: 9.5 MG/DL (ref 8.7–10.5)
CHLORIDE SERPL-SCNC: 105 MMOL/L (ref 95–110)
CO2 SERPL-SCNC: 28 MMOL/L (ref 23–29)
CREAT SERPL-MCNC: 1.1 MG/DL (ref 0.5–1.4)
DIFFERENTIAL METHOD BLD: ABNORMAL
EOSINOPHIL # BLD AUTO: 0.2 K/UL (ref 0–0.5)
EOSINOPHIL NFR BLD: 2.8 % (ref 0–8)
ERYTHROCYTE [DISTWIDTH] IN BLOOD BY AUTOMATED COUNT: 13 % (ref 11.5–14.5)
EST. GFR  (NO RACE VARIABLE): >60 ML/MIN/1.73 M^2
GLUCOSE SERPL-MCNC: 115 MG/DL (ref 70–110)
HCT VFR BLD AUTO: 45.1 % (ref 40–54)
HGB BLD-MCNC: 15.3 G/DL (ref 14–18)
IMM GRANULOCYTES # BLD AUTO: 0.03 K/UL (ref 0–0.04)
IMM GRANULOCYTES NFR BLD AUTO: 0.5 % (ref 0–0.5)
LYMPHOCYTES # BLD AUTO: 2 K/UL (ref 1–4.8)
LYMPHOCYTES NFR BLD: 30.8 % (ref 18–48)
MCH RBC QN AUTO: 32.3 PG (ref 27–31)
MCHC RBC AUTO-ENTMCNC: 33.9 G/DL (ref 32–36)
MCV RBC AUTO: 95 FL (ref 82–98)
MONOCYTES # BLD AUTO: 0.6 K/UL (ref 0.3–1)
MONOCYTES NFR BLD: 9.8 % (ref 4–15)
NEUTROPHILS # BLD AUTO: 3.6 K/UL (ref 1.8–7.7)
NEUTROPHILS NFR BLD: 54.4 % (ref 38–73)
NRBC BLD-RTO: 0 /100 WBC
OHS QRS DURATION: 84 MS
OHS QTC CALCULATION: 675 MS
PLATELET # BLD AUTO: 193 K/UL (ref 150–450)
PMV BLD AUTO: 11.1 FL (ref 9.2–12.9)
POTASSIUM SERPL-SCNC: 4.5 MMOL/L (ref 3.5–5.1)
RBC # BLD AUTO: 4.73 M/UL (ref 4.6–6.2)
SODIUM SERPL-SCNC: 139 MMOL/L (ref 136–145)
WBC # BLD AUTO: 6.52 K/UL (ref 3.9–12.7)

## 2025-02-27 PROCEDURE — 93005 ELECTROCARDIOGRAM TRACING: CPT

## 2025-02-27 PROCEDURE — 99999 PR PBB SHADOW E&M-EST. PATIENT-LVL V: CPT | Mod: PBBFAC,,, | Performed by: INTERNAL MEDICINE

## 2025-02-27 PROCEDURE — 80048 BASIC METABOLIC PNL TOTAL CA: CPT | Performed by: INTERNAL MEDICINE

## 2025-02-27 PROCEDURE — 85025 COMPLETE CBC W/AUTO DIFF WBC: CPT | Performed by: INTERNAL MEDICINE

## 2025-02-27 PROCEDURE — 36415 COLL VENOUS BLD VENIPUNCTURE: CPT | Performed by: INTERNAL MEDICINE

## 2025-02-27 RX ORDER — METOPROLOL SUCCINATE 50 MG/1
50 TABLET, EXTENDED RELEASE ORAL DAILY
Qty: 90 TABLET | Refills: 3 | Status: SHIPPED | OUTPATIENT
Start: 2025-02-27 | End: 2026-02-27

## 2025-02-27 NOTE — PROGRESS NOTES
[facility]    Chief Complaint  Chief Complaint   Patient presents with    Atrial Fibrillation       HPI:    Patient went for routine primary care follow-up.  His heart rate was noted to be irregular and high.  ECG showed atrial fibrillation.  At the time he seemed a asymptomatic.  But now that he has thought about it, he may feel his heart race from time to time.  No history of stroke or TIA.  Activities are limited by Parkinson.    Medications  Current Medications[1]     History  Past Medical History:   Diagnosis Date    Anxiety     Esotropia of right eye 05/02/2013    Hearing deficit, bilateral     High cholesterol     Hyperlipidemia     Malignant melanoma of skin, unspecified 06/29/2022    in situ right lateral infraorbit    Melanoma 06/29/2022    in situ - right lateral infraorbital    Melanoma in situ of cheek     Parkinson's disease     Ringing in ear, bilateral      Past Surgical History:   Procedure Laterality Date    CATARACT EXTRACTION W/  INTRAOCULAR LENS IMPLANT Right 01/09/2017    Dr marin     CATARACT EXTRACTION W/  INTRAOCULAR LENS IMPLANT Left 01/30/2017    Dr. Marin    COLONOSCOPY N/A 7/7/2023    Procedure: COLONOSCOPY;  Surgeon: Arnaldo Neely MD;  Location: [facility];  Service: Endoscopy;  Laterality: N/A;  prep instructions sent to pt via portal -  [employer] prep  pre call complete, stated he would have a ride -CE    DEEP BRAIN STIMULATOR PLACEMENT Left 1/2/2024    Procedure: INSERTION, DEEP BRAIN STIMULATOR ELECTRODE;  Surgeon: Roger Flores MD;  Location: [facility] OR [address] [place] [address];  Service: Neurosurgery;  Laterality: Left;  INSERT ELECTRODE LEFT SIDE FOR DEEP BRAIN STIMULATION/ GLOBUS PALLIDUS INTERNUS/ TEDS & SCD/MEDTRONICSSHRAVAN.    HERNIA REPAIR      INSERTION OF DEEP BRAIN STIMULATOR GENERATOR Left 1/9/2024    Procedure: INSERTION, PULSE GENERATOR, DEEP BRAIN STIMULATOR;  Surgeon: Roger Flores MD;  Location: [facility] OR [address] [place] [address];  Service: Neurosurgery;  Laterality:  Left;  PLACEMENT OF PULSE GENERATOR FOR DEEP BRAIN STIMULATION/ TEDS & SCD/MEDTRONICS, SHRAVAN DARTEZ    PLACEMENT OF FIDUCIAL SCREW INTO SPINE N/A 1/2/2024    Procedure: INSERTION, FIDUCIAL SCREW, SKULL;  Surgeon: Roger Flores MD;  Location: [facility] OR [address] [place] [address];  Service: Neurosurgery;  Laterality: N/A;  APPLICATION OF FIDUCIALS FOR DEEP BRAIN STIMULATION/ TEDS & SCD/MEDTRONIC/SHRAVAN DARTEZ    RECONSTRUCTION OF FACE Right 8/4/2022    Procedure: RECONSTRUCTION, FACE;  Surgeon: Yaneli Hickman MD;  Location: [facility] OR [address] [place] [address];  Service: ENT;  Laterality: Right;    STRABISMUS SURGERY  6yrs    right eye     Social History[2]  Family History   Problem Relation Name Age of Onset    Arthritis Mother      Hearing loss Mother      Heart attack Father      Atrial fibrillation Sister 2     Sleep apnea Brother      Arthritis Brother      Cataracts Maternal Grandmother      Cataracts Paternal Grandmother      Amblyopia Neg Hx      Blindness Neg Hx      Glaucoma Neg Hx      Macular degeneration Neg Hx      Retinal detachment Neg Hx      Strabismus Neg Hx      Melanoma Neg Hx          Allergies  Review of patient's allergies indicates:  No Known Allergies    Review of Systems   Review of Systems   Constitutional: Negative for malaise/fatigue, weight gain and weight loss.   Eyes:  Negative for visual disturbance.   Cardiovascular:  Positive for palpitations. Negative for chest pain, claudication, cyanosis, dyspnea on exertion, irregular heartbeat, leg swelling, near-syncope, orthopnea, paroxysmal nocturnal dyspnea and syncope.   Respiratory:  Negative for cough, hemoptysis, shortness of breath, sleep disturbances due to breathing and wheezing.    Hematologic/Lymphatic: Negative for bleeding problem. Does not bruise/bleed easily.   Skin:  Negative for poor wound healing.   Musculoskeletal:  Negative for muscle cramps and myalgias.   Gastrointestinal:  Negative for abdominal pain, anorexia, diarrhea, heartburn, hematemesis, hematochezia,  melena, nausea and vomiting.   Genitourinary:  Negative for hematuria and nocturia.   Neurological:  Negative for excessive daytime sleepiness, dizziness, focal weakness, light-headedness and weakness.       Physical Exam  Vitals:    02/27/25 1348   BP: 132/66   Pulse: 98     Wt Readings from Last 1 Encounters:   02/27/25 102.3 kg (225 lb 8 oz)     Physical Exam  Vitals and nursing note reviewed.   Constitutional:       General: He is not in acute distress.     Appearance: He is not toxic-appearing or diaphoretic.   HENT:      Head: Normocephalic and atraumatic.      Mouth/Throat:      Lips: Pink.      Mouth: Mucous membranes are moist.   Eyes:      General: No scleral icterus.     Conjunctiva/sclera: Conjunctivae normal.   Neck:      Thyroid: No thyromegaly.      Vascular: No carotid bruit, hepatojugular reflux or JVD.      Trachea: Trachea normal.   Cardiovascular:      Rate and Rhythm: Tachycardia present. Rhythm irregularly irregular. No extrasystoles are present.     Chest Wall: PMI is not displaced.      Pulses:           Carotid pulses are 2+ on the right side and 2+ on the left side.       Radial pulses are 2+ on the right side and 2+ on the left side.        Dorsalis pedis pulses are 2+ on the right side and 2+ on the left side.        Posterior tibial pulses are 2+ on the right side and 2+ on the left side.      Heart sounds: S1 normal and S2 normal. No murmur heard.     No friction rub. No S3 or S4 sounds.   Pulmonary:      Effort: Pulmonary effort is normal. No tachypnea, bradypnea, accessory muscle usage or respiratory distress.      Breath sounds: Normal breath sounds and air entry. No decreased breath sounds, wheezing, rhonchi or rales.   Abdominal:      General: Bowel sounds are normal. There is no distension or abdominal bruit.      Palpations: Abdomen is soft. There is no hepatomegaly, splenomegaly or pulsatile mass.      Tenderness: There is no abdominal tenderness.   Musculoskeletal:          General: No tenderness or deformity.      Right lower leg: No edema.      Left lower leg: No edema.   Skin:     General: Skin is warm and dry.      Capillary Refill: Capillary refill takes less than 2 seconds.      Coloration: Skin is not cyanotic or pale.      Nails: There is no clubbing.   Neurological:      General: No focal deficit present.      Mental Status: He is alert and oriented to person, place, and time.   Psychiatric:         Attention and Perception: Attention normal.         Mood and Affect: Mood normal.         Speech: Speech normal.         Behavior: Behavior normal. Behavior is cooperative.         Labs  Lab Visit on 12/18/2024   Component Date Value Ref Range Status    TSH 12/18/2024 0.803  0.400 - 4.000 uIU/mL Final   Office Visit on 12/18/2024   Component Date Value Ref Range Status    QRS Duration 12/18/2024 72  ms Final    [facility] QTC Calculation 12/18/2024 410  ms Final       Imaging  No results found.    Assessment  1. Persistent atrial fibrillation  Mildly symptomatic  - Ambulatory referral/consult to Cardiology  - EKG 12-lead  - Echo; Future  - Holter monitor - 24 hour; Future  - CBC Auto Differential; Future  - Basic Metabolic Panel; Future  - Ambulatory referral/consult to Cardiac Electrophysiology; Future      Plan and Discussion    Increase metoprolol to improve rate control.  Holter monitor to assess adequacy of rate control.  Echocardiogram.  Refer to EP to discuss the possibility of antiarrhythmics or ablation.  Given his low CHADS-VASc score, will not initiate anticoagulation at this point.    The 10-year ASCVD risk score (Guera DK, et al., 2019) is: 10.8%    Values used to calculate the score:      Age: 64 years      Sex: Male      Is Non- : No      Diabetic: No      Tobacco smoker: No      Systolic Blood Pressure: 132 mmHg      Is BP treated: No      HDL Cholesterol: 47 mg/dL      Total Cholesterol: 194 mg/dL     Follow Up  Follow up in about 1 month (around  3/27/2025).      Ravinder Demarco MD         [1]   Current Outpatient Medications   Medication Sig Dispense Refill    ALPRAZolam (XANAX) 0.5 MG tablet TAKE 1 TABLET(0.5 MG) BY MOUTH EVERY NIGHT FOR INSOMNIA OR ANXIETY 30 tablet 5    carbidopa-levodopa (RYTARY) 23.75-95 mg CpSR Take 4 capsules by mouth 3 (three) times daily. 360 capsule 11    carbidopa-levodopa  mg (SINEMET)  mg per tablet TAKE 1 TABLET BY MOUTH EVERY EVENING 30 tablet 11    carbidopa-levodopa  mg (SINEMET)  mg per tablet Take 2 tablets by mouth 5 (five) times daily. 300 tablet 11    pravastatin (PRAVACHOL) 80 MG tablet Take 1 tablet (80 mg total) by mouth every evening. 90 tablet 3    PREVIDENT 5000 ENAMEL PROTECT 1.1-5 % Pste USE TO BRUSH TEETH TWICE DAILY      rasagiline (AZILECT) 1 mg Tab Take 1 tablet (1 mg total) by mouth once daily. 90 tablet 2    metoprolol succinate (TOPROL-XL) 50 MG 24 hr tablet Take 1 tablet (50 mg total) by mouth once daily. 90 tablet 3     No current facility-administered medications for this visit.     Facility-Administered Medications Ordered in Other Visits   Medication Dose Route Frequency Provider Last Rate Last Admin    0.9%  NaCl infusion   Intravenous Continuous Thony Graves  mL/hr at 24 0519 New Bag at 24 0519    mupirocin 2 % ointment   Nasal On Call Procedure Thony Graves MD   Given at 24 0623   [2]   Social History  Socioeconomic History    Marital status:    Tobacco Use    Smoking status: Former     Current packs/day: 0.00     Average packs/day: 1 pack/day for 1 year (1.0 ttl pk-yrs)     Types: Cigarettes     Start date: 2005     Quit date: 2006     Years since quittin.1    Smokeless tobacco: Never   Substance and Sexual Activity    Alcohol use: Yes     Alcohol/week: 20.0 standard drinks of alcohol     Types: 20 Glasses of wine per week    Drug use: No    Sexual activity: Yes     Partners: Female     Social Drivers of Health      Financial Resource Strain: Low Risk  (2/27/2025)    Overall Financial Resource Strain (CARDIA)     Difficulty of Paying Living Expenses: Not hard at all   Food Insecurity: No Food Insecurity (2/27/2025)    Hunger Vital Sign     Worried About Running Out of Food in the Last Year: Never true     Ran Out of Food in the Last Year: Never true   Transportation Needs: No Transportation Needs (2/27/2025)    PRAPARE - Transportation     Lack of Transportation (Medical): No     Lack of Transportation (Non-Medical): No   Physical Activity: Sufficiently Active (2/27/2025)    Exercise Vital Sign     Days of Exercise per Week: 5 days     Minutes of Exercise per Session: 30 min   Stress: Stress Concern Present (2/27/2025)    [employer] - Occupational Stress Questionnaire     Feeling of Stress : To some extent   Housing Stability: Low Risk  (2/27/2025)    Housing Stability Vital Sign     Unable to Pay for Housing in the Last Year: No     Number of Times Moved in the Last Year: 0     Homeless in the Last Year: No

## 2025-03-12 ENCOUNTER — RESULTS FOLLOW-UP (OUTPATIENT)
Dept: CARDIOLOGY | Facility: CLINIC | Age: 65
End: 2025-03-12

## 2025-03-12 ENCOUNTER — HOSPITAL ENCOUNTER (OUTPATIENT)
Dept: CARDIOLOGY | Facility: OTHER | Age: 65
Discharge: HOME | End: 2025-03-12
Attending: INTERNAL MEDICINE
Payer: COMMERCIAL

## 2025-03-12 VITALS
HEIGHT: 73 IN | SYSTOLIC BLOOD PRESSURE: 132 MMHG | BODY MASS INDEX: 29.82 KG/M2 | WEIGHT: 225 LBS | DIASTOLIC BLOOD PRESSURE: 66 MMHG

## 2025-03-12 DIAGNOSIS — I48.19 PERSISTENT ATRIAL FIBRILLATION: ICD-10-CM

## 2025-03-12 LAB
AORTIC ROOT ANNULUS: 2.6 CM
ASCENDING AORTA: 2.79 CM
AV INDEX (PROSTH): 0.79
AV MEAN GRADIENT: 3 MMHG
AV PEAK GRADIENT: 7 MMHG
AV VALVE AREA BY VELOCITY RATIO: 2.7 CM²
AV VALVE AREA: 2.5 CM²
AV VELOCITY RATIO: 0.85
BSA FOR ECHO PROCEDURE: 2.29 M2
CV ECHO LV RWT: 0.5 CM
DOP CALC AO PEAK VEL: 1.3 M/S
DOP CALC AO VTI: 22.2 CM
DOP CALC LVOT AREA: 3.1 CM2
DOP CALC LVOT DIAMETER: 2 CM
DOP CALC LVOT PEAK VEL: 1.1 M/S
DOP CALC LVOT STROKE VOLUME: 55 CM3
DOP CALCLVOT PEAK VEL VTI: 17.5 CM
E WAVE DECELERATION TIME: 159 MSEC
E/A RATIO: 47.5
E/E' RATIO: 7 M/S
ECHO LV POSTERIOR WALL: 1.1 CM (ref 0.6–1.1)
FRACTIONAL SHORTENING: 38.6 % (ref 28–44)
GLOBAL LONGITUIDAL STRAIN: 17.4 %
INTERVENTRICULAR SEPTUM: 1 CM (ref 0.6–1.1)
IVC DIAMETER: 1.37 CM
LA MAJOR: 6.2 CM
LA MINOR: 5.8 CM
LA WIDTH: 3.9 CM
LAAS-AP2: 24 CM2
LAAS-AP4: 24 CM2
LEFT ATRIUM AREA SYSTOLIC (APICAL 2 CHAMBER): 24.93 CM2
LEFT ATRIUM AREA SYSTOLIC (APICAL 4 CHAMBER): 24.1 CM2
LEFT ATRIUM SIZE: 4.1 CM
LEFT ATRIUM VOLUME INDEX MOD: 34 ML/M2
LEFT ATRIUM VOLUME INDEX: 36 ML/M2
LEFT ATRIUM VOLUME MOD: 76 ML
LEFT ATRIUM VOLUME: 81 CM3
LEFT INTERNAL DIMENSION IN SYSTOLE: 2.7 CM (ref 2.1–4)
LEFT VENTRICLE DIASTOLIC VOLUME INDEX: 38.05 ML/M2
LEFT VENTRICLE DIASTOLIC VOLUME: 86 ML
LEFT VENTRICLE END SYSTOLIC VOLUME APICAL 2 CHAMBER: 78.94 ML
LEFT VENTRICLE END SYSTOLIC VOLUME APICAL 4 CHAMBER: 71.07 ML
LEFT VENTRICLE MASS INDEX: 70 G/M2
LEFT VENTRICLE SYSTOLIC VOLUME INDEX: 12.4 ML/M2
LEFT VENTRICLE SYSTOLIC VOLUME: 28 ML
LEFT VENTRICULAR INTERNAL DIMENSION IN DIASTOLE: 4.4 CM (ref 3.5–6)
LEFT VENTRICULAR MASS: 158.2 G
LV LATERAL E/E' RATIO: 6.3 M/S
LV SEPTAL E/E' RATIO: 6.8 M/S
LVED V (TEICH): 86.41 ML
LVES V (TEICH): 27.87 ML
LVOT MG: 2.2 MMHG
LVOT MV: 0.67 CM/S
MV PEAK A VEL: 0.02 M/S
MV PEAK E VEL: 0.95 M/S
MV STENOSIS PRESSURE HALF TIME: 46.15 MS
MV VALVE AREA P 1/2 METHOD: 4.77 CM2
OHS CV RV/LV RATIO: 0.43 CM
PISA MRMAX VEL: 2.21 M/S
PISA TR MAX VEL: 2.2 M/S
PV MV: 0.68 M/S
PV PEAK GRADIENT: 4 MMHG
PV PEAK VELOCITY: 0.94 M/S
RA MAJOR: 5.72 CM
RA PRESSURE ESTIMATED: 3 MMHG
RA WIDTH: 3 CM
RIGHT VENTRICLE DIASTOLIC BASEL DIMENSION: 1.9 CM
RIGHT VENTRICULAR END-DIASTOLIC DIMENSION: 1.85 CM
RV TB RVSP: 5 MMHG
RV TISSUE DOPPLER FREE WALL SYSTOLIC VELOCITY 1 (APICAL 4 CHAMBER VIEW): 11.39 CM/S
SINUS: 2.9 CM
STJ: 2.72 CM
TDI LATERAL: 0.15 M/S
TDI SEPTAL: 0.14 M/S
TDI: 0.15 M/S
TR MAX PG: 19 MMHG
TRICUSPID ANNULAR PLANE SYSTOLIC EXCURSION: 1.6 CM
TV REST PULMONARY ARTERY PRESSURE: 22 MMHG
Z-SCORE OF LEFT VENTRICULAR DIMENSION IN END DIASTOLE: -6.34
Z-SCORE OF LEFT VENTRICULAR DIMENSION IN END SYSTOLE: -4.9

## 2025-03-12 PROCEDURE — 93306 TTE W/DOPPLER COMPLETE: CPT | Mod: 26,,, | Performed by: INTERNAL MEDICINE

## 2025-03-12 PROCEDURE — 93356 MYOCRD STRAIN IMG SPCKL TRCK: CPT

## 2025-03-12 PROCEDURE — 93356 MYOCRD STRAIN IMG SPCKL TRCK: CPT | Mod: ,,, | Performed by: INTERNAL MEDICINE

## 2025-03-12 PROCEDURE — 93226 XTRNL ECG REC<48 HR SCAN A/R: CPT

## 2025-03-20 ENCOUNTER — TELEPHONE (OUTPATIENT)
Dept: ELECTROPHYSIOLOGY | Facility: CLINIC | Age: 65
End: 2025-03-20
Payer: COMMERCIAL

## 2025-03-20 DIAGNOSIS — I48.19 PERSISTENT ATRIAL FIBRILLATION: Primary | ICD-10-CM

## 2025-03-28 ENCOUNTER — TELEPHONE (OUTPATIENT)
Dept: ELECTROPHYSIOLOGY | Facility: CLINIC | Age: 65
End: 2025-03-28
Payer: COMMERCIAL

## 2025-03-28 PROBLEM — I48.91 ATRIAL FIBRILLATION: Status: ACTIVE | Noted: 2025-03-28

## 2025-03-28 NOTE — PROGRESS NOTES
Subjective     HPI    I had the pleasure of seeing Adam Dalal in consultation at your request for the evaluation of AF. He is a 64M with Parkinson's disease status-post DBS in 1/2024, incidentally found to  be in AF at 117 bpm at a PCP visit in 12/2024 (ECG from 11/2023 shows NSR). Follow-up ECG in 2/2025 showed persistent AF. A 24 hr Holter in 3/2025 showed persistent AF avg  bpm. Pt currently on toprol 50 mg daily, no AC.    A 3/2025 ECG showed EF 60-65%.    My interpretation of today's ECG is    Review of Systems   Constitutional: Negative for decreased appetite, malaise/fatigue, weight gain and weight loss.   HENT:  Negative for sore throat.    Eyes:  Negative for blurred vision.   Cardiovascular:  Negative for chest pain, dyspnea on exertion, irregular heartbeat, leg swelling, near-syncope, orthopnea, palpitations, paroxysmal nocturnal dyspnea and syncope.   Respiratory:  Negative for shortness of breath.    Skin:  Negative for rash.   Musculoskeletal:  Negative for arthritis.   Gastrointestinal:  Negative for abdominal pain.   Neurological:  Negative for focal weakness.   Psychiatric/Behavioral:  Negative for altered mental status.           Objective     Physical Exam  Vitals and nursing note reviewed.   Constitutional:       General: He is not in acute distress.     Appearance: He is well-developed.   HENT:      Head: Normocephalic and atraumatic.   Eyes:      General: No scleral icterus.     Pupils: Pupils are equal, round, and reactive to light.   Neck:      Thyroid: No thyromegaly.   Cardiovascular:      Rate and Rhythm: Normal rate. Rhythm irregular.      Pulses: Normal pulses.      Heart sounds: Normal heart sounds. No murmur heard.     No friction rub. No gallop.   Pulmonary:      Effort: Pulmonary effort is normal.      Breath sounds: Normal breath sounds.   Abdominal:      General: Bowel sounds are normal. There is no distension.      Palpations: Abdomen is soft.      Tenderness:  There is no abdominal tenderness.   Musculoskeletal:      Cervical back: Neck supple.   Skin:     General: Skin is warm and dry.      Findings: No rash.   Neurological:      Mental Status: He is alert and oriented to person, place, and time.   Psychiatric:         Behavior: Behavior normal.            Assessment and Plan     1. Atrial fibrillation, unspecified type        Plan:     In summary, Adam Dalal is a 64M with a history of symptomatic persistent AF. His [id number] Score is 0 but given efforts to restore sinus rhythm short-term plan is to start eliquis 5 mg bid.    Plan is FABIENNE/DCCV. Will start sotalol 80 mg bid 3 days prior to cardioversion, and check QTc post-procedure.    Thank you for allowing me to participate in the care of this patient. Please do not hesitate to call me with any questions or concerns.

## 2025-03-31 ENCOUNTER — OFFICE VISIT (OUTPATIENT)
Dept: ELECTROPHYSIOLOGY | Facility: CLINIC | Age: 65
End: 2025-03-31
Payer: COMMERCIAL

## 2025-03-31 ENCOUNTER — PATIENT MESSAGE (OUTPATIENT)
Dept: ELECTROPHYSIOLOGY | Facility: CLINIC | Age: 65
End: 2025-03-31

## 2025-03-31 ENCOUNTER — HOSPITAL ENCOUNTER (OUTPATIENT)
Dept: CARDIOLOGY | Facility: CLINIC | Age: 65
Discharge: HOME | End: 2025-03-31
Payer: COMMERCIAL

## 2025-03-31 ENCOUNTER — LAB VISIT (OUTPATIENT)
Dept: LAB | Facility: HOSPITAL | Age: 65
End: 2025-03-31
Attending: INTERNAL MEDICINE
Payer: COMMERCIAL

## 2025-03-31 VITALS
HEIGHT: 73 IN | BODY MASS INDEX: 30.51 KG/M2 | WEIGHT: 230.19 LBS | DIASTOLIC BLOOD PRESSURE: 82 MMHG | SYSTOLIC BLOOD PRESSURE: 110 MMHG | HEART RATE: 90 BPM

## 2025-03-31 DIAGNOSIS — I48.91 ATRIAL FIBRILLATION, UNSPECIFIED TYPE: Primary | ICD-10-CM

## 2025-03-31 DIAGNOSIS — I48.19 PERSISTENT ATRIAL FIBRILLATION: ICD-10-CM

## 2025-03-31 DIAGNOSIS — I48.19 PERSISTENT ATRIAL FIBRILLATION: Primary | ICD-10-CM

## 2025-03-31 LAB
ANION GAP (OHS): 8 MMOL/L (ref 8–16)
APTT PPP: 25.1 SECONDS (ref 21–32)
BUN SERPL-MCNC: 16 MG/DL (ref 8–23)
CALCIUM SERPL-MCNC: 8.7 MG/DL (ref 8.7–10.5)
CHLORIDE SERPL-SCNC: 104 MMOL/L (ref 95–110)
CO2 SERPL-SCNC: 27 MMOL/L (ref 23–29)
CREAT SERPL-MCNC: 1 MG/DL (ref 0.5–1.4)
ERYTHROCYTE [DISTWIDTH] IN BLOOD BY AUTOMATED COUNT: 13.2 % (ref 11.5–14.5)
GFR SERPLBLD CREATININE-BSD FMLA CKD-EPI: >60 ML/MIN/1.73/M2
GLUCOSE SERPL-MCNC: 94 MG/DL (ref 70–110)
HCT VFR BLD AUTO: 43.8 % (ref 40–54)
HGB BLD-MCNC: 14.9 GM/DL (ref 14–18)
INR PPP: 1.1 (ref 0.8–1.2)
MCH RBC QN AUTO: 32.7 PG (ref 27–31)
MCHC RBC AUTO-ENTMCNC: 34 G/DL (ref 32–36)
MCV RBC AUTO: 96 FL (ref 82–98)
OHS QRS DURATION: 74 MS
OHS QTC CALCULATION: 430 MS
PLATELET # BLD AUTO: 185 K/UL (ref 150–450)
PMV BLD AUTO: 11.8 FL (ref 9.2–12.9)
POTASSIUM SERPL-SCNC: 4.3 MMOL/L (ref 3.5–5.1)
PROTHROMBIN TIME: 11.7 SECONDS (ref 9–12.5)
RBC # BLD AUTO: 4.55 M/UL (ref 4.6–6.2)
SODIUM SERPL-SCNC: 139 MMOL/L (ref 136–145)
WBC # BLD AUTO: 7.13 K/UL (ref 3.9–12.7)

## 2025-03-31 PROCEDURE — 85730 THROMBOPLASTIN TIME PARTIAL: CPT

## 2025-03-31 PROCEDURE — 93005 ELECTROCARDIOGRAM TRACING: CPT | Mod: S$GLB,,, | Performed by: INTERNAL MEDICINE

## 2025-03-31 PROCEDURE — 85027 COMPLETE CBC AUTOMATED: CPT

## 2025-03-31 PROCEDURE — 93010 ELECTROCARDIOGRAM REPORT: CPT | Mod: S$GLB,,, | Performed by: INTERNAL MEDICINE

## 2025-03-31 PROCEDURE — 1159F MED LIST DOCD IN RCRD: CPT | Mod: CPTII,S$GLB,, | Performed by: INTERNAL MEDICINE

## 2025-03-31 PROCEDURE — 3079F DIAST BP 80-89 MM HG: CPT | Mod: CPTII,S$GLB,, | Performed by: INTERNAL MEDICINE

## 2025-03-31 PROCEDURE — 99999 PR PBB SHADOW E&M-EST. PATIENT-LVL III: CPT | Mod: PBBFAC,,, | Performed by: INTERNAL MEDICINE

## 2025-03-31 PROCEDURE — 3008F BODY MASS INDEX DOCD: CPT | Mod: CPTII,S$GLB,, | Performed by: INTERNAL MEDICINE

## 2025-03-31 PROCEDURE — 99205 OFFICE O/P NEW HI 60 MIN: CPT | Mod: S$GLB,,, | Performed by: INTERNAL MEDICINE

## 2025-03-31 PROCEDURE — 3074F SYST BP LT 130 MM HG: CPT | Mod: CPTII,S$GLB,, | Performed by: INTERNAL MEDICINE

## 2025-03-31 PROCEDURE — 82374 ASSAY BLOOD CARBON DIOXIDE: CPT

## 2025-03-31 PROCEDURE — 36415 COLL VENOUS BLD VENIPUNCTURE: CPT

## 2025-03-31 PROCEDURE — 85610 PROTHROMBIN TIME: CPT

## 2025-03-31 RX ORDER — SOTALOL HYDROCHLORIDE 80 MG/1
80 TABLET ORAL 2 TIMES DAILY
Qty: 60 TABLET | Refills: 11 | Status: SHIPPED | OUTPATIENT
Start: 2025-03-31 | End: 2026-03-31

## 2025-04-02 ENCOUNTER — PATIENT MESSAGE (OUTPATIENT)
Facility: CLINIC | Age: 65
End: 2025-04-02
Payer: COMMERCIAL

## 2025-04-04 ENCOUNTER — OFFICE VISIT (OUTPATIENT)
Dept: CARDIOLOGY | Facility: CLINIC | Age: 65
End: 2025-04-04
Payer: COMMERCIAL

## 2025-04-04 VITALS
SYSTOLIC BLOOD PRESSURE: 116 MMHG | DIASTOLIC BLOOD PRESSURE: 72 MMHG | HEART RATE: 118 BPM | WEIGHT: 225.63 LBS | OXYGEN SATURATION: 96 % | BODY MASS INDEX: 29.76 KG/M2

## 2025-04-04 DIAGNOSIS — I48.19 PERSISTENT ATRIAL FIBRILLATION: Primary | ICD-10-CM

## 2025-04-04 PROCEDURE — 99999 PR PBB SHADOW E&M-EST. PATIENT-LVL III: CPT | Mod: PBBFAC,,, | Performed by: INTERNAL MEDICINE

## 2025-04-04 NOTE — PROGRESS NOTES
[facility]    Chief Complaint  Chief Complaint   Patient presents with    Atrial Fibrillation       HPI:    Here for follow-up.  No complaints.  Started on anticoagulation with plans for cardioversion soon.  Tolerating anticoagulation without bleeding.    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 Marin    COLONOSCOPY N/A 7/7/2023    Procedure: COLONOSCOPY;  Surgeon: Arnaldo Neely MD;  Location: [facility] ENDOSCOPY;  Service: Endoscopy;  Laterality: N/A;  prep instructions sent to pt via portal -  [employer] prep  pre call complete, stated he would have a ride -CE    DEEP BRAIN STIMULATOR PLACEMENT Left 1/2/2024    Procedure: INSERTION, DEEP BRAIN STIMULATOR ELECTRODE;  Surgeon: Roger Flores MD;  Location: [facility] OR [address] [place] [address];  Service: Neurosurgery;  Laterality: Left;  INSERT ELECTRODE LEFT SIDE FOR DEEP BRAIN STIMULATION/ GLOBUS PALLIDUS INTERNUS/ TEDS & SCD/MEDTRONICS, SHRAVAN DARTEZ.    HERNIA REPAIR      INSERTION OF DEEP BRAIN STIMULATOR GENERATOR Left 1/9/2024    Procedure: INSERTION, PULSE GENERATOR, DEEP BRAIN STIMULATOR;  Surgeon: Roger Flores MD;  Location: [facility] OR [address] [place] [address];  Service: Neurosurgery;  Laterality: Left;  PLACEMENT OF PULSE GENERATOR FOR DEEP BRAIN STIMULATION/ TEDS & SCD/MEDTRONICS, SHRAVAN DARTEZ    PLACEMENT OF FIDUCIAL SCREW INTO SPINE N/A 1/2/2024     Procedure: INSERTION, FIDUCIAL SCREW, SKULL;  Surgeon: Roger Flores MD;  Location: [facility] OR [address] [place] [address];  Service: Neurosurgery;  Laterality: N/A;  APPLICATION OF FIDUCIALS FOR DEEP BRAIN STIMULATION/ TEDS & SCD/MEDTRONIC/SHRAVAN MOYAZ    RECONSTRUCTION OF FACE Right 8/4/2022    Procedure: RECONSTRUCTION, FACE;  Surgeon: Yaneli Hickman MD;  Location: [facility] OR [address] [place] [address];  Service: ENT;  Laterality: Right;    STRABISMUS SURGERY  4yrs    right eye     Social History[2]  Family History   Problem Relation Name Age of Onset    Arthritis Mother      Hearing loss Mother      Heart attack Father      Atrial fibrillation Sister 2     Sleep apnea Brother      Arthritis Brother      Cataracts Maternal Grandmother      Cataracts Paternal Grandmother      Amblyopia Neg Hx      Blindness Neg Hx      Glaucoma Neg Hx      Macular degeneration Neg Hx      Retinal detachment Neg Hx      Strabismus Neg Hx      Melanoma Neg Hx          Allergies  Review of patient's allergies indicates:  No Known Allergies    Review of Systems   Review of Systems   Constitutional: Negative for malaise/fatigue, weight gain and weight loss.   Eyes:  Negative for visual disturbance.   Cardiovascular:  Positive for palpitations. Negative for chest pain, claudication, cyanosis, dyspnea on exertion, irregular heartbeat, leg swelling, near-syncope, orthopnea, paroxysmal nocturnal dyspnea and syncope.   Respiratory:  Negative for cough, hemoptysis, shortness of breath, sleep disturbances due to breathing and wheezing.    Hematologic/Lymphatic: Negative for bleeding problem. Does not bruise/bleed easily.   Skin:  Negative for poor wound healing.   Musculoskeletal:  Negative for muscle cramps and myalgias.   Gastrointestinal:  Negative for abdominal pain, anorexia, diarrhea, heartburn, hematemesis, hematochezia, melena, nausea and vomiting.   Genitourinary:  Negative for hematuria and nocturia.   Neurological:  Negative for excessive daytime sleepiness, dizziness,  focal weakness, light-headedness and weakness.       Physical Exam  Vitals:    04/04/25 1507   BP: 116/72   Pulse: (!) 118     Wt Readings from Last 1 Encounters:   04/04/25 102.3 kg (225 lb 9.6 oz)     Physical Exam  Vitals and nursing note reviewed.   Constitutional:       General: He is not in acute distress.     Appearance: He is not toxic-appearing or diaphoretic.   HENT:      Head: Normocephalic and atraumatic.      Mouth/Throat:      Lips: Pink.      Mouth: Mucous membranes are moist.   Eyes:      General: No scleral icterus.     Conjunctiva/sclera: Conjunctivae normal.   Neck:      Thyroid: No thyromegaly.      Vascular: No carotid bruit, hepatojugular reflux or JVD.      Trachea: Trachea normal.   Cardiovascular:      Rate and Rhythm: Normal rate. Rhythm irregularly irregular. No extrasystoles are present.     Chest Wall: PMI is not displaced.      Pulses:           Carotid pulses are 2+ on the right side and 2+ on the left side.       Radial pulses are 2+ on the right side and 2+ on the left side.        Dorsalis pedis pulses are 2+ on the right side and 2+ on the left side.        Posterior tibial pulses are 2+ on the right side and 2+ on the left side.      Heart sounds: S1 normal and S2 normal. No murmur heard.     No friction rub. No S3 or S4 sounds.   Pulmonary:      Effort: Pulmonary effort is normal. No tachypnea, bradypnea, accessory muscle usage or respiratory distress.      Breath sounds: Normal breath sounds and air entry. No decreased breath sounds, wheezing, rhonchi or rales.   Abdominal:      General: Bowel sounds are normal. There is no distension or abdominal bruit.      Palpations: Abdomen is soft. There is no hepatomegaly, splenomegaly or pulsatile mass.      Tenderness: There is no abdominal tenderness.   Musculoskeletal:         General: No tenderness or deformity.      Right lower leg: No edema.      Left lower leg: No edema.   Skin:     General: Skin is warm and dry.      Capillary  Refill: Capillary refill takes less than 2 seconds.      Coloration: Skin is not cyanotic or pale.      Nails: There is no clubbing.   Neurological:      General: No focal deficit present.      Mental Status: He is alert and oriented to person, place, and time.   Psychiatric:         Attention and Perception: Attention normal.         Mood and Affect: Mood normal.         Speech: Speech normal.         Behavior: Behavior normal. Behavior is cooperative.         Labs  Hospital Outpatient Visit on 03/31/2025   Component Date Value Ref Range Status    QRS Duration 03/31/2025 74  ms Final    [facility] QTC Calculation 03/31/2025 430  ms Final   Lab Visit on 03/31/2025   Component Date Value Ref Range Status    PTT 03/31/2025 25.1  21.0 - 32.0 seconds Final    Refer to local heparin nomogram for intensity/dose specific therapeutic range.    Sodium 03/31/2025 139  136 - 145 mmol/L Final    Potassium 03/31/2025 4.3  3.5 - 5.1 mmol/L Final    Chloride 03/31/2025 104  95 - 110 mmol/L Final    CO2 03/31/2025 27  23 - 29 mmol/L Final    Glucose 03/31/2025 94  70 - 110 mg/dL Final    BUN 03/31/2025 16  8 - 23 mg/dL Final    Creatinine 03/31/2025 1.0  0.5 - 1.4 mg/dL Final    Calcium 03/31/2025 8.7  8.7 - 10.5 mg/dL Final    Anion Gap 03/31/2025 8  8 - 16 mmol/L Final    eGFR 03/31/2025 >60  >60 mL/min/1.73/m2 Final    Estimated GFR calculated using the CKD-EPI creatinine (2021) equation.    WBC 03/31/2025 7.13  3.90 - 12.70 K/uL Final    RBC 03/31/2025 4.55 (L)  4.60 - 6.20 M/uL Final    HGB 03/31/2025 14.9  14.0 - 18.0 gm/dL Final    HCT 03/31/2025 43.8  40.0 - 54.0 % Final    MCV 03/31/2025 96  82 - 98 fL Final    MCHC 03/31/2025 34.0  32.0 - 36.0 g/dL Final    RDW 03/31/2025 13.2  11.5 - 14.5 % Final    Platelet Count 03/31/2025 185  150 - 450 K/uL Final    MCH 03/31/2025 32.7 (H)  27.0 - 31.0 pg Final    MPV 03/31/2025 11.8  9.2 - 12.9 fL Final    PT 03/31/2025 11.7  9.0 - 12.5 seconds Final    INR 03/31/2025 1.1  0.8 - 1.2 Final     Coumadin Therapy:    2.0 - 3.0 for INR for all indicators except mechanical heart valves    and antiphospholipid syndromes which should use 2.5 - 3.5.   Hospital Outpatient Visit on 03/12/2025   Component Date Value Ref Range Status    Event Monitor Day 03/12/2025 0   Final    holter length minutes 03/12/2025 59   Final    Holter length hours 03/12/2025 23   Final    holter length dec hours 03/12/2025 23.98   Final    Sinus min HR 03/12/2025 64   Final    Sinus max hr 03/12/2025 160   Final    Sinus avg hr 03/12/2025 107   Final   Hospital Outpatient Visit on 03/12/2025   Component Date Value Ref Range Status    BSA 03/12/2025 2.29  m2 Final    TAPSE 03/12/2025 1.60  cm Final    LA WIDTH 03/12/2025 3.9  cm Final    Left Atrium Area-systolic Apical T* 03/12/2025 24.0  cm2 Final    Left Atrium Area-systolic Four Yari* 03/12/2025 24.0  cm2 Final    RA Width 03/12/2025 3.0  cm Final    LVOT stroke volume 03/12/2025 55.0  cm3 Final    LVIDd 03/12/2025 4.4  3.5 - 6.0 cm Final    LV Systolic Volume 03/12/2025 28  mL Final    LV Systolic Volume Index 03/12/2025 12.4  mL/m2 Final    LVIDs 03/12/2025 2.7  2.1 - 4.0 cm Final    LV ESV A2C 03/12/2025 78.94  mL Final    LV Diastolic Volume 03/12/2025 86  mL Final    LV ESV A4C 03/12/2025 71.07  mL Final    LV Diastolic Volume Index 03/12/2025 38.05  mL/m2 Final    Left Ventricular End Systolic Volu* 03/12/2025 27.87  mL Final    Left Ventricular End Diastolic Vol* 03/12/2025 86.41  mL Final    IVS 03/12/2025 1.0  0.6 - 1.1 cm Final    LVOT diameter 03/12/2025 2.0  cm Final    LVOT area 03/12/2025 3.1  cm2 Final    FS 03/12/2025 38.6  28 - 44 % Final    Left Ventricle Relative Wall Thick* 03/12/2025 0.50  cm Final    PW 03/12/2025 1.1  0.6 - 1.1 cm Final    LV mass 03/12/2025 158.2  g Final    LV Mass Index 03/12/2025 70.0  g/m2 Final    MV Peak E Diogo 03/12/2025 0.95  m/s Final    TDI LATERAL 03/12/2025 0.15  m/s Final    TDI SEPTAL 03/12/2025 0.14  m/s Final    E/E' ratio  03/12/2025 7  m/s Final    MV Peak A Diogo 03/12/2025 0.02  m/s Final    TR Max Diogo 03/12/2025 2.2  m/s Final    E/A ratio 03/12/2025 47.50   Final    E wave deceleration time 03/12/2025 159  msec Final    LV SEPTAL E/E' RATIO 03/12/2025 6.8  m/s Final    ISAÍAS 03/12/2025 36  mL/m2 Final    LV LATERAL E/E' RATIO 03/12/2025 6.3  m/s Final    LA Vol 03/12/2025 81  cm3 Final    LVOT peak diogo 03/12/2025 1.1  m/s Final    Left Ventricular Outflow Tract Fallon* 03/12/2025 0.67  cm/s Final    Left Ventricular Outflow Tract Fallon* 03/12/2025 2.20  mmHg Final    RV- castillo basal diam 03/12/2025 1.9  cm Final    RV S' 03/12/2025 11.39  cm/s Final    RV/LV Ratio 03/12/2025 0.43  cm Final    LA size 03/12/2025 4.1  cm Final    Left Atrium Minor Axis 03/12/2025 5.8  cm Final    Left Atrium Major Axis 03/12/2025 6.2  cm Final    LA Vol (MOD) 03/12/2025 76  mL Final    ISAÍAS (MOD) 03/12/2025 34  mL/m2 Final    RA Major Axis 03/12/2025 5.72  cm Final    AV mean gradient 03/12/2025 3  mmHg Final    AV peak gradient 03/12/2025 7  mmHg Final    Ao peak diogo 03/12/2025 1.3  m/s Final    Ao VTI 03/12/2025 22.2  cm Final    LVOT peak VTI 03/12/2025 17.5  cm Final    AV valve area 03/12/2025 2.5  cm² Final    AV Velocity Ratio 03/12/2025 0.85   Final    AV index (prosthetic) 03/12/2025 0.79   Final    CA by Velocity Ratio 03/12/2025 2.7  cm² Final    Mr max diogo 03/12/2025 2.21  m/s Final    MV stenosis pressure 1/2 time 03/12/2025 46.15  ms Final    MV valve area p 1/2 method 03/12/2025 4.77  cm2 Final    Triscuspid Valve Regurgitation Pea* 03/12/2025 19  mmHg Final    PV PEAK VELOCITY 03/12/2025 0.94  m/s Final    PV peak gradient 03/12/2025 4  mmHg Final    Pulmonary Valve Mean Velocity 03/12/2025 0.68  m/s Final    Ao root annulus 03/12/2025 2.60  cm Final    STJ 03/12/2025 2.72  cm Final    Ascending aorta 03/12/2025 2.79  cm Final    IVC diameter 03/12/2025 1.37  cm Final    Mean e' 03/12/2025 0.15  m/s Final    ZLVIDS 03/12/2025 -4.90   Final     ZLVIDD 03/12/2025 -6.34   Final    LA area A4C 03/12/2025 24.10  cm2 Final    LA area A2C 03/12/2025 24.93  cm2 Final    RVDD 03/12/2025 1.85  cm Final    Sinus 03/12/2025 2.9  cm Final    TV resting pulmonary artery pressu* 03/12/2025 22  mmHg Final    RV TB RVSP 03/12/2025 5  mmHg Final    Est. RA pres 03/12/2025 3  mmHg Final    GLS 03/12/2025 17.4  % Final   Lab Visit on 02/27/2025   Component Date Value Ref Range Status    WBC 02/27/2025 6.52  3.90 - 12.70 K/uL Final    RBC 02/27/2025 4.73  4.60 - 6.20 M/uL Final    Hemoglobin 02/27/2025 15.3  14.0 - 18.0 g/dL Final    Hematocrit 02/27/2025 45.1  40.0 - 54.0 % Final    MCV 02/27/2025 95  82 - 98 fL Final    MCH 02/27/2025 32.3 (H)  27.0 - 31.0 pg Final    MCHC 02/27/2025 33.9  32.0 - 36.0 g/dL Final    RDW 02/27/2025 13.0  11.5 - 14.5 % Final    Platelets 02/27/2025 193  150 - 450 K/uL Final    MPV 02/27/2025 11.1  9.2 - 12.9 fL Final    Immature Granulocytes 02/27/2025 0.5  0.0 - 0.5 % Final    Gran # (ANC) 02/27/2025 3.6  1.8 - 7.7 K/uL Final    Immature Grans (Abs) 02/27/2025 0.03  0.00 - 0.04 K/uL Final    Comment: Mild elevation in immature granulocytes is non specific and   can be seen in a variety of conditions including stress response,   acute inflammation, trauma and pregnancy. Correlation with other   laboratory and clinical findings is essential.      Lymph # 02/27/2025 2.0  1.0 - 4.8 K/uL Final    Mono # 02/27/2025 0.6  0.3 - 1.0 K/uL Final    Eos # 02/27/2025 0.2  0.0 - 0.5 K/uL Final    Baso # 02/27/2025 0.11  0.00 - 0.20 K/uL Final    nRBC 02/27/2025 0  0 /100 WBC Final    Gran % 02/27/2025 54.4  38.0 - 73.0 % Final    Lymph % 02/27/2025 30.8  18.0 - 48.0 % Final    Mono % 02/27/2025 9.8  4.0 - 15.0 % Final    Eosinophil % 02/27/2025 2.8  0.0 - 8.0 % Final    Basophil % 02/27/2025 1.7  0.0 - 1.9 % Final    Differential Method 02/27/2025 Automated   Final    Sodium 02/27/2025 139  136 - 145 mmol/L Final    Potassium 02/27/2025 4.5  3.5 - 5.1  mmol/L Final    Chloride 02/27/2025 105  95 - 110 mmol/L Final    CO2 02/27/2025 28  23 - 29 mmol/L Final    Glucose 02/27/2025 115 (H)  70 - 110 mg/dL Final    BUN 02/27/2025 16  8 - 23 mg/dL Final    Creatinine 02/27/2025 1.1  0.5 - 1.4 mg/dL Final    Calcium 02/27/2025 9.5  8.7 - 10.5 mg/dL Final    Anion Gap 02/27/2025 6 (L)  8 - 16 mmol/L Final    eGFR 02/27/2025 >60  >60 mL/min/1.73 m^2 Final   Office Visit on 02/27/2025   Component Date Value Ref Range Status    QRS Duration 02/27/2025 84  ms Final    [facility] QTC Calculation 02/27/2025 675  ms Final   Lab Visit on 12/18/2024   Component Date Value Ref Range Status    TSH 12/18/2024 0.803  0.400 - 4.000 uIU/mL Final   Office Visit on 12/18/2024   Component Date Value Ref Range Status    QRS Duration 12/18/2024 72  ms Final    [facility] QTC Calculation 12/18/2024 410  ms Final       Imaging  Holter monitor - 24 hour  Result Date: 3/18/2025    Atrial fibrillation with high average heart rate.     Echo  Result Date: 3/12/2025    Left Ventricle: The left ventricle is normal in size. Normal wall thickness. There is normal systolic function with a visually estimated ejection fraction of 60 - 65%. Global longitudinal strain is -17.4%.   Right Ventricle: The right ventricle is normal in size. Wall thickness is normal. Systolic function is normal.   Left Atrium: dilated       Assessment  1. Persistent atrial fibrillation   Plans for sotalol and cardioversion soon.      The 10-year ASCVD risk score (Guera DK, et al., 2019) is: 8.7%    Values used to calculate the score:      Age: 64 years      Sex: Male      Is Non- : No      Diabetic: No      Tobacco smoker: No      Systolic Blood Pressure: 116 mmHg      Is BP treated: No      HDL Cholesterol: 47 mg/dL      Total Cholesterol: 194 mg/dL     Follow Up  Follow up in about 6 months (around 10/4/2025).      Ravinder Demarco MD         [1]   Current Outpatient Medications   Medication Sig Dispense Refill     ALPRAZolam (XANAX) 0.5 MG tablet TAKE 1 TABLET(0.5 MG) BY MOUTH EVERY NIGHT FOR INSOMNIA OR ANXIETY 30 tablet 5    apixaban (ELIQUIS) 5 mg Tab Take 1 tablet (5 mg total) by mouth 2 (two) times daily. 180 tablet 3    carbidopa-levodopa (RYTARY) 23.75-95 mg CpSR Take 4 capsules by mouth 3 (three) times daily. 360 capsule 11    carbidopa-levodopa  mg (SINEMET)  mg per tablet TAKE 1 TABLET BY MOUTH EVERY EVENING 30 tablet 11    metoprolol succinate (TOPROL-XL) 50 MG 24 hr tablet Take 1 tablet (50 mg total) by mouth once daily. 90 tablet 3    pravastatin (PRAVACHOL) 80 MG tablet Take 1 tablet (80 mg total) by mouth every evening. 90 tablet 3    PREVIDENT 5000 ENAMEL PROTECT 1.1-5 % Pste USE TO BRUSH TEETH TWICE DAILY      rasagiline (AZILECT) 1 mg Tab Take 1 tablet (1 mg total) by mouth once daily. 90 tablet 2    carbidopa-levodopa  mg (SINEMET)  mg per tablet Take 2 tablets by mouth 5 (five) times daily. (Patient not taking: Reported on 2025) 300 tablet 11    sotaloL (BETAPACE) 80 MG tablet Take 1 tablet (80 mg total) by mouth 2 (two) times daily. Start 3 days prior to cardioversion (Patient not taking: Reported on 2025) 60 tablet 11     No current facility-administered medications for this visit.     Facility-Administered Medications Ordered in Other Visits   Medication Dose Route Frequency Provider Last Rate Last Admin    0.9%  NaCl infusion   Intravenous Continuous Thony Graves  mL/hr at 24 0519 New Bag at 24 0519    mupirocin 2 % ointment   Nasal On Call Procedure Thony Graves MD   Given at 24 0623   [2]   Social History  Socioeconomic History    Marital status:    Tobacco Use    Smoking status: Former     Current packs/day: 0.00     Average packs/day: 1 pack/day for 1 year (1.0 ttl pk-yrs)     Types: Cigarettes     Start date: 2005     Quit date: 2006     Years since quittin.2    Smokeless tobacco: Never   Substance and Sexual  Activity    Alcohol use: Yes     Alcohol/week: 20.0 standard drinks of alcohol     Types: 20 Glasses of wine per week    Drug use: No    Sexual activity: Yes     Partners: Female     Social Drivers of Health     Financial Resource Strain: Low Risk  (2/27/2025)    Overall Financial Resource Strain (CARDIA)     Difficulty of Paying Living Expenses: Not hard at all   Food Insecurity: No Food Insecurity (2/27/2025)    Hunger Vital Sign     Worried About Running Out of Food in the Last Year: Never true     Ran Out of Food in the Last Year: Never true   Transportation Needs: No Transportation Needs (2/27/2025)    PRAPARE - Transportation     Lack of Transportation (Medical): No     Lack of Transportation (Non-Medical): No   Physical Activity: Sufficiently Active (2/27/2025)    Exercise Vital Sign     Days of Exercise per Week: 5 days     Minutes of Exercise per Session: 30 min   Stress: Stress Concern Present (2/27/2025)    [employer] [place] [employer] - Occupational Stress Questionnaire     Feeling of Stress : To some extent   Housing Stability: Low Risk  (2/27/2025)    Housing Stability Vital Sign     Unable to Pay for Housing in the Last Year: No     Number of Times Moved in the Last Year: 0     Homeless in the Last Year: No

## 2025-04-14 ENCOUNTER — PATIENT MESSAGE (OUTPATIENT)
Dept: CARDIOLOGY | Facility: CLINIC | Age: 65
End: 2025-04-14
Payer: COMMERCIAL

## 2025-04-16 ENCOUNTER — OFFICE VISIT (OUTPATIENT)
Dept: SLEEP MEDICINE | Facility: CLINIC | Age: 65
End: 2025-04-16
Payer: COMMERCIAL

## 2025-04-16 DIAGNOSIS — G47.33 OSA (OBSTRUCTIVE SLEEP APNEA): Primary | ICD-10-CM

## 2025-04-16 DIAGNOSIS — F51.09 OTHER INSOMNIA NOT DUE TO A SUBSTANCE OR KNOWN PHYSIOLOGICAL CONDITION: ICD-10-CM

## 2025-04-16 DIAGNOSIS — R35.1 NOCTURIA: ICD-10-CM

## 2025-04-16 DIAGNOSIS — G47.10 HYPERSOMNOLENCE: ICD-10-CM

## 2025-04-16 NOTE — PROGRESS NOTES
The chief complaint leading to consultation is: CHU     Visit type: audiovisual     The patient location is: [place]     16 minutes of total time spent on the encounter, which includes face to face time and non-face to face time preparing to see the patient (eg, review of tests), Obtaining and/or reviewing separately obtained history, Documenting clinical information in the electronic or other health record, Independently interpreting results (not separately reported) and communicating results to the patient/family/caregiver, or Care coordination (not separately reported).      Each patient to whom he or she provides medical services by telemedicine is:  (1) informed of the relationship between the physician and patient and the respective role of any other health care provider with respect to management of the patient; and (2) notified that he or she may decline to receive medical services by telemedicine and may withdraw from such care at any time.        ESTABLISHED PATIENT VISIT    Adam Dalal  is a pleasant 64 y.o. male  with PMH significant for Afib, HLD, Parkinson's, hx melanoma, former smoker, BMI 29+, CHU    Here today for: CPAP follow-up     Last visit :   Dx CHU 2012 (AHI 41). Tried CPAP at the time, but could not tolerate due to claustrophobia. Dx with Parkinson's 5 years ago. Denies dream enactment, kicking, punching, screaming during slep. Denies falls from bed or injuries to self or others. States his Neurologist recommended re-evaluation for CHU, which is why he presents today.  PLAN:  -recommend sleep testing   -discussed PSG (with ability to assess for RBD) vs HST  -patient wishes to proceed with HST  -HST ordered  -discussed trial therapy if CHU present and the patient is open to a trial of CPAP therapy  -alternative therapies such as inspire and MAD      Since last visit:   States when he first received CPAP he was able to use nightly and was doing well, noticed and improvement in sx.  Reports he initially tried nasal mask interface, which did not work for him. States he went back and was fitted for a FFM, but the headgear was too small and did not fit his face so he was unable to wear. States he tried to get the issue resolved but was told cost to change mask again was high so he was unable to get. [facility] he has been unable to utilize CPAP recently due to lack of fitting headgear/mask. States since last visit he has also been dx with Afib, scheduled to undergo cardioversion soon. States he knows it is important for him to get back on CPAP therapy, which is why he presents today to get orders for CPAP supplies/mask so he can resume nightly usage.       PAP history   Problems Needs new mask/headgear   Mask Nasal mask (could not tolerate)  FFM (did not fit)   Pressure 6-12cwp   DME HME   Machine age AS 11 2/20/24   Download N/a           Past Medical History:   Diagnosis Date    Anxiety     Atrial fibrillation     Esotropia of right eye 05/02/2013    Hearing deficit, bilateral     High cholesterol     Hyperlipidemia     Malignant melanoma of skin, unspecified 06/29/2022    in situ right lateral infraorbit    Melanoma 06/29/2022    in situ - right lateral infraorbital    Melanoma in situ of cheek     Parkinson's disease     Ringing in ear, bilateral      Patient Active Problem List   Diagnosis    Hyperlipidemia    Obstructive sleep apnea    Esotropia of right eye    Ocular hypertension - Both Eyes    Nuclear sclerosis    Nuclear sclerotic cataract of left eye    Parkinson disease    Hypophonia    Voice and resonance disorder    Dysarthria    Impaired functional mobility, balance, gait, and endurance    Impaired motor control    Alcohol use disorder, mild, abuse    Oral lesion    Mohs defect of right cheek    History of melanoma in situ    Mild neurocognitive disorder due to multiple etiologies    Parkinson's disease (tremor, stiffness, slow motion, unstable posture)    Atrial fibrillation        Current Outpatient Medications:     ALPRAZolam (XANAX) 0.5 MG tablet, TAKE 1 TABLET(0.5 MG) BY MOUTH EVERY NIGHT FOR INSOMNIA OR ANXIETY, Disp: 30 tablet, Rfl: 5    apixaban (ELIQUIS) 5 mg Tab, Take 1 tablet (5 mg total) by mouth 2 (two) times daily., Disp: 180 tablet, Rfl: 3    carbidopa-levodopa (RYTARY) 23.75-95 mg CpSR, Take 4 capsules by mouth 3 (three) times daily., Disp: 360 capsule, Rfl: 11    carbidopa-levodopa  mg (SINEMET)  mg per tablet, TAKE 1 TABLET BY MOUTH EVERY EVENING, Disp: 30 tablet, Rfl: 11    carbidopa-levodopa  mg (SINEMET)  mg per tablet, Take 2 tablets by mouth 5 (five) times daily. (Patient not taking: Reported on 4/4/2025), Disp: 300 tablet, Rfl: 11    metoprolol succinate (TOPROL-XL) 50 MG 24 hr tablet, Take 1 tablet (50 mg total) by mouth once daily., Disp: 90 tablet, Rfl: 3    pravastatin (PRAVACHOL) 80 MG tablet, Take 1 tablet (80 mg total) by mouth every evening., Disp: 90 tablet, Rfl: 3    PREVIDENT 5000 ENAMEL PROTECT 1.1-5 % Pste, USE TO BRUSH TEETH TWICE DAILY, Disp: , Rfl:     rasagiline (AZILECT) 1 mg Tab, Take 1 tablet (1 mg total) by mouth once daily., Disp: 90 tablet, Rfl: 2    sotaloL (BETAPACE) 80 MG tablet, Take 1 tablet (80 mg total) by mouth 2 (two) times daily. Start 3 days prior to cardioversion (Patient not taking: Reported on 4/4/2025), Disp: 60 tablet, Rfl: 11  No current facility-administered medications for this visit.    Facility-Administered Medications Ordered in Other Visits:     0.9%  NaCl infusion, , Intravenous, Continuous, Thony Graves MD, Last Rate: 100 mL/hr at 01/03/24 0519, New Bag at 01/03/24 0519    mupirocin 2 % ointment, , Nasal, On Call Procedure, Thony Graves MD, Given at 01/02/24 0623       There were no vitals filed for this visit.    Physical Exam:    GEN:   Well-appearing  Psych:  Appropriate affect, demonstrates insight  SKIN:  No rash on the face or bridge of the nose      LABS:   Lab Results    Component Value Date    HGB 14.9 03/31/2025    CO2 27 03/31/2025       RECORDS REVIEWED PREVIOUSLY:    9/19/12 PSG: AHI 41.5, efra 79%  1/12/15 PAP titration: CPAP 5-17cwp explored, +14cwp sREM; CPAP 14cwp recommended     HST 12/5/23: AHI 5, RDI 5, not a great study due to poor PTAF, might be much more severe    Previous sleep notes: 3/25/14, 10/7/14, 3/24/15, 11/22/23    ASSESSMENT    ESS Today: 12/24    PROBLEM DESCRIPTION/ Sx on Presentation Interval Hx STATUS   SEVERE CHU   Dx 2014 AHI 41.5  Could not tolerate CPAP due to claustrophobia     Father snores very loudly and brother has CHU Reports nasal mask did not work, rtied FFM but headgear was too small unable to utilize; unable to try 3rd mask type due to cost so stopped using due to mask intolerance  Sx persist   Daytime Sx   + sleepiness when inactive   ESS 11/24 on intake (reviewed from 11/9/12) Still sleepy Sx persist   Insomnia   Trouble falling asleep:   Maintenance:         disrupted sleep  Prior hypnotics:        Current hypnotics: xanax 0.5mg PRN   Still having sleep disruptions Sx persist   Nocturia 3-4 x nightly 3-4 x nightly Sx persist   Other issues:     PLAN     -using and benefiting from CPAP therapy when able to tolerate  -recommended resuming nightly CPAP usage (gold standard tx)  -CPAP supplies ordered with mask fitting   -consider CPAP titration if intolerance persists once comfortable mask interface found  -discussed alternative therapies such as inspire, MAD; patient open to consider if CPAP intolerance persists  -discussed CHU and CPAP with patient in detail, including possible complications of untreated CHU like heart attack/stroke  -advised on strict driving precautions; advised never to drive drowsy    Advised on plan of care. Answered all patient questions. Patient verbalized understanding and voiced agreement with plan of care.       RTC   2-3 months or as needed       The patient was given open opportunity to ask questions and/or  express concerns about treatment plan.   All questions/concerns were discussed.     Two patient identifiers used prior to evaluation.

## 2025-04-21 ENCOUNTER — TELEPHONE (OUTPATIENT)
Dept: ELECTROPHYSIOLOGY | Facility: CLINIC | Age: 65
End: 2025-04-21
Payer: COMMERCIAL

## 2025-04-21 ENCOUNTER — LAB VISIT (OUTPATIENT)
Dept: LAB | Facility: HOSPITAL | Age: 65
End: 2025-04-21
Attending: INTERNAL MEDICINE
Payer: COMMERCIAL

## 2025-04-21 DIAGNOSIS — Z12.5 SCREENING FOR PROSTATE CANCER: ICD-10-CM

## 2025-04-21 DIAGNOSIS — E78.5 HYPERLIPIDEMIA, UNSPECIFIED HYPERLIPIDEMIA TYPE: ICD-10-CM

## 2025-04-21 DIAGNOSIS — Z78.9 IMPAIRED MOTOR CONTROL: ICD-10-CM

## 2025-04-21 DIAGNOSIS — G20.B2 PARKINSON'S DISEASE WITH DYSKINESIA AND FLUCTUATING MANIFESTATIONS: ICD-10-CM

## 2025-04-21 DIAGNOSIS — G47.33 OSA (OBSTRUCTIVE SLEEP APNEA): ICD-10-CM

## 2025-04-21 DIAGNOSIS — R73.09 ELEVATED GLUCOSE LEVEL: ICD-10-CM

## 2025-04-21 DIAGNOSIS — Z00.00 ROUTINE PHYSICAL EXAMINATION: ICD-10-CM

## 2025-04-21 LAB
ABSOLUTE EOSINOPHIL (OHS): 0.36 K/UL
ABSOLUTE MONOCYTE (OHS): 0.71 K/UL (ref 0.3–1)
ABSOLUTE NEUTROPHIL COUNT (OHS): 3.63 K/UL (ref 1.8–7.7)
ALBUMIN SERPL BCP-MCNC: 3.9 G/DL (ref 3.5–5.2)
ALP SERPL-CCNC: 60 UNIT/L (ref 40–150)
ALT SERPL W/O P-5'-P-CCNC: 11 UNIT/L (ref 10–44)
ANION GAP (OHS): 7 MMOL/L (ref 8–16)
AST SERPL-CCNC: 29 UNIT/L (ref 11–45)
BASOPHILS # BLD AUTO: 0.13 K/UL
BASOPHILS NFR BLD AUTO: 1.8 %
BILIRUB SERPL-MCNC: 1 MG/DL (ref 0.1–1)
BUN SERPL-MCNC: 17 MG/DL (ref 8–23)
CALCIUM SERPL-MCNC: 8.9 MG/DL (ref 8.7–10.5)
CHLORIDE SERPL-SCNC: 104 MMOL/L (ref 95–110)
CHOLEST SERPL-MCNC: 208 MG/DL (ref 120–199)
CHOLEST/HDLC SERPL: 4.1 {RATIO} (ref 2–5)
CO2 SERPL-SCNC: 28 MMOL/L (ref 23–29)
CREAT SERPL-MCNC: 1.1 MG/DL (ref 0.5–1.4)
EAG (OHS): 117 MG/DL (ref 68–131)
ERYTHROCYTE [DISTWIDTH] IN BLOOD BY AUTOMATED COUNT: 13 % (ref 11.5–14.5)
GFR SERPLBLD CREATININE-BSD FMLA CKD-EPI: >60 ML/MIN/1.73/M2
GLUCOSE SERPL-MCNC: 121 MG/DL (ref 70–110)
HBA1C MFR BLD: 5.7 % (ref 4–5.6)
HCT VFR BLD AUTO: 46.6 % (ref 40–54)
HDLC SERPL-MCNC: 51 MG/DL (ref 40–75)
HDLC SERPL: 24.5 % (ref 20–50)
HGB BLD-MCNC: 15.3 GM/DL (ref 14–18)
IMM GRANULOCYTES # BLD AUTO: 0.02 K/UL (ref 0–0.04)
IMM GRANULOCYTES NFR BLD AUTO: 0.3 % (ref 0–0.5)
LDLC SERPL CALC-MCNC: 131.4 MG/DL (ref 63–159)
LYMPHOCYTES # BLD AUTO: 2.27 K/UL (ref 1–4.8)
MCH RBC QN AUTO: 31.6 PG (ref 27–31)
MCHC RBC AUTO-ENTMCNC: 32.8 G/DL (ref 32–36)
MCV RBC AUTO: 96 FL (ref 82–98)
NONHDLC SERPL-MCNC: 157 MG/DL
NUCLEATED RBC (/100WBC) (OHS): 0 /100 WBC
PLATELET # BLD AUTO: 181 K/UL (ref 150–450)
PMV BLD AUTO: 12.1 FL (ref 9.2–12.9)
POTASSIUM SERPL-SCNC: 4.5 MMOL/L (ref 3.5–5.1)
PROT SERPL-MCNC: 7.3 GM/DL (ref 6–8.4)
PSA SERPL-MCNC: 0.26 NG/ML
RBC # BLD AUTO: 4.84 M/UL (ref 4.6–6.2)
RELATIVE EOSINOPHIL (OHS): 5.1 %
RELATIVE LYMPHOCYTE (OHS): 31.9 % (ref 18–48)
RELATIVE MONOCYTE (OHS): 10 % (ref 4–15)
RELATIVE NEUTROPHIL (OHS): 50.9 % (ref 38–73)
SODIUM SERPL-SCNC: 139 MMOL/L (ref 136–145)
TRIGL SERPL-MCNC: 128 MG/DL (ref 30–150)
WBC # BLD AUTO: 7.12 K/UL (ref 3.9–12.7)

## 2025-04-21 PROCEDURE — 83718 ASSAY OF LIPOPROTEIN: CPT

## 2025-04-21 PROCEDURE — 36415 COLL VENOUS BLD VENIPUNCTURE: CPT

## 2025-04-21 PROCEDURE — 85025 COMPLETE CBC W/AUTO DIFF WBC: CPT

## 2025-04-21 PROCEDURE — 80053 COMPREHEN METABOLIC PANEL: CPT

## 2025-04-21 PROCEDURE — 84153 ASSAY OF PSA TOTAL: CPT

## 2025-04-21 PROCEDURE — 83036 HEMOGLOBIN GLYCOSYLATED A1C: CPT

## 2025-04-21 NOTE — TELEPHONE ENCOUNTER
Spoke to patient.     CONFIRMED procedure arrival time of 11:00 AM on 4/22/2025    Reiterated instructions including:  -Directions to check in desk  -NPO after midnight night prior to procedure  -High importance of Starting Sotalol prescription 3 days prior to procedure. Patient confirmed first dose 4/19/25.   -Confirmed compliance of Eliquis. Instructed patient to continue to take Eliquis as prescribed, do not miss any doses, and take the morning of  the procedure. Patient verbalized understanding.   -Pre-procedure LABS Reviewed.   -Confirmed presence of implanted device/stimulator -Neurostimulator in situ. Instructed patient to bring remote. Patient verbalized understanding.   -Confirmed no fever, cough, or shortness of breath in the past 30 days  -Confirmed no redness, rash, irritation, or yeast infection to chest area.   -Reviewed current visitor policy    Patient verbalized understanding of above and appreciated the call.

## 2025-04-22 ENCOUNTER — ANESTHESIA (OUTPATIENT)
Dept: MEDSURG UNIT | Facility: HOSPITAL | Age: 65
End: 2025-04-22
Payer: COMMERCIAL

## 2025-04-22 ENCOUNTER — HOSPITAL ENCOUNTER (OUTPATIENT)
Dept: CARDIOLOGY | Facility: HOSPITAL | Age: 65
Discharge: HOME | End: 2025-04-22
Attending: INTERNAL MEDICINE | Admitting: INTERNAL MEDICINE
Payer: COMMERCIAL

## 2025-04-22 ENCOUNTER — HOSPITAL ENCOUNTER (OUTPATIENT)
Facility: HOSPITAL | Age: 65
Discharge: HOME | End: 2025-04-22
Attending: INTERNAL MEDICINE | Admitting: INTERNAL MEDICINE
Payer: COMMERCIAL

## 2025-04-22 ENCOUNTER — ANESTHESIA EVENT (OUTPATIENT)
Dept: MEDSURG UNIT | Facility: HOSPITAL | Age: 65
End: 2025-04-22
Payer: COMMERCIAL

## 2025-04-22 VITALS — BODY MASS INDEX: 30.34 KG/M2 | WEIGHT: 230 LBS | SYSTOLIC BLOOD PRESSURE: 124 MMHG | DIASTOLIC BLOOD PRESSURE: 96 MMHG

## 2025-04-22 VITALS
WEIGHT: 230 LBS | SYSTOLIC BLOOD PRESSURE: 109 MMHG | OXYGEN SATURATION: 98 % | DIASTOLIC BLOOD PRESSURE: 70 MMHG | TEMPERATURE: 98 F | BODY MASS INDEX: 30.48 KG/M2 | HEART RATE: 57 BPM | RESPIRATION RATE: 18 BRPM | HEIGHT: 73 IN

## 2025-04-22 DIAGNOSIS — I48.91 ATRIAL FIBRILLATION: ICD-10-CM

## 2025-04-22 DIAGNOSIS — I48.19 PERSISTENT ATRIAL FIBRILLATION: ICD-10-CM

## 2025-04-22 LAB
ASCENDING AORTA: 3.3 CM
BSA FOR ECHO PROCEDURE: 2.32 M2
OHS QRS DURATION: 74 MS
OHS QRS DURATION: 80 MS
OHS QTC CALCULATION: 429 MS
OHS QTC CALCULATION: 532 MS
SINUS: 3.4 CM
STJ: 2.8 CM

## 2025-04-22 PROCEDURE — 93312 ECHO TRANSESOPHAGEAL: CPT | Mod: 26,,, | Performed by: INTERNAL MEDICINE

## 2025-04-22 PROCEDURE — 93320 DOPPLER ECHO COMPLETE: CPT | Mod: 26,,, | Performed by: INTERNAL MEDICINE

## 2025-04-22 PROCEDURE — 25000003 PHARM REV CODE 250: Performed by: STUDENT IN AN ORGANIZED HEALTH CARE EDUCATION/TRAINING PROGRAM

## 2025-04-22 PROCEDURE — 37000009 HC ANESTHESIA EA ADD 15 MINS: Performed by: INTERNAL MEDICINE

## 2025-04-22 PROCEDURE — 92960 CARDIOVERSION ELECTRIC EXT: CPT | Performed by: INTERNAL MEDICINE

## 2025-04-22 PROCEDURE — 93320 DOPPLER ECHO COMPLETE: CPT

## 2025-04-22 PROCEDURE — 93325 DOPPLER ECHO COLOR FLOW MAPG: CPT | Mod: 26,,, | Performed by: INTERNAL MEDICINE

## 2025-04-22 PROCEDURE — 93005 ELECTROCARDIOGRAM TRACING: CPT

## 2025-04-22 PROCEDURE — 93010 ELECTROCARDIOGRAM REPORT: CPT | Mod: ,,, | Performed by: INTERNAL MEDICINE

## 2025-04-22 PROCEDURE — 92960 CARDIOVERSION ELECTRIC EXT: CPT | Mod: ,,, | Performed by: INTERNAL MEDICINE

## 2025-04-22 PROCEDURE — 25000003 PHARM REV CODE 250

## 2025-04-22 PROCEDURE — 37000008 HC ANESTHESIA 1ST 15 MINUTES: Performed by: INTERNAL MEDICINE

## 2025-04-22 PROCEDURE — 63600175 PHARM REV CODE 636 W HCPCS: Performed by: STUDENT IN AN ORGANIZED HEALTH CARE EDUCATION/TRAINING PROGRAM

## 2025-04-22 RX ORDER — LIDOCAINE HYDROCHLORIDE 20 MG/ML
INJECTION INTRAVENOUS
Status: DISCONTINUED | OUTPATIENT
Start: 2025-04-22 | End: 2025-04-22

## 2025-04-22 RX ORDER — PROPOFOL 10 MG/ML
VIAL (ML) INTRAVENOUS CONTINUOUS PRN
Status: DISCONTINUED | OUTPATIENT
Start: 2025-04-22 | End: 2025-04-22

## 2025-04-22 RX ORDER — GLUCAGON 1 MG
1 KIT INJECTION
OUTPATIENT
Start: 2025-04-22

## 2025-04-22 RX ORDER — SODIUM CHLORIDE 0.9 % (FLUSH) 0.9 %
10 SYRINGE (ML) INJECTION
OUTPATIENT
Start: 2025-04-22

## 2025-04-22 RX ORDER — SILVER SULFADIAZINE 10 G/1000G
CREAM TOPICAL DAILY
Status: DISCONTINUED | OUTPATIENT
Start: 2025-04-22 | End: 2025-04-22 | Stop reason: HOSPADM

## 2025-04-22 RX ADMIN — SILVER SULFADIAZINE: 10 CREAM TOPICAL at 02:04

## 2025-04-22 RX ADMIN — PROPOFOL 70 MG: 10 INJECTION, EMULSION INTRAVENOUS at 01:04

## 2025-04-22 RX ADMIN — PROPOFOL 20 MG: 10 INJECTION, EMULSION INTRAVENOUS at 01:04

## 2025-04-22 RX ADMIN — PROPOFOL 40 MG: 10 INJECTION, EMULSION INTRAVENOUS at 01:04

## 2025-04-22 RX ADMIN — PROPOFOL 175 MCG/KG/MIN: 10 INJECTION, EMULSION INTRAVENOUS at 01:04

## 2025-04-22 RX ADMIN — SODIUM CHLORIDE: 0.9 INJECTION, SOLUTION INTRAVENOUS at 01:04

## 2025-04-22 RX ADMIN — LIDOCAINE HYDROCHLORIDE 100 MG: 20 INJECTION INTRAVENOUS at 01:04

## 2025-04-22 NOTE — NURSING
Pt DC'd per orders.  DC paperwork reviewed w pt and spouse.  Pt verbalized DC instructions.    IV removed. Tele DC'd.  Pt was able to drink, eat, walk, and urinate with no difficulties.    Pt being wheeled off unit per Chasidy porras in wheelchair.

## 2025-04-22 NOTE — DISCHARGE SUMMARY
[facility] - Short Stay Cardiac Unit  Cardiology  Discharge Summary      Patient Name: Adam Dalal  MRN: [id number]  Admission Date: 4/22/2025  Hospital Length of Stay: 0 days  Discharge Date and Time: 04/22/2025 3:02 PM  Attending Physician: Chucho Devlin MD    Discharging Provider: Priti Reyes PA-C  Primary Care Physician: Rashi Gee MD    HPI:   Last OV with Dr. Devlin on 03/31/25.   I had the pleasure of seeing Adam Dalal in consultation at your request for the evaluation of AF. He is a 64M with Parkinson's disease status-post DBS in 1/2024, incidentally found to  be in AF at 117 bpm at a PCP visit in 12/2024 (ECG from 11/2023 shows NSR). Follow-up ECG in 2/2025 showed persistent AF. A 24 hr Holter in 3/2025 showed persistent AF avg  bpm. Pt currently on toprol 50 mg daily, no AC.     A 3/2025 TTE showed EF 60-65%.        Today, in good spirits. Patient accompanied by his wife.       Procedure(s) (LRB):  Cardioversion or Defibrillation (N/A)  Transesophageal echo (FABIENNE) intra-procedure log documentation (N/A)     Indwelling Lines/Drains at time of discharge:  Lines/Drains/Airways       None     Hospital Course:  Patient underwent FABIENNE without evidence of HEATHER thrombus. Proceeded with DCCV, converting from atrial fibrillation to sinus rhythm. Patient tolerated the procedure without any acute complications. Post-DCCV ECG revealed sinus rhythm at 59 bpm. Plan to continue all home medications including Sotalol 80 mg BID, Eliquis 5 mg BID, metoprolol 50 mg qd. Instructed to return in 1 week for follow up ECG and in 4 weeks for clinic follow up with Dr. Devlin or Germaine Muse NP.   Patient was assessed at bedside prior to discharge, they reported feeling well and denied chest discomfort, shortness of breath, palpitations, lightheadedness, or any other acute symptoms. Discharge instructions were discussed with patient and all questions were answered. Patient was discharged home in  stable condition.       Goals of Care Treatment Preferences:  Code Status: Full Code      Significant Diagnostic Studies: Cardiac Graphics: ECG: Sinus bradycardia with PACs at 59 bpm; QT/QTc: 434/429 ms; QRS 80 ms    Pending Diagnostic Studies:       None            There are no hospital problems to display for this patient.    No new Assessment & Plan notes have been filed under this hospital service since the last note was generated.  Service: Cardiology      Discharged Condition: stable    Disposition: Home or Self Care    Follow Up:   Follow-up Information       Chucho Devlin MD. Schedule an appointment as soon as possible for a visit in 1 month(s).    Specialties: Electrophysiology, Cardiovascular Disease, Cardiology  Contact information:  [address] [facility] [address]  [phone]               Germaine Muse NP. Schedule an appointment as soon as possible for a visit in 1 month(s).    Specialty: Cardiology  Contact information:  [address] [facility] [id number] [phone]                           Patient Instructions:   No discharge procedures on file.  Medications:  Reconciled Home Medications:      Medication List        CONTINUE taking these medications      ALPRAZolam 0.5 MG tablet  Commonly known as: XANAX  TAKE 1 TABLET(0.5 MG) BY MOUTH EVERY NIGHT FOR INSOMNIA OR ANXIETY     apixaban 5 mg Tab  Commonly known as: ELIQUIS  Take 1 tablet (5 mg total) by mouth 2 (two) times daily.     * carbidopa-levodopa  mg  mg per tablet  Commonly known as: SINEMET  TAKE 1 TABLET BY MOUTH EVERY EVENING     * RYTARY 23.75-95 mg Cpsr  Generic drug: carbidopa-levodopa  Take 4 capsules by mouth 3 (three) times daily.     metoprolol succinate 50 MG 24 hr tablet  Commonly known as: TOPROL-XL  Take 1 tablet (50 mg total) by mouth once daily.     pravastatin 80 MG tablet  Commonly known as: PRAVACHOL  Take 1 tablet (80 mg total) by mouth every evening.     PREVIDENT 5000 ENAMEL  PROTECT 1.1-5 % Pste  Generic drug: sodium fluoride-pot nitrate  USE TO BRUSH TEETH TWICE DAILY     rasagiline 1 mg Tab  Commonly known as: AZILECT  Take 1 tablet (1 mg total) by mouth once daily.     sotaloL 80 MG tablet  Commonly known as: BETAPACE  Take 1 tablet (80 mg total) by mouth 2 (two) times daily. Start 3 days prior to cardioversion           * This list has 2 medication(s) that are the same as other medications prescribed for you. Read the directions carefully, and ask your doctor or other care provider to review them with you.                ASK your doctor about these medications      * carbidopa-levodopa  mg  mg per tablet  Commonly known as: SINEMET  Take 2 tablets by mouth 5 (five) times daily.           * This list has 1 medication(s) that are the same as other medications prescribed for you. Read the directions carefully, and ask your doctor or other care provider to review them with you.                  Time spent on the discharge of patient: 35 minutes    Priti Reyes PA-C  Cardiology  [facility] - Cardiology

## 2025-04-22 NOTE — NURSING
Received report from Eric ALCARAZ. Patient s/p joan, AAOx4.   VSS, no c/o pain or discomfort at this time, resp even and unlabored.   Post procedure protocol reviewed with patient. Understanding verbalized. Nurse call bell within reach.     Tele maintained.

## 2025-04-22 NOTE — H&P
[facility]  Cardiology  FABIENNE/DCCV History & Physical      Adam Dalal  YOB: 1960  Medical Record Number:  [id number]  Attending Physician:  Chucho Devlin MD   Date of Admission: 4/22/2025       Hospital Day:  0  Current Principal Problem:  <principal problem not specified>    Patient information was obtained from patient and past medical records.  History     Cc: Atrial fibrillation     HPI  Last OV with Dr. Devlin on 03/31/25.   I had the pleasure of seeing Adam Dalal in consultation at your request for the evaluation of AF. He is a 64M with Parkinson's disease status-post DBS in 1/2024, incidentally found to  be in AF at 117 bpm at a PCP visit in 12/2024 (ECG from 11/2023 shows NSR). Follow-up ECG in 2/2025 showed persistent AF. A 24 hr Holter in 3/2025 showed persistent AF avg  bpm. Pt currently on toprol 50 mg daily, no AC.     A 3/2025 TTE showed EF 60-65%.      Today, in good spirits. Patient accompanied by his wife.    Rate/rhythm control: Sotalol 80 mg BID/metoprolol 50 mg qd  Anticoagulant/antiplatelets: Eliquis 5 mg BID   ECG: Atrial fibrillation with RVR at 117 bpm   Platelet count: 185  INR: 1.1    History of stroke:  no  Dysphagia or odynophagia:  no  Liver Disease, esophageal disease, or known varices:  no  Upper GI Bleeding:  no  Snoring:  no   Sleep Apnea:  no  Prior neck surgery or radiation:  no  History of anesthetic difficulties:  no  Family history of anesthetic difficulties:  no  Last oral intake: last pm   Able to move neck in all directions:  yes  GLP-1 Use: no      Medications - Outpatient  Prior to Admission medications    Medication Sig Start Date End Date Taking? Authorizing Provider   ALPRAZolam (XANAX) 0.5 MG tablet TAKE 1 TABLET(0.5 MG) BY MOUTH EVERY NIGHT FOR INSOMNIA OR ANXIETY 11/18/24   Rashi Gee MD   apixaban (ELIQUIS) 5 mg Tab Take 1 tablet (5 mg total) by mouth 2 (two) times daily. 3/31/25   Quita  Chucho GARCÍA MD   carbidopa-levodopa (RYTARY) 23.75-95 mg CpSR Take 4 capsules by mouth 3 (three) times daily. 1/8/25   Delicia Vargas MD   carbidopa-levodopa  mg (SINEMET)  mg per tablet TAKE 1 TABLET BY MOUTH EVERY EVENING 9/4/24 9/4/25  Delicia Vargas MD   carbidopa-levodopa  mg (SINEMET)  mg per tablet Take 2 tablets by mouth 5 (five) times daily.  Patient not taking: Reported on 4/4/2025 1/10/25 1/10/26  Delicia Vargas MD   metoprolol succinate (TOPROL-XL) 50 MG 24 hr tablet Take 1 tablet (50 mg total) by mouth once daily. 2/27/25 2/27/26  Ravinder Demarco MD   pravastatin (PRAVACHOL) 80 MG tablet Take 1 tablet (80 mg total) by mouth every evening. 4/22/24   Rahsi Gee MD   PREVIDENT 5000 ENAMEL PROTECT 1.1-5 % Pste USE TO BRUSH TEETH TWICE DAILY 11/14/24   Provider, Historical   rasagiline (AZILECT) 1 mg Tab Take 1 tablet (1 mg total) by mouth once daily. 11/19/24   Delicia Vargas MD   sotaloL (BETAPACE) 80 MG tablet Take 1 tablet (80 mg total) by mouth 2 (two) times daily. Start 3 days prior to cardioversion  Patient not taking: Reported on 4/4/2025 3/31/25 3/31/26  Chucho Devlin MD       Medications - Current  Scheduled Meds:  Continuous Infusions:  PRN Meds:.      Allergies  Review of patient's allergies indicates:  No Known Allergies      Past Medical History  Past Medical History:   Diagnosis Date    Anxiety     Atrial fibrillation     Esotropia of right eye 05/02/2013    Hearing deficit, bilateral     High cholesterol     Hyperlipidemia     Malignant melanoma of skin, unspecified 06/29/2022    in situ right lateral infraorbit    Melanoma 06/29/2022    in situ - right lateral infraorbital    Melanoma in situ of cheek     Parkinson's disease     Ringing in ear, bilateral        Past Surgical History  Past Surgical History:   Procedure Laterality Date    CATARACT EXTRACTION W/  INTRAOCULAR LENS IMPLANT Right 01/09/2017    Dr kwong     CATARACT EXTRACTION W/   INTRAOCULAR LENS IMPLANT Left 2017    Dr. Marin    COLONOSCOPY N/A 2023    Procedure: COLONOSCOPY;  Surgeon: Arnaldo Neely MD;  Location: [facility] ENDOSCOPY;  Service: Endoscopy;  Laterality: N/A;  prep instructions sent to pt via [place] [facility]  pre call complete, stated he would have a ride -CE    DEEP BRAIN STIMULATOR PLACEMENT Left 2024    Procedure: INSERTION, DEEP BRAIN STIMULATOR ELECTRODE;  Surgeon: Roger Flores MD;  Location: [facility] OR 2ND FLR;  Service: Neurosurgery;  Laterality: Left;  INSERT ELECTRODE LEFT SIDE FOR DEEP BRAIN STIMULATION/ GLOBUS PALLIDUS INTERNUS/ TEDS & SCD/MEDTRONICS, SHRAVAN DARTEZ.    HERNIA REPAIR      INSERTION OF DEEP BRAIN STIMULATOR GENERATOR Left 2024    Procedure: INSERTION, PULSE GENERATOR, DEEP BRAIN STIMULATOR;  Surgeon: Roger Flores MD;  Location: [facility] OR 2ND FLR;  Service: Neurosurgery;  Laterality: Left;  PLACEMENT OF PULSE GENERATOR FOR DEEP BRAIN STIMULATION/ TEDS & SCD/MEDTRONICS, SHRAVAN DARTEZ    PLACEMENT OF FIDUCIAL SCREW INTO SPINE N/A 2024    Procedure: INSERTION, FIDUCIAL SCREW, SKULL;  Surgeon: Roger Flores MD;  Location: [facility] OR 2ND FLR;  Service: Neurosurgery;  Laterality: N/A;  APPLICATION OF FIDUCIALS FOR DEEP BRAIN STIMULATION/ TEDS & SCD/MEDTRONIC/SHRAVAN DARTEZ    RECONSTRUCTION OF FACE Right 2022    Procedure: RECONSTRUCTION, FACE;  Surgeon: Yaneli Hickman MD;  Location: [facility] OR 2ND FLR;  Service: ENT;  Laterality: Right;    STRABISMUS SURGERY  4yrs    right eye       Social History  Social History     Socioeconomic History    Marital status:    Tobacco Use    Smoking status: Former     Current packs/day: 0.00     Average packs/day: 1 pack/day for 1 year (1.0 ttl pk-yrs)     Types: Cigarettes     Start date: 2005     Quit date: 2006     Years since quittin.3    Smokeless tobacco: Never   Substance and Sexual Activity    Alcohol use: Yes     Alcohol/week: 20.0 standard drinks of alcohol      Types: 20 Glasses of wine per week    Drug use: No    Sexual activity: Yes     Partners: Female     Social Drivers of Health     Financial Resource Strain: Low Risk  (2/27/2025)    Overall Financial Resource Strain (CARDIA)     Difficulty of Paying Living Expenses: Not hard at all   Food Insecurity: No Food Insecurity (2/27/2025)    Hunger Vital Sign     Worried About Running Out of Food in the Last Year: Never true     Ran Out of Food in the Last Year: Never true   Transportation Needs: No Transportation Needs (2/27/2025)    PRAPARE - Transportation     Lack of Transportation (Medical): No     Lack of Transportation (Non-Medical): No   Physical Activity: Sufficiently Active (2/27/2025)    Exercise Vital Sign     Days of Exercise per Week: 5 days     Minutes of Exercise per Session: 30 min   Stress: Stress Concern Present (2/27/2025)    [employer] - Occupational Stress Questionnaire     Feeling of Stress : To some extent   Housing Stability: Low Risk  (2/27/2025)    Housing Stability Vital Sign     Unable to Pay for Housing in the Last Year: No     Number of Times Moved in the Last Year: 0     Homeless in the Last Year: No       ROS  Review of Systems   Constitutional: Negative for chills.   HENT: Negative.     Eyes: Negative.    Cardiovascular:  Negative for chest pain, dyspnea on exertion and palpitations.   Respiratory: Negative.  Negative for shortness of breath and sleep disturbances due to breathing.    Endocrine: Negative.    Musculoskeletal: Negative.    Gastrointestinal:  Negative for hematemesis, melena, nausea and vomiting.   Genitourinary: Negative.    Neurological: Negative.    Psychiatric/Behavioral: Negative.  Negative for altered mental status.    Allergic/Immunologic: Negative.    Physical Examination     Vital Signs  24 Hour VS Range    Temp:  [98.6 °F (37 °C)]   Pulse:  [107]   Resp:  [16]   BP: (140-141)/()   SpO2:  [97 %]   No intake or output data in the 24  "hours ending 04/22/25 1232      Physical Exam:   Physical Exam  Constitutional:       General: He is not in acute distress.     Appearance: Normal appearance. He is normal weight. He is not ill-appearing or toxic-appearing.   HENT:      Head: Normocephalic and atraumatic.      Nose: Nose normal. No congestion or rhinorrhea.      Mouth/Throat:      Mouth: Mucous membranes are moist.      Pharynx: Oropharynx is clear. No oropharyngeal exudate or posterior oropharyngeal erythema.   Eyes:      General:         Right eye: No discharge.         Left eye: No discharge.      Extraocular Movements: Extraocular movements intact.      Conjunctiva/sclera: Conjunctivae normal.   Cardiovascular:      Rate and Rhythm: Normal rate. Rhythm irregularly irregular.   Pulmonary:      Effort: Pulmonary effort is normal. No respiratory distress.      Breath sounds: Normal breath sounds. No wheezing or rales.   Musculoskeletal:         General: No swelling. Normal range of motion.      Cervical back: Normal range of motion. No tenderness.      Right lower leg: No edema.      Left lower leg: No edema.   Skin:     General: Skin is warm and dry.      Coloration: Skin is not jaundiced.      Findings: No bruising or lesion.   Neurological:      General: No focal deficit present.      Mental Status: He is alert and oriented to person, place, and time.   Psychiatric:         Mood and Affect: Mood normal.         Behavior: Behavior normal.         Thought Content: Thought content normal.         Judgment: Judgment normal.         Data       Recent Labs   Lab 04/21/25  0739   WBC 7.12   HGB 15.3   HCT 46.6           No results for input(s): "PROTIME", "INR" in the last 168 hours.     Recent Labs   Lab 04/21/25  0739      K 4.5      CO2 28   BUN 17   CREATININE 1.1   ANIONGAP 7*   CALCIUM 8.9        Recent Labs   Lab 04/21/25  0739   ALBUMIN 3.9   BILITOT 1.0   ALKPHOS 60   AST 29   ALT 11        No results for input(s): " ""TROPONINI" in the last 168 hours.     No results found for: "BNP"    No results for input(s): "LABBLOO" in the last 168 hours.     Assessment & Plan     #Atrial fibrillation   -patient presents for FABIENNE/DCCV for AF   -on Eliquis for CVA prophylaxis, last dose this morning       Dr. Devlin Plan from 03/31/25:   Plan is FABIENNE/DCCV. Will start sotalol 80 mg bid 3 days prior to cardioversion, and check QTc post-procedure.       TTE 03/12/25    Left Ventricle: The left ventricle is normal in size. Normal wall thickness. There is normal systolic function with a visually estimated ejection fraction of 60 - 65%. Global longitudinal strain is -17.4%.    Right Ventricle: The right ventricle is normal in size. Wall thickness is normal. Systolic function is normal.    Left Atrium: dilated      -No absolute contraindications of esophageal stricture, tumor, perforation, laceration,or diverticulum and/or active GI bleed.  -The risks, benefits & alternatives of the procedure were explained to the patient.   -The risks of transesophageal echo include but are not limited to:  Dental trauma, esophageal trauma/perforation, bleeding, laryngospasm/brochospasm, aspiration, sore throat/hoarseness, & dislodgement of the endotracheal tube/nasogastric tube (where applicable).    -The risks of moderate sedation include hypotension, respiratory depression, arrhythmias, bronchospasm, & death.    -Prior to procedure, extensive discussion with patient regarding risks and benefits of DCCV at bedside today. The patient voices understanding, all questions have been answered, and patient would like to proceed.  -Informed consent was obtained. The patient is agreeable to proceed with the procedure and all questions and concerns addressed.    Case was discussed with an attending physician prior to procedure.    Priti Reyes PA-C  [facility]        "

## 2025-04-22 NOTE — DISCHARGE INSTRUCTIONS
Medications:  -Continue to take your home medications as listed on your medication list after you are discharged.    New Medications:  None    Diet  -You may resume oral intake after you are discharged, as long you have no swallowing difficulties.    Because you have received sedation for this procedure:  -Limit activity for the remainder of the day.  -Do not smoke for at least 6 hours and until you are fully awake and alert.  -Do not drink alcoholic beverage for 24 hours.  -Do not drive for 24 hours.  -Defer important decision making until the following day.     Go to the Emergency Department if you develop:   -Bleeding  -Weakness or numbness  -Visual, gait or speech disturbance  -New chest pain, palpitations, shortness of breath, rapid heart beat, or fainting  -Fever    Follow up:  -EKG in 1 week.  -Dr. Devlin or Germaine Muse NP in 1 month. Call or message the office to schedule.      Any need to reschedule or cancel procedures, or any questions regarding your procedures should be addressed directly with the Arrhythmia Department Nurses at the following phone number: [phone].

## 2025-04-22 NOTE — TRANSFER OF CARE
"Anesthesia Transfer of Care Note    Patient: Adam Dalal    Procedure(s) Performed: Procedure(s) (LRB):  Cardioversion or Defibrillation (N/A)  Transesophageal echo (FABIENNE) intra-procedure log documentation (N/A)    Patient location: Other:  PACU    Anesthesia Type: general    Transport from OR: Transported from OR on 6-10 L/min O2 by face mask with adequate spontaneous ventilation    Post pain: adequate analgesia    Post assessment: no apparent anesthetic complications    Post vital signs: stable    Level of consciousness: sedated    Nausea/Vomiting: no nausea/vomiting    Complications: none    Transfer of care protocol was followed      Last vitals: Visit Vitals  /57 (BP Location: Left arm, Patient Position: Lying)   Pulse 58   Temp 37 °C (98.6 °F)   Resp 16   Ht 6' 1" (1.854 m)   Wt 104.3 kg (230 lb)   SpO2 93%   BMI 30.34 kg/m²     "

## 2025-04-22 NOTE — NURSING
Eric NP made aware of SBP in the 90's.  Per NP walk pt and recheck pt.  Pt asymptomatic at this time.

## 2025-04-22 NOTE — ANESTHESIA PREPROCEDURE EVALUATION
04/22/2025  Pre-operative evaluation for Procedure(s) (LRB):  Cardioversion or Defibrillation (N/A)  Transesophageal echo (FABIENNE) intra-procedure log documentation (N/A)    Adam Dalal is a 64 y.o. male       Left Ventricle: The left ventricle is normal in size. Normal wall thickness. There is normal systolic function with a visually estimated ejection fraction of 60 - 65%. Global longitudinal strain is -17.4%.    Right Ventricle: The right ventricle is normal in size. Wall thickness is normal. Systolic function is normal.    Left Atrium: dilated    Problem List[1]    Review of patient's allergies indicates:  No Known Allergies    Medications Ordered Prior to Encounter[2]    Past Surgical History:   Procedure Laterality Date    CATARACT EXTRACTION W/  INTRAOCULAR LENS IMPLANT Right 01/09/2017    Dr marin     CATARACT EXTRACTION W/  INTRAOCULAR LENS IMPLANT Left 01/30/2017    Dr. Marin    COLONOSCOPY N/A 7/7/2023    Procedure: COLONOSCOPY;  Surgeon: Arnaldo Neely MD;  Location: [facility] ENDOSCOPY;  Service: Endoscopy;  Laterality: N/A;  [facility]  pre call complete, stated he would have a ride -CE    DEEP BRAIN STIMULATOR PLACEMENT Left 1/2/2024    Procedure: INSERTION, DEEP BRAIN STIMULATOR ELECTRODE;  Surgeon: Roger Flores MD;  Location: [facility] OR [address] [place] [address];  Service: Neurosurgery;  Laterality: Left;  INSERT ELECTRODE LEFT SIDE FOR DEEP BRAIN STIMULATION/ GLOBUS PALLIDUS INTERNUS/ TEDS & SCD/MEDTRONICS, SHRAVAN NITAZ.    HERNIA REPAIR      INSERTION OF DEEP BRAIN STIMULATOR GENERATOR Left 1/9/2024    Procedure: INSERTION, PULSE GENERATOR, DEEP BRAIN STIMULATOR;  Surgeon: Roger Flores MD;  Location: [facility] OR [address] [place] [address];  Service: Neurosurgery;  Laterality: Left;  PLACEMENT OF PULSE GENERATOR FOR DEEP BRAIN STIMULATION/ TEDS & SCD/MEDTRONICS, SHRAVAN MONISHATEVERITO     "PLACEMENT OF FIDUCIAL SCREW INTO SPINE N/A 2024    Procedure: INSERTION, FIDUCIAL SCREW, SKULL;  Surgeon: Roger Flores MD;  Location: [facility] OR [address] [place] [address];  Service: Neurosurgery;  Laterality: N/A;  APPLICATION OF FIDUCIALS FOR DEEP BRAIN STIMULATION/ TEDS & SCD/MEDTRONIC/SHRAVANNATI MOYAZ    RECONSTRUCTION OF FACE Right 2022    Procedure: RECONSTRUCTION, FACE;  Surgeon: Yaneli Hickman MD;  Location: [facility] OR [address] [place] [address];  Service: ENT;  Laterality: Right;    STRABISMUS SURGERY  4yrs    right eye       Social History[3]      CBC:   Recent Labs     25  0739   WBC 7.12   RBC 4.84   HGB 15.3   HCT 46.6      MCV 96   MCH 31.6*   MCHC 32.8       CMP:   Recent Labs     25  0739      K 4.5      CO2 28   BUN 17   CREATININE 1.1   CALCIUM 8.9   ALBUMIN 3.9   ALKPHOS 60   ALT 11   AST 29   BILITOT 1.0       INR  No results for input(s): "PT", "INR", "PROTIME", "APTT" in the last 72 hours.        Diagnostic Studies:      EKD Echo:  No results found for this or any previous visit.        Pre-op Assessment    I have reviewed the Patient Summary Reports.     I have reviewed the Nursing Notes. I have reviewed the NPO Status.   I have reviewed the Medications.     Review of Systems  Anesthesia Hx:  No problems with previous Anesthesia   History of prior surgery of interest to airway management or planning:          Denies Family Hx of Anesthesia complications.    Denies Personal Hx of Anesthesia complications.                    Hematology/Oncology:       -- Denies Anemia:                                  Cardiovascular:  Exercise tolerance: good    Denies Hypertension.    Denies CAD.    Denies CABG/stent. Dysrhythmias atrial fibrillation                                     Pulmonary:    Denies COPD.  Denies Asthma.    Sleep Apnea                Renal/:   Denies Chronic Renal Disease.                Hepatic/GI:      Denies GERD.    Not Taking GLP-1 Agonists            Neurological:    Denies " CVA.    Denies Seizures.                                Endocrine:  Denies Diabetes.               Physical Exam  General: Well nourished, Cooperative, Alert and Oriented    Airway:  Mallampati: III / II  Mouth Opening: Normal  TM Distance: Normal  Tongue: Normal  Neck ROM: Normal ROM    Chest/Lungs:  Normal Respiratory Rate    Heart:  Rhythm: Irregularly Irregular        Anesthesia Plan  Type of Anesthesia, risks & benefits discussed:    Anesthesia Type: Gen Natural Airway  Intra-op Monitoring Plan: Standard ASA Monitors  Post Op Pain Control Plan: multimodal analgesia  Induction:  IV  Informed Consent: Informed consent signed with the Patient and all parties understand the risks and agree with anesthesia plan.  All questions answered.   ASA Score: 2  Day of Surgery Review of History & Physical: H&P Update referred to the surgeon/provider.    Ready For Surgery From Anesthesia Perspective.     .           [1]   Patient Active Problem List  Diagnosis    Hyperlipidemia    Obstructive sleep apnea    Esotropia of right eye    Ocular hypertension - Both Eyes    Nuclear sclerosis    Nuclear sclerotic cataract of left eye    Parkinson disease    Hypophonia    Voice and resonance disorder    Dysarthria    Impaired functional mobility, balance, gait, and endurance    Impaired motor control    Alcohol use disorder, mild, abuse    Oral lesion    Mohs defect of right cheek    History of melanoma in situ    Mild neurocognitive disorder due to multiple etiologies    Parkinson's disease (tremor, stiffness, slow motion, unstable posture)    Atrial fibrillation   [2]   Current Facility-Administered Medications on File Prior to Encounter   Medication Dose Route Frequency Provider Last Rate Last Admin    0.9%  NaCl infusion   Intravenous Continuous Thony Graves  mL/hr at 01/03/24 0519 New Bag at 01/03/24 0519    mupirocin 2 % ointment   Nasal On Call Procedure Thony Graves MD   Given at 01/02/24 0623     Current  Outpatient Medications on File Prior to Encounter   Medication Sig Dispense Refill    ALPRAZolam (XANAX) 0.5 MG tablet TAKE 1 TABLET(0.5 MG) BY MOUTH EVERY NIGHT FOR INSOMNIA OR ANXIETY 30 tablet 5    apixaban (ELIQUIS) 5 mg Tab Take 1 tablet (5 mg total) by mouth 2 (two) times daily. 180 tablet 3    carbidopa-levodopa (RYTARY) 23.75-95 mg CpSR Take 4 capsules by mouth 3 (three) times daily. 360 capsule 11    carbidopa-levodopa  mg (SINEMET)  mg per tablet TAKE 1 TABLET BY MOUTH EVERY EVENING 30 tablet 11    metoprolol succinate (TOPROL-XL) 50 MG 24 hr tablet Take 1 tablet (50 mg total) by mouth once daily. 90 tablet 3    pravastatin (PRAVACHOL) 80 MG tablet Take 1 tablet (80 mg total) by mouth every evening. 90 tablet 3    PREVIDENT 5000 ENAMEL PROTECT 1.1-5 % Pste USE TO BRUSH TEETH TWICE DAILY      rasagiline (AZILECT) 1 mg Tab Take 1 tablet (1 mg total) by mouth once daily. 90 tablet 2    sotaloL (BETAPACE) 80 MG tablet Take 1 tablet (80 mg total) by mouth 2 (two) times daily. Start 3 days prior to cardioversion 60 tablet 11    carbidopa-levodopa  mg (SINEMET)  mg per tablet Take 2 tablets by mouth 5 (five) times daily. (Patient not taking: Reported on 3/31/2025) 300 tablet 11   [3]   Social History  Socioeconomic History    Marital status:    Tobacco Use    Smoking status: Former     Current packs/day: 0.00     Average packs/day: 1 pack/day for 1 year (1.0 ttl pk-yrs)     Types: Cigarettes     Start date: 2005     Quit date: 2006     Years since quittin.3    Smokeless tobacco: Never   Substance and Sexual Activity    Alcohol use: Yes     Alcohol/week: 20.0 standard drinks of alcohol     Types: 20 Glasses of wine per week    Drug use: No    Sexual activity: Yes     Partners: Female     Social Drivers of Health     Financial Resource Strain: Low Risk  (2025)    Overall Financial Resource Strain (CARDIA)     Difficulty of Paying Living Expenses: Not hard at all    Food Insecurity: No Food Insecurity (2/27/2025)    Hunger Vital Sign     Worried About Running Out of Food in the Last Year: Never true     Ran Out of Food in the Last Year: Never true   Transportation Needs: No Transportation Needs (2/27/2025)    PRAPARE - Transportation     Lack of Transportation (Medical): No     Lack of Transportation (Non-Medical): No   Physical Activity: Sufficiently Active (2/27/2025)    Exercise Vital Sign     Days of Exercise per Week: 5 days     Minutes of Exercise per Session: 30 min   Stress: Stress Concern Present (2/27/2025)    [employer] [place] [employer] - Occupational Stress Questionnaire     Feeling of Stress : To some extent   Housing Stability: Low Risk  (2/27/2025)    Housing Stability Vital Sign     Unable to Pay for Housing in the Last Year: No     Number of Times Moved in the Last Year: 0     Homeless in the Last Year: No

## 2025-04-22 NOTE — HOSPITAL COURSE
Patient underwent FABIENNE without evidence of HEATHER thrombus. Proceeded with DCCV, converting from atrial fibrillation to sinus rhythm. Patient tolerated the procedure without any acute complications. Post-DCCV ECG revealed sinus rhythm at 59 bpm. Plan to continue all home medications including Sotalol 80 mg BID, Eliquis 5 mg BID, metoprolol 50 mg qd. Instructed to return in 1 week for follow up ECG and in 4 weeks for clinic follow up with Dr. Devlin or Germaine Muse NP.   Patient was assessed at bedside prior to discharge, they reported feeling well and denied chest discomfort, shortness of breath, palpitations, lightheadedness, or any other acute symptoms. Discharge instructions were discussed with patient and all questions were answered. Patient was discharged home in stable condition.

## 2025-04-22 NOTE — NURSING
Pt walked around the unit per orders. VSS. NAD noted.     /70.  Pt denied dizziness or lightheadedness. Will DC per orders.

## 2025-04-23 NOTE — ANESTHESIA POSTPROCEDURE EVALUATION
Anesthesia Post Evaluation    Patient: Adam Dalal    Procedure(s) Performed: Procedure(s) (LRB):  Cardioversion or Defibrillation (N/A)  Transesophageal echo (FABIENNE) intra-procedure log documentation (N/A)    Final Anesthesia Type: general      Patient location during evaluation: PACU  Patient participation: Yes- Able to Participate  Level of consciousness: awake and alert and oriented  Post-procedure vital signs: reviewed and stable  Pain management: adequate  Airway patency: patent    PONV status at discharge: No PONV  Anesthetic complications: no      Cardiovascular status: blood pressure returned to baseline and hemodynamically stable  Respiratory status: unassisted  Hydration status: euvolemic  Follow-up not needed.              Vitals Value Taken Time   /70 04/22/25 15:12   Temp 36.7 °C (98 °F) 04/22/25 15:12   Pulse 57 04/22/25 15:12   Resp 18 04/22/25 15:12   SpO2 98 % 04/22/25 15:12         No case tracking events are documented in the log.      Pain/Bárbara Score: Bárbara Score: 10 (4/22/2025  3:12 PM)

## 2025-04-24 ENCOUNTER — HOSPITAL ENCOUNTER (EMERGENCY)
Facility: OTHER | Age: 65
Discharge: HOME | End: 2025-04-24
Attending: EMERGENCY MEDICINE
Payer: COMMERCIAL

## 2025-04-24 ENCOUNTER — TELEPHONE (OUTPATIENT)
Dept: ELECTROPHYSIOLOGY | Facility: CLINIC | Age: 65
End: 2025-04-24
Payer: COMMERCIAL

## 2025-04-24 VITALS
TEMPERATURE: 97 F | WEIGHT: 230 LBS | HEIGHT: 73 IN | RESPIRATION RATE: 20 BRPM | DIASTOLIC BLOOD PRESSURE: 87 MMHG | HEART RATE: 49 BPM | OXYGEN SATURATION: 92 % | SYSTOLIC BLOOD PRESSURE: 145 MMHG | BODY MASS INDEX: 30.48 KG/M2

## 2025-04-24 DIAGNOSIS — R42 VERTIGO: Primary | ICD-10-CM

## 2025-04-24 DIAGNOSIS — R06.02 SOB (SHORTNESS OF BREATH): ICD-10-CM

## 2025-04-24 DIAGNOSIS — R42 DIZZINESS: ICD-10-CM

## 2025-04-24 DIAGNOSIS — R00.1 BRADYCARDIA: ICD-10-CM

## 2025-04-24 DIAGNOSIS — E86.0 DEHYDRATION: ICD-10-CM

## 2025-04-24 LAB
ABSOLUTE EOSINOPHIL (OHS): 0.19 K/UL
ABSOLUTE MONOCYTE (OHS): 0.67 K/UL (ref 0.3–1)
ABSOLUTE NEUTROPHIL COUNT (OHS): 4.55 K/UL (ref 1.8–7.7)
ALBUMIN SERPL BCP-MCNC: 4 G/DL (ref 3.5–5.2)
ALP SERPL-CCNC: 61 UNIT/L (ref 40–150)
ALT SERPL W/O P-5'-P-CCNC: 15 UNIT/L (ref 10–44)
ANION GAP (OHS): 8 MMOL/L (ref 8–16)
AST SERPL-CCNC: 73 UNIT/L (ref 11–45)
BASOPHILS # BLD AUTO: 0.1 K/UL
BASOPHILS NFR BLD AUTO: 1.3 %
BILIRUB SERPL-MCNC: 1 MG/DL (ref 0.1–1)
BILIRUB UR QL STRIP.AUTO: NEGATIVE
BNP SERPL-MCNC: 271 PG/ML (ref 0–99)
BUN SERPL-MCNC: 21 MG/DL (ref 8–23)
CALCIUM SERPL-MCNC: 8.5 MG/DL (ref 8.7–10.5)
CHLORIDE SERPL-SCNC: 105 MMOL/L (ref 95–110)
CLARITY UR: CLEAR
CO2 SERPL-SCNC: 25 MMOL/L (ref 23–29)
COLOR UR AUTO: YELLOW
CREAT SERPL-MCNC: 1.1 MG/DL (ref 0.5–1.4)
ERYTHROCYTE [DISTWIDTH] IN BLOOD BY AUTOMATED COUNT: 13 % (ref 11.5–14.5)
GFR SERPLBLD CREATININE-BSD FMLA CKD-EPI: >60 ML/MIN/1.73/M2
GLUCOSE SERPL-MCNC: 103 MG/DL (ref 70–110)
GLUCOSE UR QL STRIP: NEGATIVE
HCT VFR BLD AUTO: 39 % (ref 40–54)
HGB BLD-MCNC: 13.6 GM/DL (ref 14–18)
HGB UR QL STRIP: NEGATIVE
HOLD SPECIMEN: NORMAL
IMM GRANULOCYTES # BLD AUTO: 0.02 K/UL (ref 0–0.04)
IMM GRANULOCYTES NFR BLD AUTO: 0.3 % (ref 0–0.5)
KETONES UR QL STRIP: NEGATIVE
LEUKOCYTE ESTERASE UR QL STRIP: NEGATIVE
LYMPHOCYTES # BLD AUTO: 2.25 K/UL (ref 1–4.8)
MAGNESIUM SERPL-MCNC: 2 MG/DL (ref 1.6–2.6)
MCH RBC QN AUTO: 33.1 PG (ref 27–31)
MCHC RBC AUTO-ENTMCNC: 34.9 G/DL (ref 32–36)
MCV RBC AUTO: 95 FL (ref 82–98)
NITRITE UR QL STRIP: NEGATIVE
NUCLEATED RBC (/100WBC) (OHS): 0 /100 WBC
PH UR STRIP: 7 [PH]
PLATELET # BLD AUTO: 145 K/UL (ref 150–450)
PMV BLD AUTO: 11.4 FL (ref 9.2–12.9)
POTASSIUM SERPL-SCNC: 4.4 MMOL/L (ref 3.5–5.1)
PROT SERPL-MCNC: 7 GM/DL (ref 6–8.4)
PROT UR QL STRIP: ABNORMAL
RBC # BLD AUTO: 4.11 M/UL (ref 4.6–6.2)
RELATIVE EOSINOPHIL (OHS): 2.4 %
RELATIVE LYMPHOCYTE (OHS): 28.9 % (ref 18–48)
RELATIVE MONOCYTE (OHS): 8.6 % (ref 4–15)
RELATIVE NEUTROPHIL (OHS): 58.5 % (ref 38–73)
SODIUM SERPL-SCNC: 138 MMOL/L (ref 136–145)
SP GR UR STRIP: >=1.03
TROPONIN I SERPL DL<=0.01 NG/ML-MCNC: <0.006 NG/ML
UROBILINOGEN UR STRIP-ACNC: ABNORMAL EU/DL
WBC # BLD AUTO: 7.78 K/UL (ref 3.9–12.7)

## 2025-04-24 PROCEDURE — 96361 HYDRATE IV INFUSION ADD-ON: CPT

## 2025-04-24 PROCEDURE — 99285 EMERGENCY DEPT VISIT HI MDM: CPT | Mod: 25

## 2025-04-24 PROCEDURE — 96360 HYDRATION IV INFUSION INIT: CPT

## 2025-04-24 PROCEDURE — 93005 ELECTROCARDIOGRAM TRACING: CPT

## 2025-04-24 PROCEDURE — 25000003 PHARM REV CODE 250: Performed by: EMERGENCY MEDICINE

## 2025-04-24 PROCEDURE — 84484 ASSAY OF TROPONIN QUANT: CPT | Performed by: NURSE PRACTITIONER

## 2025-04-24 PROCEDURE — 25500020 PHARM REV CODE 255: Performed by: EMERGENCY MEDICINE

## 2025-04-24 PROCEDURE — 83735 ASSAY OF MAGNESIUM: CPT | Performed by: EMERGENCY MEDICINE

## 2025-04-24 PROCEDURE — 82040 ASSAY OF SERUM ALBUMIN: CPT | Performed by: NURSE PRACTITIONER

## 2025-04-24 PROCEDURE — 85025 COMPLETE CBC W/AUTO DIFF WBC: CPT | Performed by: NURSE PRACTITIONER

## 2025-04-24 PROCEDURE — 81003 URINALYSIS AUTO W/O SCOPE: CPT | Performed by: NURSE PRACTITIONER

## 2025-04-24 PROCEDURE — 93010 ELECTROCARDIOGRAM REPORT: CPT | Mod: ,,, | Performed by: INTERNAL MEDICINE

## 2025-04-24 PROCEDURE — 83880 ASSAY OF NATRIURETIC PEPTIDE: CPT | Performed by: NURSE PRACTITIONER

## 2025-04-24 PROCEDURE — 25000003 PHARM REV CODE 250: Performed by: NURSE PRACTITIONER

## 2025-04-24 RX ORDER — MECLIZINE HYDROCHLORIDE 25 MG/1
25 TABLET ORAL
Status: COMPLETED | OUTPATIENT
Start: 2025-04-24 | End: 2025-04-24

## 2025-04-24 RX ORDER — MECLIZINE HYDROCHLORIDE 25 MG/1
25 TABLET ORAL 3 TIMES DAILY PRN
Qty: 20 TABLET | Refills: 0 | Status: SHIPPED | OUTPATIENT
Start: 2025-04-24

## 2025-04-24 RX ADMIN — SODIUM CHLORIDE 1000 ML: 9 INJECTION, SOLUTION INTRAVENOUS at 04:04

## 2025-04-24 RX ADMIN — MECLIZINE HYDROCHLORIDE 25 MG: 25 TABLET ORAL at 04:04

## 2025-04-24 RX ADMIN — IOHEXOL 100 ML: 350 INJECTION, SOLUTION INTRAVENOUS at 03:04

## 2025-04-24 NOTE — ED NOTES
Patient AAOx4, NAD, respirations E/UL, denies pain or any complaints at this time. Ambulated to bathroom independently. Nahomy ODOM NP at bedside updating patient on plan of care

## 2025-04-24 NOTE — ED PROVIDER NOTES
Encounter Date: 4/24/2025       History     Chief Complaint   Patient presents with    Dizziness     Dizziness and shortness of breath that started earlier today around 8am when walking to his car. PMH a-fib and had cardioversion on 4/22.      65 y/o male with anxiety, hx of a-fib (recent cardioversion on 4/22), HLD, hx of malignant melanoma, and parkinson's which presents with intermittent dizziness when he walks. Pt states he went to walk down the street from his house yesterday and felt very off balance after walking a while. He thought it was from the recent cardioversion so went back home and sat down. He immediately felt better after sitting down. He then had another episode when walking to work this morning. He went back home sat down and felt better. He denies any palpitations during the dizzy spells but did feel SOB. No other complaints.      The history is provided by the patient.     Review of patient's allergies indicates:  No Known Allergies  Past Medical History:   Diagnosis Date    Anxiety     Atrial fibrillation     Esotropia of right eye 05/02/2013    Hearing deficit, bilateral     High cholesterol     Hyperlipidemia     Malignant melanoma of skin, unspecified 06/29/2022    in situ right lateral infraorbit    Melanoma 06/29/2022    in situ - right lateral infraorbital    Melanoma in situ of cheek     Parkinson's disease     Ringing in ear, bilateral      Past Surgical History:   Procedure Laterality Date    CATARACT EXTRACTION W/  INTRAOCULAR LENS IMPLANT Right 01/09/2017    Dr marin     CATARACT EXTRACTION W/  INTRAOCULAR LENS IMPLANT Left 01/30/2017    Dr. Marin    COLONOSCOPY N/A 7/7/2023    Procedure: COLONOSCOPY;  Surgeon: Arnaldo Neely MD;  Location: [facility] ENDOSCOPY;  Service: Endoscopy;  Laterality: N/A;  prep instructions sent to pt via portal -jose armando Amaya prep  pre call complete, stated he would have a ride -CE    DEEP BRAIN STIMULATOR PLACEMENT Left 1/2/2024    Procedure: INSERTION, DEEP  BRAIN STIMULATOR ELECTRODE;  Surgeon: Roger Flores MD;  [facility];  Service: Neurosurgery;  Laterality: Left;  INSERT ELECTRODE LEFT SIDE FOR DEEP BRAIN STIMULATION/ GLOBUS PALLIDUS INTERNUS/ TEDS & SCD/MEDTRONICS, SHRAVAN DARTEZ.    ECHOCARDIOGRAM,TRANSESOPHAGEAL N/A 4/22/2025    Procedure: Transesophageal echo (FABIENNE) intra-procedure log documentation;  Surgeon: Curtis Peter MD;  Location: [facility] EP LAB;  Service: Cardiology;  Laterality: N/A;    HERNIA REPAIR      INSERTION OF DEEP BRAIN STIMULATOR GENERATOR Left 1/9/2024    Procedure: INSERTION, PULSE GENERATOR, DEEP BRAIN STIMULATOR;  Surgeon: Roger Flores MD;  [facility];  Service: Neurosurgery;  Laterality: Left;  PLACEMENT OF PULSE GENERATOR FOR DEEP BRAIN STIMULATION/ TEDS & SCD/MEDTRONICS, SHRAVAN DARTEZ    PLACEMENT OF FIDUCIAL SCREW INTO SPINE N/A 1/2/2024    Procedure: INSERTION, FIDUCIAL SCREW, SKULL;  Surgeon: Roger Flores MD;  Location: [facility] OR [address] [place] [address];  Service: Neurosurgery;  Laterality: N/A;  APPLICATION OF FIDUCIALS FOR DEEP BRAIN STIMULATION/ TEDS & SCD/MEDTRONIC/SHRAVAN DARTEZ    RECONSTRUCTION OF FACE Right 8/4/2022    Procedure: RECONSTRUCTION, FACE;  Surgeon: Yaneli Hickman MD;  [facility];  Service: ENT;  Laterality: Right;    STRABISMUS SURGERY  4yrs    right eye    TREATMENT OF CARDIAC ARRHYTHMIA N/A 4/22/2025    Procedure: Cardioversion or Defibrillation;  Surgeon: Chucho Devlin MD;  Location: [facility] EP LAB;  Service: Cardiology;  Laterality: N/A;  AF, FABIENNE, DCCV, MAC, GP, 3 Prep *Neurostimulator in situ*     Family History   Problem Relation Name Age of Onset    Arthritis Mother      Hearing loss Mother      Heart attack Father      Atrial fibrillation Sister 2     Sleep apnea Brother      Arthritis Brother      Cataracts Maternal Grandmother      Cataracts Paternal Grandmother      Amblyopia Neg Hx      Blindness Neg Hx      Glaucoma Neg Hx      Macular degeneration Neg Hx       Retinal detachment Neg Hx      Strabismus Neg Hx      Melanoma Neg Hx       Social History[1]  Review of Systems   Constitutional:  Negative for fever.   HENT:  Negative for sore throat.    Respiratory:  Positive for shortness of breath (only during dizziness spells).    Cardiovascular:  Negative for chest pain.   Gastrointestinal:  Negative for nausea.   Genitourinary:  Negative for dysuria.   Musculoskeletal:  Negative for back pain.   Skin:  Negative for rash.   Neurological:  Positive for dizziness. Negative for weakness.   Hematological:  Does not bruise/bleed easily.   All other systems reviewed and are negative.    Physical Exam     Initial Vitals [04/24/25 1347]   BP Pulse Resp Temp SpO2   (!) 114/57 (!) 51 18 97.4 °F (36.3 °C) 97 %      MAP       --         Physical Exam    Nursing note and vitals reviewed.  Constitutional: He appears well-developed and well-nourished.   HENT:   Head: Normocephalic and atraumatic.   Eyes: Conjunctivae and EOM are normal. Pupils are equal, round, and reactive to light.   Neck:   Normal range of motion.  Cardiovascular:  Normal rate, regular rhythm, normal heart sounds and intact distal pulses.     Exam reveals no gallop and no friction rub.       No murmur heard.  Pulmonary/Chest: Breath sounds normal. No respiratory distress. He has no wheezes. He has no rhonchi. He has no rales. He exhibits no tenderness.   Abdominal: Abdomen is soft. Bowel sounds are normal. He exhibits no distension and no mass. There is no abdominal tenderness. There is no rebound and no guarding.   Musculoskeletal:         General: No tenderness or edema. Normal range of motion.      Cervical back: Normal range of motion.     Neurological: He is alert and oriented to person, place, and time. He has normal strength. GCS score is 15. GCS eye subscore is 4. GCS verbal subscore is 5. GCS motor subscore is 6.   Dizziness induced by rapid head movement   Skin: Skin is warm. Capillary refill takes less than 2  seconds.       ED Course   Procedures  Labs Reviewed   COMPREHENSIVE METABOLIC PANEL - Abnormal       Result Value    Sodium 138      Potassium 4.4      Chloride 105      CO2 25      Glucose 103      BUN 21      Creatinine 1.1      Calcium 8.5 (*)     Protein Total 7.0      Albumin 4.0      Bilirubin Total 1.0      ALP 61      AST 73 (*)     ALT 15      Anion Gap 8      eGFR >60     B-TYPE NATRIURETIC PEPTIDE - Abnormal     (*)    CBC WITH DIFFERENTIAL - Abnormal    WBC 7.78      RBC 4.11 (*)     HGB 13.6 (*)     HCT 39.0 (*)     MCV 95      MCH 33.1 (*)     MCHC 34.9      RDW 13.0      Platelet Count 145 (*)     MPV 11.4      Nucleated RBC 0      Neut % 58.5      Lymph % 28.9      Mono % 8.6      Eos % 2.4      Basophil % 1.3      Imm Grans % 0.3      Neut # 4.55      Lymph # 2.25      Mono # 0.67      Eos # 0.19      Baso # 0.10      Imm Grans # 0.02     URINALYSIS, REFLEX TO URINE CULTURE - Abnormal    Color, UA Yellow      Appearance, UA Clear      pH, UA 7.0      Spec Grav UA >=1.030 (*)     Protein, UA Trace (*)     Glucose, UA Negative      Ketones, UA Negative      Bilirubin, UA Negative      Blood, UA Negative      Nitrites, UA Negative      Urobilinogen, UA 2.0-3.0 (*)     Leukocyte Esterase, UA Negative     TROPONIN I - Normal    Troponin-I <0.006     MAGNESIUM - Normal    Magnesium  2.0     CBC W/ AUTO DIFFERENTIAL    Narrative:     The following orders were created for panel order CBC auto differential.  Procedure                               Abnormality         Status                     ---------                               -----------         ------                     CBC with Differential[[id number]]       Abnormal            Final result                 Please view results for these tests on the individual orders.   GREY TOP URINE HOLD    Extra Tube Hold for add-ons.            Imaging Results              X-Ray Chest AP Portable (Final result)  Result time 04/24/25 18:50:24      Final  result by Amanda Mcmanus MD (04/24/25 18:50:24)                   Impression:      No acute intrathoracic abnormality detected.      Electronically signed by: Amanda Mcmanus  Date:    04/24/2025  Time:    18:50               Narrative:    EXAMINATION:  AP PORTABLE CHEST    CLINICAL HISTORY:  Shortness of breath    TECHNIQUE:  AP portable chest radiograph was submitted.    COMPARISON:  11/21/2023    FINDINGS:  AP portable chest radiograph demonstrates a cardiac silhouette within normal limits.  There is no focal consolidation, pneumothorax, or pleural effusion.  Spondylitic changes are present.  A deep brain stimulator device is seen over the left chest.                                       CTA Head and Neck (xpd) (Final result)  Result time 04/24/25 16:49:18      Final result by Jeffy Hook MD (04/24/25 16:49:18)                   Impression:      CT head:    No evidence of intracranial hemorrhage or mass effect.    Satisfactory position of left DBS.  No complication identified.    CTA neck:    Significant calcifications at the origins of the left vertebral artery.    No hemodynamically significant stenosis in the neck vessels.    CTA head:    No large vessel occlusion in the intracranial circulation.    Additional findings as above.      Electronically signed by: Jeffy Hook MD  Date:    04/24/2025  Time:    16:49               Narrative:    EXAMINATION:  CTA HEAD AND NECK (XPD)    CLINICAL HISTORY:  Dizziness, persistent/recurrent, cardiac or vascular cause suspected.    TECHNIQUE:  Non contrast low dose axial images were obtained through the head.  CT angiogram was performed from the level of the chauncey to the top of the head following the IV administration of 100mL of Omnipaque 350.   Sagittal and coronal reconstructions and maximum intensity projection reconstructions were performed. Arterial stenosis percentages are based on NASCET measurement criteria.    COMPARISON:  CT head dated  01/02/2024.    MRI of the brain dated 12/14/2023.    FINDINGS:  CT head:    There is a left frontal DBS with its tip terminating in the left subthalamic nuclei.  The overall appearance is unremarkable.  There has been resolution of the previous postoperative left frontal pneumocephalus.    The craniocervical junction is intact.  The sellar and parasellar structures are within normal limits.  There are no extra-axial fluid collections.  There is no evidence of intracranial hemorrhage.    The ventricles and sulci are within normal limits.  The gray-white differentiation is maintained.  There is no dense vessel sign.  There is no evidence of mass effect.    CTA neck:    There are no filling defects within the visualized pulmonary tree.    There is normal 3 vessel aortic arch configuration.  There are minimal calcifications in the right proximal subclavian artery.    There are minimal calcifications in the right common carotid artery.  The right internal and external carotid arteries are within normal limits.    There are minimal calcifications in the left common carotid artery.  The left internal and external carotid arteries are within normal limits.  There are extensive calcifications involving the proximal aspect of the left subclavian artery.    The origins of the right vertebral artery is unremarkable.  The right vertebral artery appears hypoplastic.  There are significant calcifications at the origin of the left vertebral artery.  The left vertebral artery is normal in caliber.    CTA brain:    There are calcifications in the cavernous segment of the right ICA.  The minimal calcifications in the cavernous segment left ICA.  The anterior cerebral arteries and anterior component complex are within normal limits.  The middle cerebral arteries are within normal limits.  The basilar is unremarkable.  The posterior cerebral arteries are within normal limits.    The dural venous sinuses are within normal  limits.    There is no evidence of aneurysm or stenosis.    There is no abnormal intracranial enhancement.    Additional findings:    The soft tissue the neck are within normal limits.  There is no evidence of lymphadenopathy in the neck.  There is mild interstitial prominence in the visualized lung fields.    There are degenerative changes in the cervical spine.  There is no evidence of fracture.                                       Medications   meclizine tablet 25 mg (25 mg Oral Given 4/24/25 1606)   iohexoL (OMNIPAQUE 350) injection 100 mL (100 mLs Intravenous Given 4/24/25 6384)   sodium chloride 0.9% bolus 1,000 mL 1,000 mL (0 mLs Intravenous Stopped 4/24/25 4368)     Medical Decision Making  65 y/o male which presents with intermittent dizziness s/p cardioversion on 4/22. CTA negative for acute findings. Labs unremarkable except for slightly low K. He was given K during the Ed visit. Pt was given a liter of NS and meclizine. He was able to walk without feeling off balance. Review of chart showed that he had a low pressure during the morning dizziness incident. Dr. Hawk with cardiology consulted due to concern that the betablocker was causing bradycardia and hypotension. Pt advised to follow with Dr. Devlin along with holding the metoprolol and continuing the sotalol until discussed with Dr. Devlin.He was also discharged with meclizine in the event he has more dizziness spells as there could be a component of vertigo to his dizziness. Patient given strict return precautions and voiced understanding of all discharge instructions.     Pt care signed out to Dr. Charleen Morgan for follow up of CXR and final disposition.      Differential Diagnosis: symptomatic bradycardia, dizziness, vertigo, stroke,     Problems Addressed:  Bradycardia: acute illness or injury  Dehydration: acute illness or injury  Dizziness: acute illness or injury  SOB (shortness of breath): self-limited or minor problem  Vertigo: acute illness or  injury    Amount and/or Complexity of Data Reviewed  Labs:  Decision-making details documented in ED Course.  Radiology: ordered.    Risk  Prescription drug management.               ED Course as of 25 0843   Thu 2025   1501 BP(!): 114/57 [AT]   1501 Temp: 97.4 °F (36.3 °C) [AT]   1501 Temp Source: Oral [AT]   1501 Pulse(!): 51 [AT]   1501 Resp: 18 [AT]   1501 SpO2: 97 % [AT]   1546 Troponin I: <0.006 [AT]   1546 Magnesium : 2.0 [AT]   1628 BNP(!): 271 [AT]      ED Course User Index  [AT] Nahomy Durán FNP                           Clinical Impression:  Final diagnoses:  [R42] Dizziness  [R06.02] SOB (shortness of breath)  [R42] Vertigo (Primary)  [E86.0] Dehydration  [R00.1] Bradycardia          ED Disposition Condition    Discharge Good          ED Prescriptions       Medication Sig Dispense Start Date End Date Auth. Provider    meclizine (ANTIVERT) 25 mg tablet Take 1 tablet (25 mg total) by mouth 3 (three) times daily as needed. 20 tablet 2025 -- Nahomy Durán FNP          Follow-up Information       Follow up With Specialties Details Why Contact Info    Rashi Gee MD Internal Medicine Schedule an appointment as soon as possible for a visit  As needed [address]  [place] [address]  [phone]      [facility] Emergency Dept Emergency Medicine  If symptoms worsen [address] [facility] [id number] [phone]             Nahomy Durán FNP  25 0843         [1]   Social History  Tobacco Use    Smoking status: Former     Current packs/day: 0.00     Average packs/day: 1 pack/day for 1 year (1.0 ttl pk-yrs)     Types: Cigarettes     Start date: 2005     Quit date: 2006     Years since quittin.3    Smokeless tobacco: Never   Substance Use Topics    Alcohol use: Yes     Alcohol/week: 14.0 standard drinks of alcohol     Types: 14 Glasses of wine per week    Drug use: No        Nahomy Durán FNP  25 0845

## 2025-04-24 NOTE — ED TRIAGE NOTES
Patient presents to ED with c/o dizziness, described as the room spinning, since having cardioversion 2 days ago. Denies extremity weakness, N/V, visual disturbances.

## 2025-04-24 NOTE — TELEPHONE ENCOUNTER
Spoke to patient. Patient is s/p FABIENNE/DCCV on 4/22/2025. Patient has been experiencing labored breathing with exertion and dizziness as well. Patient has no complaints of chest pain or discomfort. Patient has been compliant with his Metoprolol 50 mg daily, Sotalol 80 mg twice daily, and Eliquis 5 mg twice daily. Most recent BP 80/56 HR 68. Advised patient due to being symptomatic with the low blood pressure and labored breathing to go the nearest emergency room for an evaluation.  Will inform Dr. Devlin.  Patient verbalized understanding and appreciated the call.       ----- Message from LISSA Anand sent at 4/24/2025 12:23 PM CDT -----  Regarding: FW: Dizziness    ----- Message -----  From: Regina Sage MA  Sent: 4/24/2025  12:20 PM CDT  To: Val Hawk RN  Subject: FW: Dizziness                                      ----- Message -----  From: Liana Lindquist  Sent: 4/24/2025  12:15 PM CDT  To: Quita Rankin Staff  Subject: Dizziness                                        Patient wife calling because he's experiencing dizziness. Please call back @ [phone]. Thank you Liana

## 2025-04-24 NOTE — DISCHARGE INSTRUCTIONS
Please hold the metoprolol and continue taking the sotalol until you discuss the low heart rate with Dr. Devlin.     Thank you for coming to our Emergency Department today. It is important to remember that some problems are difficult to diagnose and may not be found during your Emergency Department visit. Be sure to follow up with your primary care doctor and review all labs/imaging/tests that were performed during this visit with them. Some labs/tests may be outside of the normal range and require non-emergent follow-up and further investigation to help diagnose/exclude/prevent complications or other medical conditions.    If you do not have a primary care doctor, you may contact the one listed on your discharge paperwork or you may also call the [facility] Appointment Desk at [phone] to schedule an appointment and establish care with one. It is important to your health that you have a primary care doctor.    Please take all medications as directed. All medications may potentially have side-effects and it is impossible to predict which medications may give you side-effects or what side-effects (if any) they will give you.. If you feel that you are having a negative effect or side-effect of any medication you should immediately stop taking them and seek medical attention. If you feel that you are having a life-threatening reaction call 911.    Return to the ER with any questions/concerns, new/concerning symptoms, worsening or failure to improve.     Do not drive, swim, climb to height, take a bath or make any important decisions for 24 hours if you have received any pain medications, sedatives or mood altering drugs during your ER visit.

## 2025-04-24 NOTE — FIRST PROVIDER EVALUATION
Emergency Department TeleTriage Encounter Note      CHIEF COMPLAINT    Chief Complaint   Patient presents with    Dizziness     Dizziness and shortness of breath that started earlier today around 8am when walking to his car. PMH a-fib and had cardioversion on 4/22.        VITAL SIGNS   Initial Vitals [04/24/25 1347]   BP Pulse Resp Temp SpO2   (!) 114/57 (!) 51 18 97.4 °F (36.3 °C) 97 %      MAP       --            ALLERGIES    Review of patient's allergies indicates:  No Known Allergies    PROVIDER TRIAGE NOTE  This is a teletriage evaluation of a 64 y.o. male presenting to the ED complaining of GUZMAN starting this morning. No CP.  Recent cardioversion.    Alert, sitting upright, speaking in short sentences.     Initial orders will be placed and care will be transferred to an alternate provider when patient is roomed for a full evaluation. Any additional orders and the final disposition will be determined by that provider.         ORDERS  Labs Reviewed   CBC W/ AUTO DIFFERENTIAL   COMPREHENSIVE METABOLIC PANEL       ED Orders (720h ago, onward)      Start Ordered     Status Ordering Provider    04/24/25 1439 04/24/25 1439  CBC Auto Differential  STAT         Ordered AARON GILL N.    04/24/25 1439 04/24/25 1439  Comprehensive Metabolic Panel  STAT         Ordered AARON GILL N.    04/24/25 1439 04/24/25 1439  Pulse Oximetry Continuous  Continuous         Ordered UMANG GILLIKA N.    04/24/25 1439 04/24/25 1439  Cardiac Monitoring - Adult  Continuous         Ordered AARON GILL N.    04/24/25 1439 04/24/25 1439  EKG 12-lead  Once         Ordered UMANG GILLIKA N.    04/24/25 1439 04/24/25 1439  POCT COVID-19 Rapid Screening  Once         Ordered AARON GILL N.    04/24/25 1439 04/24/25 1439  X-Ray Chest 1 View  1 time imaging         Ordered AARON GILL N.    04/24/25 1359 04/24/25 1358  EKG 12-lead  Once         Completed by IAN WILKINS on  4/24/2025 at  1:59 PM KRISTIN EDMONDS              Virtual Visit Note: The provider triage portion of this emergency department evaluation and documentation was performed via [employer], a HIPAA-compliant telemedicine application, in concert with a tele-presenter in the room. A face to face patient evaluation with one of my colleagues will occur once the patient is placed in an emergency department room.      DISCLAIMER: This note was prepared with [employer] voice recognition transcription software. Garbled syntax, mangled pronouns, and other bizarre constructions may be attributed to that software system.

## 2025-04-25 ENCOUNTER — PATIENT MESSAGE (OUTPATIENT)
Dept: ELECTROPHYSIOLOGY | Facility: CLINIC | Age: 65
End: 2025-04-25
Payer: COMMERCIAL

## 2025-04-25 ENCOUNTER — TELEPHONE (OUTPATIENT)
Dept: ELECTROPHYSIOLOGY | Facility: CLINIC | Age: 65
End: 2025-04-25
Payer: COMMERCIAL

## 2025-04-25 NOTE — TELEPHONE ENCOUNTER
Spoke to patient. Provided patient with Dr. Devlin's recommendation of holding Metoprolol until evaluation. Patient verbalized understanding. Patient reported symptoms of dizziness this morning. Prescribed Meclizine 25 mg tablet yesterday at the ER to be taken three times a day as needed. Patient reported relief with the medication. Advised patient to continue to monitor BP and HR. Informed patient scheduled for EKG one week post cardioversion on 4/29/25. Patient verbalized understanding and appreciated the call.   ----- Message from Chucho Devlin MD sent at 4/24/2025  1:40 PM CDT -----  Hold metoprolol until eval  ----- Message -----  From: Cheng Flores RN  Sent: 4/24/2025   1:28 PM CDT  To: Chucho Devlni MD    Pt s/p FABIENNE/DCCV on 4/22/25. Complaints today of dizziness/ labored breathing. BP 80/56 HR 68. Current Meds: Metoprolol 50 mg daily; Sotalol 80 mg BID, Eliquis 5 mg BID. Advised patient based on being symptomatic with low BP and labored breathing to go the the nearest ER for evaluation. Any further recommendations? Thanks, LISSA Anand

## 2025-04-25 NOTE — TELEPHONE ENCOUNTER
Spoke to patient this morning. Provided patient with Dr. Devlin's recommendation. And patient verbalized understanding. Review telephone encounter.

## 2025-04-28 LAB
OHS QRS DURATION: 64 MS
OHS QTC CALCULATION: 544 MS

## 2025-04-29 ENCOUNTER — HOSPITAL ENCOUNTER (OUTPATIENT)
Facility: HOSPITAL | Age: 65
Discharge: HOME | End: 2025-05-01
Attending: EMERGENCY MEDICINE | Admitting: INTERNAL MEDICINE
Payer: COMMERCIAL

## 2025-04-29 ENCOUNTER — HOSPITAL ENCOUNTER (OUTPATIENT)
Dept: CARDIOLOGY | Facility: CLINIC | Age: 65
Discharge: HOME | End: 2025-04-29
Payer: COMMERCIAL

## 2025-04-29 ENCOUNTER — TELEPHONE (OUTPATIENT)
Dept: ELECTROPHYSIOLOGY | Facility: CLINIC | Age: 65
End: 2025-04-29
Payer: COMMERCIAL

## 2025-04-29 ENCOUNTER — DOCUMENTATION ONLY (OUTPATIENT)
Dept: CARDIOLOGY | Facility: HOSPITAL | Age: 65
End: 2025-04-29
Payer: COMMERCIAL

## 2025-04-29 DIAGNOSIS — I48.91 ATRIAL FIBRILLATION STATUS POST CARDIOVERSION: ICD-10-CM

## 2025-04-29 DIAGNOSIS — R07.9 CHEST PAIN: ICD-10-CM

## 2025-04-29 DIAGNOSIS — R00.0 INCREASED HEART RATE: ICD-10-CM

## 2025-04-29 DIAGNOSIS — I48.19 PERSISTENT ATRIAL FIBRILLATION: ICD-10-CM

## 2025-04-29 DIAGNOSIS — I48.91 ATRIAL FIBRILLATION: ICD-10-CM

## 2025-04-29 DIAGNOSIS — I48.91 ATRIAL FIBRILLATION WITH RAPID VENTRICULAR RESPONSE: Primary | ICD-10-CM

## 2025-04-29 LAB
ABSOLUTE EOSINOPHIL (OHS): 0.22 K/UL
ABSOLUTE MONOCYTE (OHS): 1.04 K/UL (ref 0.3–1)
ABSOLUTE NEUTROPHIL COUNT (OHS): 4.5 K/UL (ref 1.8–7.7)
ALBUMIN SERPL BCP-MCNC: 3.9 G/DL (ref 3.5–5.2)
ALP SERPL-CCNC: 61 UNIT/L (ref 40–150)
ALT SERPL W/O P-5'-P-CCNC: 18 UNIT/L (ref 10–44)
ANION GAP (OHS): 11 MMOL/L (ref 8–16)
AST SERPL-CCNC: 27 UNIT/L (ref 11–45)
BASOPHILS # BLD AUTO: 0.11 K/UL
BASOPHILS NFR BLD AUTO: 1.4 %
BILIRUB SERPL-MCNC: 0.8 MG/DL (ref 0.1–1)
BNP SERPL-MCNC: 438 PG/ML (ref 0–99)
BUN SERPL-MCNC: 19 MG/DL (ref 8–23)
CALCIUM SERPL-MCNC: 8.7 MG/DL (ref 8.7–10.5)
CHLORIDE SERPL-SCNC: 105 MMOL/L (ref 95–110)
CO2 SERPL-SCNC: 23 MMOL/L (ref 23–29)
CREAT SERPL-MCNC: 1 MG/DL (ref 0.5–1.4)
ERYTHROCYTE [DISTWIDTH] IN BLOOD BY AUTOMATED COUNT: 13.2 % (ref 11.5–14.5)
GFR SERPLBLD CREATININE-BSD FMLA CKD-EPI: >60 ML/MIN/1.73/M2
GLUCOSE SERPL-MCNC: 93 MG/DL (ref 70–110)
HCT VFR BLD AUTO: 45.2 % (ref 40–54)
HCV AB SERPL QL IA: NORMAL
HGB BLD-MCNC: 15.8 GM/DL (ref 14–18)
HIV 1+2 AB+HIV1 P24 AG SERPL QL IA: NORMAL
IMM GRANULOCYTES # BLD AUTO: 0.04 K/UL (ref 0–0.04)
IMM GRANULOCYTES NFR BLD AUTO: 0.5 % (ref 0–0.5)
LYMPHOCYTES # BLD AUTO: 2.04 K/UL (ref 1–4.8)
MAGNESIUM SERPL-MCNC: 2.1 MG/DL (ref 1.6–2.6)
MCH RBC QN AUTO: 32.6 PG (ref 27–31)
MCHC RBC AUTO-ENTMCNC: 35 G/DL (ref 32–36)
MCV RBC AUTO: 93 FL (ref 82–98)
NUCLEATED RBC (/100WBC) (OHS): 0 /100 WBC
OHS QRS DURATION: 68 MS
OHS QRS DURATION: 72 MS
OHS QTC CALCULATION: 433 MS
OHS QTC CALCULATION: 525 MS
PLATELET # BLD AUTO: 162 K/UL (ref 150–450)
PMV BLD AUTO: 12.7 FL (ref 9.2–12.9)
POTASSIUM SERPL-SCNC: 4.2 MMOL/L (ref 3.5–5.1)
PROT SERPL-MCNC: 7.2 GM/DL (ref 6–8.4)
RBC # BLD AUTO: 4.85 M/UL (ref 4.6–6.2)
RELATIVE EOSINOPHIL (OHS): 2.8 %
RELATIVE LYMPHOCYTE (OHS): 25.7 % (ref 18–48)
RELATIVE MONOCYTE (OHS): 13.1 % (ref 4–15)
RELATIVE NEUTROPHIL (OHS): 56.5 % (ref 38–73)
SODIUM SERPL-SCNC: 139 MMOL/L (ref 136–145)
T4 FREE SERPL-MCNC: 0.94 NG/DL (ref 0.71–1.51)
TROPONIN I SERPL HS-MCNC: <3 NG/L
TSH SERPL-ACNC: 5.79 UIU/ML (ref 0.4–4)
WBC # BLD AUTO: 7.95 K/UL (ref 3.9–12.7)

## 2025-04-29 PROCEDURE — 93010 ELECTROCARDIOGRAM REPORT: CPT | Mod: ,,, | Performed by: INTERNAL MEDICINE

## 2025-04-29 PROCEDURE — 25000003 PHARM REV CODE 250: Performed by: NURSE PRACTITIONER

## 2025-04-29 PROCEDURE — 93010 ELECTROCARDIOGRAM REPORT: CPT | Mod: S$GLB,,, | Performed by: INTERNAL MEDICINE

## 2025-04-29 PROCEDURE — 80053 COMPREHEN METABOLIC PANEL: CPT | Performed by: EMERGENCY MEDICINE

## 2025-04-29 PROCEDURE — G0378 HOSPITAL OBSERVATION PER HR: HCPCS

## 2025-04-29 PROCEDURE — 93005 ELECTROCARDIOGRAM TRACING: CPT | Mod: S$GLB,,, | Performed by: INTERNAL MEDICINE

## 2025-04-29 PROCEDURE — 83735 ASSAY OF MAGNESIUM: CPT | Performed by: EMERGENCY MEDICINE

## 2025-04-29 PROCEDURE — 84443 ASSAY THYROID STIM HORMONE: CPT | Performed by: PHYSICIAN ASSISTANT

## 2025-04-29 PROCEDURE — 99285 EMERGENCY DEPT VISIT HI MDM: CPT | Mod: 25

## 2025-04-29 PROCEDURE — 83880 ASSAY OF NATRIURETIC PEPTIDE: CPT | Performed by: PHYSICIAN ASSISTANT

## 2025-04-29 PROCEDURE — 84439 ASSAY OF FREE THYROXINE: CPT | Performed by: PHYSICIAN ASSISTANT

## 2025-04-29 PROCEDURE — 84484 ASSAY OF TROPONIN QUANT: CPT | Performed by: EMERGENCY MEDICINE

## 2025-04-29 PROCEDURE — 85025 COMPLETE CBC W/AUTO DIFF WBC: CPT | Performed by: EMERGENCY MEDICINE

## 2025-04-29 PROCEDURE — 87389 HIV-1 AG W/HIV-1&-2 AB AG IA: CPT | Performed by: PHYSICIAN ASSISTANT

## 2025-04-29 PROCEDURE — 86803 HEPATITIS C AB TEST: CPT | Performed by: PHYSICIAN ASSISTANT

## 2025-04-29 PROCEDURE — 93005 ELECTROCARDIOGRAM TRACING: CPT

## 2025-04-29 RX ORDER — METOPROLOL SUCCINATE 50 MG/1
50 TABLET, EXTENDED RELEASE ORAL DAILY
Status: DISCONTINUED | OUTPATIENT
Start: 2025-04-30 | End: 2025-04-29

## 2025-04-29 RX ORDER — SOTALOL HYDROCHLORIDE 80 MG/1
80 TABLET ORAL 2 TIMES DAILY
Status: DISCONTINUED | OUTPATIENT
Start: 2025-04-29 | End: 2025-04-30

## 2025-04-29 RX ORDER — ACETAMINOPHEN 325 MG/1
650 TABLET ORAL EVERY 4 HOURS PRN
Status: DISCONTINUED | OUTPATIENT
Start: 2025-04-29 | End: 2025-05-01 | Stop reason: HOSPADM

## 2025-04-29 RX ORDER — ALPRAZOLAM 0.5 MG/1
0.5 TABLET ORAL NIGHTLY PRN
Status: DISCONTINUED | OUTPATIENT
Start: 2025-04-29 | End: 2025-05-01 | Stop reason: HOSPADM

## 2025-04-29 RX ORDER — NALOXONE HCL 0.4 MG/ML
0.02 VIAL (ML) INJECTION
Status: DISCONTINUED | OUTPATIENT
Start: 2025-04-29 | End: 2025-05-01 | Stop reason: HOSPADM

## 2025-04-29 RX ORDER — GLUCAGON 1 MG
1 KIT INJECTION
Status: DISCONTINUED | OUTPATIENT
Start: 2025-04-29 | End: 2025-05-01 | Stop reason: HOSPADM

## 2025-04-29 RX ORDER — RASAGILINE 1 MG/1
1 TABLET ORAL DAILY
Status: DISCONTINUED | OUTPATIENT
Start: 2025-04-30 | End: 2025-05-01 | Stop reason: HOSPADM

## 2025-04-29 RX ORDER — ONDANSETRON HYDROCHLORIDE 2 MG/ML
4 INJECTION, SOLUTION INTRAVENOUS EVERY 8 HOURS PRN
Status: DISCONTINUED | OUTPATIENT
Start: 2025-04-29 | End: 2025-05-01 | Stop reason: HOSPADM

## 2025-04-29 RX ORDER — PRAVASTATIN SODIUM 40 MG/1
80 TABLET ORAL NIGHTLY
Status: DISCONTINUED | OUTPATIENT
Start: 2025-04-30 | End: 2025-05-01 | Stop reason: HOSPADM

## 2025-04-29 RX ORDER — IBUPROFEN 200 MG
24 TABLET ORAL
Status: DISCONTINUED | OUTPATIENT
Start: 2025-04-29 | End: 2025-05-01 | Stop reason: HOSPADM

## 2025-04-29 RX ORDER — SOTALOL HYDROCHLORIDE 80 MG/1
80 TABLET ORAL
Status: DISCONTINUED | OUTPATIENT
Start: 2025-04-29 | End: 2025-04-29

## 2025-04-29 RX ORDER — SODIUM CHLORIDE 0.9 % (FLUSH) 0.9 %
10 SYRINGE (ML) INJECTION EVERY 12 HOURS PRN
Status: DISCONTINUED | OUTPATIENT
Start: 2025-04-29 | End: 2025-05-01 | Stop reason: HOSPADM

## 2025-04-29 RX ORDER — IBUPROFEN 200 MG
16 TABLET ORAL
Status: DISCONTINUED | OUTPATIENT
Start: 2025-04-29 | End: 2025-05-01 | Stop reason: HOSPADM

## 2025-04-29 RX ORDER — TALC
6 POWDER (GRAM) TOPICAL NIGHTLY PRN
Status: DISCONTINUED | OUTPATIENT
Start: 2025-04-29 | End: 2025-05-01 | Stop reason: HOSPADM

## 2025-04-29 RX ADMIN — SOTALOL HYDROCHLORIDE 80 MG: 80 TABLET ORAL at 10:04

## 2025-04-29 NOTE — PROGRESS NOTES
Pt here for EKG appt. Pt found to be in Afib RVR 90-130s. Pt complains of some chest tightness and lightheadedness. Pt had recent conversion on 4/22/2025. Spoke to Dr JAE Devlin's clinic nurse. Instructed to send pt to ED. Pt was transported to ED along with EKG tech in wheelchair.

## 2025-04-29 NOTE — ED PROVIDER NOTES
Encounter Date: 4/29/2025       History     Chief Complaint   Patient presents with    Chest Pain     Chest tightness and lightheadedness onset 1 hour, sent to ED by cardiology clinic, + cardiac hx     The history is provided by the patient and medical records. No  was used.     Adam Dalal is a 64 y.o. male with medical history of AFib (cardioversion 4/22), HLD, Hx of malignant melanoma, Parkinson's presenting to the ED with the chief complaint of chest pain.     Patient had a f/u ECG in cardiology clinic after cardioversion 7 days ago today that showed AFib RVR and he was referred to the ED for further evaluation. Reports he felt palpitations while sleeping last night and intermittently throughout the day. Denies chest pain, SOB, cough, lightheadedness, dizziness, syncopal episode. He is compliant with his Sotalol and Eliquis. Reports drinking a few glasses of wine and 1 beer last night.     Review of patient's allergies indicates:  No Known Allergies  Past Medical History:   Diagnosis Date    Anxiety     Atrial fibrillation     Esotropia of right eye 05/02/2013    Hearing deficit, bilateral     High cholesterol     Hyperlipidemia     Malignant melanoma of skin, unspecified 06/29/2022    in situ right lateral infraorbit    Melanoma 06/29/2022    in situ - right lateral infraorbital    Melanoma in situ of cheek     Parkinson's disease     Ringing in ear, bilateral      Past Surgical History:   Procedure Laterality Date    CATARACT EXTRACTION W/  INTRAOCULAR LENS IMPLANT Right 01/09/2017    Dr marin     CATARACT EXTRACTION W/  INTRAOCULAR LENS IMPLANT Left 01/30/2017    Dr. Marin    COLONOSCOPY N/A 7/7/2023    Procedure: COLONOSCOPY;  Surgeon: Arnaldo Neely MD;  Location: [facility] ENDOSCOPY;  Service: Endoscopy;  Laterality: N/A;  prep instructions sent to pt via portal -jose armando Amaya prep  pre call complete, stated he would have a ride -CE    DEEP BRAIN STIMULATOR PLACEMENT Left 1/2/2024     Procedure: INSERTION, DEEP BRAIN STIMULATOR ELECTRODE;  Surgeon: Roger Flores MD;  Location: [facility] OR [address] [place] [address];  Service: Neurosurgery;  Laterality: Left;  INSERT ELECTRODE LEFT SIDE FOR DEEP BRAIN STIMULATION/ GLOBUS PALLIDUS INTERNUS/ TEDS & SCD/MEDTRONICS, SHRAVAN DARTEZ.    ECHOCARDIOGRAM,TRANSESOPHAGEAL N/A 4/22/2025    Procedure: Transesophageal echo (FABIENNE) intra-procedure log documentation;  Surgeon: Curtis Peter MD;  Location: [facility] EP LAB;  Service: Cardiology;  Laterality: N/A;    HERNIA REPAIR      INSERTION OF DEEP BRAIN STIMULATOR GENERATOR Left 1/9/2024    Procedure: INSERTION, PULSE GENERATOR, DEEP BRAIN STIMULATOR;  Surgeon: Roger Flores MD;  Location: [facility] OR [address] [place] [address];  Service: Neurosurgery;  Laterality: Left;  PLACEMENT OF PULSE GENERATOR FOR DEEP BRAIN STIMULATION/ TEDS & SCD/MEDTRONICS, SHRAVAN DARTEZ    PLACEMENT OF FIDUCIAL SCREW INTO SPINE N/A 1/2/2024    Procedure: INSERTION, FIDUCIAL SCREW, SKULL;  Surgeon: Roger Flores MD;  Location: [facility] OR [address] [place] [address];  Service: Neurosurgery;  Laterality: N/A;  APPLICATION OF FIDUCIALS FOR DEEP BRAIN STIMULATION/ TEDS & SCD/MEDTRONIC/SHRAVAN DARTEZ    RECONSTRUCTION OF FACE Right 8/4/2022    Procedure: RECONSTRUCTION, FACE;  Surgeon: Yaneli Hickman MD;  Location: [facility] OR [address] [place] [address];  Service: ENT;  Laterality: Right;    STRABISMUS SURGERY  4yrs    right eye    TREATMENT OF CARDIAC ARRHYTHMIA N/A 4/22/2025    Procedure: Cardioversion or Defibrillation;  Surgeon: Chucho Devlin MD;  Location: [facility] EP LAB;  Service: Cardiology;  Laterality: N/A;  AF, FABIENNE, DCCV, MAC, GP, 3 Prep *Neurostimulator in situ*     Family History   Problem Relation Name Age of Onset    Arthritis Mother      Hearing loss Mother      Heart attack Father      Atrial fibrillation Sister 2     Sleep apnea Brother      Arthritis Brother      Cataracts Maternal Grandmother      Cataracts Paternal Grandmother      Amblyopia Neg Hx      Blindness Neg Hx      Glaucoma Neg Hx      Macular  degeneration Neg Hx      Retinal detachment Neg Hx      Strabismus Neg Hx      Melanoma Neg Hx       Social History[1]  Review of Systems   Constitutional:  Negative for fever.       Physical Exam     Initial Vitals [04/29/25 1544]   BP Pulse Resp Temp SpO2   (!) 113/93 98 18 98.4 °F (36.9 °C) 99 %      MAP       --         Physical Exam    Constitutional: He appears well-developed and well-nourished. He is not diaphoretic. He is easily aroused.   HENT:   Head: Normocephalic and atraumatic. Mouth/Throat: Oropharynx is clear and moist. No oropharyngeal exudate.   Eyes: EOM and lids are normal. Pupils are equal, round, and reactive to light. No scleral icterus.   Neck: Phonation normal. Neck supple. No stridor present.   Normal range of motion.  Cardiovascular:  A regularly irregular rhythm present.   Tachycardia present.         Pulmonary/Chest: Breath sounds normal. No stridor. No respiratory distress. He has no wheezes. He has no rales.   Abdominal: Abdomen is soft. He exhibits no distension. There is no abdominal tenderness. There is no rebound.   Musculoskeletal:         General: No tenderness or edema. Normal range of motion.      Cervical back: Normal range of motion and neck supple.     Neurological: He is alert, oriented to person, place, and time and easily aroused. He has normal strength. No sensory deficit.   Skin: Skin is warm and dry. No rash noted. No erythema.   Psychiatric: He has a normal mood and affect. His speech is normal.         ED Course   Procedures  Labs Reviewed   CBC WITH DIFFERENTIAL - Abnormal       Result Value    WBC 7.95      RBC 4.85      HGB 15.8      HCT 45.2      MCV 93      MCH 32.6 (*)     MCHC 35.0      RDW 13.2      Platelet Count 162      MPV 12.7      Nucleated RBC 0      Neut % 56.5      Lymph % 25.7      Mono % 13.1      Eos % 2.8      Basophil % 1.4      Imm Grans % 0.5      Neut # 4.50      Lymph # 2.04      Mono # 1.04 (*)     Eos # 0.22      Baso # 0.11      Imm Grans  # 0.04     B-TYPE NATRIURETIC PEPTIDE - Abnormal     (*)    TSH - Abnormal    TSH 5.788 (*)    COMPREHENSIVE METABOLIC PANEL - Normal    Sodium 139      Potassium 4.2      Chloride 105      CO2 23      Glucose 93      BUN 19      Creatinine 1.0      Calcium 8.7      Protein Total 7.2      Albumin 3.9      Bilirubin Total 0.8      ALP 61      AST 27      ALT 18      Anion Gap 11      eGFR >60     TROPONIN I HIGH SENSITIVITY - Normal    Troponin High Sensitive <3     MAGNESIUM - Normal    Magnesium  2.1     HEPATITIS C ANTIBODY - Normal    Hep C Ab Interp Non-Reactive     HIV 1 / 2 ANTIBODY - Normal    HIV 1/2 Ag/Ab Non-Reactive     T4, FREE - Normal    Free T4 0.94     CBC W/ AUTO DIFFERENTIAL    Narrative:     The following orders were created for panel order CBC auto differential.  Procedure                               Abnormality         Status                     ---------                               -----------         ------                     CBC with Differential[[id number]]       Abnormal            Final result                 Please view results for these tests on the individual orders.   HEP C VIRUS HOLD SPECIMEN     EKG Readings: (Independently Interpreted)   AFib . TWI in V2-6. Normal axis. No STEMI     ECG Results              EKG 12-lead (Final result)        Collection Time Result Time QRS Duration OHS QTC Calculation    04/29/25 15:39:53 04/29/25 15:51:43 72 525                     Final result by Interface, Lab In [facility] (04/29/25 15:51:49)                   Narrative:    Test Reason : R07.9,    Vent. Rate : 116 BPM     Atrial Rate :    BPM     P-R Int :    ms          QRS Dur :  72 ms      QT Int : 378 ms       P-R-T Axes :    -28   1 degrees    QTcB Int : 525 ms    Atrial fibrillation with rapid ventricular response  Low voltage QRS  Nonspecific ST and/or T wave abnormalities  Abnormal ECG  When compared with ECG of 29-Apr-2025 14:58,  No significant change was  found  Confirmed by Leandro Albert ([id number]) on 4/29/2025 3:51:41 PM    Referred By:            Confirmed By: Leandro Albert                                  Imaging Results    None          Medications   apixaban tablet 5 mg (has no administration in time range)   pravastatin tablet 80 mg (has no administration in time range)   rasagiline tablet Tab 1 mg (has no administration in time range)   sotaloL tablet 80 mg (80 mg Oral Given 4/29/25 2235)   sodium chloride 0.9% flush 10 mL (has no administration in time range)   naloxone 0.4 mg/mL injection 0.02 mg (has no administration in time range)   glucose chewable tablet 16 g (has no administration in time range)   glucose chewable tablet 24 g (has no administration in time range)   dextrose 50% injection 12.5 g (has no administration in time range)   dextrose 50% injection 25 g (has no administration in time range)   glucagon (human recombinant) injection 1 mg (has no administration in time range)   acetaminophen tablet 650 mg (has no administration in time range)   ondansetron injection 4 mg (has no administration in time range)   melatonin tablet 6 mg (has no administration in time range)   ALPRAZolam tablet 0.5 mg (has no administration in time range)     Medical Decision Making  64 y.o. male with medical history of AFib (cardioversion 4/22), HLD, Hx of malignant melanoma, Parkinson's presenting to the ED c/o palpitations. Had f/u ECG in cardiology clinic today which showed AFib RVR and he was sent to the ED for further evaluation.     DDx includes but not limited to cardiac arrhythmia, electrolyte disturbance, holiday heart, CHF, thyroid disease, ACS.    Amount and/or Complexity of Data Reviewed  External Data Reviewed: labs and notes.  Labs: ordered. Decision-making details documented in ED Course.  Radiology: ordered and independent interpretation performed.               ED Course as of 04/29/25 2252   Tue Apr 29, 2025   1850 TSH(!): 5.788  FT4 ordered [BA]    185 WBC: 7.95 [BA]   1850 Hemoglobin: 15.8 [BA]   1850 Hematocrit: 45.2 [BA]   0 Creatinine: 1.0 [BA]    Troponin I High Sensitivity: <3 [BA]    Magnesium : 2.1 [BA]    Discussed patient with Cardiology who will come see the patient. [BA]    Cardiology evaluated at bedside. Will plan for cardioversion tomorrow. Discussed with HM and placed in observation for further management. Patient expresses understanding and agreeable to the plan.  [BA]      ED Course User Index  [BA] Vikas Alex PA-C                           Clinical Impression:  Final diagnoses:  [R07.9] Chest pain  [I48.91] Atrial fibrillation with rapid ventricular response (Primary)          ED Disposition Condition    Observation                     [1]   Social History  Tobacco Use    Smoking status: Former     Current packs/day: 0.00     Average packs/day: 1 pack/day for 1 year (1.0 ttl pk-yrs)     Types: Cigarettes     Start date: 2005     Quit date: 2006     Years since quittin.3    Smokeless tobacco: Never   Substance Use Topics    Alcohol use: Yes     Alcohol/week: 14.0 standard drinks of alcohol     Types: 14 Glasses of wine per week    Drug use: No        Vikas Alex PA-C  [phone]

## 2025-04-29 NOTE — TELEPHONE ENCOUNTER
Called patient. Patient reports no dizziness since following Dr. Devlin's recommendation of stopping Metoprolol until follow up appointment post cardioversion. Patient appreciated the call.

## 2025-04-29 NOTE — ED TRIAGE NOTES
Pt. Is a 64 yr old  male presenting to the ED post cardioversion last week.  Pt. Was receiving EKG with some abnormalities following up cardioversion and was sent to the ED by MD.

## 2025-04-29 NOTE — FIRST PROVIDER EVALUATION
"Medical screening examination initiated.  I have conducted a focused provider triage encounter, findings are as follows:    Brief history of present illness:  64-year-old male with history of Parkinson's, AFib s/p ablation 1 week ago, sent by clinic after EKG showed recurrent AFib.  Also reports some mild chest tightness and episodic dizziness today, was seen for dizziness in ED 5 days ago with normal sinus rhythm then with bradycardia.    Vitals:    04/29/25 1544   BP: (!) 113/93   BP Location: Left arm   Pulse: 98   Resp: 18   Temp: 98.4 °F (36.9 °C)   TempSrc: Oral   SpO2: 99%   Weight: 104.3 kg (230 lb)   Height: 6' 1" (1.854 m)       Pertinent physical exam:  Resting comfortably, heart rate ranging from 90s to 110s    Brief workup plan:  ACS workup    Preliminary workup initiated; this workup will be continued and followed by the physician or advanced practice provider that is assigned to the patient when roomed.  "

## 2025-04-30 ENCOUNTER — ANESTHESIA EVENT (OUTPATIENT)
Dept: MEDSURG UNIT | Facility: HOSPITAL | Age: 65
End: 2025-04-30
Payer: COMMERCIAL

## 2025-04-30 ENCOUNTER — ANESTHESIA (OUTPATIENT)
Dept: MEDSURG UNIT | Facility: HOSPITAL | Age: 65
End: 2025-04-30
Payer: COMMERCIAL

## 2025-04-30 PROBLEM — F41.9 ANXIETY: Status: ACTIVE | Noted: 2025-04-30

## 2025-04-30 LAB
ABSOLUTE EOSINOPHIL (OHS): 0.23 K/UL
ABSOLUTE MONOCYTE (OHS): 0.89 K/UL (ref 0.3–1)
ABSOLUTE NEUTROPHIL COUNT (OHS): 3.86 K/UL (ref 1.8–7.7)
ALBUMIN SERPL BCP-MCNC: 3.5 G/DL (ref 3.5–5.2)
ALP SERPL-CCNC: 54 UNIT/L (ref 40–150)
ALT SERPL W/O P-5'-P-CCNC: 32 UNIT/L (ref 10–44)
ANION GAP (OHS): 10 MMOL/L (ref 8–16)
AST SERPL-CCNC: 22 UNIT/L (ref 11–45)
BASOPHILS # BLD AUTO: 0.11 K/UL
BASOPHILS NFR BLD AUTO: 1.5 %
BILIRUB SERPL-MCNC: 0.9 MG/DL (ref 0.1–1)
BUN SERPL-MCNC: 16 MG/DL (ref 8–23)
CALCIUM SERPL-MCNC: 8.8 MG/DL (ref 8.7–10.5)
CHLORIDE SERPL-SCNC: 105 MMOL/L (ref 95–110)
CO2 SERPL-SCNC: 25 MMOL/L (ref 23–29)
CREAT SERPL-MCNC: 0.8 MG/DL (ref 0.5–1.4)
ERYTHROCYTE [DISTWIDTH] IN BLOOD BY AUTOMATED COUNT: 13.2 % (ref 11.5–14.5)
GFR SERPLBLD CREATININE-BSD FMLA CKD-EPI: >60 ML/MIN/1.73/M2
GLUCOSE SERPL-MCNC: 99 MG/DL (ref 70–110)
HCT VFR BLD AUTO: 46.4 % (ref 40–54)
HGB BLD-MCNC: 15.4 GM/DL (ref 14–18)
IMM GRANULOCYTES # BLD AUTO: 0.03 K/UL (ref 0–0.04)
IMM GRANULOCYTES NFR BLD AUTO: 0.4 % (ref 0–0.5)
LYMPHOCYTES # BLD AUTO: 2.19 K/UL (ref 1–4.8)
MAGNESIUM SERPL-MCNC: 2 MG/DL (ref 1.6–2.6)
MCH RBC QN AUTO: 31.5 PG (ref 27–31)
MCHC RBC AUTO-ENTMCNC: 33.2 G/DL (ref 32–36)
MCV RBC AUTO: 95 FL (ref 82–98)
NUCLEATED RBC (/100WBC) (OHS): 0 /100 WBC
OHS QRS DURATION: 76 MS
OHS QRS DURATION: 78 MS
OHS QRS DURATION: 80 MS
OHS QTC CALCULATION: 391 MS
OHS QTC CALCULATION: 438 MS
OHS QTC CALCULATION: 468 MS
PLATELET # BLD AUTO: 168 K/UL (ref 150–450)
PMV BLD AUTO: 12.3 FL (ref 9.2–12.9)
POCT GLUCOSE: 117 MG/DL (ref 70–110)
POTASSIUM SERPL-SCNC: 4.5 MMOL/L (ref 3.5–5.1)
PROT SERPL-MCNC: 6.6 GM/DL (ref 6–8.4)
RBC # BLD AUTO: 4.89 M/UL (ref 4.6–6.2)
RELATIVE EOSINOPHIL (OHS): 3.1 %
RELATIVE LYMPHOCYTE (OHS): 30 % (ref 18–48)
RELATIVE MONOCYTE (OHS): 12.2 % (ref 4–15)
RELATIVE NEUTROPHIL (OHS): 52.8 % (ref 38–73)
SODIUM SERPL-SCNC: 140 MMOL/L (ref 136–145)
WBC # BLD AUTO: 7.31 K/UL (ref 3.9–12.7)

## 2025-04-30 PROCEDURE — 37000008 HC ANESTHESIA 1ST 15 MINUTES: Performed by: INTERNAL MEDICINE

## 2025-04-30 PROCEDURE — 92960 CARDIOVERSION ELECTRIC EXT: CPT | Performed by: INTERNAL MEDICINE

## 2025-04-30 PROCEDURE — 93005 ELECTROCARDIOGRAM TRACING: CPT

## 2025-04-30 PROCEDURE — 93010 ELECTROCARDIOGRAM REPORT: CPT | Mod: 76,,, | Performed by: INTERNAL MEDICINE

## 2025-04-30 PROCEDURE — 83735 ASSAY OF MAGNESIUM: CPT | Performed by: NURSE PRACTITIONER

## 2025-04-30 PROCEDURE — G0378 HOSPITAL OBSERVATION PER HR: HCPCS

## 2025-04-30 PROCEDURE — 93010 ELECTROCARDIOGRAM REPORT: CPT | Mod: ,,, | Performed by: INTERNAL MEDICINE

## 2025-04-30 PROCEDURE — 63600175 PHARM REV CODE 636 W HCPCS: Performed by: STUDENT IN AN ORGANIZED HEALTH CARE EDUCATION/TRAINING PROGRAM

## 2025-04-30 PROCEDURE — 36415 COLL VENOUS BLD VENIPUNCTURE: CPT | Performed by: NURSE PRACTITIONER

## 2025-04-30 PROCEDURE — 85025 COMPLETE CBC W/AUTO DIFF WBC: CPT | Performed by: NURSE PRACTITIONER

## 2025-04-30 PROCEDURE — 37000009 HC ANESTHESIA EA ADD 15 MINS: Performed by: INTERNAL MEDICINE

## 2025-04-30 PROCEDURE — 25000003 PHARM REV CODE 250: Performed by: STUDENT IN AN ORGANIZED HEALTH CARE EDUCATION/TRAINING PROGRAM

## 2025-04-30 PROCEDURE — 92960 CARDIOVERSION ELECTRIC EXT: CPT | Mod: ,,, | Performed by: INTERNAL MEDICINE

## 2025-04-30 PROCEDURE — 25000003 PHARM REV CODE 250: Performed by: NURSE PRACTITIONER

## 2025-04-30 PROCEDURE — 80053 COMPREHEN METABOLIC PANEL: CPT | Performed by: NURSE PRACTITIONER

## 2025-04-30 RX ORDER — LIDOCAINE HYDROCHLORIDE 20 MG/ML
INJECTION INTRAVENOUS
Status: DISCONTINUED | OUTPATIENT
Start: 2025-04-30 | End: 2025-04-30

## 2025-04-30 RX ORDER — MECLIZINE HYDROCHLORIDE 25 MG/1
25 TABLET ORAL 3 TIMES DAILY PRN
Status: DISCONTINUED | OUTPATIENT
Start: 2025-04-30 | End: 2025-05-01 | Stop reason: HOSPADM

## 2025-04-30 RX ORDER — ONDANSETRON HYDROCHLORIDE 2 MG/ML
4 INJECTION, SOLUTION INTRAVENOUS DAILY PRN
OUTPATIENT
Start: 2025-04-30

## 2025-04-30 RX ORDER — PROPOFOL 10 MG/ML
VIAL (ML) INTRAVENOUS
Status: DISCONTINUED | OUTPATIENT
Start: 2025-04-30 | End: 2025-04-30

## 2025-04-30 RX ORDER — METOPROLOL SUCCINATE 25 MG/1
25 TABLET, EXTENDED RELEASE ORAL DAILY
Status: DISCONTINUED | OUTPATIENT
Start: 2025-04-30 | End: 2025-05-01 | Stop reason: HOSPADM

## 2025-04-30 RX ORDER — FLECAINIDE ACETATE 100 MG/1
100 TABLET ORAL EVERY 12 HOURS
Status: DISCONTINUED | OUTPATIENT
Start: 2025-04-30 | End: 2025-05-01 | Stop reason: HOSPADM

## 2025-04-30 RX ORDER — GLUCAGON 1 MG
1 KIT INJECTION
OUTPATIENT
Start: 2025-04-30

## 2025-04-30 RX ADMIN — RASAGILINE 1 MG: 1 TABLET ORAL at 09:04

## 2025-04-30 RX ADMIN — FLECAINIDE ACETATE 100 MG: 100 TABLET ORAL at 05:04

## 2025-04-30 RX ADMIN — MECLIZINE HYDROCHLORIDE 25 MG: 25 TABLET ORAL at 12:04

## 2025-04-30 RX ADMIN — LIDOCAINE HYDROCHLORIDE 100 MG: 20 INJECTION INTRAVENOUS at 09:04

## 2025-04-30 RX ADMIN — LEVODOPA AND CARBIDOPA 4 CAPSULE: 95; 23.75 CAPSULE, EXTENDED RELEASE ORAL at 08:04

## 2025-04-30 RX ADMIN — PROPOFOL 30 MG: 10 INJECTION, EMULSION INTRAVENOUS at 09:04

## 2025-04-30 RX ADMIN — LEVODOPA AND CARBIDOPA 4 CAPSULE: 95; 23.75 CAPSULE, EXTENDED RELEASE ORAL at 11:04

## 2025-04-30 RX ADMIN — PROPOFOL 20 MG: 10 INJECTION, EMULSION INTRAVENOUS at 09:04

## 2025-04-30 RX ADMIN — APIXABAN 5 MG: 5 TABLET, FILM COATED ORAL at 09:04

## 2025-04-30 RX ADMIN — APIXABAN 5 MG: 5 TABLET, FILM COATED ORAL at 08:04

## 2025-04-30 RX ADMIN — LEVODOPA AND CARBIDOPA 4 CAPSULE: 95; 23.75 CAPSULE, EXTENDED RELEASE ORAL at 02:04

## 2025-04-30 RX ADMIN — PRAVASTATIN SODIUM 80 MG: 40 TABLET ORAL at 08:04

## 2025-04-30 NOTE — PROGRESS NOTES
[facility] Card[facility] Medicine  Progress Note    Patient Name: Adam Dalal  MRN: [id number]  Patient Class: OP- Observation   Admission Date: 4/29/2025  Length of Stay: 0 days  Attending Physician: Tyree Lamb MD  Primary Care Provider: Rashi Gee MD        Subjective     Principal Problem:Atrial fibrillation        HPI:  Adam Dalal is a 64 y.o. male with a PMHx of  Parkinson's s/p deep brain stimulator, HLD, symptomatic persistent Afib (follows with Dr. Devlin in EP) and recent cardioversion who presents to the ED from EP clinic for recurrent a fib. The patient reports noticing palpitations intermittently beginning last night and continued today. Does note mild chest tightness. Denies chest pain, SOB, cough, lightheadedness, dizziness, syncopal episode. He is compliant with his Sotalol and Eliquis. Since discharge he had some vertigo, so his Toprol was d/c'd.     In the ED, pt mildly tachycardic otherwise vitals stable, afebrile. CBC and CMP unremarkable. TSH 5.788 but free T4 WNL. BNP in process. HS troponin <3. EKG shows a fib w/ bpm. The patient received his home 80mg sotalol. EP consulted in the ED.    Overview/Hospital Course:  No notes on file    Interval History:  Patient underwent cardioversion.  No new complaints. Deep brain stimulator implanted to L chest wall, ? interfering with telemetry reading tachycardia. EP notified     Review of Systems   Unable to perform ROS: Other     Objective:     Vital Signs (Most Recent):  Temp: 97.8 °F (36.6 °C) (04/30/25 1113)  Pulse: (!) 121 (04/30/25 1231)  Resp: 15 (04/30/25 1113)  BP: 133/78 (04/30/25 1200)  SpO2: 98 % (04/30/25 1113) Vital Signs (24h Range):  Temp:  [97.3 °F (36.3 °C)-98.4 °F (36.9 °C)] 97.8 °F (36.6 °C)  Pulse:  [] 121  Resp:  [10-20] 15  SpO2:  [92 %-99 %] 98 %  BP: (104-139)/(64-95) 133/78     Weight: 101.8 kg (224 lb 6.9 oz)  Body mass index is 29.61 kg/m².    Intake/Output Summary (Last 24  hours) at 4/30/2025 1304  Last data filed at 4/30/2025 1118  Gross per 24 hour   Intake 300 ml   Output 899 ml   Net -599 ml         Physical Exam  Constitutional:       General: He is not in acute distress.     Appearance: He is obese.   HENT:      Head: Normocephalic.      Right Ear: External ear normal.      Left Ear: External ear normal.      Nose: Nose normal.   Cardiovascular:      Heart sounds: Normal heart sounds.   Pulmonary:      Breath sounds: Normal breath sounds.   Abdominal:      Palpations: Abdomen is soft.      Tenderness: There is no abdominal tenderness.   Skin:     General: Skin is warm.   Neurological:      General: No focal deficit present.      Mental Status: He is alert and oriented to person, place, and time.               Significant Labs: All pertinent labs within the past 24 hours have been reviewed.      Assessment & Plan  Atrial fibrillation  Patient has paroxysmal (<7 days) atrial fibrillation. Patient is currently in atrial fibrillation. [id number] Score: The patient doesn't have any registry metric data available. The patients heart rate in the last 24 hours is as follows:  Pulse  Min: 51  Max: 126     Antiarrhythmics  flecainide tablet 100 mg, Every 12 hours, Oral  metoprolol succinate (TOPROL-XL) 24 hr tablet 25 mg, Daily, Oral    Anticoagulants  apixaban tablet 5 mg, 2 times daily, Oral    Plan  - Replete lytes with a goal of K>4, Mg >2  - Patient is anticoagulated, see medications listed above.  - Patient's afib is currently controlled  - EP consulted in the ED.  - NPO after midnight for possible cardioversion.    4/30  Status post cardioversion.  On flecainide and metoprolol succinate per Cardiology, sotalol discontinued. ?  Deep brain stimulator affecting telemetry reading as its reporting tachycardia.  EP notified  Hyperlipidemia   Patient is chronically on statin.will continue for now. Monitor clinically. Last LDL was   Lab Results   Component Value Date    LDLCALC 131.4  04/21/2025     Parkinson's disease (tremor, stiffness, slow motion, unstable posture)  -Chronic issue. S/p deep brain stimulator  -Continue home carbidopa levodopa and rasagiline.   -Fall precautions.   Anxiety  -Chronic, stable.  - reviewed, continue home xanax.  VTE Risk Mitigation (From admission, onward)           Ordered     apixaban tablet 5 mg  2 times daily         04/29/25 1948     IP VTE HIGH RISK PATIENT  Once         04/29/25 1948     Place sequential compression device  Until discontinued         04/29/25 1948                    Discharge Planning   SHENG: 4/30/2025     Code Status: Full Code   Medical Readiness for Discharge Date:                            Tyree Lamb MD  Department of Hospital Medicine   [facility] Stepdown

## 2025-04-30 NOTE — CONSULTS
[facility] - Emergency Dept  Cardiac Electrophysiology  Consult Note    Admission Date: 4/29/2025  Code Status: Full Code   Attending Provider: Tyree Lamb MD  Consulting Provider: Connor M Gillies, DO  Principal Problem:Atrial fibrillation    Inpatient consult to Electrophysiology  Consult performed by: Gillies, Connor M, DO  Consult ordered by: Vikas Alex PA-C        Subjective:     Chief Complaint:  Palpitations     HPI:   Adam Dalal is a 64yoM with a hx of Parkinson's s/p deep brain stimulator, symptomatic persistent Afib (follows with Dr. Devlin in EP) and recent cardioversion who presents to the ED from EP clinic in atrial fibrillation again. He has palpitations which he feels more when he is resting, and he states these came back on in the past couple days. He was in the EP clinic for 1 week follow up after he had a successful FABIENNE DCCV on 4/22/25. After cardioversion, he was continued on Sotalol 80mg PO BID, Toprol, and Eliquis. Since discharge he had some vertigo, which his Toprol was d/c'd for and otherwise he is compliant with all of his meds. He typically doesn't miss doses and reports Eliquis compliance. On arrival to the ED, he is in rate-controlled AF. EP is consulted.    Past Medical History:   Diagnosis Date    Anxiety     Atrial fibrillation     Esotropia of right eye 05/02/2013    Hearing deficit, bilateral     High cholesterol     Hyperlipidemia     Malignant melanoma of skin, unspecified 06/29/2022    in situ right lateral infraorbit    Melanoma 06/29/2022    in situ - right lateral infraorbital    Melanoma in situ of cheek     Parkinson's disease     Ringing in ear, bilateral        Past Surgical History:   Procedure Laterality Date    CATARACT EXTRACTION W/  INTRAOCULAR LENS IMPLANT Right 01/09/2017    Dr marin     CATARACT EXTRACTION W/  INTRAOCULAR LENS IMPLANT Left 01/30/2017    Dr. Marin    COLONOSCOPY N/A 7/7/2023    Procedure: COLONOSCOPY;  Surgeon: Arnaldo Neely MD;  Location:  [facility];  Service: Endoscopy;  Laterality: N/A;  prep instructions sent to pt via [facility] prep  pre call complete, stated he would have a ride -CE    DEEP BRAIN STIMULATOR PLACEMENT Left 1/2/2024    Procedure: INSERTION, DEEP BRAIN STIMULATOR ELECTRODE;  Surgeon: Roger Flores MD;  Location: [facility] OR [address] [place] [address];  Service: Neurosurgery;  Laterality: Left;  INSERT ELECTRODE LEFT SIDE FOR DEEP BRAIN STIMULATION/ GLOBUS PALLIDUS INTERNUS/ TEDS & SCD/MEDTRONICS, SHRAVAN DARTEZ.    ECHOCARDIOGRAM,TRANSESOPHAGEAL N/A 4/22/2025    Procedure: Transesophageal echo (FABIENNE) intra-procedure log documentation;  Surgeon: Curtis Peter MD;  Location: [facility] EP LAB;  Service: Cardiology;  Laterality: N/A;    HERNIA REPAIR      INSERTION OF DEEP BRAIN STIMULATOR GENERATOR Left 1/9/2024    Procedure: INSERTION, PULSE GENERATOR, DEEP BRAIN STIMULATOR;  Surgeon: Roger Flores MD;  Location: [facility] OR [address] [place] [address];  Service: Neurosurgery;  Laterality: Left;  PLACEMENT OF PULSE GENERATOR FOR DEEP BRAIN STIMULATION/ TEDS & SCD/MEDTRONICS, SHRAVAN DARTEZ    PLACEMENT OF FIDUCIAL SCREW INTO SPINE N/A 1/2/2024    Procedure: INSERTION, FIDUCIAL SCREW, SKULL;  Surgeon: Roger Flores MD;  Location: [facility] OR [address] [place] [address];  Service: Neurosurgery;  Laterality: N/A;  APPLICATION OF FIDUCIALS FOR DEEP BRAIN STIMULATION/ TEDS & SCD/MEDTRONIC/SHRAVAN DARTEZ    RECONSTRUCTION OF FACE Right 8/4/2022    Procedure: RECONSTRUCTION, FACE;  Surgeon: Yaneli Hickman MD;  Location: [facility] OR [address] [place] [address];  Service: ENT;  Laterality: Right;    STRABISMUS SURGERY  4yrs    right eye    TREATMENT OF CARDIAC ARRHYTHMIA N/A 4/22/2025    Procedure: Cardioversion or Defibrillation;  Surgeon: Chucho Devlin MD;  Location: [facility] EP LAB;  Service: Cardiology;  Laterality: N/A;  AF, FABIENNE, DCCV, MAC, GP, 3 Prep *Neurostimulator in situ*       Review of patient's allergies indicates:  No Known Allergies    Current Facility-Administered Medications on File Prior to Encounter    Medication    0.9%  NaCl infusion    mupirocin 2 % ointment     Current Outpatient Medications on File Prior to Encounter   Medication Sig    ALPRAZolam (XANAX) 0.5 MG tablet TAKE 1 TABLET(0.5 MG) BY MOUTH EVERY NIGHT FOR INSOMNIA OR ANXIETY    apixaban (ELIQUIS) 5 mg Tab Take 1 tablet (5 mg total) by mouth 2 (two) times daily.    carbidopa-levodopa (RYTARY) 23.75-95 mg CpSR Take 4 capsules by mouth 3 (three) times daily.    carbidopa-levodopa  mg (SINEMET)  mg per tablet TAKE 1 TABLET BY MOUTH EVERY EVENING    carbidopa-levodopa  mg (SINEMET)  mg per tablet Take 2 tablets by mouth 5 (five) times daily.    meclizine (ANTIVERT) 25 mg tablet Take 1 tablet (25 mg total) by mouth 3 (three) times daily as needed.    metoprolol succinate (TOPROL-XL) 50 MG 24 hr tablet Take 1 tablet (50 mg total) by mouth once daily.    pravastatin (PRAVACHOL) 80 MG tablet Take 1 tablet (80 mg total) by mouth every evening.    PREVIDENT 5000 ENAMEL PROTECT 1.1-5 % Pste USE TO BRUSH TEETH TWICE DAILY    rasagiline (AZILECT) 1 mg Tab Take 1 tablet (1 mg total) by mouth once daily.    sotaloL (BETAPACE) 80 MG tablet Take 1 tablet (80 mg total) by mouth 2 (two) times daily. Start 3 days prior to cardioversion     Family History       Problem Relation (Age of Onset)    Arthritis Mother, Brother    Atrial fibrillation Sister    Cataracts Maternal Grandmother, Paternal Grandmother    Hearing loss Mother    Heart attack Father    Sleep apnea Brother          Tobacco Use    Smoking status: Former     Current packs/day: 0.00     Average packs/day: 1 pack/day for 1 year (1.0 ttl pk-yrs)     Types: Cigarettes     Start date: 2005     Quit date: 2006     Years since quittin.3    Smokeless tobacco: Never   Substance and Sexual Activity    Alcohol use: Yes     Alcohol/week: 14.0 standard drinks of alcohol     Types: 14 Glasses of wine per week    Drug use: No    Sexual activity: Yes     Partners: Female     Review  of Systems   Constitutional: Negative for diaphoresis and fever.   Cardiovascular:  Positive for palpitations. Negative for chest pain, dyspnea on exertion, leg swelling, near-syncope, orthopnea, paroxysmal nocturnal dyspnea and syncope.   Respiratory:  Negative for cough and shortness of breath.    Gastrointestinal:  Negative for abdominal pain, diarrhea, nausea and vomiting.   Neurological:  Negative for light-headedness.   Psychiatric/Behavioral:  Negative for altered mental status and substance abuse.      Objective:     Vital Signs (Most Recent):  Temp: 98.4 °F (36.9 °C) (04/29/25 1544)  Pulse: 98 (04/29/25 1544)  Resp: 18 (04/29/25 1544)  BP: (!) 113/93 (04/29/25 1544)  SpO2: 99 % (04/29/25 1544) Vital Signs (24h Range):  Temp:  [98.4 °F (36.9 °C)] 98.4 °F (36.9 °C)  Pulse:  [98] 98  Resp:  [18] 18  SpO2:  [99 %] 99 %  BP: (113)/(93) 113/93       Weight: 104.3 kg (230 lb)  Body mass index is 30.34 kg/m².    SpO2: 99 %        Physical Exam  Constitutional:       General: He is not in acute distress.     Appearance: Normal appearance. He is well-developed.   HENT:      Mouth/Throat:      Mouth: Mucous membranes are moist.      Pharynx: Oropharynx is clear.   Cardiovascular:      Rate and Rhythm: Normal rate. Rhythm irregular.      Heart sounds: Normal heart sounds. No murmur heard.  Pulmonary:      Effort: Pulmonary effort is normal. No respiratory distress.      Breath sounds: Normal breath sounds.   Abdominal:      Palpations: Abdomen is soft.      Tenderness: There is no abdominal tenderness. There is no guarding.   Musculoskeletal:         General: No swelling. Normal range of motion.      Right lower leg: No edema.      Left lower leg: No edema.   Skin:     General: Skin is warm and dry.   Neurological:      Mental Status: He is alert and oriented to person, place, and time.   Psychiatric:         Mood and Affect: Mood normal.         Behavior: Behavior normal.            Significant Labs: All pertinent lab  results from the last 24 hours have been reviewed.    Significant Imaging:  Reviewed     CHADS2 for A-FIB Stroke Risk  Age?: < 65 years old  CHF history: No  HTN history: No  Previous Stroke Sx or TIA: No  Vascular Disease History?: No  Diabetes Mellitus History: No  Female?: No  [id number] Total Score: 0      Assessment and Plan:     * Atrial fibrillation  64yoM with parkinson's (deep brain stimulator in place), persistent AF s/p FABIENNE DCCV on 4/22/25 is here with atrial fibrillation recurrence. No obvious trigger besides maybe a viral syndrome with vertigo this week? No obvious cardiac decompensation otherwise.    AC: Eliquis, complaint (last dose this evening)  AAD/AVN blockers: Sotalol 80mg PO BID. Toprol held earlier this week in the setting of vertigo  Presenting EKG: Afib, rate 113  Tele: Mostly rate controlled AFib    Narrow QRS  No structural or known coronary disease  No other prior AADs    Recommendations:  - Continue sotalol  - Continue Eliquis  - Plan for straight cardioversion in the morning given Eliquis compliance  - Continue telemetry (lots of noise due to brain stimulator, but some leads are interpretable)  - We will discuss adjustments to anti-arrhythmic meds in the AM        Thank you for your consult. I will follow-up with patient. Please contact us if you have any additional questions.    Connor M Gillies, DO  Cardiac Electrophysiology  [facility] - [facility] Dept

## 2025-04-30 NOTE — ASSESSMENT & PLAN NOTE
-Chronic issue. S/p deep brain stimulator  -Continue home carbidopa levodopa and rasagiline.   -Fall precautions.

## 2025-04-30 NOTE — PROGRESS NOTES
Pt remained stable during EP pacu stay. PT AAOX4. Following commands appropriately.. VSS. Patient denies pain. EKG done. No N&V. Pt tolerated a cup of water. Teds/SCD's in place throughout duration in PACU. Transferred to the next Phase of Care.

## 2025-04-30 NOTE — HPI
Adam Dalal is a 64yoM with a hx of Parkinson's s/p deep brain stimulator, symptomatic persistent Afib (follows with Dr. Devlin in EP) and recent cardioversion who presents to the ED from EP clinic in atrial fibrillation again. He has palpitations which he feels more when he is resting, and he states these came back on in the past couple days. He was in the EP clinic for 1 week follow up after he had a successful FABIENNE DCCV on 4/22/25. After cardioversion, he was continued on Sotalol 80mg PO BID, Toprol, and Eliquis. Since discharge he had some vertigo, which his Toprol was d/c'd for and otherwise he is compliant with all of his meds. He typically doesn't miss doses and reports Eliquis compliance. On arrival to the ED, he is in rate-controlled AF. EP is consulted.

## 2025-04-30 NOTE — ANESTHESIA POSTPROCEDURE EVALUATION
Anesthesia Post Evaluation    Patient: Adam Dalal    Procedure(s) Performed: Procedure(s) (LRB):  Cardioversion or Defibrillation (N/A)    Final Anesthesia Type: general      Patient location during evaluation: PACU  Patient participation: Yes- Able to Participate  Level of consciousness: awake and alert  Post-procedure vital signs: reviewed and stable  Pain management: adequate  Airway patency: patent  CHU mitigation strategies: Multimodal analgesia  PONV status at discharge: No PONV  Anesthetic complications: no      Cardiovascular status: stable  Respiratory status: unassisted and spontaneous ventilation  Hydration status: euvolemic  Follow-up not needed.              Vitals Value Taken Time   /78 04/30/25 12:00   Temp 36.6 °C (97.8 °F) 04/30/25 11:13   Pulse 121 04/30/25 12:31   Resp 15 04/30/25 11:13   SpO2 98 % 04/30/25 11:13         No case tracking events are documented in the log.      Pain/Bárbara Score: Pain Rating Prior to Med Admin: 0 (4/30/2025 10:30 AM)  Pain Rating Post Med Admin: 0 (4/30/2025  9:12 AM)  Bárbara Score: 10 (4/30/2025 10:30 AM)

## 2025-04-30 NOTE — ASSESSMENT & PLAN NOTE
Patient has paroxysmal (<7 days) atrial fibrillation. Patient is currently in atrial fibrillation. [id number] Score: The patient doesn't have any registry metric data available. The patients heart rate in the last 24 hours is as follows:  Pulse  Min: 98  Max: 98     Antiarrhythmics  metoprolol succinate (TOPROL-XL) 24 hr tablet 50 mg, Daily, Oral  sotaloL tablet 80 mg, 2 times daily, Oral    Anticoagulants  apixaban tablet 5 mg, 2 times daily, Oral    Plan  - Replete lytes with a goal of K>4, Mg >2  - Patient is anticoagulated, see medications listed above.  - Patient's afib is currently controlled  - EP consulted in the ED.  - NPO after midnight for possible cardioversion.

## 2025-04-30 NOTE — SUBJECTIVE & OBJECTIVE
Past Medical History:   Diagnosis Date    Anxiety     Atrial fibrillation     Esotropia of right eye 05/02/2013    Hearing deficit, bilateral     High cholesterol     Hyperlipidemia     Malignant melanoma of skin, unspecified 06/29/2022    in situ right lateral infraorbit    Melanoma 06/29/2022    in situ - right lateral infraorbital    Melanoma in situ of cheek     Parkinson's disease     Ringing in ear, bilateral        Past Surgical History:   Procedure Laterality Date    CATARACT EXTRACTION W/  INTRAOCULAR LENS IMPLANT Right 01/09/2017    Dr marin     CATARACT EXTRACTION W/  INTRAOCULAR LENS IMPLANT Left 01/30/2017    Dr. Marin    COLONOSCOPY N/A 7/7/2023    Procedure: COLONOSCOPY;  Surgeon: Arnaldo Neely MD;  Location: [facility] ENDOSCOPY;  Service: Endoscopy;  Laterality: N/A;  prep instructions sent to pt via portal -  [facility] prep  pre call complete, stated he would have a ride -CE    DEEP BRAIN STIMULATOR PLACEMENT Left 1/2/2024    Procedure: INSERTION, DEEP BRAIN STIMULATOR ELECTRODE;  Surgeon: Roger Flores MD;  Location: [address];  Service: Neurosurgery;  Laterality: Left;  INSERT ELECTRODE LEFT SIDE FOR DEEP BRAIN STIMULATION/ GLOBUS PALLIDUS INTERNUS/ TEDS & SCD/MEDTRONICS, SHRAVAN DARTEZ.    ECHOCARDIOGRAM,TRANSESOPHAGEAL N/A 4/22/2025    Procedure: Transesophageal echo (FABIENNE) intra-procedure log documentation;  Surgeon: Curtis Peter MD;  Location: [facility] EP LAB;  Service: Cardiology;  Laterality: N/A;    HERNIA REPAIR      INSERTION OF DEEP BRAIN STIMULATOR GENERATOR Left 1/9/2024    Procedure: INSERTION, PULSE GENERATOR, DEEP BRAIN STIMULATOR;  Surgeon: Roger Flores MD;  Location: [address];  Service: Neurosurgery;  Laterality: Left;  PLACEMENT OF PULSE GENERATOR FOR DEEP BRAIN STIMULATION/ TEDS & SCD/MEDTRONICS, SHRAVAN DARTEZ    PLACEMENT OF FIDUCIAL SCREW INTO SPINE N/A 1/2/2024    Procedure: INSERTION, FIDUCIAL SCREW, SKULL;  Surgeon: Roger Flores MD;  Location: [address]  [facility];  Service: Neurosurgery;  Laterality: N/A;  APPLICATION OF FIDUCIALS FOR DEEP BRAIN STIMULATION/ TEDS & SCD/MEDTRONIC/SHRAVAN MARTINES    RECONSTRUCTION OF FACE Right 8/4/2022    Procedure: RECONSTRUCTION, FACE;  Surgeon: Yaneli Hickman MD;  Location: [facility] OR [facility];  Service: ENT;  Laterality: Right;    STRABISMUS SURGERY  4yrs    right eye    TREATMENT OF CARDIAC ARRHYTHMIA N/A 4/22/2025    Procedure: Cardioversion or Defibrillation;  Surgeon: Chucho Devlin MD;  Location: [facility] EP LAB;  Service: Cardiology;  Laterality: N/A;  AF, FABIENNE, DCCV, MAC, GP, 3 Prep *Neurostimulator in situ*       Review of patient's allergies indicates:  No Known Allergies    Current Facility-Administered Medications on File Prior to Encounter   Medication    0.9%  NaCl infusion    mupirocin 2 % ointment     Current Outpatient Medications on File Prior to Encounter   Medication Sig    ALPRAZolam (XANAX) 0.5 MG tablet TAKE 1 TABLET(0.5 MG) BY MOUTH EVERY NIGHT FOR INSOMNIA OR ANXIETY    apixaban (ELIQUIS) 5 mg Tab Take 1 tablet (5 mg total) by mouth 2 (two) times daily.    carbidopa-levodopa (RYTARY) 23.75-95 mg CpSR Take 4 capsules by mouth 3 (three) times daily.    carbidopa-levodopa  mg (SINEMET)  mg per tablet TAKE 1 TABLET BY MOUTH EVERY EVENING    carbidopa-levodopa  mg (SINEMET)  mg per tablet Take 2 tablets by mouth 5 (five) times daily.    meclizine (ANTIVERT) 25 mg tablet Take 1 tablet (25 mg total) by mouth 3 (three) times daily as needed.    metoprolol succinate (TOPROL-XL) 50 MG 24 hr tablet Take 1 tablet (50 mg total) by mouth once daily.    pravastatin (PRAVACHOL) 80 MG tablet Take 1 tablet (80 mg total) by mouth every evening.    PREVIDENT 5000 ENAMEL PROTECT 1.1-5 % Pste USE TO BRUSH TEETH TWICE DAILY    rasagiline (AZILECT) 1 mg Tab Take 1 tablet (1 mg total) by mouth once daily.    sotaloL (BETAPACE) 80 MG tablet Take 1 tablet (80 mg total) by mouth 2 (two) times daily. Start 3 days prior to  cardioversion     Family History       Problem Relation (Age of Onset)    Arthritis Mother, Brother    Atrial fibrillation Sister    Cataracts Maternal Grandmother, Paternal Grandmother    Hearing loss Mother    Heart attack Father    Sleep apnea Brother          Tobacco Use    Smoking status: Former     Current packs/day: 0.00     Average packs/day: 1 pack/day for 1 year (1.0 ttl pk-yrs)     Types: Cigarettes     Start date: 2005     Quit date: 2006     Years since quittin.3    Smokeless tobacco: Never   Substance and Sexual Activity    Alcohol use: Yes     Alcohol/week: 14.0 standard drinks of alcohol     Types: 14 Glasses of wine per week    Drug use: No    Sexual activity: Yes     Partners: Female     Review of Systems   Constitutional:  Negative for activity change, appetite change, chills, diaphoresis, fatigue and fever.   HENT:  Negative for congestion, rhinorrhea and sore throat.    Eyes:  Negative for photophobia and visual disturbance.   Respiratory:  Positive for chest tightness. Negative for cough, shortness of breath and wheezing.    Cardiovascular:  Positive for palpitations. Negative for chest pain and leg swelling.   Gastrointestinal:  Negative for abdominal distention, abdominal pain, diarrhea, nausea and vomiting.   Genitourinary:  Negative for dysuria, frequency and hematuria.   Musculoskeletal:  Negative for back pain, myalgias and neck pain.   Skin:  Negative for color change, pallor, rash and wound.   Neurological:  Negative for syncope, weakness, light-headedness and headaches.   Psychiatric/Behavioral:  Negative for confusion and hallucinations. The patient is not nervous/anxious.      Objective:     Vital Signs (Most Recent):  Temp: 98.4 °F (36.9 °C) (25 1544)  Pulse: 98 (25 1544)  Resp: 18 (25 1544)  BP: (!) 113/93 (25 1544)  SpO2: 99 % (25 1544) Vital Signs (24h Range):  Temp:  [98.4 °F (36.9 °C)] 98.4 °F (36.9 °C)  Pulse:  [98] 98  Resp:  [18]  18  SpO2:  [99 %] 99 %  BP: (113)/(93) 113/93     Weight: 104.3 kg (230 lb)  Body mass index is 30.34 kg/m².     Physical Exam  Vitals and nursing note reviewed.   Constitutional:       General: He is not in acute distress.     Appearance: He is not toxic-appearing or diaphoretic.   HENT:      Head: Normocephalic and atraumatic.      Nose: Nose normal.      Mouth/Throat:      Mouth: Mucous membranes are moist.   Eyes:      Pupils: Pupils are equal, round, and reactive to light.   Cardiovascular:      Rate and Rhythm: Tachycardia present. Rhythm irregularly irregular.      Pulses: Normal pulses.      Heart sounds: No murmur heard.  Pulmonary:      Effort: Pulmonary effort is normal. No respiratory distress.      Breath sounds: No wheezing, rhonchi or rales.   Abdominal:      General: Bowel sounds are normal. There is no distension.      Palpations: Abdomen is soft.      Tenderness: There is no abdominal tenderness. There is no guarding.   Musculoskeletal:         General: Normal range of motion.      Cervical back: Normal range of motion.      Right lower leg: No edema.      Left lower leg: No edema.   Skin:     General: Skin is warm and dry.      Capillary Refill: Capillary refill takes less than 2 seconds.   Neurological:      General: No focal deficit present.      Mental Status: He is alert and oriented to person, place, and time.      Motor: No weakness.   Psychiatric:         Mood and Affect: Mood normal.         Behavior: Behavior normal.              CRANIAL NERVES     CN III, IV, VI   Pupils are equal, round, and reactive to light.       Significant Labs: All pertinent labs within the past 24 hours have been reviewed.  CBC:   Recent Labs   Lab 04/29/25  1645   WBC 7.95   HGB 15.8   HCT 45.2        CMP:   Recent Labs   Lab 04/29/25  1645      K 4.2      CO2 23   GLU 93   BUN 19   CREATININE 1.0   CALCIUM 8.7   PROT 7.2   ALBUMIN 3.9   BILITOT 0.8   ALKPHOS 61   AST 27   ALT 18   ANIONGAP 11      Magnesium:   Recent Labs   Lab 04/29/25  1645   MG 2.1     Troponin:   Recent Labs   Lab 04/29/25  1645   TROPONINIHS <3     TSH:   Recent Labs   Lab 04/29/25  1645   TSH 5.788*       Significant Imaging: I have reviewed all pertinent imaging results/findings within the past 24 hours.

## 2025-04-30 NOTE — TRANSFER OF CARE
"Anesthesia Transfer of Care Note    Patient: Adam Dalal    Procedure(s) Performed: Procedure(s) (LRB):  Cardioversion or Defibrillation (N/A)    Patient location: PACU    Anesthesia Type: general    Transport from OR: Transported from OR on 6-10 L/min O2 by face mask with adequate spontaneous ventilation    Post pain: adequate analgesia    Post assessment: no apparent anesthetic complications    Post vital signs: stable    Level of consciousness: sedated    Nausea/Vomiting: no nausea/vomiting    Complications: none    Transfer of care protocol was followed      Last vitals: Visit Vitals  /70 (BP Location: Right arm, Patient Position: Lying)   Pulse (!) 51   Temp 36.3 °C (97.3 °F) (Temporal)   Resp 20   Ht 6' 1" (1.854 m)   Wt 101.8 kg (224 lb 6.9 oz)   SpO2 97%   BMI 29.61 kg/m²     "

## 2025-04-30 NOTE — NURSING
Bp 112/68 map 82 Hr 59.Primary team and MD Brayden(cardiology)notified.6 pm scheduled dose of Metoprolol held per MARQUIS Lamb MD's order. No additional orders given at this time, plan of care ongoing.

## 2025-04-30 NOTE — SUBJECTIVE & OBJECTIVE
"Interval History: Patient was seen and evaluated at bedside. NAD and hemodynamically stable. No acute event reported overnight. Pt was recently FABIENNE/DCCV 4/22/25. However presented again with AF. Plan is to do straight cardioversion today.   Presenting EKG: Afib, rate 113  Tele: Mostly rate controlled Afib;     ROS  Objective:     Vital Signs (Most Recent):  Temp: 97.6 °F (36.4 °C) (04/30/25 0752)  Pulse: (!) 126 (04/30/25 0912)  Resp: 19 (04/30/25 0752)  BP: 139/83 (04/30/25 0752)  SpO2: 95 % (04/30/25 0752) Vital Signs (24h Range):  Temp:  [97.5 °F (36.4 °C)-98.4 °F (36.9 °C)] 97.6 °F (36.4 °C)  Pulse:  [] 126  Resp:  [16-19] 19  SpO2:  [94 %-99 %] 95 %  BP: (113-139)/(77-95) 139/83     Weight: 101.8 kg (224 lb 6.9 oz)  Body mass index is 29.61 kg/m².     SpO2: 95 %        Physical Exam  Constitutional:       Appearance: Normal appearance.   HENT:      Head: Atraumatic.   Cardiovascular:      Rate and Rhythm: Normal rate and regular rhythm.   Pulmonary:      Effort: Pulmonary effort is normal.      Breath sounds: Normal breath sounds.   Musculoskeletal:      Right lower leg: No edema.      Left lower leg: No edema.   Skin:     General: Skin is warm.   Neurological:      Mental Status: He is alert and oriented to person, place, and time.   Psychiatric:         Mood and Affect: Mood normal.         Behavior: Behavior normal.            Significant Labs:   Recent Labs   Lab 04/24/25  1507 04/29/25  1645 04/30/25  0503   WBC 7.78 7.95 7.31   HGB 13.6* 15.8 15.4   HCT 39.0* 45.2 46.4   * 162 168       Recent Labs   Lab 04/24/25  1507 04/29/25  1645 04/30/25  0503    139 140   K 4.4 4.2 4.5    105 105   CO2 25 23 25   BUN 21 19 16   CREATININE 1.1 1.0 0.8   CALCIUM 8.5* 8.7 8.8       Recent Labs   Lab 04/24/25  1507 04/29/25  1645 04/30/25  0503   ALKPHOS 61 61 54   BILITOT 1.0 0.8 0.9   PROT 7.0 7.2 6.6   ALT 15 18 32   AST 73* 27 22       No results for input(s): "CHOL", "TRIG", "HDL", "LDL" in " the last 168 hours.  Lab Results   Component Value Date    CHOL 208 (H) 04/21/2025    CHOL 194 04/19/2024    HDL 51 04/21/2025    LDLCALC 131.4 04/21/2025    TRIG 128 04/21/2025    TRIG 86 04/19/2024       Recent Labs   Lab 04/24/25  1507   TROPONINI <0.006       Lab Results   Component Value Date    HGBA1C 5.7 (H) 04/21/2025    HGBA1C 5.7 (H) 04/19/2024       Lab Results   Component Value Date     (H) 04/29/2025     (H) 04/24/2025           Significant Imaging:    Echo 3/31/25       FABIENNE for HEATHER assessment prior to DCCV; there is no evidence of intracardiac thrombus.    Left Atrium: Moderately dilated The left atrial appendage has a windsock morphology. Appendage velocity is normal at greater than 40 cm/sec. There is no thrombus in the left atrial appendage. The pulmonary veins have systolic blunting.    Left Ventricle: The left ventricle is normal in size. There is normal systolic function with a visually estimated ejection fraction of 55 - 60%.    Right Ventricle: The right ventricle is normal in size. Systolic function is normal.    Right Atrium: Right atrium is moderately dilated.    Aortic Valve: The aortic valve is a trileaflet valve. Mildly calcified cusps. There is mild annular calcification present.    Mitral Valve: There is mild regurgitation with a centrally directed jet.    Tricuspid Valve: There is mild regurgitation with a centrally directed jet.    Aorta: Grade 2 atherosclerosis of the descending aorta and in the aortic arch.

## 2025-04-30 NOTE — HPI
Adam Dalal is a 64 y.o. male with a PMHx of  Parkinson's s/p deep brain stimulator, HLD, symptomatic persistent Afib (follows with Dr. Devlin in EP) and recent cardioversion who presents to the ED from EP clinic for recurrent a fib. The patient reports noticing palpitations intermittently beginning last night and continued today. Does note mild chest tightness. Denies chest pain, SOB, cough, lightheadedness, dizziness, syncopal episode. He is compliant with his Sotalol and Eliquis. Since discharge he had some vertigo, so his Toprol was d/c'd.     In the ED, pt mildly tachycardic otherwise vitals stable, afebrile. CBC and CMP unremarkable. TSH 5.788 but free T4 WNL. BNP in process. HS troponin <3. EKG shows a fib w/ bpm. The patient received his home 80mg sotalol. EP consulted in the ED.

## 2025-04-30 NOTE — ASSESSMENT & PLAN NOTE
Patient is chronically on statin.will continue for now. Monitor clinically. Last LDL was   Lab Results   Component Value Date    LDLCALC 131.4 04/21/2025

## 2025-04-30 NOTE — NURSING TRANSFER
Nursing Transfer Note      4/30/2025   10:16 AM    Nurse giving handoff:Jose Luis thao Rn  Nurse receiving handoff: Sury Haynes    Reason patient is being transferred: postop    Transfer To: 354    Transfer via stretcher    Transfer with cardiac monitoring    Transported by transport    Transfer Vital Signs:  Blood Pressure:see flowsheet  Heart Rate:  O2:  Temperature:  Respirations:    Telemetry: yes  Order for Tele Monitor? Yes    Additional Lines: n/a    Medicines sent: n/a    Any special needs or follow-up needed:     Patient belongings transferred with patient: Yes, cell phone and stimulator remote    Chart send with patient: Yes    Notified: spouse via text    Patient reassessed at: 4/30/25  1  Upon arrival to floor: bed in lowest position

## 2025-04-30 NOTE — SUBJECTIVE & OBJECTIVE
Interval History:  Patient underwent cardioversion.  No new complaints. Deep brain stimulator implanted to L chest wall, ? interfering with telemetry reading tachycardia. EP notified     Review of Systems   Unable to perform ROS: Other     Objective:     Vital Signs (Most Recent):  Temp: 97.8 °F (36.6 °C) (04/30/25 1113)  Pulse: (!) 121 (04/30/25 1231)  Resp: 15 (04/30/25 1113)  BP: 133/78 (04/30/25 1200)  SpO2: 98 % (04/30/25 1113) Vital Signs (24h Range):  Temp:  [97.3 °F (36.3 °C)-98.4 °F (36.9 °C)] 97.8 °F (36.6 °C)  Pulse:  [] 121  Resp:  [10-20] 15  SpO2:  [92 %-99 %] 98 %  BP: (104-139)/(64-95) 133/78     Weight: 101.8 kg (224 lb 6.9 oz)  Body mass index is 29.61 kg/m².    Intake/Output Summary (Last 24 hours) at 4/30/2025 1304  Last data filed at 4/30/2025 1118  Gross per 24 hour   Intake 300 ml   Output 899 ml   Net -599 ml         Physical Exam  Constitutional:       General: He is not in acute distress.     Appearance: He is obese.   HENT:      Head: Normocephalic.      Right Ear: External ear normal.      Left Ear: External ear normal.      Nose: Nose normal.   Cardiovascular:      Heart sounds: Normal heart sounds.   Pulmonary:      Breath sounds: Normal breath sounds.   Abdominal:      Palpations: Abdomen is soft.      Tenderness: There is no abdominal tenderness.   Skin:     General: Skin is warm.   Neurological:      General: No focal deficit present.      Mental Status: He is alert and oriented to person, place, and time.               Significant Labs: All pertinent labs within the past 24 hours have been reviewed.

## 2025-04-30 NOTE — NURSING
Telemetry alarming V-tach with a HR as high as 180's after patient returned from EP s/p a successful cardioversion, converting to sinus bradycardia. Patient has a deep brain stimulator implanted to L chest wall that is known for interfering with telemetry readings. STAT EKG ordered due to v-tach/ increased HR readings. Portable SpO2 and cassius map HR readings correlating with one another at 84 and 57. EKG confirmed sinus bradycardia with a rate of 56. DMD Mariaa notified. No additional orders given at this time, plan of care ongoing.

## 2025-04-30 NOTE — PLAN OF CARE
Initial Discharge Planning Assessment:  Patient admitted on: 4/29/25     Chart reviewed, Care plan discussed with treatment team,  attending Dr. Lamb     PCP updated in Epic: Dr. Farris  Pharmacy, updated in Epic: Bedside      DME at home: [facility]       Current dispo: Home with family       Transportation: Family can provide      Power of  or Living Will: Wife      Anticipated DC needs from CM perspective:          Discharge Plan A and Plan B have been determined by review of patient's clinical status, future medical and therapeutic needs, and coverage/benefits for post-acute care in coordination with multidisciplinary team members.        [facility] Stepdown  Initial Discharge Assessment       Primary Care Provider: Rashi Gee MD    Admission Diagnosis: Atrial fibrillation with rapid ventricular response [I48.91]  Chest pain [R07.9]    Admission Date: 4/29/2025  Expected Discharge Date: 4/30/2025    Transition of Care Barriers: (P) None    Payor: BLUE CROSS BLUE SHIELD / Plan: BCBS OF [facility] LOCAL PLUS / Product Type: Commercial /     Extended Emergency Contact Information  Primary Emergency Contact: Berenice Sharp  Address: [address] [place] [address] .           [address]  Home Phone: [phone]  Mobile Phone: [phone]  Relation: Spouse    Discharge Plan A: (P) Home with family  Discharge Plan B: (P) Home with family, Home Health      [id number] - [place], [place] - [address]  [address]  [facility] [address]  Phone: [phone] Fax: [phone]    [facility] - [place], [place] - [address] [address] [facility] [address] [address]  [address] [address]  [address]  [facility] [address]  Phone: [phone] Fax: [phone]    [facility] - [place], [place] - [address]  [address]  [address]  [facility] [address]  Phone: [phone] Fax: [phone]      Initial  Assessment (most recent)       Adult Discharge Assessment - 04/30/25 1440          Discharge Assessment    Assessment Type Discharge Planning Assessment     Confirmed/corrected address, phone number and insurance Yes     Confirmed Demographics Correct on Facesheet     Source of Information patient;family;health record     People in Home spouse     Do you expect to return to your current living situation? Yes     Do you have help at home or someone to help you manage your care at home? Yes     Prior to hospitilization cognitive status: Alert/Oriented     Current cognitive status: Alert/Oriented     Walking or Climbing Stairs Difficulty no     Dressing/Bathing Difficulty no     Equipment Currently Used at Home cane, straight (P)      Readmission within 30 days? Yes   4/2/24    Patient currently being followed by outpatient case management? No     Do you currently have service(s) that help you manage your care at home? No     Do you take prescription medications? Yes     Do you have prescription coverage? Yes     Do you have any problems affording any of your prescribed medications? No (P)      Is the patient taking medications as prescribed? yes (P)      How do you get to doctors appointments? car, drives self;family or friend will provide (P)      Are you on dialysis? No (P)      Do you take coumadin? No (P)      Discharge Plan A Home with family (P)      Discharge Plan B Home with family;Home Health (P)      DME Needed Upon Discharge  none (P)      Discharge Plan discussed with: Patient (P)      Transition of Care Barriers None (P)

## 2025-04-30 NOTE — PROGRESS NOTES
[facility]  Cardiac Electrophysiology  Progress Note    Admission Date: 4/29/2025  Code Status: Full Code   Attending Physician: Tyree Lamb MD   Expected Discharge Date: 4/30/2025  Principal Problem:Atrial fibrillation    Subjective:     Interval History: Patient was seen and evaluated at bedside. NAD and hemodynamically stable. No acute event reported overnight. Pt was recently FABIENNE/DCCV 4/22/25. However presented again with AF. Plan is to do straight cardioversion today.   Presenting EKG: Afib, rate 113  Tele: Mostly rate controlled Afib;     ROS  Objective:     Vital Signs (Most Recent):  Temp: 97.6 °F (36.4 °C) (04/30/25 0752)  Pulse: (!) 126 (04/30/25 0912)  Resp: 19 (04/30/25 0752)  BP: 139/83 (04/30/25 0752)  SpO2: 95 % (04/30/25 0752) Vital Signs (24h Range):  Temp:  [97.5 °F (36.4 °C)-98.4 °F (36.9 °C)] 97.6 °F (36.4 °C)  Pulse:  [] 126  Resp:  [16-19] 19  SpO2:  [94 %-99 %] 95 %  BP: (113-139)/(77-95) 139/83     Weight: 101.8 kg (224 lb 6.9 oz)  Body mass index is 29.61 kg/m².     SpO2: 95 %        Physical Exam  Constitutional:       Appearance: Normal appearance.   HENT:      Head: Atraumatic.   Cardiovascular:      Rate and Rhythm: Normal rate and regular rhythm.   Pulmonary:      Effort: Pulmonary effort is normal.      Breath sounds: Normal breath sounds.   Musculoskeletal:      Right lower leg: No edema.      Left lower leg: No edema.   Skin:     General: Skin is warm.   Neurological:      Mental Status: He is alert and oriented to person, place, and time.   Psychiatric:         Mood and Affect: Mood normal.         Behavior: Behavior normal.            Significant Labs:   Recent Labs   Lab 04/24/25  1507 04/29/25  1645 04/30/25  0503   WBC 7.78 7.95 7.31   HGB 13.6* 15.8 15.4   HCT 39.0* 45.2 46.4   * 162 168       Recent Labs   Lab 04/24/25  1507 04/29/25  1645 04/30/25  0503    139 140   K 4.4 4.2 4.5    105 105   CO2 25 23 25   BUN 21 19 16   CREATININE 1.1 1.0  "0.8   CALCIUM 8.5* 8.7 8.8       Recent Labs   Lab 04/24/25  1507 04/29/25  1645 04/30/25  0503   ALKPHOS 61 61 54   BILITOT 1.0 0.8 0.9   PROT 7.0 7.2 6.6   ALT 15 18 32   AST 73* 27 22       No results for input(s): "CHOL", "TRIG", "HDL", "LDL" in the last 168 hours.  Lab Results   Component Value Date    CHOL 208 (H) 04/21/2025    CHOL 194 04/19/2024    HDL 51 04/21/2025    LDLCALC 131.4 04/21/2025    TRIG 128 04/21/2025    TRIG 86 04/19/2024       Recent Labs   Lab 04/24/25  1507   TROPONINI <0.006       Lab Results   Component Value Date    HGBA1C 5.7 (H) 04/21/2025    HGBA1C 5.7 (H) 04/19/2024       Lab Results   Component Value Date     (H) 04/29/2025     (H) 04/24/2025           Significant Imaging:    Echo 3/31/25       FABIENNE for HEATHER assessment prior to DCCV; there is no evidence of intracardiac thrombus.    Left Atrium: Moderately dilated The left atrial appendage has a windsock morphology. Appendage velocity is normal at greater than 40 cm/sec. There is no thrombus in the left atrial appendage. The pulmonary veins have systolic blunting.    Left Ventricle: The left ventricle is normal in size. There is normal systolic function with a visually estimated ejection fraction of 55 - 60%.    Right Ventricle: The right ventricle is normal in size. Systolic function is normal.    Right Atrium: Right atrium is moderately dilated.    Aortic Valve: The aortic valve is a trileaflet valve. Mildly calcified cusps. There is mild annular calcification present.    Mitral Valve: There is mild regurgitation with a centrally directed jet.    Tricuspid Valve: There is mild regurgitation with a centrally directed jet.    Aorta: Grade 2 atherosclerosis of the descending aorta and in the aortic arch.    Assessment and Plan:     * Atrial fibrillation  64yoM with parkinson's (deep brain stimulator in place), persistent AF s/p FABIENNE DCCV on 4/22/25 is here with atrial fibrillation recurrence. No obvious trigger besides " maybe a viral syndrome with vertigo this week? No obvious cardiac decompensation otherwise.    AC: Eliquis, complaint (last dose this evening)  AAD/AVN blockers: Sotalol 80mg PO BID. Toprol held earlier this week in the setting of vertigo  Presenting EKG: Afib, rate 113  Tele: Mostly rate controlled AFib    Narrow QRS  No structural or known coronary disease  No other prior AADs    Recommendations:  - STOP  sotalol  - Continue Eliquis 5 mg BID   - S/p straight cardioversion in the morning given Eliquis compliance  - Continue telemetry (lots of noise due to brain stimulator, but some leads are interpretable)  - Start Flecainide 100 mg BID; starting around 6 pm   - Start metoprolol succinate 25 mg qd       Thank you for consulting our service; attending attestation will follow; please review it.     Eleni Nunez MD  Cardiac Electrophysiology  [facility]

## 2025-04-30 NOTE — H&P
[facility] - Emergency Dept  [facility] Medicine  History & Physical    Patient Name: Adam Dalal  MRN: [id number]  Patient Class: OP- Observation  Admission Date: 4/29/2025  Attending Physician: Tyree Lamb MD   Primary Care Provider: Rashi Gee MD         Patient information was obtained from patient, past medical records, and ER records.     Subjective:     Principal Problem:Atrial fibrillation    Chief Complaint:   Chief Complaint   Patient presents with    Chest Pain     Chest tightness and lightheadedness onset 1 hour, sent to ED by cardiology clinic, + cardiac hx        HPI: Adam Dalal is a 64 y.o. male with a PMHx of  Parkinson's s/p deep brain stimulator, HLD, symptomatic persistent Afib (follows with Dr. Deviln in EP) and recent cardioversion who presents to the ED from EP clinic for recurrent a fib. The patient reports noticing palpitations intermittently beginning last night and continued today. Does note mild chest tightness. Denies chest pain, SOB, cough, lightheadedness, dizziness, syncopal episode. He is compliant with his Sotalol and Eliquis. Since discharge he had some vertigo, so his Toprol was d/c'd.     In the ED, pt mildly tachycardic otherwise vitals stable, afebrile. CBC and CMP unremarkable. TSH 5.788 but free T4 WNL. BNP in process. HS troponin <3. EKG shows a fib w/ bpm. The patient received his home 80mg sotalol. EP consulted in the ED.    Past Medical History:   Diagnosis Date    Anxiety     Atrial fibrillation     Esotropia of right eye 05/02/2013    Hearing deficit, bilateral     High cholesterol     Hyperlipidemia     Malignant melanoma of skin, unspecified 06/29/2022    in situ right lateral infraorbit    Melanoma 06/29/2022    in situ - right lateral infraorbital    Melanoma in situ of cheek     Parkinson's disease     Ringing in ear, bilateral        Past Surgical History:   Procedure Laterality Date    CATARACT EXTRACTION W/  INTRAOCULAR LENS IMPLANT  Right 01/09/2017    Dr marin     CATARACT EXTRACTION W/  INTRAOCULAR LENS IMPLANT Left 01/30/2017    Dr. Marin    COLONOSCOPY N/A 7/7/2023    Procedure: COLONOSCOPY;  Surgeon: Arnaldo Neely MD;  Location: [facility] ENDOSCOPY;  Service: Endoscopy;  Laterality: N/A;  [facility]  pre call complete, stated he would have a ride -CE    DEEP BRAIN STIMULATOR PLACEMENT Left 1/2/2024    Procedure: INSERTION, DEEP BRAIN STIMULATOR ELECTRODE;  Surgeon: Roger Flores MD;  [facility];  Service: Neurosurgery;  Laterality: Left;  INSERT ELECTRODE LEFT SIDE FOR DEEP BRAIN STIMULATION/ GLOBUS PALLIDUS INTERNUS/ TEDS & SCD/MEDTRONICS, SHRAVAN DARTEZ.    ECHOCARDIOGRAM,TRANSESOPHAGEAL N/A 4/22/2025    Procedure: Transesophageal echo (FABIENNE) intra-procedure log documentation;  Surgeon: Curtis Peter MD;  Location: [facility] EP LAB;  Service: Cardiology;  Laterality: N/A;    HERNIA REPAIR      INSERTION OF DEEP BRAIN STIMULATOR GENERATOR Left 1/9/2024    Procedure: INSERTION, PULSE GENERATOR, DEEP BRAIN STIMULATOR;  Surgeon: Roger Flores MD;  Location: [facility] OR [address] [place] [address];  Service: Neurosurgery;  Laterality: Left;  PLACEMENT OF PULSE GENERATOR FOR DEEP BRAIN STIMULATION/ TEDS & SCD/MEDTRONICS, SHRAVAN DARTEZ    PLACEMENT OF FIDUCIAL SCREW INTO SPINE N/A 1/2/2024    Procedure: INSERTION, FIDUCIAL SCREW, SKULL;  Surgeon: Roger Flores MD;  Location: [facility] OR [address] [place] [address];  Service: Neurosurgery;  Laterality: N/A;  APPLICATION OF FIDUCIALS FOR DEEP BRAIN STIMULATION/ TEDS & SCD/MEDTRONIC/SHRAVAN DARTEZ    RECONSTRUCTION OF FACE Right 8/4/2022    Procedure: RECONSTRUCTION, FACE;  Surgeon: Yaneli Hickman MD;  [facility];  Service: ENT;  Laterality: Right;    STRABISMUS SURGERY  4yrs    right eye    TREATMENT OF CARDIAC ARRHYTHMIA N/A 4/22/2025    Procedure: Cardioversion or Defibrillation;  Surgeon: Chucho Devlin MD;  Location: [facility] EP LAB;  Service: Cardiology;   Laterality: N/A;  AF, FABIENNE, DCCV, MAC, GP, 3 Prep *Neurostimulator in situ*       Review of patient's allergies indicates:  No Known Allergies    Current Facility-Administered Medications on File Prior to Encounter   Medication    0.9%  NaCl infusion    mupirocin 2 % ointment     Current Outpatient Medications on File Prior to Encounter   Medication Sig    ALPRAZolam (XANAX) 0.5 MG tablet TAKE 1 TABLET(0.5 MG) BY MOUTH EVERY NIGHT FOR INSOMNIA OR ANXIETY    apixaban (ELIQUIS) 5 mg Tab Take 1 tablet (5 mg total) by mouth 2 (two) times daily.    carbidopa-levodopa (RYTARY) 23.75-95 mg CpSR Take 4 capsules by mouth 3 (three) times daily.    carbidopa-levodopa  mg (SINEMET)  mg per tablet TAKE 1 TABLET BY MOUTH EVERY EVENING    carbidopa-levodopa  mg (SINEMET)  mg per tablet Take 2 tablets by mouth 5 (five) times daily.    meclizine (ANTIVERT) 25 mg tablet Take 1 tablet (25 mg total) by mouth 3 (three) times daily as needed.    metoprolol succinate (TOPROL-XL) 50 MG 24 hr tablet Take 1 tablet (50 mg total) by mouth once daily.    pravastatin (PRAVACHOL) 80 MG tablet Take 1 tablet (80 mg total) by mouth every evening.    PREVIDENT 5000 ENAMEL PROTECT 1.1-5 % Pste USE TO BRUSH TEETH TWICE DAILY    rasagiline (AZILECT) 1 mg Tab Take 1 tablet (1 mg total) by mouth once daily.    sotaloL (BETAPACE) 80 MG tablet Take 1 tablet (80 mg total) by mouth 2 (two) times daily. Start 3 days prior to cardioversion     Family History       Problem Relation (Age of Onset)    Arthritis Mother, Brother    Atrial fibrillation Sister    Cataracts Maternal Grandmother, Paternal Grandmother    Hearing loss Mother    Heart attack Father    Sleep apnea Brother          Tobacco Use    Smoking status: Former     Current packs/day: 0.00     Average packs/day: 1 pack/day for 1 year (1.0 ttl pk-yrs)     Types: Cigarettes     Start date: 2005     Quit date: 2006     Years since quittin.3    Smokeless tobacco: Never    Substance and Sexual Activity    Alcohol use: Yes     Alcohol/week: 14.0 standard drinks of alcohol     Types: 14 Glasses of wine per week    Drug use: No    Sexual activity: Yes     Partners: Female     Review of Systems   Constitutional:  Negative for activity change, appetite change, chills, diaphoresis, fatigue and fever.   HENT:  Negative for congestion, rhinorrhea and sore throat.    Eyes:  Negative for photophobia and visual disturbance.   Respiratory:  Positive for chest tightness. Negative for cough, shortness of breath and wheezing.    Cardiovascular:  Positive for palpitations. Negative for chest pain and leg swelling.   Gastrointestinal:  Negative for abdominal distention, abdominal pain, diarrhea, nausea and vomiting.   Genitourinary:  Negative for dysuria, frequency and hematuria.   Musculoskeletal:  Negative for back pain, myalgias and neck pain.   Skin:  Negative for color change, pallor, rash and wound.   Neurological:  Negative for syncope, weakness, light-headedness and headaches.   Psychiatric/Behavioral:  Negative for confusion and hallucinations. The patient is not nervous/anxious.      Objective:     Vital Signs (Most Recent):  Temp: 98.4 °F (36.9 °C) (04/29/25 1544)  Pulse: 98 (04/29/25 1544)  Resp: 18 (04/29/25 1544)  BP: (!) 113/93 (04/29/25 1544)  SpO2: 99 % (04/29/25 1544) Vital Signs (24h Range):  Temp:  [98.4 °F (36.9 °C)] 98.4 °F (36.9 °C)  Pulse:  [98] 98  Resp:  [18] 18  SpO2:  [99 %] 99 %  BP: (113)/(93) 113/93     Weight: 104.3 kg (230 lb)  Body mass index is 30.34 kg/m².     Physical Exam  Vitals and nursing note reviewed.   Constitutional:       General: He is not in acute distress.     Appearance: He is not toxic-appearing or diaphoretic.   HENT:      Head: Normocephalic and atraumatic.      Nose: Nose normal.      Mouth/Throat:      Mouth: Mucous membranes are moist.   Eyes:      Pupils: Pupils are equal, round, and reactive to light.   Cardiovascular:      Rate and Rhythm:  Tachycardia present. Rhythm irregularly irregular.      Pulses: Normal pulses.      Heart sounds: No murmur heard.  Pulmonary:      Effort: Pulmonary effort is normal. No respiratory distress.      Breath sounds: No wheezing, rhonchi or rales.   Abdominal:      General: Bowel sounds are normal. There is no distension.      Palpations: Abdomen is soft.      Tenderness: There is no abdominal tenderness. There is no guarding.   Musculoskeletal:         General: Normal range of motion.      Cervical back: Normal range of motion.      Right lower leg: No edema.      Left lower leg: No edema.   Skin:     General: Skin is warm and dry.      Capillary Refill: Capillary refill takes less than 2 seconds.   Neurological:      General: No focal deficit present.      Mental Status: He is alert and oriented to person, place, and time.      Motor: No weakness.   Psychiatric:         Mood and Affect: Mood normal.         Behavior: Behavior normal.              CRANIAL NERVES     CN III, IV, VI   Pupils are equal, round, and reactive to light.       Significant Labs: All pertinent labs within the past 24 hours have been reviewed.  CBC:   Recent Labs   Lab 04/29/25  1645   WBC 7.95   HGB 15.8   HCT 45.2        CMP:   Recent Labs   Lab 04/29/25  1645      K 4.2      CO2 23   GLU 93   BUN 19   CREATININE 1.0   CALCIUM 8.7   PROT 7.2   ALBUMIN 3.9   BILITOT 0.8   ALKPHOS 61   AST 27   ALT 18   ANIONGAP 11     Magnesium:   Recent Labs   Lab 04/29/25  1645   MG 2.1     Troponin:   Recent Labs   Lab 04/29/25  1645   TROPONINIHS <3     TSH:   Recent Labs   Lab 04/29/25  1645   TSH 5.788*       Significant Imaging: I have reviewed all pertinent imaging results/findings within the past 24 hours.    Assessment/Plan:     Assessment & Plan  Atrial fibrillation  Patient has paroxysmal (<7 days) atrial fibrillation. Patient is currently in atrial fibrillation. [id number] Score: The patient doesn't have any registry metric data  available. The patients heart rate in the last 24 hours is as follows:  Pulse  Min: 98  Max: 98     Antiarrhythmics  metoprolol succinate (TOPROL-XL) 24 hr tablet 50 mg, Daily, Oral  sotaloL tablet 80 mg, 2 times daily, Oral    Anticoagulants  apixaban tablet 5 mg, 2 times daily, Oral    Plan  - Replete lytes with a goal of K>4, Mg >2  - Patient is anticoagulated, see medications listed above.  - Patient's afib is currently controlled  - EP consulted in the ED.  - NPO after midnight for possible cardioversion.  Hyperlipidemia   Patient is chronically on statin.will continue for now. Monitor clinically. Last LDL was   Lab Results   Component Value Date    LDLCALC 131.4 04/21/2025     Parkinson's disease (tremor, stiffness, slow motion, unstable posture)  -Chronic issue. S/p deep brain stimulator  -Continue home carbidopa levodopa and rasagiline.   -Fall precautions.   Anxiety  -Chronic, stable.  - reviewed, continue home xanax.  VTE Risk Mitigation (From admission, onward)           Ordered     apixaban tablet 5 mg  2 times daily         04/29/25 1948     IP VTE HIGH RISK PATIENT  Once         04/29/25 1948     Place sequential compression device  Until discontinued         04/29/25 1948                         On 04/29/2025, patient should be placed in hospital observation services under my care in collaboration with Jefry Flores MD.           Emily Sylvester NP  Department of Hospital Medicine  [facility] - Emergency Dept

## 2025-04-30 NOTE — ED NOTES
Telemetry Verification   Patient placed on Telemetry Box  Verified with War Room  Box # [id number]   Monitor Tech max   Rate 102   Rhythm afib

## 2025-04-30 NOTE — NURSING TRANSFER
Nursing Transfer Note      4/30/2025   9:49 AM      Reason patient is being transferred: Cardioversion    Transfer From: CSU   Transfer To: EP Lab    Transfer via stretcher    Transfer with cardiac monitoring    Transported by EP Nurse   Telemetry: Box Number [id number]  Order for Tele Monitor? Yes    Additional Lines: n/a    Medicines sent: n/a    Patient belongings transferred with patient: Yes, remote to deep brain stimulator    Chart send with patient: Yes, remote to br    Notified: spouse

## 2025-04-30 NOTE — NURSING
Patient arrived from ER, alert and oriented. No  pain. No sign of distress. Iv line in place- saline locked. Tele monitor in place.On room air. Call bell given on his reach. Instructed to call for any concerns or assistance. Bed on lowest position. Non skid socks on. Plan of care discussed with patient, question answered.

## 2025-04-30 NOTE — PLAN OF CARE
Problem: Fall Injury Risk  Goal: Absence of Fall and Fall-Related Injury  Outcome: Progressing  Intervention: Identify and Manage Contributors  Flowsheets (Taken 4/30/2025 0049)  Self-Care Promotion:   independence encouraged   BADL personal objects within reach  Medication Review/Management: medications reviewed  Intervention: Promote Injury-Free Environment  Flowsheets (Taken 4/30/2025 0049)  Safety Promotion/Fall Prevention:   assistive device/personal item within reach   commode/urinal/bedpan at bedside   side rails raised x 2   lighting adjusted   medications reviewed   nonskid shoes/socks when out of bed     Problem: Dysrhythmia  Goal: Normalized Cardiac Rhythm  Outcome: Progressing  Intervention: Monitor and Manage Cardiac Rhythm Effect  Flowsheets (Taken 4/30/2025 0049)  Dysrhythmia Management: (meds givenvas ordered) other (see comments)  VTE Prevention/Management:   ambulation promoted   ROM (active) performed

## 2025-04-30 NOTE — NURSING
Patient complaining of midsternal tightness and dizziness upon returning from [facility]. Patient also implies he has Vertigo and normally takes Meclizine 25 mg tid.MARQUIS Lamb MD notified. Per MD 25 mg Meclizine ordered prn tid. No additional orders given at this time, plan of care ongoing.

## 2025-04-30 NOTE — PLAN OF CARE
04/30/25 1454   Readmission   Was this a planned readmission? Yes   Why were you readmitted? Related to previous admission   When you left the hospital where did you go? Home with Family   Did patient/caregiver refused recommended DC plan? No   Did you try to manage your symptoms your self? Yes   Did you call anyone? Yes   Who did you call? Specialist   Did you try to see or did see a doctor or nurse before you came? Yes   Did you have  a follow-up appointment on discharge? Yes

## 2025-04-30 NOTE — ANESTHESIA PREPROCEDURE EVALUATION
04/30/2025  Adam Dalal is a 64 y.o., male.    Pre-operative evaluation for Procedure(s) (LRB):  Cardioversion or Defibrillation (N/A)    Adam Dalal is a 64 y.o. male     Problem List[1]    Review of patient's allergies indicates:  No Known Allergies    Medications Ordered Prior to Encounter[2]    Past Surgical History:   Procedure Laterality Date    CATARACT EXTRACTION W/  INTRAOCULAR LENS IMPLANT Right 01/09/2017    Dr marin     CATARACT EXTRACTION W/  INTRAOCULAR LENS IMPLANT Left 01/30/2017    Dr. Marin    COLONOSCOPY N/A 7/7/2023    Procedure: COLONOSCOPY;  Surgeon: Arnaldo Neely MD;  Location: [facility] ENDOSCOPY;  Service: Endoscopy;  Laterality: N/A;  prep instructions [facility]  pre call complete, stated he would have a ride -CE    DEEP BRAIN STIMULATOR PLACEMENT Left 1/2/2024    Procedure: INSERTION, DEEP BRAIN STIMULATOR ELECTRODE;  Surgeon: Roger Flores MD;  Location: [address];  Service: Neurosurgery;  Laterality: Left;  INSERT ELECTRODE LEFT SIDE FOR DEEP BRAIN STIMULATION/ GLOBUS PALLIDUS INTERNUS/ TEDS & SCD/MEDTRONICS, SHRAVAN DARTEZ.    ECHOCARDIOGRAM,TRANSESOPHAGEAL N/A 4/22/2025    Procedure: Transesophageal echo (FABIENNE) intra-procedure log documentation;  Surgeon: Curtis Peter MD;  Location: [facility] EP LAB;  Service: Cardiology;  Laterality: N/A;    HERNIA REPAIR      INSERTION OF DEEP BRAIN STIMULATOR GENERATOR Left 1/9/2024    Procedure: INSERTION, PULSE GENERATOR, DEEP BRAIN STIMULATOR;  Surgeon: Roger Flores MD;  Location: [address];  Service: Neurosurgery;  Laterality: Left;  PLACEMENT OF PULSE GENERATOR FOR DEEP BRAIN STIMULATION/ TEDS & SCD/MEDTRONICS, SHRAVAN DARTEZ    PLACEMENT OF FIDUCIAL SCREW INTO SPINE N/A 1/2/2024    Procedure: INSERTION, FIDUCIAL SCREW, SKULL;  Surgeon: Roger Flores MD;  Location: [facility]  "2ND FLR;  Service: Neurosurgery;  Laterality: N/A;  APPLICATION OF FIDUCIALS FOR DEEP BRAIN STIMULATION/ TEDS & SCD/MEDTRONIC/SHRAVAN MARTINES    RECONSTRUCTION OF FACE Right 2022    Procedure: RECONSTRUCTION, FACE;  Surgeon: Yaneli Hickman MD;  Location: [facility] OR 2ND FLR;  Service: ENT;  Laterality: Right;    STRABISMUS SURGERY  4yrs    right eye    TREATMENT OF CARDIAC ARRHYTHMIA N/A 2025    Procedure: Cardioversion or Defibrillation;  Surgeon: Chucho Devlin MD;  Location: [facility] EP LAB;  Service: Cardiology;  Laterality: N/A;  AF, FABIENNE, DCCV, MAC, GP, 3 Prep *Neurostimulator in situ*       Social History[3]      CBC:   Recent Labs     25  1645 25  0503   WBC 7.95 7.31   RBC 4.85 4.89   HGB 15.8 15.4   HCT 45.2 46.4    168   MCV 93 95   MCH 32.6* 31.5*   MCHC 35.0 33.2       CMP:   Recent Labs     25  1645 25  0503    140   K 4.2 4.5    105   CO2 23 25   BUN 19 16   CREATININE 1.0 0.8   GLU 93 99   MG 2.1 2.0   CALCIUM 8.7 8.8   ALBUMIN 3.9 3.5   PROT 7.2 6.6   ALKPHOS 61 54   ALT 18 32   AST 27 22   BILITOT 0.8 0.9       INR  No results for input(s): "PT", "INR", "PROTIME", "APTT" in the last 72 hours.        Diagnostic Studies:      EKD Echo:  No results found for this or any previous visit.      Pre-op Assessment    I have reviewed the Patient Summary Reports.     I have reviewed the Nursing Notes. I have reviewed the NPO Status.   I have reviewed the Medications.     Review of Systems  Anesthesia Hx:  No problems with previous Anesthesia   History of prior surgery of interest to airway management or planning:          Denies Family Hx of Anesthesia complications.    Denies Personal Hx of Anesthesia complications.                    Hematology/Oncology:       -- Denies Anemia:                                  Cardiovascular:  Exercise tolerance: good    Denies Hypertension.    Denies CAD.    Denies CABG/stent. Dysrhythmias atrial fibrillation         ECG " has been reviewed.                            Pulmonary:    Denies COPD.  Denies Asthma.    Sleep Apnea, CPAP                Renal/:   Denies Chronic Renal Disease.                Hepatic/GI:      Denies GERD.    Not Taking GLP-1 Agonists            Neurological:    Denies CVA.    Denies Seizures.    Parkinson's disease                            Endocrine:  Denies Diabetes.               Physical Exam  General: Well nourished, Cooperative and Alert    Airway:  Mallampati: I   Mouth Opening: Normal  TM Distance: Normal  Tongue: Normal  Neck ROM: Normal ROM    Chest/Lungs:  Normal Respiratory Rate    Heart:  Rate: Normal        Anesthesia Plan  Type of Anesthesia, risks & benefits discussed:    Anesthesia Type: Gen ETT, Gen Natural Airway  Intra-op Monitoring Plan: Standard ASA Monitors  Post Op Pain Control Plan: multimodal analgesia and IV/PO Opioids PRN  Induction:  IV  Airway Plan: Direct, Post-Induction  Informed Consent: Informed consent signed with the Patient and all parties understand the risks and agree with anesthesia plan.  All questions answered.   ASA Score: 3  Day of Surgery Review of History & Physical: H&P Update referred to the surgeon/provider.    Ready For Surgery From Anesthesia Perspective.     .           [1]   Patient Active Problem List  Diagnosis    Hyperlipidemia    Obstructive sleep apnea    Esotropia of right eye    Ocular hypertension - Both Eyes    Nuclear sclerosis    Nuclear sclerotic cataract of left eye    Parkinson disease    Hypophonia    Voice and resonance disorder    Dysarthria    Impaired functional mobility, balance, gait, and endurance    Impaired motor control    Alcohol use disorder, mild, abuse    Oral lesion    Mohs defect of right cheek    History of melanoma in situ    Mild neurocognitive disorder due to multiple etiologies    Parkinson's disease (tremor, stiffness, slow motion, unstable posture)    Atrial fibrillation    Anxiety   [2]   Current Facility-Administered  Medications on File Prior to Encounter   Medication Dose Route Frequency Provider Last Rate Last Admin    0.9%  NaCl infusion   Intravenous Continuous Thony Graves  mL/hr at 24 New Bag at 24    mupirocin 2 % ointment   Nasal On Call Procedure Thony Graves MD   Given at 24 0623     Current Outpatient Medications on File Prior to Encounter   Medication Sig Dispense Refill    ALPRAZolam (XANAX) 0.5 MG tablet TAKE 1 TABLET(0.5 MG) BY MOUTH EVERY NIGHT FOR INSOMNIA OR ANXIETY 30 tablet 5    apixaban (ELIQUIS) 5 mg Tab Take 1 tablet (5 mg total) by mouth 2 (two) times daily. 180 tablet 3    carbidopa-levodopa (RYTARY) 23.75-95 mg CpSR Take 4 capsules by mouth 3 (three) times daily. 360 capsule 11    carbidopa-levodopa  mg (SINEMET)  mg per tablet TAKE 1 TABLET BY MOUTH EVERY EVENING 30 tablet 11    carbidopa-levodopa  mg (SINEMET)  mg per tablet Take 2 tablets by mouth 5 (five) times daily. 300 tablet 11    meclizine (ANTIVERT) 25 mg tablet Take 1 tablet (25 mg total) by mouth 3 (three) times daily as needed. 20 tablet 0    metoprolol succinate (TOPROL-XL) 50 MG 24 hr tablet Take 1 tablet (50 mg total) by mouth once daily. 90 tablet 3    pravastatin (PRAVACHOL) 80 MG tablet Take 1 tablet (80 mg total) by mouth every evening. 90 tablet 3    PREVIDENT 5000 ENAMEL PROTECT 1.1-5 % Pste USE TO BRUSH TEETH TWICE DAILY      rasagiline (AZILECT) 1 mg Tab Take 1 tablet (1 mg total) by mouth once daily. 90 tablet 2    sotaloL (BETAPACE) 80 MG tablet Take 1 tablet (80 mg total) by mouth 2 (two) times daily. Start 3 days prior to cardioversion 60 tablet 11   [3]   Social History  Socioeconomic History    Marital status:    Tobacco Use    Smoking status: Former     Current packs/day: 0.00     Average packs/day: 1 pack/day for 1 year (1.0 ttl pk-yrs)     Types: Cigarettes     Start date: 2005     Quit date: 2006     Years since quittin.3    Smokeless  tobacco: Never   Substance and Sexual Activity    Alcohol use: Yes     Alcohol/week: 14.0 standard drinks of alcohol     Types: 14 Glasses of wine per week    Drug use: No    Sexual activity: Yes     Partners: Female     Social Drivers of Health     Financial Resource Strain: Low Risk  (2/27/2025)    Overall Financial Resource Strain (CARDIA)     Difficulty of Paying Living Expenses: Not hard at all   Food Insecurity: No Food Insecurity (2/27/2025)    Hunger Vital Sign     Worried About Running Out of Food in the Last Year: Never true     Ran Out of Food in the Last Year: Never true   Transportation Needs: No Transportation Needs (2/27/2025)    PRAPARE - Transportation     Lack of Transportation (Medical): No     Lack of Transportation (Non-Medical): No   Physical Activity: Sufficiently Active (2/27/2025)    Exercise Vital Sign     Days of Exercise per Week: 5 days     Minutes of Exercise per Session: 30 min   Stress: Stress Concern Present (2/27/2025)    [employer] - Occupational Stress Questionnaire     Feeling of Stress : To some extent   Housing Stability: Low Risk  (2/27/2025)    Housing Stability Vital Sign     Unable to Pay for Housing in the Last Year: No     Number of Times Moved in the Last Year: 0     Homeless in the Last Year: No

## 2025-04-30 NOTE — SUBJECTIVE & OBJECTIVE
Past Medical History:   Diagnosis Date    Anxiety     Atrial fibrillation     Esotropia of right eye 05/02/2013    Hearing deficit, bilateral     High cholesterol     Hyperlipidemia     Malignant melanoma of skin, unspecified 06/29/2022    in situ right lateral infraorbit    Melanoma 06/29/2022    in situ - right lateral infraorbital    Melanoma in situ of cheek     Parkinson's disease     Ringing in ear, bilateral        Past Surgical History:   Procedure Laterality Date    CATARACT EXTRACTION W/  INTRAOCULAR LENS IMPLANT Right 01/09/2017    Dr marin     CATARACT EXTRACTION W/  INTRAOCULAR LENS IMPLANT Left 01/30/2017    Dr. Marin    COLONOSCOPY N/A 7/7/2023    Procedure: COLONOSCOPY;  Surgeon: Arnaldo Neely MD;  Location: [facility] ENDOSCOPY;  Service: Endoscopy;  Laterality: N/A;  prep instructions sent to pt via portal -  [employer] prep  pre call complete, stated he would have a ride -CE    DEEP BRAIN STIMULATOR PLACEMENT Left 1/2/2024    Procedure: INSERTION, DEEP BRAIN STIMULATOR ELECTRODE;  Surgeon: Roger Flores MD;  Location: [address];  Service: Neurosurgery;  Laterality: Left;  INSERT ELECTRODE LEFT SIDE FOR DEEP BRAIN STIMULATION/ GLOBUS PALLIDUS INTERNUS/ TEDS & SCD/MEDTRONICS, SHRAVAN DARTEZ.    ECHOCARDIOGRAM,TRANSESOPHAGEAL N/A 4/22/2025    Procedure: Transesophageal echo (FABIENNE) intra-procedure log documentation;  Surgeon: Curtis Peter MD;  Location: [facility] EP LAB;  Service: Cardiology;  Laterality: N/A;    HERNIA REPAIR      INSERTION OF DEEP BRAIN STIMULATOR GENERATOR Left 1/9/2024    Procedure: INSERTION, PULSE GENERATOR, DEEP BRAIN STIMULATOR;  Surgeon: Roger Flores MD;  Location: [address];  Service: Neurosurgery;  Laterality: Left;  PLACEMENT OF PULSE GENERATOR FOR DEEP BRAIN STIMULATION/ TEDS & SCD/MEDTRONICS, SHRAVAN DARTEZ    PLACEMENT OF FIDUCIAL SCREW INTO SPINE N/A 1/2/2024    Procedure: INSERTION, FIDUCIAL SCREW, SKULL;  Surgeon: Roger Flores MD;  Location: [address]  FLR;  Service: Neurosurgery;  Laterality: N/A;  APPLICATION OF FIDUCIALS FOR DEEP BRAIN STIMULATION/ TEDS & SCD/MEDTRONIC/SHRAVAN MARTINES    RECONSTRUCTION OF FACE Right 8/4/2022    Procedure: RECONSTRUCTION, FACE;  Surgeon: Yaneli Hickman MD;  Location: [facility] OR [facility]LR;  Service: ENT;  Laterality: Right;    STRABISMUS SURGERY  4yrs    right eye    TREATMENT OF CARDIAC ARRHYTHMIA N/A 4/22/2025    Procedure: Cardioversion or Defibrillation;  Surgeon: Chucho Devlin MD;  Location: [facility] EP LAB;  Service: Cardiology;  Laterality: N/A;  AF, FABIENNE, DCCV, MAC, GP, 3 Prep *Neurostimulator in situ*       Review of patient's allergies indicates:  No Known Allergies    Current Facility-Administered Medications on File Prior to Encounter   Medication    0.9%  NaCl infusion    mupirocin 2 % ointment     Current Outpatient Medications on File Prior to Encounter   Medication Sig    ALPRAZolam (XANAX) 0.5 MG tablet TAKE 1 TABLET(0.5 MG) BY MOUTH EVERY NIGHT FOR INSOMNIA OR ANXIETY    apixaban (ELIQUIS) 5 mg Tab Take 1 tablet (5 mg total) by mouth 2 (two) times daily.    carbidopa-levodopa (RYTARY) 23.75-95 mg CpSR Take 4 capsules by mouth 3 (three) times daily.    carbidopa-levodopa  mg (SINEMET)  mg per tablet TAKE 1 TABLET BY MOUTH EVERY EVENING    carbidopa-levodopa  mg (SINEMET)  mg per tablet Take 2 tablets by mouth 5 (five) times daily.    meclizine (ANTIVERT) 25 mg tablet Take 1 tablet (25 mg total) by mouth 3 (three) times daily as needed.    metoprolol succinate (TOPROL-XL) 50 MG 24 hr tablet Take 1 tablet (50 mg total) by mouth once daily.    pravastatin (PRAVACHOL) 80 MG tablet Take 1 tablet (80 mg total) by mouth every evening.    PREVIDENT 5000 ENAMEL PROTECT 1.1-5 % Pste USE TO BRUSH TEETH TWICE DAILY    rasagiline (AZILECT) 1 mg Tab Take 1 tablet (1 mg total) by mouth once daily.    sotaloL (BETAPACE) 80 MG tablet Take 1 tablet (80 mg total) by mouth 2 (two) times daily. Start 3 days prior to  cardioversion     Family History       Problem Relation (Age of Onset)    Arthritis Mother, Brother    Atrial fibrillation Sister    Cataracts Maternal Grandmother, Paternal Grandmother    Hearing loss Mother    Heart attack Father    Sleep apnea Brother          Tobacco Use    Smoking status: Former     Current packs/day: 0.00     Average packs/day: 1 pack/day for 1 year (1.0 ttl pk-yrs)     Types: Cigarettes     Start date: 2005     Quit date: 2006     Years since quittin.3    Smokeless tobacco: Never   Substance and Sexual Activity    Alcohol use: Yes     Alcohol/week: 14.0 standard drinks of alcohol     Types: 14 Glasses of wine per week    Drug use: No    Sexual activity: Yes     Partners: Female     Review of Systems   Constitutional: Negative for diaphoresis and fever.   Cardiovascular:  Positive for palpitations. Negative for chest pain, dyspnea on exertion, leg swelling, near-syncope, orthopnea, paroxysmal nocturnal dyspnea and syncope.   Respiratory:  Negative for cough and shortness of breath.    Gastrointestinal:  Negative for abdominal pain, diarrhea, nausea and vomiting.   Neurological:  Negative for light-headedness.   Psychiatric/Behavioral:  Negative for altered mental status and substance abuse.      Objective:     Vital Signs (Most Recent):  Temp: 98.4 °F (36.9 °C) (25 1544)  Pulse: 98 (25 1544)  Resp: 18 (25 1544)  BP: (!) 113/93 (25 1544)  SpO2: 99 % (25 1544) Vital Signs (24h Range):  Temp:  [98.4 °F (36.9 °C)] 98.4 °F (36.9 °C)  Pulse:  [98] 98  Resp:  [18] 18  SpO2:  [99 %] 99 %  BP: (113)/(93) 113/93       Weight: 104.3 kg (230 lb)  Body mass index is 30.34 kg/m².    SpO2: 99 %        Physical Exam  Constitutional:       General: He is not in acute distress.     Appearance: Normal appearance. He is well-developed.   HENT:      Mouth/Throat:      Mouth: Mucous membranes are moist.      Pharynx: Oropharynx is clear.   Cardiovascular:      Rate and  Rhythm: Normal rate. Rhythm irregular.      Heart sounds: Normal heart sounds. No murmur heard.  Pulmonary:      Effort: Pulmonary effort is normal. No respiratory distress.      Breath sounds: Normal breath sounds.   Abdominal:      Palpations: Abdomen is soft.      Tenderness: There is no abdominal tenderness. There is no guarding.   Musculoskeletal:         General: No swelling. Normal range of motion.      Right lower leg: No edema.      Left lower leg: No edema.   Skin:     General: Skin is warm and dry.   Neurological:      Mental Status: He is alert and oriented to person, place, and time.   Psychiatric:         Mood and Affect: Mood normal.         Behavior: Behavior normal.            Significant Labs: All pertinent lab results from the last 24 hours have been reviewed.    Significant Imaging:  Reviewed

## 2025-04-30 NOTE — NURSING
Patient sitting up in bed with HOB elevated. AAOX4, breathing spontaneously no distress noted. side rails x2 and bed in low position with call bell in reach. Patient reassessed and reports relief of dizziness after taking meclizine. Patient denies having any questions or concerns. Plan of care ongoing.

## 2025-04-30 NOTE — ASSESSMENT & PLAN NOTE
64yoM with parkinson's (deep brain stimulator in place), persistent AF s/p FABIENNE DCCV on 4/22/25 is here with atrial fibrillation recurrence. No obvious trigger besides maybe a viral syndrome with vertigo this week? No obvious cardiac decompensation otherwise.    AC: Eliquis, complaint (last dose this evening)  AAD/AVN blockers: Sotalol 80mg PO BID. Toprol held earlier this week in the setting of vertigo  Presenting EKG: Afib, rate 113  Tele: Mostly rate controlled AFib    Narrow QRS  No structural or known coronary disease  No other prior AADs    Recommendations:  - Continue sotalol  - Continue Eliquis  - Plan for straight cardioversion in the morning given Eliquis compliance  - Continue telemetry (lots of noise due to brain stimulator, but some leads are interpretable)  - We will discuss adjustments to anti-arrhythmic meds in the AM

## 2025-04-30 NOTE — ASSESSMENT & PLAN NOTE
Patient has paroxysmal (<7 days) atrial fibrillation. Patient is currently in atrial fibrillation. [id number] Score: The patient doesn't have any registry metric data available. The patients heart rate in the last 24 hours is as follows:  Pulse  Min: 51  Max: 126     Antiarrhythmics  flecainide tablet 100 mg, Every 12 hours, Oral  metoprolol succinate (TOPROL-XL) 24 hr tablet 25 mg, Daily, Oral    Anticoagulants  apixaban tablet 5 mg, 2 times daily, Oral    Plan  - Replete lytes with a goal of K>4, Mg >2  - Patient is anticoagulated, see medications listed above.  - Patient's afib is currently controlled  - EP consulted in the ED.  - NPO after midnight for possible cardioversion.    4/30  Status post cardioversion.  On flecainide and metoprolol succinate per Cardiology, sotalol discontinued. ?  Deep brain stimulator affecting telemetry reading as its reporting tachycardia.  EP notified

## 2025-04-30 NOTE — ED NOTES
Bed: [facility]  Expected date:   Expected time:   Means of arrival:   Comments:  Alexus Mcadams

## 2025-04-30 NOTE — ASSESSMENT & PLAN NOTE
64yoM with parkinson's (deep brain stimulator in place), persistent AF s/p FABIENNE DCCV on 4/22/25 is here with atrial fibrillation recurrence. No obvious trigger besides maybe a viral syndrome with vertigo this week? No obvious cardiac decompensation otherwise.    AC: Eliquis, complaint (last dose this evening)  AAD/AVN blockers: Sotalol 80mg PO BID. Toprol held earlier this week in the setting of vertigo  Presenting EKG: Afib, rate 113  Tele: Mostly rate controlled AFib    Narrow QRS  No structural or known coronary disease  No other prior AADs    Recommendations:  - STOP  sotalol  - Continue Eliquis 5 mg BID   - S/p straight cardioversion in the morning given Eliquis compliance  - Continue telemetry (lots of noise due to brain stimulator, but some leads are interpretable)  - Start Flecainide 100 mg BID; starting around 6 pm   - Start metoprolol succinate 25 mg qd

## 2025-04-30 NOTE — CARE UPDATE
"RAPID RESPONSE NURSE CHART REVIEW        Chart Reviewed: 04/29/2025 2350    MRN: [id number]  Bed: 354/354 A    Dx: Atrial fibrillation    Adam Dalal has a past medical history of Anxiety, Atrial fibrillation, Esotropia of right eye, Hearing deficit, bilateral, High cholesterol, Hyperlipidemia, Malignant melanoma of skin, unspecified, Melanoma, Melanoma in situ of cheek, Parkinson's disease, and Ringing in ear, bilateral.    Last VS: /81 (BP Location: Right arm, Patient Position: Lying)   Pulse 78   Temp 97.7 °F (36.5 °C) (Oral)   Resp 18   Ht 6' 1" (1.854 m)   Wt 101.8 kg (224 lb 6.9 oz)   SpO2 95%   BMI 29.61 kg/m²     24H Vital Sign Range:  Temp:  [97.5 °F (36.4 °C)-98.4 °F (36.9 °C)]   Pulse:  []   Resp:  [16-19]   BP: (113-136)/(77-95)   SpO2:  [94 %-99 %]     Level of Consciousness (AVPU): alert    Recent Labs     04/29/25  1645   WBC 7.95   HGB 15.8   HCT 45.2          Recent Labs     04/29/25  1645      K 4.2      CO2 23   BUN 19   CREATININE 1.0   GLU 93   MG 2.1        OXYGEN:      MEWS score: 1    Rounding completed with charge nurse Fior for MEWs reports patient in Afib, relatively rate controlled, scheduled for cardioversion in AM. BP stable. No additional concerns verbalized at this time. Instructed to call [phone] for further concerns or assistance.    Tomi Morgan RN      "

## 2025-05-01 VITALS
RESPIRATION RATE: 18 BRPM | WEIGHT: 220.25 LBS | HEART RATE: 63 BPM | TEMPERATURE: 98 F | SYSTOLIC BLOOD PRESSURE: 127 MMHG | HEIGHT: 73 IN | DIASTOLIC BLOOD PRESSURE: 65 MMHG | OXYGEN SATURATION: 97 % | BODY MASS INDEX: 29.19 KG/M2

## 2025-05-01 LAB
ABSOLUTE EOSINOPHIL (OHS): 0.18 K/UL
ABSOLUTE MONOCYTE (OHS): 0.79 K/UL (ref 0.3–1)
ABSOLUTE NEUTROPHIL COUNT (OHS): 4.13 K/UL (ref 1.8–7.7)
ALBUMIN SERPL BCP-MCNC: 3.5 G/DL (ref 3.5–5.2)
ALP SERPL-CCNC: 52 UNIT/L (ref 40–150)
ALT SERPL W/O P-5'-P-CCNC: 6 UNIT/L (ref 10–44)
ANION GAP (OHS): 8 MMOL/L (ref 8–16)
AST SERPL-CCNC: 18 UNIT/L (ref 11–45)
BASOPHILS # BLD AUTO: 0.08 K/UL
BASOPHILS NFR BLD AUTO: 1.1 %
BILIRUB SERPL-MCNC: 0.8 MG/DL (ref 0.1–1)
BUN SERPL-MCNC: 17 MG/DL (ref 8–23)
CALCIUM SERPL-MCNC: 8.6 MG/DL (ref 8.7–10.5)
CHLORIDE SERPL-SCNC: 104 MMOL/L (ref 95–110)
CO2 SERPL-SCNC: 27 MMOL/L (ref 23–29)
CREAT SERPL-MCNC: 1.1 MG/DL (ref 0.5–1.4)
ERYTHROCYTE [DISTWIDTH] IN BLOOD BY AUTOMATED COUNT: 13.3 % (ref 11.5–14.5)
GFR SERPLBLD CREATININE-BSD FMLA CKD-EPI: >60 ML/MIN/1.73/M2
GLUCOSE SERPL-MCNC: 116 MG/DL (ref 70–110)
HCT VFR BLD AUTO: 42.1 % (ref 40–54)
HGB BLD-MCNC: 14.4 GM/DL (ref 14–18)
IMM GRANULOCYTES # BLD AUTO: 0.04 K/UL (ref 0–0.04)
IMM GRANULOCYTES NFR BLD AUTO: 0.5 % (ref 0–0.5)
LYMPHOCYTES # BLD AUTO: 2.27 K/UL (ref 1–4.8)
MAGNESIUM SERPL-MCNC: 1.9 MG/DL (ref 1.6–2.6)
MCH RBC QN AUTO: 32.7 PG (ref 27–31)
MCHC RBC AUTO-ENTMCNC: 34.2 G/DL (ref 32–36)
MCV RBC AUTO: 96 FL (ref 82–98)
NUCLEATED RBC (/100WBC) (OHS): 0 /100 WBC
OHS QRS DURATION: 80 MS
OHS QRS DURATION: 80 MS
OHS QTC CALCULATION: 420 MS
OHS QTC CALCULATION: 437 MS
PLATELET # BLD AUTO: 165 K/UL (ref 150–450)
PMV BLD AUTO: 12.4 FL (ref 9.2–12.9)
POTASSIUM SERPL-SCNC: 4.2 MMOL/L (ref 3.5–5.1)
PROT SERPL-MCNC: 6.5 GM/DL (ref 6–8.4)
RBC # BLD AUTO: 4.4 M/UL (ref 4.6–6.2)
RELATIVE EOSINOPHIL (OHS): 2.4 %
RELATIVE LYMPHOCYTE (OHS): 30.3 % (ref 18–48)
RELATIVE MONOCYTE (OHS): 10.5 % (ref 4–15)
RELATIVE NEUTROPHIL (OHS): 55.2 % (ref 38–73)
SODIUM SERPL-SCNC: 139 MMOL/L (ref 136–145)
WBC # BLD AUTO: 7.49 K/UL (ref 3.9–12.7)

## 2025-05-01 PROCEDURE — G0378 HOSPITAL OBSERVATION PER HR: HCPCS

## 2025-05-01 PROCEDURE — 25000003 PHARM REV CODE 250: Performed by: STUDENT IN AN ORGANIZED HEALTH CARE EDUCATION/TRAINING PROGRAM

## 2025-05-01 PROCEDURE — 80053 COMPREHEN METABOLIC PANEL: CPT | Performed by: NURSE PRACTITIONER

## 2025-05-01 PROCEDURE — 25000003 PHARM REV CODE 250: Performed by: NURSE PRACTITIONER

## 2025-05-01 PROCEDURE — 36415 COLL VENOUS BLD VENIPUNCTURE: CPT | Performed by: NURSE PRACTITIONER

## 2025-05-01 PROCEDURE — 85025 COMPLETE CBC W/AUTO DIFF WBC: CPT | Performed by: NURSE PRACTITIONER

## 2025-05-01 PROCEDURE — 25000003 PHARM REV CODE 250: Performed by: HOSPITALIST

## 2025-05-01 PROCEDURE — 83735 ASSAY OF MAGNESIUM: CPT | Performed by: NURSE PRACTITIONER

## 2025-05-01 RX ORDER — LANOLIN ALCOHOL/MO/W.PET/CERES
400 CREAM (GRAM) TOPICAL ONCE
Status: COMPLETED | OUTPATIENT
Start: 2025-05-01 | End: 2025-05-01

## 2025-05-01 RX ORDER — FLECAINIDE ACETATE 100 MG/1
100 TABLET ORAL EVERY 12 HOURS
Qty: 60 TABLET | Refills: 1 | Status: SHIPPED | OUTPATIENT
Start: 2025-05-01 | End: 2025-06-30

## 2025-05-01 RX ORDER — METOPROLOL SUCCINATE 25 MG/1
25 TABLET, EXTENDED RELEASE ORAL DAILY
Qty: 30 TABLET | Refills: 1 | Status: SHIPPED | OUTPATIENT
Start: 2025-05-01 | End: 2025-06-30

## 2025-05-01 RX ADMIN — METOPROLOL SUCCINATE 25 MG: 25 TABLET, EXTENDED RELEASE ORAL at 08:05

## 2025-05-01 RX ADMIN — LEVODOPA AND CARBIDOPA 4 CAPSULE: 95; 23.75 CAPSULE, EXTENDED RELEASE ORAL at 08:05

## 2025-05-01 RX ADMIN — RASAGILINE 1 MG: 1 TABLET ORAL at 08:05

## 2025-05-01 RX ADMIN — APIXABAN 5 MG: 5 TABLET, FILM COATED ORAL at 08:05

## 2025-05-01 RX ADMIN — Medication 400 MG: at 08:05

## 2025-05-01 RX ADMIN — FLECAINIDE ACETATE 100 MG: 100 TABLET ORAL at 08:05

## 2025-05-01 NOTE — PLAN OF CARE
[facility] Stepdown  Discharge Final Note    Primary Care Provider: Rashi Gee MD    Expected Discharge Date: 5/1/2025    Patient discharged to home via family personal transportation.     Patient's bedside nurse and family notified of the above.    Discharge Plan A and Plan B have been determined by review of patient's clinical status, future medical and therapeutic needs, and coverage/benefits for post-acute care in coordination with multidisciplinary team members.        Final Discharge Note (most recent)       Final Note - 05/01/25 1121          Final Note    Assessment Type Final Discharge Note (P)      Anticipated Discharge Disposition Home or Self Care (P)      Hospital Resources/Appts/Education Provided Provided patient/caregiver with written discharge plan information;Appointments scheduled and added to AVS (P)         Post-Acute Status    Discharge Delays None known at this time (P)                      Important Message from Medicare                 Future Appointments   Date Time Provider Department Center   5/21/2025  9:00 AM Rashi Gee MD [facility]   10/6/2025  2:00 PM Ravinder Demarco MD [id number] [facility] scheduled post-discharge follow-up appointment and information added to AVS.

## 2025-05-01 NOTE — NURSING
Patient is ready for discharge. Patient stable alert and oriented. IV and telemetry removed. No complaints of pain. Discussed discharge plan. Reviewed medications and side effects, appointments, and answered questions with patient and family. Pt picking up Rx from pharmacy.

## 2025-05-01 NOTE — PLAN OF CARE
EP Plan of Care    Patient had successful cardioversion yesterday. Sinus bradycardia on post-DCCV EKG and evening EKG sinus rhythm. Telemetry difficult to interpret given significant artifact from deep-brain stimulator.    Continue flecainide 100mg BID, toprol 25mg QD. Stable for discharge from EP perspective.    Rick Carcamo MD  Cardiovascular Disease PGY-VI  [facility]

## 2025-05-02 ENCOUNTER — PATIENT OUTREACH (OUTPATIENT)
Dept: ADMINISTRATIVE | Facility: CLINIC | Age: 65
End: 2025-05-02
Payer: COMMERCIAL

## 2025-05-02 NOTE — PROGRESS NOTES
C3 nurse spoke with Adam Dalal for a TCC post hospital discharge follow up call. The patient does not have a scheduled HOSFU appointment with Rashi Gee MD within 5-7 days post hospital discharge date 05/01/25. C3 nurse was unable to schedule HOSFU appointment in [facility].    Message sent to PCP staff requesting they contact patient and schedule follow up appointment.

## 2025-05-06 ENCOUNTER — TELEPHONE (OUTPATIENT)
Dept: ELECTROPHYSIOLOGY | Facility: CLINIC | Age: 65
End: 2025-05-06
Payer: COMMERCIAL

## 2025-05-07 ENCOUNTER — TELEPHONE (OUTPATIENT)
Dept: ELECTROPHYSIOLOGY | Facility: CLINIC | Age: 65
End: 2025-05-07
Payer: COMMERCIAL

## 2025-05-07 NOTE — TELEPHONE ENCOUNTER
Attempted to call patient to schedule f/u appointment. No answer. Unable to leave VM since mailbox is full

## 2025-05-08 ENCOUNTER — TELEPHONE (OUTPATIENT)
Dept: ELECTROPHYSIOLOGY | Facility: CLINIC | Age: 65
End: 2025-05-08
Payer: COMMERCIAL

## 2025-05-09 ENCOUNTER — OFFICE VISIT (OUTPATIENT)
Dept: INTERNAL MEDICINE | Facility: CLINIC | Age: 65
End: 2025-05-09
Payer: COMMERCIAL

## 2025-05-09 VITALS
DIASTOLIC BLOOD PRESSURE: 74 MMHG | SYSTOLIC BLOOD PRESSURE: 124 MMHG | HEIGHT: 73 IN | HEART RATE: 62 BPM | BODY MASS INDEX: 30.01 KG/M2 | OXYGEN SATURATION: 96 % | WEIGHT: 226.44 LBS

## 2025-05-09 DIAGNOSIS — I48.91 ATRIAL FIBRILLATION, UNSPECIFIED TYPE: ICD-10-CM

## 2025-05-09 DIAGNOSIS — Z00.00 ROUTINE PHYSICAL EXAMINATION: Primary | ICD-10-CM

## 2025-05-09 DIAGNOSIS — F41.9 ANXIETY: ICD-10-CM

## 2025-05-09 DIAGNOSIS — G20.B2 PARKINSON'S DISEASE WITH DYSKINESIA AND FLUCTUATING MANIFESTATIONS: ICD-10-CM

## 2025-05-09 DIAGNOSIS — G47.33 OBSTRUCTIVE SLEEP APNEA: ICD-10-CM

## 2025-05-09 PROCEDURE — 99999 PR PBB SHADOW E&M-EST. PATIENT-LVL IV: CPT | Mod: PBBFAC,,, | Performed by: INTERNAL MEDICINE

## 2025-05-09 RX ORDER — ALPRAZOLAM 0.5 MG/1
TABLET ORAL
Qty: 30 TABLET | Refills: 5 | Status: SHIPPED | OUTPATIENT
Start: 2025-05-09

## 2025-05-09 NOTE — PROGRESS NOTES
Subjective:       Patient ID: Adam Dalal is a 64 y.o. male.    Chief Complaint: Hospital Follow Up    Patient seen for hospital follow-up but he also remind me his physical is scheduled for next week so we combine those.  He had pre visit labs and we reviewed his PSA A1c chemistry.  All generally very stable.  He had a bunch of labs in the hospital recently for AFib with rapid response.  He had to be cardioverted and is now in sinus rhythm.  He has a deep brain stimulator for parkinsonism.  Meds reviewed.  He does still use alprazolam for anxiety and sleep.   reviewed and a prescription will be written although he is not due at this moment  Family history reviewed.    Prior to the AFib episode he was not having any acute complaints or problems.  No chest pain or shortness breath.  No GI or  complaints.     reviewed.  No escalation of alprazolam.  He tries not to take it regularly.      Review of Systems   Constitutional:  Negative for chills, fatigue and fever.   HENT:  Negative for nosebleeds and trouble swallowing.    Eyes:  Negative for pain and visual disturbance.   Respiratory:  Negative for cough, shortness of breath and wheezing.    Cardiovascular:  Negative for chest pain and palpitations.   Gastrointestinal:  Negative for abdominal pain, constipation, diarrhea, nausea and vomiting.   Genitourinary:  Negative for difficulty urinating and hematuria.   Musculoskeletal:  Negative for arthralgias, back pain and neck pain.        Stiffness   Integumentary:  Negative for rash.   Neurological:  Positive for tremors. Negative for dizziness and headaches.   Hematological:  Does not bruise/bleed easily.   Psychiatric/Behavioral:  Negative for dysphoric mood and sleep disturbance.            Past Medical History:   Diagnosis Date    Anxiety     Atrial fibrillation     Esotropia of right eye 05/02/2013    Hearing deficit, bilateral     High cholesterol     Hyperlipidemia     Malignant melanoma of skin,  unspecified 06/29/2022    in situ right lateral infraorbit    Melanoma 06/29/2022    in situ - right lateral infraorbital    Melanoma in situ of cheek     Parkinson's disease     Ringing in ear, bilateral      Past Surgical History:   Procedure Laterality Date    CATARACT EXTRACTION W/  INTRAOCULAR LENS IMPLANT Right 01/09/2017    Dr marin     CATARACT EXTRACTION W/  INTRAOCULAR LENS IMPLANT Left 01/30/2017    Dr. Marin    COLONOSCOPY N/A 7/7/2023    Procedure: COLONOSCOPY;  Surgeon: Arnaldo Neely MD;  Location: [facility] ENDOSCOPY;  Service: Endoscopy;  Laterality: N/A;  prep instructions sent to pt via portal -  [employer] prep  pre call complete, stated he would have a ride -CE    DEEP BRAIN STIMULATOR PLACEMENT Left 1/2/2024    Procedure: INSERTION, DEEP BRAIN STIMULATOR ELECTRODE;  Surgeon: Roger Flores MD;  Location: [facility] OR [address] [place] [address];  Service: Neurosurgery;  Laterality: Left;  INSERT ELECTRODE LEFT SIDE FOR DEEP BRAIN STIMULATION/ GLOBUS PALLIDUS INTERNUS/ TEDS & SCD/MEDTRONICS, SHRAVAN DARTEZ.    ECHOCARDIOGRAM,TRANSESOPHAGEAL N/A 4/22/2025    Procedure: Transesophageal echo (FABIENNE) intra-procedure log documentation;  Surgeon: Curtis Peter MD;  Location: [facility] EP LAB;  Service: Cardiology;  Laterality: N/A;    HERNIA REPAIR      INSERTION OF DEEP BRAIN STIMULATOR GENERATOR Left 1/9/2024    Procedure: INSERTION, PULSE GENERATOR, DEEP BRAIN STIMULATOR;  Surgeon: Roger Flores MD;  Location: [facility] OR [address] [place] [address];  Service: Neurosurgery;  Laterality: Left;  PLACEMENT OF PULSE GENERATOR FOR DEEP BRAIN STIMULATION/ TEDS & SCD/MEDTRONICS, SHRAVAN DARTEZ    PLACEMENT OF FIDUCIAL SCREW INTO SPINE N/A 1/2/2024    Procedure: INSERTION, FIDUCIAL SCREW, SKULL;  Surgeon: Roger Flores MD;  Location: [facility] OR [address] [place] [address];  Service: Neurosurgery;  Laterality: N/A;  APPLICATION OF FIDUCIALS FOR DEEP BRAIN STIMULATION/ TEDS & SCD/MEDTRONIC/SHRAVAN DARTEZ    RECONSTRUCTION OF FACE Right 8/4/2022    Procedure: RECONSTRUCTION, FACE;   Surgeon: Yaneli Hickman MD;  Location: [facility] OR [address] [place] [address];  Service: ENT;  Laterality: Right;    STRABISMUS SURGERY  4yrs    right eye    TREATMENT OF CARDIAC ARRHYTHMIA N/A 4/22/2025    Procedure: Cardioversion or Defibrillation;  Surgeon: Chucho Delvin MD;  Location: [facility] EP LAB;  Service: Cardiology;  Laterality: N/A;  AF, FABIENNE, DCCV, MAC, GP, 3 Prep *Neurostimulator in situ*    TREATMENT OF CARDIAC ARRHYTHMIA N/A 4/30/2025    Procedure: Cardioversion or Defibrillation;  Surgeon: Orlando Carrasco MD;  Location: [facility] EP LAB;  Service: Cardiology;  Laterality: N/A;  AF, DCCV ONLY, ANES, SK,       Problem List[1]     Objective:      Physical Exam  Constitutional:       General: He is not in acute distress.     Appearance: He is well-developed.   HENT:      Head: Normocephalic and atraumatic.      Right Ear: Tympanic membrane, ear canal and external ear normal.      Left Ear: Tympanic membrane, ear canal and external ear normal.      Mouth/Throat:      Pharynx: No oropharyngeal exudate or posterior oropharyngeal erythema.   Eyes:      General: No scleral icterus.     Conjunctiva/sclera: Conjunctivae normal.      Pupils: Pupils are equal, round, and reactive to light.   Neck:      Thyroid: No thyromegaly.      Comments: Stiffness in neck and upper torso  Cardiovascular:      Rate and Rhythm: Normal rate and regular rhythm.      Pulses: Normal pulses.      Heart sounds: Normal heart sounds. No murmur heard.  Pulmonary:      Effort: Pulmonary effort is normal.      Breath sounds: Normal breath sounds. No wheezing.   Abdominal:      General: Bowel sounds are normal.      Palpations: Abdomen is soft. There is no mass.      Tenderness: There is no abdominal tenderness.   Musculoskeletal:         General: No tenderness.      Cervical back: Normal range of motion and neck supple.      Right lower leg: No edema.      Left lower leg: No edema.   Lymphadenopathy:      Cervical: No cervical adenopathy.   Skin:     Coloration:  "Skin is not jaundiced or pale.   Neurological:      General: No focal deficit present.      Mental Status: He is alert and oriented to person, place, and time.   Psychiatric:         Mood and Affect: Mood normal.         Behavior: Behavior normal.         Assessment:       Problem List Items Addressed This Visit          Neuro    Parkinson disease       Psychiatric    Anxiety    Relevant Medications    ALPRAZolam (XANAX) 0.5 MG tablet       Cardiac/Vascular    Atrial fibrillation       Other    Obstructive sleep apnea     Other Visit Diagnoses         Routine physical examination    -  Primary            Plan:         Adam was seen today for hospital follow up.    Diagnoses and all orders for this visit:    Routine physical examination    Atrial fibrillation, unspecified type  Comments:  S/P Cardioversion. Sees Cardiology for follow up next week.    Parkinson's disease with dyskinesia and fluctuating manifestations    Anxiety  -     ALPRAZolam (XANAX) 0.5 MG tablet; TAKE 1 TABLET(0.5 MG) BY MOUTH EVERY NIGHT FOR INSOMNIA OR ANXIETY    Obstructive sleep apnea       Follow up in a few months, sooner prn.               Portions of this note may have been created with voice recognition software. Occasional "wrong-word" or "sound-a-like" substitutions may have occurred due to the inherent limitations of voice recognition software. Please, read the note carefully and recognize, using context, where substitutions have occurred.         [1]   Patient Active Problem List  Diagnosis    Hyperlipidemia    Obstructive sleep apnea    Esotropia of right eye    Ocular hypertension - Both Eyes    Nuclear sclerosis    Nuclear sclerotic cataract of left eye    Parkinson disease    Hypophonia    Voice and resonance disorder    Dysarthria    Impaired functional mobility, balance, gait, and endurance    Impaired motor control    Alcohol use disorder, mild, abuse    Oral lesion    Mohs defect of right cheek    History of melanoma in " situ    Mild neurocognitive disorder due to multiple etiologies    Parkinson's disease (tremor, stiffness, slow motion, unstable posture)    Atrial fibrillation    Anxiety

## 2025-05-11 RX ORDER — PRAVASTATIN SODIUM 80 MG/1
80 TABLET ORAL NIGHTLY
Qty: 90 TABLET | Refills: 3 | Status: SHIPPED | OUTPATIENT
Start: 2025-05-11

## 2025-05-11 NOTE — TELEPHONE ENCOUNTER
No care due was identified.  [facility] Embedded Care Due Messages. Reference number: [id number].   5/11/2025 8:11:07 AM CDT

## 2025-05-16 ENCOUNTER — PATIENT MESSAGE (OUTPATIENT)
Facility: CLINIC | Age: 65
End: 2025-05-16
Payer: COMMERCIAL

## 2025-06-03 ENCOUNTER — HOSPITAL ENCOUNTER (EMERGENCY)
Facility: HOSPITAL | Age: 65
Discharge: HOME | End: 2025-06-03
Attending: EMERGENCY MEDICINE
Payer: COMMERCIAL

## 2025-06-03 VITALS
WEIGHT: 225 LBS | SYSTOLIC BLOOD PRESSURE: 110 MMHG | HEIGHT: 73 IN | OXYGEN SATURATION: 96 % | DIASTOLIC BLOOD PRESSURE: 70 MMHG | TEMPERATURE: 99 F | HEART RATE: 56 BPM | BODY MASS INDEX: 29.82 KG/M2 | RESPIRATION RATE: 17 BRPM

## 2025-06-03 DIAGNOSIS — R07.9 CHEST PAIN: ICD-10-CM

## 2025-06-03 DIAGNOSIS — K21.9 GASTROESOPHAGEAL REFLUX DISEASE, UNSPECIFIED WHETHER ESOPHAGITIS PRESENT: Primary | ICD-10-CM

## 2025-06-03 LAB
ABSOLUTE EOSINOPHIL (OHS): 0.17 K/UL
ABSOLUTE MONOCYTE (OHS): 0.52 K/UL (ref 0.3–1)
ABSOLUTE NEUTROPHIL COUNT (OHS): 3.8 K/UL (ref 1.8–7.7)
ALBUMIN SERPL BCP-MCNC: 4.1 G/DL (ref 3.5–5.2)
ALP SERPL-CCNC: 62 UNIT/L (ref 40–150)
ALT SERPL W/O P-5'-P-CCNC: 5 UNIT/L (ref 10–44)
ANION GAP (OHS): 8 MMOL/L (ref 8–16)
AST SERPL-CCNC: 33 UNIT/L (ref 11–45)
BASOPHILS # BLD AUTO: 0.1 K/UL
BASOPHILS NFR BLD AUTO: 1.7 %
BILIRUB SERPL-MCNC: 0.8 MG/DL (ref 0.1–1)
BNP SERPL-MCNC: 78 PG/ML (ref 0–99)
BUN SERPL-MCNC: 17 MG/DL (ref 8–23)
CALCIUM SERPL-MCNC: 8.5 MG/DL (ref 8.7–10.5)
CHLORIDE SERPL-SCNC: 104 MMOL/L (ref 95–110)
CO2 SERPL-SCNC: 25 MMOL/L (ref 23–29)
CREAT SERPL-MCNC: 1 MG/DL (ref 0.5–1.4)
ERYTHROCYTE [DISTWIDTH] IN BLOOD BY AUTOMATED COUNT: 13.1 % (ref 11.5–14.5)
GFR SERPLBLD CREATININE-BSD FMLA CKD-EPI: >60 ML/MIN/1.73/M2
GLUCOSE SERPL-MCNC: 100 MG/DL (ref 70–110)
HCT VFR BLD AUTO: 41.3 % (ref 40–54)
HGB BLD-MCNC: 14.3 GM/DL (ref 14–18)
HOLD SPECIMEN: NORMAL
HOLD SPECIMEN: NORMAL
IMM GRANULOCYTES # BLD AUTO: 0.03 K/UL (ref 0–0.04)
IMM GRANULOCYTES NFR BLD AUTO: 0.5 % (ref 0–0.5)
LYMPHOCYTES # BLD AUTO: 1.34 K/UL (ref 1–4.8)
MCH RBC QN AUTO: 32.4 PG (ref 27–31)
MCHC RBC AUTO-ENTMCNC: 34.6 G/DL (ref 32–36)
MCV RBC AUTO: 93 FL (ref 82–98)
NUCLEATED RBC (/100WBC) (OHS): 0 /100 WBC
OHS QRS DURATION: 92 MS
OHS QTC CALCULATION: 446 MS
PLATELET # BLD AUTO: 170 K/UL (ref 150–450)
PMV BLD AUTO: 12.2 FL (ref 9.2–12.9)
POTASSIUM SERPL-SCNC: 4.7 MMOL/L (ref 3.5–5.1)
PROT SERPL-MCNC: 7.6 GM/DL (ref 6–8.4)
RBC # BLD AUTO: 4.42 M/UL (ref 4.6–6.2)
RELATIVE EOSINOPHIL (OHS): 2.9 %
RELATIVE LYMPHOCYTE (OHS): 22.5 % (ref 18–48)
RELATIVE MONOCYTE (OHS): 8.7 % (ref 4–15)
RELATIVE NEUTROPHIL (OHS): 63.7 % (ref 38–73)
SODIUM SERPL-SCNC: 137 MMOL/L (ref 136–145)
TROPONIN I SERPL HS-MCNC: <3 NG/L
WBC # BLD AUTO: 5.96 K/UL (ref 3.9–12.7)

## 2025-06-03 PROCEDURE — 84484 ASSAY OF TROPONIN QUANT: CPT

## 2025-06-03 PROCEDURE — 84484 ASSAY OF TROPONIN QUANT: CPT | Performed by: EMERGENCY MEDICINE

## 2025-06-03 PROCEDURE — 83880 ASSAY OF NATRIURETIC PEPTIDE: CPT

## 2025-06-03 PROCEDURE — 25000003 PHARM REV CODE 250

## 2025-06-03 PROCEDURE — 99285 EMERGENCY DEPT VISIT HI MDM: CPT | Mod: 25

## 2025-06-03 PROCEDURE — 93005 ELECTROCARDIOGRAM TRACING: CPT

## 2025-06-03 PROCEDURE — 93010 ELECTROCARDIOGRAM REPORT: CPT | Mod: ,,, | Performed by: INTERNAL MEDICINE

## 2025-06-03 PROCEDURE — 85025 COMPLETE CBC W/AUTO DIFF WBC: CPT

## 2025-06-03 PROCEDURE — 82947 ASSAY GLUCOSE BLOOD QUANT: CPT

## 2025-06-03 RX ORDER — ASPIRIN 325 MG
325 TABLET ORAL
Status: COMPLETED | OUTPATIENT
Start: 2025-06-03 | End: 2025-06-03

## 2025-06-03 RX ADMIN — ASPIRIN 325 MG ORAL TABLET 325 MG: 325 PILL ORAL at 12:06

## 2025-06-03 NOTE — ED PROVIDER NOTES
Encounter Date: 6/3/2025       History     Chief Complaint   Patient presents with    Chest Pain     Pain began 1 hour PTA, endorses jaw pain, denies numbness or tingling in upper extremities. Hx a fib w/ RVR      Adam Dalal is a 64 y.o. male who has a past medical history of Anxiety, Atrial fibrillation, Esotropia of right eye (05/02/2013), Hearing deficit, bilateral, High cholesterol, Hyperlipidemia, Malignant melanoma of skin, unspecified (06/29/2022), Melanoma (06/29/2022), Melanoma in situ of cheek, Parkinson's disease, and Ringing in ear, bilateral.    The patient presents to the ED due to central chest pain that began approximately an hour prior to arrival.  Patient states that he was non active sitting at his desk at work at which time he felt a pressure-like sensation in the middle of his chest which worked up to his throat.  He originally thought this may be gastric reflux in light of his prior ablations and cardiac history out and maybe more serious to which she sought medical attention.  Patient currently endorses no symptoms other than an odd pressure-like sensation.  Patient is A&O x4, denies shortness of breath, abdominal pain, early satiety, jaw claudication or referred pain to the left shoulder.  He denies nausea or vomiting.      The history is provided by the patient and medical records. No  was used.     Review of patient's allergies indicates:  No Known Allergies  Past Medical History:   Diagnosis Date    Anxiety     Atrial fibrillation     Esotropia of right eye 05/02/2013    Hearing deficit, bilateral     High cholesterol     Hyperlipidemia     Malignant melanoma of skin, unspecified 06/29/2022    in situ right lateral infraorbit    Melanoma 06/29/2022    in situ - right lateral infraorbital    Melanoma in situ of cheek     Parkinson's disease     Ringing in ear, bilateral      Past Surgical History:   Procedure Laterality Date    CATARACT EXTRACTION W/   INTRAOCULAR LENS IMPLANT Right 01/09/2017    Dr marin     CATARACT EXTRACTION W/  INTRAOCULAR LENS IMPLANT Left 01/30/2017    Dr. Marin    COLONOSCOPY N/A 7/7/2023    Procedure: COLONOSCOPY;  Surgeon: Arnaldo Neely MD;  Location: [facility] ENDOSCOPY;  Service: Endoscopy;  Laterality: N/A;  prep instructions sent [facility] pt [facility]  pre call complete, stated he would have a ride -CE    DEEP BRAIN STIMULATOR PLACEMENT Left 1/2/2024    Procedure: INSERTION, DEEP BRAIN STIMULATOR ELECTRODE;  Surgeon: Roger Flores MD;  Location: [facility] OR [address] [place] [address];  Service: Neurosurgery;  Laterality: Left;  INSERT ELECTRODE LEFT SIDE FOR DEEP BRAIN STIMULATION/ GLOBUS PALLIDUS INTERNUS/ TEDS & SCD/MEDTRONICS, SHRAVAN DARTEZ.    ECHOCARDIOGRAM,TRANSESOPHAGEAL N/A 4/22/2025    Procedure: Transesophageal echo (FABIENNE) intra-procedure log documentation;  Surgeon: Curtis Peter MD;  Location: [facility] EP LAB;  Service: Cardiology;  Laterality: N/A;    HERNIA REPAIR      INSERTION OF DEEP BRAIN STIMULATOR GENERATOR Left 1/9/2024    Procedure: INSERTION, PULSE GENERATOR, DEEP BRAIN STIMULATOR;  Surgeon: Roger Flores MD;  [facility];  Service: Neurosurgery;  Laterality: Left;  PLACEMENT OF PULSE GENERATOR FOR DEEP BRAIN STIMULATION/ TEDS & SCD/MEDTRONICS, SHRAVAN DARTEZ    PLACEMENT OF FIDUCIAL SCREW INTO SPINE N/A 1/2/2024    Procedure: INSERTION, FIDUCIAL SCREW, SKULL;  Surgeon: Roger Flores MD;  Location: [facility] OR [address] [place] [address];  Service: Neurosurgery;  Laterality: N/A;  APPLICATION OF FIDUCIALS FOR DEEP BRAIN STIMULATION/ TEDS & SCD/MEDTRONIC/SHRAVAN DARTEZ    RECONSTRUCTION OF FACE Right 8/4/2022    Procedure: RECONSTRUCTION, FACE;  Surgeon: Yaneli Hickman MD;  [facility];  Service: ENT;  Laterality: Right;    STRABISMUS SURGERY  4yrs    right eye    TREATMENT OF CARDIAC ARRHYTHMIA N/A 4/22/2025    Procedure: Cardioversion or Defibrillation;  Surgeon: Chucho Devlin MD;  Location: [facility] EP LAB;   Service: Cardiology;  Laterality: N/A;  AF, FABIENNE, DCCV, MAC, GP, 3 Prep *Neurostimulator in situ*    TREATMENT OF CARDIAC ARRHYTHMIA N/A 4/30/2025    Procedure: Cardioversion or Defibrillation;  Surgeon: Orlando Carrasco MD;  Location: [facility] EP LAB;  Service: Cardiology;  Laterality: N/A;  AF, DCCV ONLY, ANES, SK,      Family History   Problem Relation Name Age of Onset    Arthritis Mother      Hearing loss Mother      Heart attack Father      Atrial fibrillation Sister 2     Sleep apnea Brother      Arthritis Brother      Cataracts Maternal Grandmother      Cataracts Paternal Grandmother      Amblyopia Neg Hx      Blindness Neg Hx      Glaucoma Neg Hx      Macular degeneration Neg Hx      Retinal detachment Neg Hx      Strabismus Neg Hx      Melanoma Neg Hx       Social History[1]  Review of Systems   All other systems reviewed and are negative.      Physical Exam     Initial Vitals [06/03/25 1124]   BP Pulse Resp Temp SpO2   (!) 178/83 66 16 97.3 °F (36.3 °C) 97 %      MAP       --         Physical Exam    Nursing note and vitals reviewed.  Constitutional: He appears well-developed and well-nourished. No distress.   HENT:   Head: Normocephalic and atraumatic.   Eyes: Conjunctivae are normal. Pupils are equal, round, and reactive to light.   Dysconjugate gaze   Neck: Neck supple.   Normal range of motion.  Cardiovascular:  Normal rate, normal heart sounds and intact distal pulses.           Pulmonary/Chest: Breath sounds normal.   Abdominal: Abdomen is soft. He exhibits no distension. There is no abdominal tenderness. There is no rebound.   Musculoskeletal:         General: No edema. Normal range of motion.      Cervical back: Normal range of motion and neck supple.     Neurological: He is alert and oriented to person, place, and time. He has normal strength. GCS score is 15. GCS eye subscore is 4. GCS verbal subscore is 5. GCS motor subscore is 6.   Skin: Skin is warm and dry. Capillary refill takes less than 2  seconds.   Psychiatric: He has a normal mood and affect. His behavior is normal. Judgment and thought content normal.         ED Course   Procedures  Labs Reviewed   COMPREHENSIVE METABOLIC PANEL - Abnormal       Result Value    Sodium 137      Potassium 4.7      Chloride 104      CO2 25      Glucose 100      BUN 17      Creatinine 1.0      Calcium 8.5 (*)     Protein Total 7.6      Albumin 4.1      Bilirubin Total 0.8      ALP 62      AST 33      ALT 5 (*)     Anion Gap 8      eGFR >60     CBC WITH DIFFERENTIAL - Abnormal    WBC 5.96      RBC 4.42 (*)     HGB 14.3      HCT 41.3      MCV 93      MCH 32.4 (*)     MCHC 34.6      RDW 13.1      Platelet Count 170      MPV 12.2      Nucleated RBC 0      Neut % 63.7      Lymph % 22.5      Mono % 8.7      Eos % 2.9      Basophil % 1.7      Imm Grans % 0.5      Neut # 3.80      Lymph # 1.34      Mono # 0.52      Eos # 0.17      Baso # 0.10      Imm Grans # 0.03     TROPONIN I HIGH SENSITIVITY - Normal    Troponin High Sensitive <3     TROPONIN I HIGH SENSITIVITY - Normal    Troponin High Sensitive <3     B-TYPE NATRIURETIC PEPTIDE - Normal    BNP 78     TROPONIN I HIGH SENSITIVITY - Normal    Troponin High Sensitive <3     CBC W/ AUTO DIFFERENTIAL    Narrative:     The following orders were created for panel order CBC auto differential.  Procedure                               Abnormality         Status                     ---------                               -----------         ------                     CBC with Differential[[id number]]       Abnormal            Final result                 Please view results for these tests on the individual orders.   RAINBOW DRAW    Narrative:     The following orders were created for panel order [place] Draw.  Procedure                               Abnormality         Status                     ---------                               -----------         ------                     Light Blue Top Hold[[id number]]                              Final result               Gold Top Hold[[id number]]                                   Final result                 Please view results for these tests on the individual orders.   LIGHT BLUE TOP HOLD    Extra Tube Hold for add-ons.     GOLD TOP HOLD    Extra Tube Hold for add-ons.       EKG Readings: (Independently Interpreted)   Initial Reading: No STEMI. Rhythm: Normal Sinus Rhythm. Heart Rate: 61. ST Segments: Normal ST Segments. T Waves: Normal. Axis: Left Axis Deviation.     ECG Results              EKG 12-lead (Final result)        Collection Time Result Time QRS Duration OHS QTC Calculation    06/03/25 11:28:43 06/03/25 14:06:47 92 446                     Final result by Interface, Lab In [facility] (06/03/25 14:06:52)                   Narrative:    Test Reason : R07.9,    Vent. Rate :  61 BPM     Atrial Rate :    BPM     P-R Int :    ms          QRS Dur :  92 ms      QT Int : 444 ms       P-R-T Axes :    -33  50 degrees    QTcB Int : 446 ms    Normal sinus rhythm  Left axis deviation  Nonspecific ST and/or T wave abnormalities  Baseline Artifact  Abnormal ECG  When compared with ECG of 30-Apr-2025 18:36,  No significant change was found  Confirmed by Leandro Albert ([id number]) on 6/3/2025 2:06:44 PM    Referred By:            Confirmed By: Leandro Albert                                  Imaging Results              X-Ray Chest AP Portable (Final result)  Result time 06/03/25 12:30:16      Final result by Felix Hines III, MD (06/03/25 12:30:16)                   Impression:      No acute process seen.      Electronically signed by: Felix Hines MD  Date:    06/03/2025  Time:    12:30               Narrative:    EXAMINATION:  XR CHEST AP PORTABLE    CLINICAL HISTORY:  Chest Pain;    FINDINGS:  Chest one view portable.    There is a deep brain stimulator.  Heart size is normal.  There is aortic plaque.  Bones are noncontributory.  There is DJD.                                       Medications    aspirin tablet 325 mg (325 mg Oral Given 6/3/25 1246)     Medical Decision Making  64 y.o. male, as described above w/ hx of Anxiety, Atrial fibrillation, Esotropia of right eye (05/02/2013), Hearing deficit, bilateral, High cholesterol, Hyperlipidemia, Malignant melanoma of skin, unspecified (06/29/2022), Melanoma (06/29/2022), Melanoma in situ of cheek, Parkinson's diseaseThis is an emergent evaluation for a patient with chest pain. The patient is complaining of chest pain for the past hour prior to arrival.The patient's pain is atypical for cardiac etiology.  I decided to obtain and review the patient's past medical record.        The vital signs are stable in the room.    EKG shows AFib with normal rate.  There is no evidence of STEMI or ischemia.    CXR is negative for pneumonia, pneumothorax and edema.  High sensitivity troponin is negative and was drawn at least 4 hours since the onset of pain.  I doubt ACS.  BNP is negative.  There is no evidence of congestive heart failure.  The electrolytes are relatively normal.  The pt is not anemic.    The pt's symptoms were treated with:  Medications  aspirin tablet 325 mg (325 mg Oral Given 6/3/25 1246)    Currently the patient has a a non-diagnostic EKG with negative troponin in the emergency department.  I doubt acute coronary syndrome.  I did inform the patient that even with negative testing, we can never eliminate all risk.  I believe the patient is low risk with negative initial testing; they are appropriate for close outpatient follow-up, possible stress test.  The patient is aware of the small but persistent risk for MI/ACS with subsequent cardiac complications or death.  I have low suspicion for cardiopulmonary, vascular, infectious, respiratory, or other emergent medical condition based on my evaluation in the ED.     The patient's pain is currently improved.      The results and physical exam findings were reviewed with the patient. Pt agrees with  "assessment, disposition and treatment plan and has no further questions or complaints at this time.     Patient was signed out to oncoming provider pending delta troponin and re-evaluation.  I believe this patient will be amenable to discharge home with close follow up final disposition at discretion of sign-out provider.    Amount and/or Complexity of Data Reviewed  Labs: ordered. Decision-making details documented in ED Course.  Radiology: ordered.  ECG/medicine tests:  Decision-making details documented in ED Course.    Risk  OTC drugs.      Additional MDM:   Heart Score:    History:          Slightly suspicious.  ECG:             Normal  Age:               45-65 years  Risk factors: 1-2 risk factors  Troponin:       Less than or equal to normal limit  Heart Score = 2               Attending Attestation:   Physician Attestation Statement for Resident:  As the supervising MD   Physician Attestation Statement: I have personally seen and examined this patient.   I agree with the above history.  -: 64-year-old male with a history of AFib status post recent ablation on apixaban metoprolol, hyperlipidemia, additional medical history presents with a chief complaint of chest pain.  Described as "tightness" that resolved earlier today.  Exam reassuring and he is resting comfortably.  2 troponin rule out.  Doubt PE as on apixaban.  No signs of DVT.   As the supervising MD I agree with the above PE.     As the supervising MD I agree with the above treatment, course, plan, and disposition.                    ED Course as of 06/04/25 0810   Tue Jun 03, 2025   1202 EKG 12-lead  As per my own interpretation of EKG taken at 11:28 a.m., ventricular rate 61, , no ST elevations or depressions.  Left axis deviation, stable compared to prior taken on 06/03/2025.  No discernible P waves, history of AFib. [JL]   1252 As per my interpretation of chest x-ray, there are no fractures, pneumothorax, or costophrenic angle blunting to " indicate effusion. tracheal midline.  No signs of interstitial lung markings or opacities to indicate CHF exacerbation or pneumonia.  Cardiac device present [JL]   1358 Troponin I High Sensitivity: <3  We will follow-up with delta trop.  Patient remained asymptomatic [JL]   1400 BNP: 78 [JL]   1400 Comprehensive metabolic panel(!)  No metabolic or electrolyte derangements. [JL]   1400 CBC auto differential(!)  No infectious type process.  No leukocytosis or left shift.  Normal H and H [JL]      ED Course User Index  [JL] Obinna Christina MD                           Clinical Impression:  Final diagnoses:  [R07.9] Chest pain  [K21.9] Gastroesophageal reflux disease, unspecified whether esophagitis present (Primary)          ED Disposition Condition    Discharge Stable          ED Prescriptions    None       Follow-up Information       Follow up With Specialties Details Why Contact Info    Rashi Gee MD Internal Medicine Schedule an appointment as soon as possible for a visit   [address]  [place] [address]  [phone]      Chucho Devlin MD Electrophysiology, Cardiovascular Disease, Cardiology Schedule an appointment as soon as possible for a visit  As needed [address] [facility] [address]  [phone]                   Obinna Christina MD  Resident  25 [phone]         [1]   Social History  Tobacco Use    Smoking status: Former     Current packs/day: 0.00     Average packs/day: 1 pack/day for 1 year (1.0 ttl pk-yrs)     Types: Cigarettes     Start date: 2005     Quit date: 2006     Years since quittin.4    Smokeless tobacco: Never   Substance Use Topics    Alcohol use: Yes     Alcohol/week: 14.0 standard drinks of alcohol     Types: 14 Glasses of wine per week    Drug use: No        Graham Marsh MD  [phone]

## 2025-06-03 NOTE — PHARMACY MED REC
"    Admission Medication History     The home medication history was taken by Cece Marcelo.    You may go to "Admission" then "Reconcile Home Medications" tabs to review and/or act upon these items.     The home medication list has been updated by the Pharmacy department.   Please read ALL comments highlighted in yellow.   Please address this information as you see fit.    Feel free to contact us if you have any questions or require assistance.        Medications listed below were obtained from: Patient/family and Analytic software- [employer]  Current Outpatient Medications on File Prior to Encounter   Medication Sig    ALPRAZolam (XANAX) 0.5 MG tablet Take 1 tablet by mouth every night.        apixaban (ELIQUIS) 5 mg Tab Take 1 tablet (5 mg total) by mouth 2 (two) times daily.      carbidopa-levodopa (RYTARY) 23.75-95 mg CpSR Take 4 capsules by mouth 3 (three) times daily.      flecainide (TAMBOCOR) 100 MG Tab Take 1 tablet (100 mg total) by mouth every 12 (twelve) hours.      metoprolol succinate (TOPROL-XL) 25 MG 24 hr tablet Take 1 tablet (25 mg total) by mouth once daily.        pravastatin (PRAVACHOL) 80 MG tablet Take 1 tablet by mouth every evening.      rasagiline (AZILECT) 1 mg Tab Take 1 tablet (1 mg total) by mouth once daily.      carbidopa-levodopa  mg (SINEMET)  mg per tablet Take 1 tablet by mouth every evening.  (Patient not taking: Reported on 5/9/2025)      carbidopa-levodopa  mg (SINEMET)  mg per tablet Take 2 tablets by mouth 5 (five) times daily.   (Patient not taking: Reported on 5/9/2025)      meclizine (ANTIVERT) 25 mg tablet Take 1 tablet (25 mg total) by mouth 3 (three) times daily as needed for dizziness.   (Patient not taking: Reported on 6/3/2025)      PREVIDENT 5000 ENAMEL PROTECT 1.1-5 % Pste Use to brush teeth twice daily.    (Patient not taking: Reported on 6/3/2025)                 Potential issues to be addressed PRIOR TO DISCHARGE  Please discuss with " the patient barriers to adherence with medication treatment plans  Patient requires education regarding drug therapies     Cece Marcelo  EXT [phone]              .

## 2025-06-03 NOTE — ED NOTES
Pt presents to ED from home for cp. Cp is described as tightness to entire anterior chest. Pain is reproducible to palp and radiates to jaw. Pain is 2/10.     Pt has hx of afib with recent ablation. Pt is currently in afib but rate controlled. Endorses compliance with metoprolol and eliquis

## 2025-06-03 NOTE — ED PROVIDER NOTES
"/71   Pulse (!) 52   Temp 98.5 °F (36.9 °C) (Oral)   Resp 15   Ht 6' 1" (1.854 m)   Wt 102.1 kg (225 lb)   SpO2 98%   BMI 29.69 kg/m²       Patient received in sign-out from the previously treating ED team, pending serial high sensitivity troponin.  Secpnd value was undetectable.  On final reassessment patient is seen resting comfortably and we had a discussion about atypical chest pain, most possibly reflux in his case.  He is educated about supportive care and asked to follow up with his primary care doctor and cardiologist as soon as possible.  Asked to return to the emergency department immediately for any new, concerning, or worsening symptoms.  Patient was agreeable with this plan and discharged in stable condition.     Kaiden Dickson MD  06/03/25 1841    "

## 2025-06-22 ENCOUNTER — PATIENT MESSAGE (OUTPATIENT)
Dept: CARDIOLOGY | Facility: CLINIC | Age: 65
End: 2025-06-22
Payer: COMMERCIAL

## 2025-06-26 PROBLEM — Z79.01 ON ANTICOAGULANT THERAPY: Status: ACTIVE | Noted: 2025-06-26

## 2025-06-26 NOTE — PROGRESS NOTES
Mr. Dalal is a patient of Dr. Devlin and was last seen in clinic 3/31/2025.      Subjective:   Patient ID:  Adam Dalal is a 64 y.o. male who presents for follow up of Atrial Fibrillation  .     HPI:    Mr. Dalal is a 64 y.o. male with Parkinson's (s/p DBS), AF, HLD, malignant melanoma here for follow up after cardioversion.       Background:    Adam Dalal has a hx of Parkinson's disease status-post DBS in 1/2024, incidentally found to  be in AF at 117 bpm at a PCP visit in 12/2024 (ECG from 11/2023 shows NSR). Follow-up ECG in 2/2025 showed persistent AF. A 24 hr Holter in 3/2025 showed persistent AF avg  bpm. Pt currently on toprol 50 mg daily, no AC.    A 3/2025 ECG showed EF 60-65%.    History of symptomatic persistent AF. His [id number] Score is 0 but given efforts to restore sinus rhythm short-term plan is to start eliquis 5 mg bid.  Plan is FABIENNE/DCCV. Will start sotalol 80 mg bid 3 days prior to cardioversion, and check QTc post-procedure.    Update (07/07/2025):    4/22/2025: DCCV. Discharged on sotalol 80mg BID    4/24/2024: ED with dizziness, bradycardia, hypotension. Thought possibly secondary to sotalol.     4/30/2025: ED with palps. Cardioversion was successfully performed with restoration of normal sinus rhythm. Sotalol switched to flecainide.     6/3/2025: Patient states that he was non active sitting at his desk at work at which time he felt a pressure-like sensation in the middle of his chest which worked up to his throat. ACS testing negative. ECG showing SR.    Today he reports brief flutters/palps from time to time but no sustained events on flecainide. No significant LH on current regimen. No CP, worsening GUZMAN.    He is currently taking eliquis 5mg BID for stroke prophylaxis and denies significant bleeding episodes. He is currently being treated with flecainide 100mg BID for rhythm control and toprol 25mg daily for HR control.  Kidney function is stable, with a  creatinine of 1 on 6/3/2025.    I have personally reviewed the patient's EKG today, which shows sinus rhythm at 59bpm. MI interval is 178. QRS is 96. QT is 320.    Relevant Cardiac Test Results:    FABIENNE (4/22/2025):    FABIENNE for HEATHER assessment prior to DCCV; there is no evidence of intracardiac thrombus.    Left Atrium: Moderately dilated The left atrial appendage has a windsock morphology. Appendage velocity is normal at greater than 40 cm/sec. There is no thrombus in the left atrial appendage. The pulmonary veins have systolic blunting.    Left Ventricle: The left ventricle is normal in size. There is normal systolic function with a visually estimated ejection fraction of 55 - 60%.    Right Ventricle: The right ventricle is normal in size. Systolic function is normal.    Right Atrium: Right atrium is moderately dilated.    Aortic Valve: The aortic valve is a trileaflet valve. Mildly calcified cusps. There is mild annular calcification present.    Mitral Valve: There is mild regurgitation with a centrally directed jet.    Tricuspid Valve: There is mild regurgitation with a centrally directed jet.    Aorta: Grade 2 atherosclerosis of the descending aorta and in the aortic arch.    Current Outpatient Medications   Medication Sig    ALPRAZolam (XANAX) 0.5 MG tablet TAKE 1 TABLET(0.5 MG) BY MOUTH EVERY NIGHT FOR INSOMNIA OR ANXIETY    apixaban (ELIQUIS) 5 mg Tab Take 1 tablet (5 mg total) by mouth 2 (two) times daily.    carbidopa-levodopa (RYTARY) 23.75-95 mg CpSR Take 4 capsules by mouth 3 (three) times daily.    flecainide (TAMBOCOR) 100 MG Tab Take 1 tablet (100 mg total) by mouth every 12 (twelve) hours.    meclizine (ANTIVERT) 25 mg tablet Take 1 tablet (25 mg total) by mouth 3 (three) times daily as needed.    metoprolol succinate (TOPROL-XL) 25 MG 24 hr tablet Take 1 tablet (25 mg total) by mouth once daily.    pravastatin (PRAVACHOL) 80 MG tablet TAKE 1 TABLET(80 MG) BY MOUTH EVERY EVENING    PREVIDENT 5000  "ENAMEL PROTECT 1.1-5 % Pste     rasagiline (AZILECT) 1 mg Tab Take 1 tablet (1 mg total) by mouth once daily.    carbidopa-levodopa  mg (SINEMET)  mg per tablet TAKE 1 TABLET BY MOUTH EVERY EVENING (Patient not taking: Reported on 7/7/2025)    carbidopa-levodopa  mg (SINEMET)  mg per tablet Take 2 tablets by mouth 5 (five) times daily.     No current facility-administered medications for this visit.     Facility-Administered Medications Ordered in Other Visits   Medication    0.9%  NaCl infusion    mupirocin 2 % ointment       Review of Systems   Constitutional: Negative for malaise/fatigue.   Cardiovascular:  Positive for palpitations (brief flutters not sustained). Negative for chest pain, dyspnea on exertion, irregular heartbeat and leg swelling.   Respiratory:  Negative for shortness of breath.    Hematologic/Lymphatic: Negative for bleeding problem.   Skin:  Negative for rash.   Musculoskeletal:  Negative for myalgias.   Gastrointestinal:  Negative for hematemesis, hematochezia and nausea.   Genitourinary:  Negative for hematuria.   Neurological:  Negative for light-headedness.   Psychiatric/Behavioral:  Negative for altered mental status.    Allergic/Immunologic: Negative for persistent infections.       Objective:          /64   Pulse (!) 59   Ht 6' 1" (1.854 m)   Wt 105.6 kg (232 lb 12.9 oz)   BMI 30.71 kg/m²     Physical Exam  Vitals and nursing note reviewed.   Constitutional:       Appearance: Normal appearance. He is well-developed.   HENT:      Head: Normocephalic.      Nose: Nose normal.   Eyes:      Pupils: Pupils are equal, round, and reactive to light.   Cardiovascular:      Rate and Rhythm: Normal rate and regular rhythm.   Pulmonary:      Effort: No respiratory distress.   Musculoskeletal:         General: Normal range of motion.   Skin:     General: Skin is warm and dry.      Findings: No erythema.   Neurological:      Mental Status: He is alert and oriented to " person, place, and time.   Psychiatric:         Speech: Speech normal.         Behavior: Behavior normal.       Lab Results   Component Value Date     06/03/2025     02/27/2025    K 4.7 06/03/2025    K 4.5 02/27/2025    MG 1.9 05/01/2025    BUN 17 06/03/2025    CREATININE 1.0 06/03/2025    ALT 5 (L) 06/03/2025    ALT <5 (L) 11/21/2023    AST 33 06/03/2025    AST 23 11/21/2023    HGB 14.3 06/03/2025    HGB 15.3 02/27/2025    HCT 41.3 06/03/2025    HCT 45.1 02/27/2025    TSH 5.788 (H) 04/29/2025    TSH 0.803 12/18/2024    LDLCALC 131.4 04/21/2025       Recent Labs   Lab 11/21/23  1253 01/02/24  0640 01/09/24  1237 03/31/25  1406   INR 1.0 1.0 1.0 1.1   PT  --   --   --  11.7       Assessment:     1. Persistent atrial fibrillation    2. Obstructive sleep apnea    3. On anticoagulant therapy    4. Encounter for monitoring flecainide therapy        Plan:     In summary, Mr. Dalal is a 64 y.o. male with Parkinson's (s/p DBS), AF, HLD, malignant melanoma here for follow up after cardioversion.   Pt is 2 mo s/p DCCV and initiation of sotalol. He experienced LH/fatigue secondary to sotalol and ultimately had AF recurrence and underwent second DCCV a week afterward and sotalol was switched to flecainide. He has since done well on flecainide and reports symptom improvement in SR. Did have some CP in early June but acute coronary syndrome ruled out and he remained in SR during ED stay. ECG today shows SR with stable intervals. CHADSVASc 0 and he remains on eliquis. Will go ahead and stop eliquis at this time given good rhythm control and low stroke risk. Discussed possible ablation in the future if he has recurrence on flecainide. Discussed Kardia for ongoing rhythm monitoring at home.    Stop eliquis  Kardia for home monitoring  Continue other meds  RTC 3 mo, sooner if needed      *A copy of this note has been sent to Dr. Devlin*    Follow up in about 3 months (around  10/7/2025).    ------------------------------------------------------------------    CHACHA Parson, NP-C  Cardiac Electrophysiology

## 2025-06-29 ENCOUNTER — PATIENT MESSAGE (OUTPATIENT)
Dept: ELECTROPHYSIOLOGY | Facility: CLINIC | Age: 65
End: 2025-06-29
Payer: COMMERCIAL

## 2025-07-01 RX ORDER — FLECAINIDE ACETATE 100 MG/1
100 TABLET ORAL EVERY 12 HOURS
Qty: 60 TABLET | Refills: 11 | Status: SHIPPED | OUTPATIENT
Start: 2025-07-01 | End: 2025-08-30

## 2025-07-07 ENCOUNTER — HOSPITAL ENCOUNTER (OUTPATIENT)
Dept: CARDIOLOGY | Facility: CLINIC | Age: 65
Discharge: HOME | End: 2025-07-07
Payer: COMMERCIAL

## 2025-07-07 ENCOUNTER — OFFICE VISIT (OUTPATIENT)
Dept: ELECTROPHYSIOLOGY | Facility: CLINIC | Age: 65
End: 2025-07-07
Payer: COMMERCIAL

## 2025-07-07 VITALS
HEART RATE: 59 BPM | DIASTOLIC BLOOD PRESSURE: 64 MMHG | HEIGHT: 73 IN | BODY MASS INDEX: 30.85 KG/M2 | WEIGHT: 232.81 LBS | SYSTOLIC BLOOD PRESSURE: 122 MMHG

## 2025-07-07 DIAGNOSIS — Z79.899 ENCOUNTER FOR MONITORING FLECAINIDE THERAPY: ICD-10-CM

## 2025-07-07 DIAGNOSIS — Z79.01 ON ANTICOAGULANT THERAPY: ICD-10-CM

## 2025-07-07 DIAGNOSIS — G47.33 OBSTRUCTIVE SLEEP APNEA: ICD-10-CM

## 2025-07-07 DIAGNOSIS — Z51.81 ENCOUNTER FOR MONITORING FLECAINIDE THERAPY: ICD-10-CM

## 2025-07-07 DIAGNOSIS — I48.19 PERSISTENT ATRIAL FIBRILLATION: ICD-10-CM

## 2025-07-07 DIAGNOSIS — I48.19 PERSISTENT ATRIAL FIBRILLATION: Primary | ICD-10-CM

## 2025-07-07 LAB
OHS QRS DURATION: 96 MS
OHS QTC CALCULATION: 316 MS

## 2025-07-07 PROCEDURE — 99999 PR PBB SHADOW E&M-EST. PATIENT-LVL IV: CPT | Mod: PBBFAC,,, | Performed by: NURSE PRACTITIONER

## 2025-07-07 PROCEDURE — 1160F RVW MEDS BY RX/DR IN RCRD: CPT | Mod: CPTII,S$GLB,, | Performed by: NURSE PRACTITIONER

## 2025-07-07 PROCEDURE — 3078F DIAST BP <80 MM HG: CPT | Mod: CPTII,S$GLB,, | Performed by: NURSE PRACTITIONER

## 2025-07-07 PROCEDURE — 93010 ELECTROCARDIOGRAM REPORT: CPT | Mod: S$GLB,,, | Performed by: INTERNAL MEDICINE

## 2025-07-07 PROCEDURE — 3044F HG A1C LEVEL LT 7.0%: CPT | Mod: CPTII,S$GLB,, | Performed by: NURSE PRACTITIONER

## 2025-07-07 PROCEDURE — 1159F MED LIST DOCD IN RCRD: CPT | Mod: CPTII,S$GLB,, | Performed by: NURSE PRACTITIONER

## 2025-07-07 PROCEDURE — 3008F BODY MASS INDEX DOCD: CPT | Mod: CPTII,S$GLB,, | Performed by: NURSE PRACTITIONER

## 2025-07-07 PROCEDURE — 99214 OFFICE O/P EST MOD 30 MIN: CPT | Mod: S$GLB,,, | Performed by: NURSE PRACTITIONER

## 2025-07-07 PROCEDURE — 3074F SYST BP LT 130 MM HG: CPT | Mod: CPTII,S$GLB,, | Performed by: NURSE PRACTITIONER

## 2025-07-07 NOTE — PATIENT INSTRUCTIONS
I'd like you to know about a device that can record your heart rhythm at home:      [employer] makes a device called MailFrontiera that you can use to check your heart rhythm. The souleymane analyzes the heart rhythm and is accurate more than 90% of the time in detecting atrial fibrillation.  It also integrates with the [facility] souleymane allowing you to send the tracing to your physician.              There are several watches made by [employer], [employer] and [employer] that record and analyze your heart rhythm - particularly useful for detecting atrial fibrillation. The reading is sent to your phone.

## 2025-07-22 ENCOUNTER — OFFICE VISIT (OUTPATIENT)
Dept: PODIATRY | Facility: CLINIC | Age: 65
End: 2025-07-22
Payer: COMMERCIAL

## 2025-07-22 ENCOUNTER — APPOINTMENT (OUTPATIENT)
Dept: RADIOLOGY | Facility: OTHER | Age: 65
End: 2025-07-22
Attending: PODIATRIST
Payer: COMMERCIAL

## 2025-07-22 ENCOUNTER — PATIENT MESSAGE (OUTPATIENT)
Dept: PODIATRY | Facility: CLINIC | Age: 65
End: 2025-07-22

## 2025-07-22 VITALS
BODY MASS INDEX: 30.85 KG/M2 | HEART RATE: 65 BPM | SYSTOLIC BLOOD PRESSURE: 132 MMHG | DIASTOLIC BLOOD PRESSURE: 85 MMHG | HEIGHT: 73 IN | WEIGHT: 232.81 LBS

## 2025-07-22 DIAGNOSIS — M25.571 ACUTE RIGHT ANKLE PAIN: Primary | ICD-10-CM

## 2025-07-22 DIAGNOSIS — G20.B2 PARKINSON'S DISEASE WITH DYSKINESIA AND FLUCTUATING MANIFESTATIONS: ICD-10-CM

## 2025-07-22 DIAGNOSIS — M25.571 ACUTE RIGHT ANKLE PAIN: ICD-10-CM

## 2025-07-22 PROCEDURE — 99999 PR PBB SHADOW E&M-EST. PATIENT-LVL IV: CPT | Mod: PBBFAC,,, | Performed by: PODIATRIST

## 2025-07-22 PROCEDURE — 1160F RVW MEDS BY RX/DR IN RCRD: CPT | Mod: CPTII,S$GLB,, | Performed by: PODIATRIST

## 2025-07-22 PROCEDURE — 73610 X-RAY EXAM OF ANKLE: CPT | Mod: 26,RT,, | Performed by: RADIOLOGY

## 2025-07-22 PROCEDURE — 73610 X-RAY EXAM OF ANKLE: CPT | Mod: TC,PN,RT

## 2025-07-22 PROCEDURE — 3079F DIAST BP 80-89 MM HG: CPT | Mod: CPTII,S$GLB,, | Performed by: PODIATRIST

## 2025-07-22 PROCEDURE — 3008F BODY MASS INDEX DOCD: CPT | Mod: CPTII,S$GLB,, | Performed by: PODIATRIST

## 2025-07-22 PROCEDURE — 3075F SYST BP GE 130 - 139MM HG: CPT | Mod: CPTII,S$GLB,, | Performed by: PODIATRIST

## 2025-07-22 PROCEDURE — 1159F MED LIST DOCD IN RCRD: CPT | Mod: CPTII,S$GLB,, | Performed by: PODIATRIST

## 2025-07-22 PROCEDURE — 3044F HG A1C LEVEL LT 7.0%: CPT | Mod: CPTII,S$GLB,, | Performed by: PODIATRIST

## 2025-07-22 PROCEDURE — 99203 OFFICE O/P NEW LOW 30 MIN: CPT | Mod: S$GLB,,, | Performed by: PODIATRIST

## 2025-07-31 DIAGNOSIS — G20.A1 PARKINSON'S DISEASE: ICD-10-CM

## 2025-07-31 RX ORDER — RASAGILINE 1 MG/1
TABLET ORAL
Qty: 90 TABLET | Refills: 2 | Status: SHIPPED | OUTPATIENT
Start: 2025-07-31

## 2025-08-01 NOTE — PROGRESS NOTES
Chief Complaint   Patient presents with    Foot Pain     Pain and discomfort in right ankle    Trauma happen 3 weeks ago pt sprained ankle            HPI:   Adam Dalal is a 65 y.o. male who presents to clinic for Foot Pain (Pain and discomfort in right ankle//Trauma happen 3 weeks ago pt sprained ankle )    Has had pain in this area before.   He had pain initially, able to bear weight.    He has been using Voltaren gel to the area.   He has history of Parkinsons.       Patient Active Problem List   Diagnosis    Hyperlipidemia    Obstructive sleep apnea    Esotropia of right eye    Ocular hypertension - Both Eyes    Nuclear sclerosis    Nuclear sclerotic cataract of left eye    Parkinson disease    Hypophonia    Voice and resonance disorder    Dysarthria    Impaired functional mobility, balance, gait, and endurance    Impaired motor control    Alcohol use disorder, mild, abuse    Oral lesion    Mohs defect of right cheek    History of melanoma in situ    Mild neurocognitive disorder due to multiple etiologies    Parkinson's disease (tremor, stiffness, slow motion, unstable posture)    Atrial fibrillation    Anxiety    On anticoagulant therapy           Current Outpatient Medications on File Prior to Visit   Medication Sig Dispense Refill    ALPRAZolam (XANAX) 0.5 MG tablet TAKE 1 TABLET(0.5 MG) BY MOUTH EVERY NIGHT FOR INSOMNIA OR ANXIETY 30 tablet 5    carbidopa-levodopa (RYTARY) 23.75-95 mg CpSR Take 4 capsules by mouth 3 (three) times daily. 360 capsule 11    carbidopa-levodopa  mg (SINEMET)  mg per tablet TAKE 1 TABLET BY MOUTH EVERY EVENING 30 tablet 11    carbidopa-levodopa  mg (SINEMET)  mg per tablet Take 2 tablets by mouth 5 (five) times daily. 300 tablet 11    flecainide (TAMBOCOR) 100 MG Tab Take 1 tablet (100 mg total) by mouth every 12 (twelve) hours. 60 tablet 11    meclizine (ANTIVERT) 25 mg tablet Take 1 tablet (25 mg total) by mouth 3 (three) times daily as needed.  "20 tablet 0    pravastatin (PRAVACHOL) 80 MG tablet TAKE 1 TABLET(80 MG) BY MOUTH EVERY EVENING 90 tablet 3    PREVIDENT 5000 ENAMEL PROTECT 1.1-5 % Pste       metoprolol succinate (TOPROL-XL) 25 MG 24 hr tablet Take 1 tablet (25 mg total) by mouth once daily. 30 tablet 1     Current Facility-Administered Medications on File Prior to Visit   Medication Dose Route Frequency Provider Last Rate Last Admin    0.9%  NaCl infusion   Intravenous Continuous Thony Graves  mL/hr at 01/03/24 0519 New Bag at 01/03/24 0519    mupirocin 2 % ointment   Nasal On Call Procedure Thony Graves MD   Given at 01/02/24 0623       ALLG:  Review of patient's allergies indicates:  No Known Allergies        ROS:  General ROS: negative for - chills or fever  Respiratory ROS: no cough, shortness of breath, or wheezing  Cardiovascular ROS: no chest pain or dyspnea on exertion  Musculoskeletal ROS: negative for - gait disturbance  Dermatological ROS: negative for pruritus and rash, positive for lump        EXAM:   Vitals:    07/22/25 0828   BP: 132/85   Pulse: 65   Weight: 105.6 kg (232 lb 12.9 oz)   Height: 6' 1" (1.854 m)       General:  Alert and oriented x 3, WDWN, in no apparent distress.    Physical exam:     Vasc: DP and PT pulses are palpable in both feet.  The feet appropriately warm to touch, capillary refill time is within normal limit.    Neuro: Sharp/dull sensation is intact.  No Tinel's noted.  Patient does have history of Parkinson's.    Derm:  No bruising noted.  No erythema.  Very small healed wound noted at the site of pain.   No swelling.       MSK:  Limited range of motion bilateral feet. Mild tremors.  There is tenderness to palpation at the medial malleolus.          7/14/22:  Xrays:  RIGHT ANKLE: FINDINGS:  There is a skin marker overlying the medial malleolus.  There is a transverse sclerotic line extending through the medial malleolus, which could represent a healing subacute fracture.  Alignment is " anatomic.  No acute fractures are identified.  The talar dome is intact and the ankle mortise is relatively symmetric.  Mild degenerative changes of the tibiotalar joint.  Achilles enthesopathy.  No focal soft tissue swelling.      7/22/2025  RIGHT ANKLE xray: FINDINGS:  Ankle mortise is preserved.  Again a E sclerotic line in the medial malleolus appears to represent a healing fracture.  Calcaneal spurs are noted.         ASSESSMENT/PLAN       Problem List Items Addressed This Visit       Parkinson disease     Other Visit Diagnoses         Acute right ankle pain    -  Primary    Relevant Orders    X-Ray Ankle Complete Right (Completed)            I counseled the patient on the patient's conditions, their implications and medical management.  RIGHT xrays.     OTC Ankle brace.  Defer CAM boot due to concerns about instability due to Parkinsons   Continue topical NSAIDs as needed for pain.  Clinic evacuated in the middle of the visit due to potential gas leak in the building.  Patient safely left and contacted by phone and patient portal to finish appointment.

## 2025-08-27 ENCOUNTER — PATIENT MESSAGE (OUTPATIENT)
Facility: CLINIC | Age: 65
End: 2025-08-27
Payer: COMMERCIAL

## 2025-08-27 DIAGNOSIS — G20.B2 PARKINSON'S DISEASE WITH DYSKINESIA AND FLUCTUATING MANIFESTATIONS: Primary | ICD-10-CM

## 2025-08-28 ENCOUNTER — PATIENT MESSAGE (OUTPATIENT)
Facility: CLINIC | Age: 65
End: 2025-08-28
Payer: COMMERCIAL

## (undated) DEVICE — GUIDE ENDOCAVITY NEEDLE 3.5X20

## (undated) DEVICE — TUBE FRAZIER 5MM 2FT SOFT TIP

## (undated) DEVICE — DRAPE HALF SURGICAL 40X58IN

## (undated) DEVICE — DRESSING SURGICAL 1/2X1/2

## (undated) DEVICE — DRESSING TRANS 4X4 TEGADERM

## (undated) DEVICE — DRESSING TELFA N ADH 3X8

## (undated) DEVICE — BIT PERFORATOR LARGE

## (undated) DEVICE — SOL BETADINE 5%

## (undated) DEVICE — NEURO PROBE

## (undated) DEVICE — SUT VICRYL PLUS 3-0 SH 18IN

## (undated) DEVICE — TAPE MEDIPORE 4IN X 2YDS

## (undated) DEVICE — RUBBERBAND STERILE 3X1/8IN

## (undated) DEVICE — DRAPE THREE-QTR REINF 53X77IN

## (undated) DEVICE — TRAY NEURO OMC

## (undated) DEVICE — TOWEL OR DISP STRL BLUE 4/PK

## (undated) DEVICE — SUT VICRYL PLUS 4-0 P3 18IN

## (undated) DEVICE — SUT 4/0 18IN PDS II CLR MO

## (undated) DEVICE — DRAPE T THYROID STERILE

## (undated) DEVICE — ADHESIVE MASTISOL VIAL 48/BX

## (undated) DEVICE — SKINMARKER & RULER REGULAR X-F

## (undated) DEVICE — STERILE CANNULA

## (undated) DEVICE — SUT SILK 3-0 CR/RB-1 8-18

## (undated) DEVICE — MARKER SKIN STND TIP BLUE BARR

## (undated) DEVICE — SUT 2-0 12-18IN SILK

## (undated) DEVICE — SPONGE GAUZE 16PLY 4X4

## (undated) DEVICE — BIT DRILL PERFORATOR DISP 14MM

## (undated) DEVICE — DRIVER AUTO DISP OSTEODRIVE

## (undated) DEVICE — SPONGE COTTON TRAY 4X4IN

## (undated) DEVICE — DRAPE STERI INSTRUMENT 1018

## (undated) DEVICE — STAPLER SKIN PROXIMATE WIDE

## (undated) DEVICE — SUT MONOCRYL 4-0 PS-1 UND

## (undated) DEVICE — CABLE NEUR OMEGA HEADSTAGE 5CH

## (undated) DEVICE — BANDAGE ROLL COTTN 4.5INX4.1YD

## (undated) DEVICE — HEMOSTAT SURGICEL 4X8IN

## (undated) DEVICE — GAUZE SPONGE PEANUT STRL

## (undated) DEVICE — ELECTRODE BLADE INSULATED 1 IN

## (undated) DEVICE — FIDUCIAL KIT UNI STEREO 10 MM
Type: IMPLANTABLE DEVICE | Site: CRANIAL | Status: NON-FUNCTIONAL
Removed: 2024-01-02

## (undated) DEVICE — TRAY CATH FOL SIL URIMTR 16FR

## (undated) DEVICE — SUT CTD VICRYL CR/RB-1 4-0

## (undated) DEVICE — SUT 2/0 18IN SILK BLK BRAID

## (undated) DEVICE — DRAPE IOBAN 2 STERI

## (undated) DEVICE — NDL 18GA X1 1/2 REG BEVEL

## (undated) DEVICE — BILATERAL NEXFRAME KIT

## (undated) DEVICE — JELLY SURGILUBE LUBE PKT 3GM

## (undated) DEVICE — PAD DEFIB CADENCE ADULT R2

## (undated) DEVICE — GOWN SURGICAL X-LARGE

## (undated) DEVICE — PROGRAMMER PERCEPT PC DEVICE

## (undated) DEVICE — CARTRIDGE OIL

## (undated) DEVICE — DENTAL ROLL 1 1/2 X 3/8

## (undated) DEVICE — SEE MEDLINE ITEM 157194

## (undated) DEVICE — NDL STRAIGHT 4CM LEIBINGER

## (undated) DEVICE — SUT SILK 6-0 BLK BR P-1 P-1

## (undated) DEVICE — NEXPROBE

## (undated) DEVICE — SUT 5-0 PROLENE

## (undated) DEVICE — DRAPE INCISE IOBAN 2 23X17IN

## (undated) DEVICE — STRIP MEDI WND CLSR 1/2X4IN

## (undated) DEVICE — SENSIGHT LEAD TEST CABLE KIT

## (undated) DEVICE — DRAPE EENT SPLIT STERILE

## (undated) DEVICE — CLOSURE SKIN STERI STRIP 1/2X4

## (undated) DEVICE — TRAY MINOR GEN SURG

## (undated) DEVICE — APPLICATOR NS COTTON-TIP 3IN

## (undated) DEVICE — SENSIGHT EXTENSION TUNNELER KIT

## (undated) DEVICE — BOOT SUTURE AID

## (undated) DEVICE — SUT 4-0 CV RB-1 UND CR

## (undated) DEVICE — CORD BIPOLAR 12 FOOT

## (undated) DEVICE — BUR BONE CUT MICRO TPS 3X3.8MM

## (undated) DEVICE — DIFFUSER